# Patient Record
Sex: FEMALE | Race: ASIAN | Employment: FULL TIME | URBAN - METROPOLITAN AREA
[De-identification: names, ages, dates, MRNs, and addresses within clinical notes are randomized per-mention and may not be internally consistent; named-entity substitution may affect disease eponyms.]

---

## 2017-02-13 PROBLEM — D06.9 CIN III WITH SEVERE DYSPLASIA: Status: ACTIVE | Noted: 2017-02-13

## 2019-07-03 ENCOUNTER — HOSPITAL ENCOUNTER (OUTPATIENT)
Dept: SURGERY | Age: 64
Discharge: HOME | End: 2019-07-03
Payer: COMMERCIAL

## 2019-07-03 VITALS
DIASTOLIC BLOOD PRESSURE: 80 MMHG | WEIGHT: 170 LBS | BODY MASS INDEX: 30.12 KG/M2 | HEART RATE: 80 BPM | SYSTOLIC BLOOD PRESSURE: 140 MMHG | OXYGEN SATURATION: 95 % | TEMPERATURE: 98.1 F | HEIGHT: 63 IN | RESPIRATION RATE: 16 BRPM

## 2019-07-03 LAB
BACTERIA SPEC CULT: NORMAL
CREAT SERPL-MCNC: 0.6 MG/DL (ref 0.6–1)
HGB BLD-MCNC: 11.8 G/DL (ref 11.7–15.4)
POTASSIUM SERPL-SCNC: 3.6 MMOL/L (ref 3.5–5.1)
SERVICE CMNT-IMP: NORMAL

## 2019-07-03 PROCEDURE — 84132 ASSAY OF SERUM POTASSIUM: CPT

## 2019-07-03 PROCEDURE — 82565 ASSAY OF CREATININE: CPT

## 2019-07-03 PROCEDURE — 87641 MR-STAPH DNA AMP PROBE: CPT

## 2019-07-03 PROCEDURE — 77030027138 HC INCENT SPIROMETER -A

## 2019-07-03 PROCEDURE — 85018 HEMOGLOBIN: CPT

## 2019-07-03 RX ORDER — CHOLECALCIFEROL (VITAMIN D3) 125 MCG
CAPSULE ORAL DAILY
COMMUNITY

## 2019-07-03 RX ORDER — ALBUTEROL SULFATE 0.63 MG/3ML
0.63 SOLUTION RESPIRATORY (INHALATION)
COMMUNITY
End: 2019-07-03

## 2019-07-03 RX ORDER — CETIRIZINE HCL 10 MG
10 TABLET ORAL DAILY
COMMUNITY
End: 2020-04-01

## 2019-07-03 RX ORDER — HYDROCHLOROTHIAZIDE 25 MG/1
25 TABLET ORAL DAILY
COMMUNITY
End: 2020-04-16 | Stop reason: SDUPTHER

## 2019-07-03 RX ORDER — ALBUTEROL SULFATE 90 UG/1
2 AEROSOL, METERED RESPIRATORY (INHALATION)
COMMUNITY

## 2019-07-03 RX ORDER — PRAVASTATIN SODIUM 10 MG/1
10 TABLET ORAL
COMMUNITY
End: 2020-10-29 | Stop reason: ALTCHOICE

## 2019-07-03 RX ORDER — TRAZODONE HYDROCHLORIDE 50 MG/1
50 TABLET ORAL
COMMUNITY
End: 2020-04-01

## 2019-07-03 RX ORDER — LANOLIN ALCOHOL/MO/W.PET/CERES
CREAM (GRAM) TOPICAL
COMMUNITY
End: 2020-07-09 | Stop reason: SDUPTHER

## 2019-07-03 NOTE — PERIOP NOTES
Patient verified name, , and surgery as listed in [facility]. Patient provided medical/health information and PTA medications to the best of their ability. TYPE  CASE: 11  Orders per surgeon: were not received   Labs per surgeon: none at present time. Labs per anesthesia protocol: hgb, potassium, creat and  mrsa/mssa collected and results are in connect care  EKG:  EKG is not required per protocol. Patient any history of CAD, chest pain or shortness of breath on exertion     Nasal Swab collected per MD order . Patient provided with and instructed on education handouts including Guide to Surgery, blood transfusions, pain management, and hand hygiene for the family and community, and [facility] brochure. Road to Recovery Spine surgery patient guide given. Instructed on incentive spirometry with return demonstration. Long handled prehab sponge given with instructions for use. Patient viewed spine prehab video. Hibiclens and instructions given per hospital policy. Instructed patient to continue previous medications as prescribed prior to surgery unless otherwise directed and to take the following medications the day of surgery according to anesthesia guidelines : bring albuterol inhaler, amlodipine, atenolol, zyrtec and flonase nasal spray     . Instructed patient to hold  the following medications on the day of surgery: all vitamins and supplements 7 days prior including vitamin D and all nsaids 5 days prior    Original medication prescription bottles were visualized during patient appointment. Patient teach back successful and patient demonstrates knowledge of instruction.

## 2019-07-08 ENCOUNTER — ANESTHESIA EVENT (OUTPATIENT)
Dept: SURGERY | Age: 64
End: 2019-07-08
Payer: COMMERCIAL

## 2019-07-08 NOTE — H&P
Chief complaint: Back and leg pain. History of present illness: The patient returns today with persistent symptoms of lumbar deficit as previously described. This is a patient who has a lateral recess stenosis at L4-5. She also has a far lateral disc extrusion on the right at L5. There is compression of both L5 nerve roots. She has been going undergoing bilateral L5 selective nerve root blocks with significant relief however these injections just dont last.  The symptoms are worse on the right. The symptoms have been present for almost a year. The symptoms are described as bilateral L5 radiculopathy. The symptoms are worse with activities. There has been no lasting benefit from conservative efforts including injections, NSAIDs, tramadol, aquatic therapy, physical therapy. . At this point she is ready to proceed with surgical intervention. PMHx/PSHx/Social History/Medications/Allergies/ROS are documented separately and have been reviewed. ROS: Denies fever, chills, chest pain. ????? Medications:  ????? AmLODIPine & Diet Manage Prod;Aspirin; Atenolol;Cetirizine HCl;PARoxetine HCl;Pravastatin Sodium    Allergies:  ?????  ?????    Physical Exam: This is a well developed well nourished adult female in no acute distress. Mood and affect are appropriate. Oriented to person, place, and time. Head is normocephalic and atraumatic. Extraocular movements intact. Sclera anicteric. Respirations are unlabored and there is no evidence of cyanosis. Chest is clear to auscultation. Heart is regular rate and rhythm. Abdomen is soft. Straight leg testing is positive. There is subjective light touch sensory loss over the Bilateral L5.     Reflexes   Right Left   Quadriceps (L4) 2 2   Achilles (S1) 2 2     Ankle jerk is negative for clonus    Strength testing in the lower extremity reveals the following based on the 5 point grading scale:     HF (L2) H Ab (L5) KE (L3/4) ADF (L4) EHL (L5) A Ev (S1) APF (S1)   Right 5 5 5 5 5 5 5   Left 5 5 5 5 5 5 5     The feet are warm with good capillary refill and palpable pedal pulses. Radiographic Studies:    X-rays and MRI were again independently reviewed and correlate with the patients symptoms. Patient has significant spinal stenosis from L4-S1. Assessment/Plan: This patients clinical history and physical exam is consistent with Neurogenic claudication related to spinal stenosis from L4-S1. Raymond Barnett The imaging studies are concordant with the patients symptoms. Conservative efforts have been reasonably exhausted and the patient feels like she cannot go on with the symptoms as they are. We have previously discussed surgical options and now she would like to proceed with surgical scheduling.     ????? Patient met with Dr. Grzegorz Shay the following was discussed: We discussed the details of the surgery including a midline incision in over the low back followed by dissection to the area of stenosis. The nerves would be freed up by trimming any impinging structures including ligaments and bone. Once the nerves are freed the wound would be closed with suture and covered with sterile dressings. The patient would expect to stay in the hospital 1-2 days or until she can get about safely with minimal assistance. A short stay in a rehabilitation facility could also be considered depending on how quickly she recovers. Follow-up would be scheduled for 2-3 weeks and she would have restrictions including no driving, and no lifting greater than 15 lbs until follow up with me.   We also discussed the potential risks of the surgery including, but not limited to infection, spinal fluid leak and potential headaches requiring her to remain supine or have a lumbar drain inserted post-operatively; injury to the cauda equina or peripheral nerve root resulting in paralysis, bowel or bladder dysfunction, or loss of use of an extremity; persistent back or leg symptoms, recurrence of stenosis or the development of instability possibly needing additional surgery;  blood loss requiring transfusion; and the risks of anesthesia including, but not limited heart attack, stroke, and blood clot. The patient voiced an understanding of these issues and would like to discuss them over with her family and will get back with me with her desired treatment course. The surgery that I believe would be most beneficial here is a bilateral L4-S1 laminectomy. Brett table with sling. Patient will also be referred for her last injection for this year. Shell be referred for bilateral L5 selective nerve root blocks as a form of pain control until she is able to undergo surgery. ????? ?????         Electronically Signed By Sharon FLORES and Aleks Hsu MD on 5/30/2019

## 2019-07-09 ENCOUNTER — APPOINTMENT (OUTPATIENT)
Dept: GENERAL RADIOLOGY | Age: 64
End: 2019-07-09
Attending: ORTHOPAEDIC SURGERY
Payer: COMMERCIAL

## 2019-07-09 ENCOUNTER — HOSPITAL ENCOUNTER (OUTPATIENT)
Age: 64
Setting detail: OUTPATIENT SURGERY
Discharge: HOME | End: 2019-07-09
Attending: ORTHOPAEDIC SURGERY | Admitting: ORTHOPAEDIC SURGERY
Payer: COMMERCIAL

## 2019-07-09 ENCOUNTER — ANESTHESIA (OUTPATIENT)
Dept: SURGERY | Age: 64
End: 2019-07-09
Payer: COMMERCIAL

## 2019-07-09 VITALS
DIASTOLIC BLOOD PRESSURE: 70 MMHG | TEMPERATURE: 97.6 F | HEART RATE: 92 BPM | BODY MASS INDEX: 30.21 KG/M2 | OXYGEN SATURATION: 94 % | SYSTOLIC BLOOD PRESSURE: 126 MMHG | RESPIRATION RATE: 11 BRPM | WEIGHT: 170.5 LBS | HEIGHT: 63 IN

## 2019-07-09 DIAGNOSIS — M48.062 LUMBAR STENOSIS WITH NEUROGENIC CLAUDICATION: Primary | ICD-10-CM

## 2019-07-09 PROCEDURE — 77030019908 HC STETH ESOPH SIMS -A: Performed by: NURSE ANESTHETIST, CERTIFIED REGISTERED

## 2019-07-09 PROCEDURE — 77030018836 HC SOL IRR NACL ICUM -A: Performed by: ORTHOPAEDIC SURGERY

## 2019-07-09 PROCEDURE — 74011250637 HC RX REV CODE- 250/637: Performed by: NURSE PRACTITIONER

## 2019-07-09 PROCEDURE — 77030032490 HC SLV COMPR SCD KNE COVD -B: Performed by: ORTHOPAEDIC SURGERY

## 2019-07-09 PROCEDURE — 77030012894: Performed by: ORTHOPAEDIC SURGERY

## 2019-07-09 PROCEDURE — 74011000250 HC RX REV CODE- 250

## 2019-07-09 PROCEDURE — 77030031139 HC SUT VCRL2 J&J -A: Performed by: ORTHOPAEDIC SURGERY

## 2019-07-09 PROCEDURE — 77030039425 HC BLD LARYNG TRULITE DISP TELE -A: Performed by: NURSE ANESTHETIST, CERTIFIED REGISTERED

## 2019-07-09 PROCEDURE — 77030002946 HC SUT NRLN J&J -B: Performed by: ORTHOPAEDIC SURGERY

## 2019-07-09 PROCEDURE — 74011250637 HC RX REV CODE- 250/637: Performed by: ANESTHESIOLOGY

## 2019-07-09 PROCEDURE — 77030037088 HC TUBE ENDOTRACH ORAL NSL COVD-A: Performed by: NURSE ANESTHETIST, CERTIFIED REGISTERED

## 2019-07-09 PROCEDURE — 77030019940 HC BLNKT HYPOTHRM STRY -B: Performed by: NURSE ANESTHETIST, CERTIFIED REGISTERED

## 2019-07-09 PROCEDURE — 76010000161 HC OR TIME 1 TO 1.5 HR INTENSV-TIER 1: Performed by: ORTHOPAEDIC SURGERY

## 2019-07-09 PROCEDURE — 74011250636 HC RX REV CODE- 250/636

## 2019-07-09 PROCEDURE — 74011000250 HC RX REV CODE- 250: Performed by: ORTHOPAEDIC SURGERY

## 2019-07-09 PROCEDURE — 76210000000 HC OR PH I REC 2 TO 2.5 HR: Performed by: ORTHOPAEDIC SURGERY

## 2019-07-09 PROCEDURE — 72020 X-RAY EXAM OF SPINE 1 VIEW: CPT

## 2019-07-09 PROCEDURE — 74011250636 HC RX REV CODE- 250/636: Performed by: ANESTHESIOLOGY

## 2019-07-09 PROCEDURE — 77030030163 HC BN WAX J&J -A: Performed by: ORTHOPAEDIC SURGERY

## 2019-07-09 PROCEDURE — 74011250636 HC RX REV CODE- 250/636: Performed by: ORTHOPAEDIC SURGERY

## 2019-07-09 PROCEDURE — 76060000033 HC ANESTHESIA 1 TO 1.5 HR: Performed by: ORTHOPAEDIC SURGERY

## 2019-07-09 PROCEDURE — 76210000026 HC REC RM PH II 1 TO 1.5 HR: Performed by: ORTHOPAEDIC SURGERY

## 2019-07-09 PROCEDURE — 74011250636 HC RX REV CODE- 250/636: Performed by: NURSE PRACTITIONER

## 2019-07-09 PROCEDURE — 77030014647 HC SEAL FBRN TISSL BAXT -D: Performed by: ORTHOPAEDIC SURGERY

## 2019-07-09 PROCEDURE — 77030025623 HC BUR RND PRECIS STRY -D: Performed by: ORTHOPAEDIC SURGERY

## 2019-07-09 RX ORDER — OXYCODONE HYDROCHLORIDE 5 MG/1
5 TABLET ORAL
Status: COMPLETED | OUTPATIENT
Start: 2019-07-09 | End: 2019-07-09

## 2019-07-09 RX ORDER — MIDAZOLAM HYDROCHLORIDE 1 MG/ML
2 INJECTION, SOLUTION INTRAMUSCULAR; INTRAVENOUS ONCE
Status: COMPLETED | OUTPATIENT
Start: 2019-07-09 | End: 2019-07-09

## 2019-07-09 RX ORDER — ONDANSETRON 2 MG/ML
INJECTION INTRAMUSCULAR; INTRAVENOUS AS NEEDED
Status: DISCONTINUED | OUTPATIENT
Start: 2019-07-09 | End: 2019-07-09 | Stop reason: HOSPADM

## 2019-07-09 RX ORDER — SODIUM CHLORIDE, SODIUM LACTATE, POTASSIUM CHLORIDE, CALCIUM CHLORIDE 600; 310; 30; 20 MG/100ML; MG/100ML; MG/100ML; MG/100ML
INJECTION, SOLUTION INTRAVENOUS
Status: DISCONTINUED | OUTPATIENT
Start: 2019-07-09 | End: 2019-07-09 | Stop reason: HOSPADM

## 2019-07-09 RX ORDER — CEFAZOLIN SODIUM/WATER 2 G/20 ML
2 SYRINGE (ML) INTRAVENOUS
Status: COMPLETED | OUTPATIENT
Start: 2019-07-09 | End: 2019-07-09

## 2019-07-09 RX ORDER — FENTANYL CITRATE 50 UG/ML
100 INJECTION, SOLUTION INTRAMUSCULAR; INTRAVENOUS ONCE
Status: DISCONTINUED | OUTPATIENT
Start: 2019-07-09 | End: 2019-07-09 | Stop reason: HOSPADM

## 2019-07-09 RX ORDER — HYDROMORPHONE HYDROCHLORIDE 2 MG/ML
0.5 INJECTION, SOLUTION INTRAMUSCULAR; INTRAVENOUS; SUBCUTANEOUS
Status: DISCONTINUED | OUTPATIENT
Start: 2019-07-09 | End: 2019-07-09 | Stop reason: HOSPADM

## 2019-07-09 RX ORDER — VANCOMYCIN HYDROCHLORIDE 1 G/20ML
INJECTION, POWDER, LYOPHILIZED, FOR SOLUTION INTRAVENOUS AS NEEDED
Status: DISCONTINUED | OUTPATIENT
Start: 2019-07-09 | End: 2019-07-09 | Stop reason: HOSPADM

## 2019-07-09 RX ORDER — SODIUM CHLORIDE, SODIUM LACTATE, POTASSIUM CHLORIDE, CALCIUM CHLORIDE 600; 310; 30; 20 MG/100ML; MG/100ML; MG/100ML; MG/100ML
100 INJECTION, SOLUTION INTRAVENOUS CONTINUOUS
Status: DISCONTINUED | OUTPATIENT
Start: 2019-07-09 | End: 2019-07-09 | Stop reason: HOSPADM

## 2019-07-09 RX ORDER — MIDAZOLAM HYDROCHLORIDE 1 MG/ML
2 INJECTION, SOLUTION INTRAMUSCULAR; INTRAVENOUS
Status: DISCONTINUED | OUTPATIENT
Start: 2019-07-09 | End: 2019-07-09 | Stop reason: HOSPADM

## 2019-07-09 RX ORDER — BUPIVACAINE HYDROCHLORIDE AND EPINEPHRINE 5; 5 MG/ML; UG/ML
INJECTION, SOLUTION EPIDURAL; INTRACAUDAL; PERINEURAL AS NEEDED
Status: DISCONTINUED | OUTPATIENT
Start: 2019-07-09 | End: 2019-07-09 | Stop reason: HOSPADM

## 2019-07-09 RX ORDER — PROPOFOL 10 MG/ML
INJECTION, EMULSION INTRAVENOUS AS NEEDED
Status: DISCONTINUED | OUTPATIENT
Start: 2019-07-09 | End: 2019-07-09 | Stop reason: HOSPADM

## 2019-07-09 RX ORDER — NALOXONE HYDROCHLORIDE 0.4 MG/ML
0.04 INJECTION, SOLUTION INTRAMUSCULAR; INTRAVENOUS; SUBCUTANEOUS
Status: DISCONTINUED | OUTPATIENT
Start: 2019-07-09 | End: 2019-07-09 | Stop reason: HOSPADM

## 2019-07-09 RX ORDER — HYDROCODONE BITARTRATE AND ACETAMINOPHEN 7.5; 325 MG/1; MG/1
1 TABLET ORAL
Qty: 40 TAB | Refills: 0 | Status: SHIPPED | OUTPATIENT
Start: 2019-07-09 | End: 2019-07-12

## 2019-07-09 RX ORDER — ONDANSETRON 2 MG/ML
INJECTION INTRAMUSCULAR; INTRAVENOUS
Status: COMPLETED
Start: 2019-07-09 | End: 2019-07-09

## 2019-07-09 RX ORDER — GLYCOPYRROLATE 0.2 MG/ML
INJECTION INTRAMUSCULAR; INTRAVENOUS AS NEEDED
Status: DISCONTINUED | OUTPATIENT
Start: 2019-07-09 | End: 2019-07-09 | Stop reason: HOSPADM

## 2019-07-09 RX ORDER — LIDOCAINE HYDROCHLORIDE 20 MG/ML
INJECTION, SOLUTION EPIDURAL; INFILTRATION; INTRACAUDAL; PERINEURAL AS NEEDED
Status: DISCONTINUED | OUTPATIENT
Start: 2019-07-09 | End: 2019-07-09 | Stop reason: HOSPADM

## 2019-07-09 RX ORDER — FENTANYL CITRATE 50 UG/ML
INJECTION, SOLUTION INTRAMUSCULAR; INTRAVENOUS AS NEEDED
Status: DISCONTINUED | OUTPATIENT
Start: 2019-07-09 | End: 2019-07-09 | Stop reason: HOSPADM

## 2019-07-09 RX ORDER — NEOSTIGMINE METHYLSULFATE 1 MG/ML
INJECTION INTRAVENOUS AS NEEDED
Status: DISCONTINUED | OUTPATIENT
Start: 2019-07-09 | End: 2019-07-09 | Stop reason: HOSPADM

## 2019-07-09 RX ORDER — ONDANSETRON 2 MG/ML
4 INJECTION INTRAMUSCULAR; INTRAVENOUS ONCE
Status: DISCONTINUED | OUTPATIENT
Start: 2019-07-09 | End: 2019-07-09 | Stop reason: HOSPADM

## 2019-07-09 RX ORDER — ROCURONIUM BROMIDE 10 MG/ML
INJECTION, SOLUTION INTRAVENOUS AS NEEDED
Status: DISCONTINUED | OUTPATIENT
Start: 2019-07-09 | End: 2019-07-09 | Stop reason: HOSPADM

## 2019-07-09 RX ORDER — DEXAMETHASONE SODIUM PHOSPHATE 4 MG/ML
INJECTION, SOLUTION INTRA-ARTICULAR; INTRALESIONAL; INTRAMUSCULAR; INTRAVENOUS; SOFT TISSUE AS NEEDED
Status: DISCONTINUED | OUTPATIENT
Start: 2019-07-09 | End: 2019-07-09 | Stop reason: HOSPADM

## 2019-07-09 RX ORDER — LIDOCAINE HYDROCHLORIDE 10 MG/ML
0.1 INJECTION INFILTRATION; PERINEURAL AS NEEDED
Status: DISCONTINUED | OUTPATIENT
Start: 2019-07-09 | End: 2019-07-09 | Stop reason: HOSPADM

## 2019-07-09 RX ADMIN — SODIUM CHLORIDE, SODIUM LACTATE, POTASSIUM CHLORIDE, AND CALCIUM CHLORIDE 100 ML/HR: 600; 310; 30; 20 INJECTION, SOLUTION INTRAVENOUS at 07:55

## 2019-07-09 RX ADMIN — Medication 0.5 G: at 08:42

## 2019-07-09 RX ADMIN — MIDAZOLAM HYDROCHLORIDE 2 MG: 2 INJECTION, SOLUTION INTRAMUSCULAR; INTRAVENOUS at 08:05

## 2019-07-09 RX ADMIN — GLYCOPYRROLATE 0.2 MG: 0.2 INJECTION INTRAMUSCULAR; INTRAVENOUS at 09:27

## 2019-07-09 RX ADMIN — Medication 0.5 G: at 08:40

## 2019-07-09 RX ADMIN — NEOSTIGMINE METHYLSULFATE 1.5 MG: 1 INJECTION INTRAVENOUS at 09:27

## 2019-07-09 RX ADMIN — FENTANYL CITRATE 25 MCG: 50 INJECTION, SOLUTION INTRAMUSCULAR; INTRAVENOUS at 09:30

## 2019-07-09 RX ADMIN — GLYCOPYRROLATE 0.2 MG: 0.2 INJECTION INTRAMUSCULAR; INTRAVENOUS at 09:30

## 2019-07-09 RX ADMIN — NEOSTIGMINE METHYLSULFATE 1 MG: 1 INJECTION INTRAVENOUS at 09:30

## 2019-07-09 RX ADMIN — ONDANSETRON 4 MG: 2 INJECTION INTRAMUSCULAR; INTRAVENOUS at 12:52

## 2019-07-09 RX ADMIN — Medication 3 AMPULE: at 07:55

## 2019-07-09 RX ADMIN — GLYCOPYRROLATE 0.2 MG: 0.2 INJECTION INTRAMUSCULAR; INTRAVENOUS at 09:26

## 2019-07-09 RX ADMIN — SODIUM CHLORIDE, SODIUM LACTATE, POTASSIUM CHLORIDE, CALCIUM CHLORIDE: 600; 310; 30; 20 INJECTION, SOLUTION INTRAVENOUS at 08:28

## 2019-07-09 RX ADMIN — NEOSTIGMINE METHYLSULFATE 1 MG: 1 INJECTION INTRAVENOUS at 09:28

## 2019-07-09 RX ADMIN — Medication 0.5 G: at 08:43

## 2019-07-09 RX ADMIN — NEOSTIGMINE METHYLSULFATE 1.5 MG: 1 INJECTION INTRAVENOUS at 09:26

## 2019-07-09 RX ADMIN — LIDOCAINE HYDROCHLORIDE 80 MG: 20 INJECTION, SOLUTION EPIDURAL; INFILTRATION; INTRACAUDAL; PERINEURAL at 08:32

## 2019-07-09 RX ADMIN — ONDANSETRON 4 MG: 2 INJECTION INTRAMUSCULAR; INTRAVENOUS at 09:10

## 2019-07-09 RX ADMIN — PROPOFOL 20 MG: 10 INJECTION, EMULSION INTRAVENOUS at 09:26

## 2019-07-09 RX ADMIN — PROPOFOL 200 MG: 10 INJECTION, EMULSION INTRAVENOUS at 08:32

## 2019-07-09 RX ADMIN — OXYCODONE HYDROCHLORIDE 5 MG: 5 TABLET ORAL at 11:04

## 2019-07-09 RX ADMIN — PROPOFOL 10 MG: 10 INJECTION, EMULSION INTRAVENOUS at 09:28

## 2019-07-09 RX ADMIN — GLYCOPYRROLATE 0.2 MG: 0.2 INJECTION INTRAMUSCULAR; INTRAVENOUS at 09:28

## 2019-07-09 RX ADMIN — HYDROMORPHONE HYDROCHLORIDE 0.5 MG: 2 INJECTION INTRAMUSCULAR; INTRAVENOUS; SUBCUTANEOUS at 10:09

## 2019-07-09 RX ADMIN — FENTANYL CITRATE 25 MCG: 50 INJECTION, SOLUTION INTRAMUSCULAR; INTRAVENOUS at 09:40

## 2019-07-09 RX ADMIN — FENTANYL CITRATE 50 MCG: 50 INJECTION, SOLUTION INTRAMUSCULAR; INTRAVENOUS at 08:32

## 2019-07-09 RX ADMIN — ROCURONIUM BROMIDE 50 MG: 10 INJECTION, SOLUTION INTRAVENOUS at 08:32

## 2019-07-09 RX ADMIN — DEXAMETHASONE SODIUM PHOSPHATE 4 MG: 4 INJECTION, SOLUTION INTRA-ARTICULAR; INTRALESIONAL; INTRAMUSCULAR; INTRAVENOUS; SOFT TISSUE at 08:47

## 2019-07-09 RX ADMIN — Medication 0.5 G: at 08:41

## 2019-07-09 RX ADMIN — FENTANYL CITRATE 50 MCG: 50 INJECTION, SOLUTION INTRAMUSCULAR; INTRAVENOUS at 08:28

## 2019-07-09 RX ADMIN — FENTANYL CITRATE 50 MCG: 50 INJECTION, SOLUTION INTRAMUSCULAR; INTRAVENOUS at 08:50

## 2019-07-09 NOTE — DISCHARGE INSTRUCTIONS
Discharge Instructions    Wound Care and Showering  Your wound will typically be covered with a clear plastic dressing when you go home from the hospital. Since it is transparent, you will see the underlying gauze turn red with blood which is normal. You do not need to change the dressing unless it is leaking from the edges. Otherwise leave this dressing in place. The clear plastic dressing is waterproof so you can take a shower while it is on. You may remove the clear plastic dressing and the underlying gauze 3 days after surgery. There will be small tape strips under the gauze which should be left in place. If there is no leaking from the wound, you may take a shower and allow the tape strips to get wet. Some of them may fall off. The remaining strips will be removed once you return to the office. If there is persistent leaking when you first remove the clear dressing, apply new gauze and new clear plastic dressing (typically purchased at a pharmacy) over the wound. Hair washing is permissible in the shower. No tub baths, hot tubs or whirlpools until seen in the office. If any of the following should occur, please call the office:    -Persistent drainage from the incision site.  -Opening of incisions  -Fevers greater than 101 degrees  -Flu-like symptoms  -Increased redness    Exercise  You have unlimited walking and stair climbing privileges. Walking outside or walking on a treadmill without an incline is also allowed. Do NOT lift anything weighing greater than 10-15 lbs. Especially try to avoid lifting or reaching above your head. Sleeping  You may sleep in any comfortable position. Many patients find comfort sleeping in a recliner chair. It is normal to have difficulty sleeping for the first several weeks following your surgery. We recommend trying Benadryl, Melatonin, or Tylenol PM for help sleeping. All are over-the-counter and can be found in drugstores.      Eating  Because of the tubes in your throat while asleep during surgery, it is normal to have a sore throat and some difficulty swallowing solid foods after your surgery. This may persist for several weeks. Eating soft foods like yogurt, macaroni, and mashed potatoes seem to help. Today, you may have bland foods, nothing spicy or greasy. Pain  If you feel you need pain medicine, you may take regular or extra-strength Tylenol. Do NOT take an anti-inflammatory medication such as Advil, Aleve, or Motrin for the first 8 weeks following your surgery. Anti-inflammatory medications like these hinder bone growth and healing, which is critical in the weeks following surgery. Do NOT resume taking Foasamax for 8 weeks after your fusion surgery. To help alleviate persistent soreness around the shoulder lades, apply ice or warm moist compresses. Driving  You may NOT drive a car until told otherwise by your physician. You may be a passenger for short distances (about 20-30 minutes). If you must take a longer trip, be sure to make several pit stops so that you can walk and stretch your legs. Reclining in the passenger seat seems to be the most comfortable position for most patients. In some states, it is illegal to drive a car while wearing a neck brace. Follow up appointments  When you are discharged from the hospital, a follow up appointment will be made for 2-3 weeks from your surgery date. Call [phone] to confirm your appointment. After general anesthesia or intravenous sedation, for 24 hours or while taking prescription Narcotics:  · Limit your activities  · Do not drive and operate hazardous machinery  · Do not make important personal or business decisions  · Do  not drink alcoholic beverages  · If you have not urinated within 8 hours after discharge, please contact your surgeon on call. *  Please give a list of your current medications to your Primary Care Provider.   *  Please update this list whenever your medications are discontinued, doses are      changed, or new medications (including over-the-counter products) are added. *  Please carry medication information at all times in case of emergency situations. These are general instructions for a healthy lifestyle:  No smoking/ No tobacco products/ Avoid exposure to second hand smoke  Surgeon General's Warning:  Quitting smoking now greatly reduces serious risk to your health. Obesity, smoking, and sedentary lifestyle greatly increases your risk for illness  A healthy diet, regular physical exercise & weight monitoring are important for maintaining a healthy lifestyle    You may be retaining fluid if you have a history of heart failure or if you experience any of the following symptoms:  Weight gain of 3 pounds or more overnight or 5 pounds in a week, increased swelling in our hands or feet or shortness of breath while lying flat in bed. Please call your doctor as soon as you notice any of these symptoms; do not wait until your next office visit.

## 2019-07-09 NOTE — ANESTHESIA PREPROCEDURE EVALUATION
Relevant Problems   No relevant active problems       Anesthetic History     PONV          Review of Systems / Medical History  Pertinent labs reviewed    Pulmonary  Within defined limits                 Neuro/Psych   Within defined limits           Cardiovascular    Hypertension              Exercise tolerance: >4 METS  Comments: Normal stress test 2018   GI/Hepatic/Renal  Within defined limits              Endo/Other  Within defined limits           Other Findings            Physical Exam    Airway  Mallampati: II  TM Distance: 4 - 6 cm  Neck ROM: normal range of motion   Mouth opening: Diminished (comment)     Cardiovascular  Regular rate and rhythm,  S1 and S2 normal,  no murmur, click, rub, or gallop             Dental  No notable dental hx       Pulmonary  Breath sounds clear to auscultation               Abdominal  GI exam deferred       Other Findings            Anesthetic Plan    ASA: 2  Anesthesia type: general          Induction: Intravenous  Anesthetic plan and risks discussed with: Patient

## 2019-07-09 NOTE — ANESTHESIA POSTPROCEDURE EVALUATION
Procedure(s):  L4-S1 LAMINECTOMY. general    Anesthesia Post Evaluation        Patient location during evaluation: PACU  Patient participation: complete - patient participated  Level of consciousness: awake  Pain management: satisfactory to patient  Airway patency: patent  Anesthetic complications: no  Cardiovascular status: hemodynamically stable  Respiratory status: spontaneous ventilation  Hydration status: euvolemic  Post anesthesia nausea and vomiting:  none      Vitals Value Taken Time   /70 7/9/2019 11:50 AM   Temp 36.4 °C (97.6 °F) 7/9/2019 11:06 AM   Pulse 91 7/9/2019 11:53 AM   Resp 19 7/9/2019 11:53 AM   SpO2 97 % 7/9/2019 11:53 AM   Vitals shown include unvalidated device data.

## 2019-07-09 NOTE — OP NOTES
[address] [address]   [phone]    OPERATIVE REPORT    Patient ID:Jaja Dickey  [id number]  1955  66 y.o. DATE OF SURGERY: 7/9/2019    SURGEON: Dr. Elias Salazar. PREOPERATIVE DIAGNOSIS: Lumbar stenosis    POSTOPERATIVE DIAGNOSIS: Lumbar stenosis    PROCEDURE:    1. Lumbar laminectomy L4 with bilateral foraminotomies and L4 root decompression. (CPT K815566)  2. Lumbar laminectomy L5  with bilateral foraminotomies and L5 root decompression  (CPT 62461)  3. Lumbar laminectomy S1  with bilateral foraminotomies and S1 root decompression  (CPT 07438)    ANESTHESIA: General.    ESTIMATED BLOOD LOSS: 100 ml    POSTOPERATIVE CONDITION: Stable. INTRAOPERATIVE COMPLICATIONS: None. INDICATIONS FOR PROCEDURE: Back and leg pain consistent with claudication/lumbar radiculitis that is no longer responsive to conservative measures. Walking and standing tolerances have diminished. Imaging studies are concordant, showing lumbar stenosis. In the outpatient setting, the risks, benefits, and potential complications of the above listed procedure were discussed and an informed consent was obtained. DESCRIPTION OF PROCEDURE: After adequate induction of general anesthesia, the patient was positioned prone on the Nixon Pew spinal table. Care was taken to pad all bony prominences. The shoulders and elbows were placed in the 90/90 position. The abdomen was allowed to hang free to decrease intraabdominal and venous pressure. The lumbar area was prepped and draped in the usual sterile fashion. Preoperative antibiotic was administered. A time out was called to confirm the appropriate patient, proposed procedure and proposed incision sites. With this conformation, an incision was created midline, over the lumbar spinous processes. Dissection was carried down to the lumbodorsal fascia.      A Kocher clamp was attached to a spinous process and a cross-table lateral fluoroscopic image was obtained to confirm appropriate spinal level. With this confirmation, the spinous process was marked with a Leksell rongeur and the lumbodorsal fascia and paraspinous musculature were elevated in a subperiosteal fashion, laterally off of the spinous processes and lamina. The pars interarticularis was exposed at each level. Care was taken to preserve the facet capsule at each level. The deep retractors were placed to facilitate visualization. A Leksell rongeur was used to resect the spinous processes of  L4 - S1. The 4 mm darell was used to thin the lamina to an eggshell like thickness. A triple-0 angled curet was used to elevated the ligamentum flavum off of its origin on the caudal surface of the L5 lamina as well as off the cephalad surface of the adjacent lamina. The ligamentum flavum was elevated from the thecal sac and a plane was created in the epidural space with the [facility]. A 4 mm Kerrison was used to perform a central laminectomy of  S1, L5 and L4 . A small dural rent at a denticulate insertion was approximated with a figure of eight 4-0 neurilon. The central laminectomy was widened to the medial border of the pedicle at each level. With the central laminectomy completed, a 4 mm Kerrison was used to under cut the lateral recess along the entire length of the laminectomy site. Then using the Hezzie Foyer elevator to retract the thecal sac and identify each of the nerve roots, partial foraminotomies were performed and each nerve was visualized and decompressed bilaterally including L4, L5, and S1. There was felt to be no significant facet instability and a fusion was not deemed to be necessary. With the decompression completed, the wound was liberally irrigated with saline solution. Floseal and Vancomycin were applied. The lumbodorsal fascia was approximated with a #1 Vicryl suture in an interrupted fashion.  The subcutaneous tissue and skin were approximated in a layered fashion. Benzoin and Steri-Strips were applied. Sterile dressings were applied. The patient tolerated the procedure well and was returned to the postanesthesia care unit in stable condition. At the end of the case, all sponge, needle, and instrument counts were correct.        Erwin Valdovinos MD

## 2019-08-28 ENCOUNTER — HOSPITAL ENCOUNTER (OUTPATIENT)
Dept: PHYSICAL THERAPY | Age: 64
Discharge: HOME | End: 2019-08-28
Payer: COMMERCIAL

## 2019-08-28 PROCEDURE — 97110 THERAPEUTIC EXERCISES: CPT

## 2019-08-28 PROCEDURE — 97161 PT EVAL LOW COMPLEX 20 MIN: CPT

## 2019-08-28 NOTE — PROGRESS NOTES
Cayetano Smith  : 1955  Primary: Denise Razo [facility]  Secondary:  [address] at [facility]  [address], [address], [place]  [phone]   [phone]        OUTPATIENT PHYSICAL THERAPY: Daily Treatment Note 2019  ICD-10: Low back pain (M54.5), Difficulty in walking, not elsewhere classified (R26.2), Muscle weakness (generalized) (M62.81)    Pre-treatment Symptoms/Complaints:  Moderate levels of back pain  Pain: Initial:   5/10 Post Session:  5/10   Medications Last Reviewed:  2019    Updated Objective Findings:  See evaluation note from today   TREATMENT:   THERAPEUTIC EXERCISE: ( 15 minutes):     Date:   Date:   Date:     Activity/Exercise Parameters Parameters Parameters         Quad set 5 sec hold x  10     SAQ X 10                                 MANUAL THERAPY: ( minutes):     Manual Therapy technique and location Date:   Date:   Date:      Parameters Parameters Parameters           MODALITIES ( minutes):    Include Fitness  Treatment/Session Summary:    · Response to Treatment:pt demonstrated understanding of exercises. Reported fatigue. · Communication/Consultation:  None today  · Equipment provided today:  None today  · Recommendations/Intent for next treatment session: Next visit will focus on LE, core, back strengthening and motion.   Treatment Plan of Care Effective Dates:   2019 TO 2019  Total Treatment Billable Duration:  20 minutes eval, 15 minutes therex  PT Patient Time In/Time Out  Time In: 1515  Time Out: 1309 Harshad Fontenot, PT, DPT

## 2019-08-28 NOTE — THERAPY EVALUATION
Fitz Sorensen  : 1955  Payor: Elliott Alas / Plan: Frank Hammond / Product Type: Commerical /  [address]  at [facility]  [address], [address], [place]  [phone]   [phone]             OUTPATIENT PHYSICAL THERAPY:Initial Assessment 2019   ICD-10: Treatment Diagnosis: Low back pain (M54.5), Difficulty in walking, not elsewhere classified (R26.2), Muscle weakness (generalized) (M62.81)  Precautions/Allergies:   Latex   MD Orders: evaluate and treat, modalities, core strengthening and stretching, gait training and right lower extremity strengthening MEDICAL/REFERRING DIAGNOSIS:  Encounter for follow-up examination after completed treatment for conditions other than malignant neoplasm [Z09]  Radiculopathy, lumbar region [M54.16]   DATE OF ONSET:   REFERRING PHYSICIAN: Shaniqua Mchugh 372: --     INITIAL ASSESSMENT:  Ms. Yenifer Davis presents with strength deficits in the R LE and functional gait deficits. Pt will benefit from skilled physical therapy to address dysfunctions. PROBLEM LIST (Impacting functional limitations):  1. Decreased Strength  2. Decreased ADL/Functional Activities  3. Decreased Ambulation Ability/Technique  4. Decreased Activity Tolerance  5. Decreased Towns with Home Exercise Program INTERVENTIONS PLANNED: (Treatment may consist of any combination of the following)  1. Home Exercise Program (HEP)  2. Manual Therapy including soft tissue mobilization  3. Neuromuscular Re-education/Strengthening  4. Range of Motion (ROM)  5. Therapeutic Activites  6. Therapeutic Exercise/Strengthening  7. Modalities including heat/cold application, electrical stimulation, vasopneumatic compression, ultrasound   TREATMENT PLAN:  Effective Dates: 2019 TO 2019 (90 days).   Frequency/Duration: 2 times a week for 90 Day(s)  GOALS: (Goals have been discussed and agreed upon with patient.)  Short-Term Functional Goals: Time Frame: 5 weeks     1. Pt will demonstrate independence with > or = 2 exercises in HEP. 2. Pt will report < or = 19 on Oswestry. Discharge Goals: Time Frame: 10 weeks  1. Pt will demonstrate independence with > or = 4 exercises in HEP. 2. Pt will report < or = 14 on Oswestry. 3. Pt will demonstrate functional gait. 4. Pt will demonstrate functional R LE strength. OUTCOME MEASURE:   Tool Used: Modified Oswestry Low Back Pain Questionnaire  Score:  Initial: 24/50  Most Recent: X/50 (Date: -- )   Interpretation of Score: Each section is scored on a 0-5 scale, 5 representing the greatest disability. The scores of each section are added together for a total score of 50. MEDICAL NECESSITY:   · Skilled intervention continues to be required due to decreased function. REASON FOR SERVICES/OTHER COMMENTS:  · Patient continues to require skilled intervention due to decreased function. Total Duration:  PT Patient Time In/Time Out  Time In: 1515  Time Out: 1600    Rehabilitation Potential For Stated Goals: Good  Regarding Eh Ace's therapy, I certify that the treatment plan above will be carried out by a therapist or under their direction. Thank you for this referral,  Melissa Chan, PT, DPT          PAIN/SUBJECTIVE:   Initial:   5/10 Post Session:  5/10   HISTORY:   History of Injury/Illness (Reason for Referral):  Pt had back surgery on July 9th due to back pain that radiated down R LE, symptoms had been ongoing for years before sx. Following sx reports of back pain and R LE weakness that results in leg buckling occasionally, so using a cane. Past Medical History/Comorbidities:   Ms. Cooper Osman  has a past medical history of DVT (deep venous thrombosis) ([address] (~2009), Hypertension, and Nausea & vomiting. Ms. Cooper Osman  has a past surgical history that includes hx open cholecystectomy (1995) and hx hysterectomy (1991).   Social History/Living Environment:     house  Prior Level of Function/Work/Activity:  Works retail- computer work at a desk  Dominant Side:         RIGHT   Ambulatory/Rehab [facility] Risk Assessment   Risk Factors:       No Risk Factors Identified Ability to Rise from Chair:       (0)  Ability to rise in a single movement   Falls Prevention Plan:       No modifications necessary   Total: (5 or greater = High Risk): 0   ©2010 [facility] [employer] States Patent #[id number]. Federal Law prohibits the replication, distribution or use without written permission from [facility] [employer]   Current Medications:       Current Outpatient Medications:     cetirizine (ZYRTEC) 10 mg tablet, Take 10 mg by mouth daily. , Disp: , Rfl:     hydroCHLOROthiazide (HYDRODIURIL) 25 mg tablet, Take 25 mg by mouth daily. Indications: high blood pressure, Disp: , Rfl:     ferrous sulfate 325 mg (65 mg iron) tablet, Take  by mouth Daily (before breakfast). , Disp: , Rfl:     traZODone (DESYREL) 50 mg tablet, Take 50 mg by mouth nightly as needed for Sleep., Disp: , Rfl:     pravastatin (PRAVACHOL) 10 mg tablet, Take 10 mg by mouth nightly., Disp: , Rfl:     cholecalciferol, vitamin D3, (VITAMIN D3) 2,000 unit tab, Take  by mouth daily. , Disp: , Rfl:     albuterol (PROVENTIL HFA, VENTOLIN HFA, PROAIR HFA) 90 mcg/actuation inhaler, Take 2 Puffs by inhalation every four (4) hours as needed for Wheezing., Disp: , Rfl:     fluticasone (FLONASE) 50 mcg/actuation nasal spray, 2 Sprays by Nasal route daily. , Disp: , Rfl:     amLODIPine (NORVASC) 10 mg tablet, Take 1 Tab by mouth daily. , Disp: , Rfl:     aspirin delayed-release 81 mg tablet, Take 81 mg by mouth daily. Indications: prevention of transient ischemic attack, Disp: , Rfl:     atenolol (TENORMIN) 25 mg tablet, Take 25 mg by mouth daily. , Disp: , Rfl:    Date Last Reviewed:  8/28/2019     Number of Personal Factors/Comorbidities that affect the Plan of Care: 0: LOW COMPLEXITY   EXAMINATION: Observation:       Standing- weight shifted onto L LE in standing       Gait- ambulated in using cane in R hand, increased trunk sway to the R during R LE stance phase. Generally short strides with L and decreased stance time on R    Strength:  Muscle/Movement Strength at Eval Reassessment Date:    knee extension R 3-/5    knee flexion R 3/5    Hip flexion  R 2+/5    Hip extension R 3/5    Hip abduction R 2+/5             Range of Motion:   Movement/Joint ROM at eval Reassessment Date:    Knee  R wfl    hip Generally decreased IR rotation on R                          Palpation:  No palpation done today. Body Structures Involved:  1. Nerves  2. Bones  3. Joints  4. Muscles Body Functions Affected:  1. Sensory/Pain  2. Neuromusculoskeletal  3. Movement Related Activities and Participation Affected:  1. General Tasks and Demands  2. Mobility  3. Domestic Life  4.  Community, Social and [facility] [place]   Number of elements (examined above) that affect the Plan of Care: 1-2: LOW COMPLEXITY   CLINICAL PRESENTATION:   Presentation: Stable and uncomplicated: LOW COMPLEXITY   CLINICAL DECISION MAKING:   Use of outcome tool(s) and clinical judgement create a POC that gives a: Clear prediction of patient's progress: LOW COMPLEXITY       Total Evaluation Duration:  PT Patient Time In/Time Out  Time In: 1515  Time Out: 1600    Future Appointments   Date Time Provider Socorro Chandler   8/30/2019  8:30 AM Damon Julien PT, DPT [facility]   9/6/2019  3:15 PM Hernandez Fontenot PT, DPT [facility]   9/9/2019  2:30 PM Hernandez Fontenot PT, DPT [facility]   9/11/2019  9:15 AM Kannan Huggins PT [facility]   9/16/2019  2:30 PM Hernandez Galeas PT, DPT [facility]   9/18/2019 10:30 AM Hernandez Galeas PT, DPT [facility]   9/23/2019  2:30 PM Hernandez Galeas PT, DPT [facility]   9/25/2019 10:30 AM Damon Julien PT, DPT [facility]   9/30/2019  2:30 PM Trey Lopes [facility]   10/2/2019 10:30 AM Trey Lopes PT, LUIS JAC[facility]   10/7/2019  2:30 PM [facility]   10/9/2019 10:30 AM Duke Hampton [facility]       Naeem Kenney PT, DPT

## 2019-08-30 ENCOUNTER — HOSPITAL ENCOUNTER (OUTPATIENT)
Dept: PHYSICAL THERAPY | Age: 64
Discharge: HOME | End: 2019-08-30
Payer: COMMERCIAL

## 2019-08-30 PROCEDURE — 97110 THERAPEUTIC EXERCISES: CPT

## 2019-08-30 NOTE — PROGRESS NOTES
Anette Enzo  : 1955  Primary: Luis Angel [facility]  Secondary:  [address] at [facility]  [address], [address], [place] [address]  [phone]   [phone]        OUTPATIENT PHYSICAL THERAPY: Daily Treatment Note 2019  ICD-10: Low back pain (M54.5), Difficulty in walking, not elsewhere classified (R26.2), Muscle weakness (generalized) (M62.81)    Pre-treatment Symptoms/Complaints:  Moderate levels of back pain  Pain: Initial:   5/10 Post Session:  5/10   Medications Last Reviewed:  2019    Updated Objective Findings: pt demonstrates step to gait pattern when walking without cane; improved gait sequencing using cane in L hand    TREATMENT:   THERAPEUTIC EXERCISE: ( 45 minutes):     Date:   Date:     Date:     Activity/Exercise Parameters Parameters Parameters   Recumbent stepper  8 min level 1    Quad set 5 sec hold x  10 X 20    SAQ X 10 X 20    SLR  X 20    bridge  X 20    SL clamshells  X 20 R    SL hip abduction  X 10 R    ambulation  No cane, CGA, 50 ft x 2                    MANUAL THERAPY: ( minutes):     Manual Therapy technique and location Date:   Date:   Date:      Parameters Parameters Parameters           MODALITIES ( minutes):    [employer] Portal  Treatment/Session Summary:    · Response to Treatment:pt reported R LE fatigue. Very hesitant to weight bear on R LE.    · Communication/Consultation:  None today  · Equipment provided today:  None today  · Recommendations/Intent for next treatment session: Next visit will focus on LE, core, back strengthening and motion.   Treatment Plan of Care Effective Dates:   2019 TO 2019  Total Treatment Billable Duration: 45 minutes therex  PT Patient Time In/Time Out  Time In: 830  Time Out: 915    Marietta Marcos, PT, DPT

## 2019-09-06 ENCOUNTER — HOSPITAL ENCOUNTER (OUTPATIENT)
Dept: PHYSICAL THERAPY | Age: 64
Discharge: HOME | End: 2019-09-06
Payer: COMMERCIAL

## 2019-09-06 PROCEDURE — 97110 THERAPEUTIC EXERCISES: CPT

## 2019-09-06 NOTE — PROGRESS NOTES
Cayetano Smith  : 1955  Primary: Denise Razo [facility]  Secondary:  [address] at [facility]  [address], [address], [place] [address]  [phone]   [phone]        OUTPATIENT PHYSICAL THERAPY: Daily Treatment Note 2019  ICD-10: Low back pain (M54.5), Difficulty in walking, not elsewhere classified (R26.2), Muscle weakness (generalized) (M62.81)    Pre-treatment Symptoms/Complaints:  Reports high levels of fatigue after last treatment. For the past couple of weeks the upper back has been painful and getting more painful, having difficulty rolling over. Pain: Initial:   5/10 Post Session:  5/10   Medications Last Reviewed:  2019    Updated Objective Findings: none today   TREATMENT:   THERAPEUTIC EXERCISE: ( 45 minutes):     Date:   Date:     Date:  9.   Activity/Exercise Parameters Parameters Parameters   Recumbent stepper  8 min level 1 12 min level 1   Quad set 5 sec hold x  10 X 20    SAQ X 10 X 20    SLR  X 20 2 x 10   bridge  X 20 2 x 10   SL clamshells  X 20 R    SL hip abduction  X 10 R    ambulation  No cane, CGA, 50 ft x 2    Abdominal isometrics   10 sec hold x 3 B LE   STC   30 sec hold x 3 R   Piriformis stretch   30 sec hold x 3 R   Towel roll under mid-thoracic   X 2 min   Seated thoracic flexion, extension   X 10   Ball squat   X 20       MANUAL THERAPY: ( minutes):     Manual Therapy technique and location Date:   Date:   Date:      Parameters Parameters Parameters           MODALITIES ( minutes):    [employer] Portal  Treatment/Session Summary:    · Response to Treatment: spent some time with mid thoracic back pain today, felt a stretch in the area with seated thoracic flexion/extension. To do this at home. · Communication/Consultation:  None today  · Equipment provided today:  None today  · Recommendations/Intent for next treatment session: Next visit will focus on LE, core, back strengthening and motion.   Treatment Plan of Care Effective Dates:   8/28/2019 TO 11/26/2019  Total Treatment Billable Duration: 45 minutes therex  PT Patient Time In/Time Out  Time In: 1783  Time Out: 7617 [facility] IMANI Fontenot, DPT

## 2019-09-09 ENCOUNTER — HOSPITAL ENCOUNTER (OUTPATIENT)
Dept: PHYSICAL THERAPY | Age: 64
Discharge: HOME | End: 2019-09-09
Payer: COMMERCIAL

## 2019-09-09 PROCEDURE — 97110 THERAPEUTIC EXERCISES: CPT

## 2019-09-09 NOTE — PROGRESS NOTES
Nelli Muller  : 1955  Primary: Jose Carlos Rodriguez [facility]  Secondary:  [address] at [facility]  [address], [address], [place] [address]  [phone]   [phone]        OUTPATIENT PHYSICAL THERAPY: Daily Treatment Note 2019  ICD-10: Low back pain (M54.5), Difficulty in walking, not elsewhere classified (R26.2), Muscle weakness (generalized) (M62.81)    Pre-treatment Symptoms/Complaints:  Mid back pain is much better since she has started doing the stretches, was so tired after last treatment   Pain: Initial:   5/10 Post Session:  5/10   Medications Last Reviewed:  2019    Updated Objective Findings: none today   TREATMENT:   THERAPEUTIC EXERCISE: ( 45 minutes):     Date:   Date:     Date:   9.9   Activity/Exercise Parameters Parameters Parameters    Recumbent stepper  8 min level 1 12 min level 1 10 min level 1   Quad set 5 sec hold x  10 X 20     SAQ X 10 X 20     SLR  X 20 2 x 10 2 x 10   bridge  X 20 2 x 10 2 x 10   SL clamshells  X 20 R  2 x 10 R   SL hip abduction  X 10 R  2 x 10 R   ambulation  No cane, CGA, 50 ft x 2     Abdominal isometrics   10 sec hold x 3 B LE    STC   30 sec hold x 3 R 30 sec hold x 3 R   Piriformis stretch   30 sec hold x 3 R 30 sec hold x 3 R   Towel roll under mid-thoracic   X 2 min    Seated thoracic flexion, extension   X 10    Ball squat   X 20    Sit to stand    Elevated surface, x 10   balance    In II bars, neutral stance without hand hold 30 sec x 4              MANUAL THERAPY: ( minutes):     Manual Therapy technique and location Date:   Date:   Date:      Parameters Parameters Parameters           MODALITIES ( minutes):    [employer] Portal  Treatment/Session Summary:    · Response to Treatment: reported increased fatigue with exercises.    · Communication/Consultation:  None today  · Equipment provided today:  None today  · Recommendations/Intent for next treatment session: Next visit will focus on LE, core, back strengthening and motion.   Treatment Plan of Care Effective Dates:   8/28/2019 TO 11/26/2019  Total Treatment Billable Duration: 45 minutes therex  PT Patient Time In/Time Out  Time In: 1430  Time Out: 6997    Devonte Garcias, PT, DPT     Future Appointments   Date Time Provider Socorro Chandler   9/11/2019  4:15 PM Maria Del Rosario Braun, PT, DPT [facility]   9/16/2019  2:30 PM Zhao Fontenot, PT, DPT [facility]   9/18/2019  1:30 PM Octavio Jade, PT [facility]   9/23/2019  2:30 PM Zhao Fontenot, PT, DPT [facility]   9/25/2019  2:30 PM Zhao Fontenot, PT, DPT [facility]   9/30/2019  2:30 PM Lebanon LynnZhao galvez Spenser, PT, DPT [facility]   10/2/2019  2:30 PM Lebanon LynnZhao galvez Spenser, PT, DPT [facility]   10/7/2019  2:30 PM Zhao Dueñas, PT [facility]   10/9/2019  2:30 PM Lori Cuevas, PT [facility]   '

## 2019-09-11 ENCOUNTER — HOSPITAL ENCOUNTER (OUTPATIENT)
Dept: PHYSICAL THERAPY | Age: 64
Discharge: HOME | End: 2019-09-11
Payer: COMMERCIAL

## 2019-09-11 PROCEDURE — 97110 THERAPEUTIC EXERCISES: CPT

## 2019-09-11 NOTE — PROGRESS NOTES
Henry Yin  : 1955  Primary: Pancho Brandon [facility]  Secondary:  [address] at [facility]  [address], [address], [place] [address]  [phone]   [phone]        OUTPATIENT PHYSICAL THERAPY: Daily Treatment Note 2019  ICD-10: Low back pain (M54.5), Difficulty in walking, not elsewhere classified (R26.2), Muscle weakness (generalized) (M62.81)    Pre-treatment Symptoms/Complaints:  Pt almost fell when walking to the bathroom at work because R LE gave way. She was very tired after last treatment. Pain: Initial:   5/10 Post Session:  5/10   Medications Last Reviewed:  2019    Updated Objective Findings: none today   TREATMENT:   THERAPEUTIC EXERCISE: ( 45 minutes):     Date:   Date:     Date:  9 9.9 9.11   Activity/Exercise Parameters Parameters Parameters     Recumbent stepper  8 min level 1 12 min level 1 10 min level 1 10 min level 1   Quad set 5 sec hold x  10 X 20      SAQ X 10 X 20      SLR  X 20 2 x 10 2 x 10 2 x 15   bridge  X 20 2 x 10 2 x 10 2 x 15   SL clamshells  X 20 R  2 x 10 R 2 x 15   SL hip abduction  X 10 R  2 x 10 R 2 x 10   ambulation  No cane, CGA, 50 ft x 2      Abdominal isometrics   10 sec hold x 3 B LE  5 sec hold x 3; therapist assist   STC   30 sec hold x 3 R 30 sec hold x 3 R    Piriformis stretch   30 sec hold x 3 R 30 sec hold x 3 R    Towel roll under mid-thoracic   X 2 min     Seated thoracic flexion, extension   X 10     Ball squat   X 20     Sit to stand    Elevated surface, x 10 Elevated surface, x 10   balance    In II bars, neutral stance without hand hold 30 sec x 4 In II bars, neutral stance without hand hold 30 sec x 4               MANUAL THERAPY: ( minutes):     Manual Therapy technique and location Date:   Date:   Date:      Parameters Parameters Parameters           MODALITIES ( minutes):    [employer] Portal  Treatment/Session Summary:    · Response to Treatment: reported increased fatigue with exercises. · Communication/Consultation:  None today  · Equipment provided today:  None today  · Recommendations/Intent for next treatment session: Next visit will focus on LE, core, back strengthening and motion.   Treatment Plan of Care Effective Dates:   8/28/2019 TO 11/26/2019  Total Treatment Billable Duration: 45 minutes therex  PT Patient Time In/Time Out  Time In: 3024  Time Out: 1309 [facility] Richardey, PT, DPT     Future Appointments   Date Time Provider Socorro Chandler   9/16/2019  2:30 PM [facility]ifan[facility]les [facility]   9/18/2019  1:30 PM Marquis Jara PT [facility]   9/23/2019  2:30 PM Leeroy Fontenot PT, DPT [facility]   9/25/2019  2:30 PM Leeroy Fontenot, PT, DPT [facility]   9/30/2019  2:30 PM Leeroy Fontenot, PT, DPT [facility]   10/2/2019  2:30 PM Leeroy Shelton, PT, DPT [facility]   10/7/2019  2:30 PM Leeroy Cedillo PT [facility]   10/9/2019  2:30 PM Jose Angel DUTTON PT [facility]   '

## 2019-09-16 ENCOUNTER — HOSPITAL ENCOUNTER (OUTPATIENT)
Dept: PHYSICAL THERAPY | Age: 64
Discharge: HOME | End: 2019-09-16
Payer: COMMERCIAL

## 2019-09-16 PROCEDURE — 97110 THERAPEUTIC EXERCISES: CPT

## 2019-09-16 NOTE — PROGRESS NOTES
Fitz Sorensen  : 1955  Primary: Joann Terry [facility]  Secondary:  [address] at [facility]  [address], [address], [place] [address]  [phone]   [phone]        OUTPATIENT PHYSICAL THERAPY: Daily Treatment Note 2019  ICD-10: Low back pain (M54.5), Difficulty in walking, not elsewhere classified (R26.2), Muscle weakness (generalized) (M62.81)    Pre-treatment Symptoms/Complaints:  Pt reports continuing R LE weakness.     Pain: Initial:   10 Post Session:  5/10   Medications Last Reviewed:  2019    Updated Objective Findings: none today   TREATMENT:   THERAPEUTIC EXERCISE: ( 45 minutes):     Date:   Date:     Date:   9.9 9.11 9.16   Activity/Exercise Parameters Parameters Parameters      Recumbent stepper  8 min level 1 12 min level 1 10 min level 1 10 min level 1 10 min level 1   Quad set 5 sec hold x  10 X 20       SAQ X 10 X 20       SLR  X 20 2 x 10 2 x 10 2 x 15 2 x 15   bridge  X 20 2 x 10 2 x 10 2 x 15 2 x 15   SL clamshells  X 20 R  2 x 10 R 2 x 15 2 x 15   SL hip abduction  X 10 R  2 x 10 R 2 x 10 2 x 15   ambulation  No cane, CGA, 50 ft x 2       Abdominal isometrics   10 sec hold x 3 B LE  5 sec hold x 3; therapist assist    STC   30 sec hold x 3 R 30 sec hold x 3 R     Piriformis stretch   30 sec hold x 3 R 30 sec hold x 3 R     Towel roll under mid-thoracic   X 2 min      Seated thoracic flexion, extension   X 10      Ball squat   X 20      Sit to stand    Elevated surface, x 10 Elevated surface, x 10 Elevated surface, x 10   balance    In II bars, neutral stance without hand hold 30 sec x 4 In II bars, neutral stance without hand hold 30 sec x 4 In II bars, neutral stance without hand hold 30 sec x 4   marching      In II bars  X 10   Standing hip abd      X 10                                  MANUAL THERAPY: ( minutes):     Manual Therapy technique and location Date:   Date:   Date:      Parameters Parameters Parameters MODALITIES ( minutes):    [employer] Portal  Treatment/Session Summary:    · Response to Treatment: pt tolerated increased activities with increased fatigue. · Communication/Consultation:  None today  · Equipment provided today:  None today  · Recommendations/Intent for next treatment session: Next visit will focus on LE, core, back strengthening and motion.   Treatment Plan of Care Effective Dates:   8/28/2019 TO 11/26/2019  Total Treatment Billable Duration: 45 minutes therex  PT Patient Time In/Time Out  Time In: 1430  Time Out: 79 Walder [place] Fontenot, PT, DPT     Future Appointments   Date Time Provider Socorro Chandler   9/18/2019  1:30 PM Maurilio Ahumada, PT [facility]   9/23/2019  2:30 PM Bandar Fontenot PT, DPT [facility]   9/25/2019  2:30 PM Bandar Fontenot, PT, DPT [facility]   9/30/2019  2:30 PM Bandar Fontenot PT, DPT [facility]   10/2/2019  2:30 PM Bandar Fontenot PT, DPT [facility]   10/7/2019  2:30 PM Bandar Gonzalez PT [facility]   10/9/2019  2:30 PM Reena DUTTON PT [facility]   '

## 2019-09-18 ENCOUNTER — HOSPITAL ENCOUNTER (OUTPATIENT)
Dept: PHYSICAL THERAPY | Age: 64
Discharge: HOME | End: 2019-09-18
Payer: COMMERCIAL

## 2019-09-18 PROCEDURE — 97110 THERAPEUTIC EXERCISES: CPT

## 2019-09-18 NOTE — PROGRESS NOTES
Anny Chepear  : 1955  Primary: [facility]  Secondary:  [address] at [facility]  [address], [address], [place]  [phone]   [phone]        OUTPATIENT PHYSICAL THERAPY: Daily Treatment Note 2019  ICD-10: Low back pain (M54.5), Difficulty in walking, not elsewhere classified (R26.2), Muscle weakness (generalized) (M62.81)    Pre-treatment Symptoms/Complaints:  Pt complains more about \"burning in right leg\" than true pain today. She does report 1 episode of right leg \"giving out\".   Pain: Initial:   1/10 Post Session:  1/10   Medications Last Reviewed:  2019    Updated Objective Findings: none today   TREATMENT:   THERAPEUTIC EXERCISE: ( 45 minutes):     Date:   Date:     Date:   9.9 9.11 9.16 Date:  19   Activity/Exercise Parameters Parameters Parameters       Recumbent stepper  8 min level 1 12 min level 1 10 min level 1 10 min level 1 10 min level 1 10 min level 1   Quad set 5 sec hold x  10 X 20     X 10   SAQ X 10 X 20     1# x 15   SLR  X 20 2 x 10 2 x 10 2 x 15 2 x 15 1# x 15             bridge  X 20 2 x 10 2 x 10 2 x 15 2 x 15    SL clamshells  X 20 R  2 x 10 R 2 x 15 2 x 15 X 15   SL hip abduction  X 10 R  2 x 10 R 2 x 10 2 x 15 1# x 15   ambulation  No cane, CGA, 50 ft x 2        Abdominal isometrics   10 sec hold x 3 B LE  5 sec hold x 3; therapist assist     STC   30 sec hold x 3 R 30 sec hold x 3 R      Piriformis stretch   30 sec hold x 3 R 30 sec hold x 3 R      Towel roll under mid-thoracic   X 2 min       Seated thoracic flexion, extension   X 10       Ball squat   X 20       Sit to stand    Elevated surface, x 10 Elevated surface, x 10 Elevated surface, x 10 Elevated surface,2  x 10   balance    In II bars, neutral stance without hand hold 30 sec x 4 In II bars, neutral stance without hand hold 30 sec x 4 In II bars, neutral stance without hand hold 30 sec x 4    marching      In II bars  X 10 In II bars  X 10   Standing hip abd      X 10 In II bars  X 10   Ant step ups       6\"  X 10 BLE   Lat step ups       6\"  X 10 BLE                 MANUAL THERAPY: ( minutes):     Manual Therapy technique and location Date:   Date:   Date:      Parameters Parameters Parameters           MODALITIES ( minutes):    [employer] Portal  Treatment/Session Summary:    · Response to Treatment: pt reports moderate fatigue. · Communication/Consultation:  None today  · Equipment provided today:  None today  · Recommendations/Intent for next treatment session: Next visit will focus on LE, core, back strengthening and motion.   Treatment Plan of Care Effective Dates:   8/28/2019 TO 11/26/2019  Total Treatment Billable Duration: 45 minutes therex  PT Patient Time In/Time Out  Time In: 1330  Time Out: Caesar Townsend 90, PT     Future Appointments   Date Time Provider Socorro Chandler   9/23/2019  2:30 PM Braxton Arvizu PT, DPT [facility]   9/25/2019  2:30 PM Cristina Fontenot, PT, DPT [facility]   9/30/2019  2:30 PM Cristina Fontenot PT, DPT [facility]   10/2/2019  2:30 PM Cristina Fontenot, PT, DPT [facility]   10/7/2019  2:30 PM Cristina Mims PT [facility]   10/9/2019  2:30 PM Diaz DUTTON PT [facility]   '

## 2019-09-23 ENCOUNTER — HOSPITAL ENCOUNTER (OUTPATIENT)
Dept: PHYSICAL THERAPY | Age: 64
Discharge: HOME | End: 2019-09-23
Payer: COMMERCIAL

## 2019-09-23 PROCEDURE — 97110 THERAPEUTIC EXERCISES: CPT

## 2019-09-23 NOTE — PROGRESS NOTES
Power Ariadna  : 1955  Primary: Sonia [facility]  Secondary:  [address]  at [facility] [address] beds at the [facility]  [address], [address], [place] [address]  [phone]   [phone]        OUTPATIENT PHYSICAL THERAPY: Daily Treatment Note 2019  ICD-10: Low back pain (M54.5), Difficulty in walking, not elsewhere classified (R26.2), Muscle weakness (generalized) (M62.81)    Pre-treatment Symptoms/Complaints:  Pt reports she has been able to get her socks on by herself by crossing her legs which she hadn't been able to do. Today experiencing a lot of pain into R foot.    Pain: Initial:    Post Session:     Medications Last Reviewed:  2019    Updated Objective Findings: none today   TREATMENT:   THERAPEUTIC EXERCISE: ( 45 minutes):     Date:     Date:   9.9 9.11 9.16 Date:  19   Activity/Exercise Parameters Parameters        Recumbent stepper 8 min level 1 12 min level 1 10 min level 1 10 min level 1 10 min level 1 10 min level 1 10 min level 1   Quad set X 20     X 10    SAQ X 20     1# x 15 1# 2 x 15   SLR X 20 2 x 10 2 x 10 2 x 15 2 x 15 1# x 15 2 x 15             bridge X 20 2 x 10 2 x 10 2 x 15 2 x 15  2 x 15   SL clamshells X 20 R  2 x 10 R 2 x 15 2 x 15 X 15 2 x 15   SL hip abduction X 10 R  2 x 10 R 2 x 10 2 x 15 1# x 15 2 x 15   ambulation No cane, CGA, 50 ft x 2         Abdominal isometrics  10 sec hold x 3 B LE  5 sec hold x 3; therapist assist      STC  30 sec hold x 3 R 30 sec hold x 3 R       Piriformis stretch  30 sec hold x 3 R 30 sec hold x 3 R    15 sec hold x 5 with contract-relax   Towel roll under mid-thoracic  X 2 min        Seated thoracic flexion, extension  X 10        Ball squat  X 20        Sit to stand   Elevated surface, x 10 Elevated surface, x 10 Elevated surface, x 10 Elevated surface,2  x 10 Elevated surface  2 x 10   balance   In II bars, neutral stance without hand hold 30 sec x 4 In II bars, neutral stance without hand hold 30 sec x 4 In II bars, neutral stance without hand hold 30 sec x 4     marching     In II bars  X 10 In II bars  X 10 In II bars   2 x 10   Standing hip abd     X 10 In II bars  X 10 In II bars  2 x 10   Ant step ups      6\"  X 10 BLE    Lat step ups      6\"  X 10 BLE    Sciatic nerve flossing       X 10       MANUAL THERAPY: ( minutes):     Manual Therapy technique and location Date:   Date:   Date:      Parameters Parameters Parameters           MODALITIES ( minutes):    [employer] Portal  Treatment/Session Summary:    · Response to Treatment: pt reports moderate fatigue. Tolerating more repetitions. · Communication/Consultation:  None today  · Equipment provided today:  None today  · Recommendations/Intent for next treatment session: Next visit will focus on LE, core, back strengthening and motion.   Treatment Plan of Care Effective Dates:   8/28/2019 TO 11/26/2019  Total Treatment Billable Duration: 45 minutes therex  PT Patient Time In/Time Out  Time In: 1430  Time Out: 79 Freddy Fontenot, PT, DPT     Future Appointments   Date Time Provider Socorro Chandler   9/25/2019  2:30 PM Ra Dunn, PT, DPT [facility]   9/30/2019  2:30 PM Richardey, Oletta Riccardohire, PT, DPT [facility]   10/2/2019  2:30 PM Cuffey, Charlestta Fortinoons[place], PT, DPT [facility]   10/7/2019  2:30 PM Olemonstera Riccardohire, PT [facility]   10/9/2019  2:30 PM Olemonstera Riccardohire, PT [facility]   10/16/2019  2:15 PM Cuffey, Oletta [place], PT, DPT [facility]   10/18/2019  1:45 PM Cuffey, Oletta [place], PT, DPT [facility]   10/21/2019  1:45 PM Ashish Mcknightonshire, PT, DPT [facility]   10/25/2019  1:00 PM Ashish Mcknightonshire, PT, DPT [facility]   10/28/2019  1:45 PM Barak Mcknighta [place], PT, DPT [facility]   10/30/2019  2:15 PM Ashish Fontenot PT, DPT [facility]   '

## 2019-09-25 ENCOUNTER — HOSPITAL ENCOUNTER (OUTPATIENT)
Dept: PHYSICAL THERAPY | Age: 64
Discharge: HOME | End: 2019-09-25
Payer: COMMERCIAL

## 2019-09-25 PROCEDURE — 97110 THERAPEUTIC EXERCISES: CPT

## 2019-09-25 NOTE — PROGRESS NOTES
Yoav Alanna  : 1955  Primary: Hetal [facility]  Secondary:  [address] at [facility]  [address], [address], [place] [address]  [phone]   [phone]        OUTPATIENT PHYSICAL THERAPY: Daily Treatment Note 2019  ICD-10: Low back pain (M54.5), Difficulty in walking, not elsewhere classified (R26.2), Muscle weakness (generalized) (M62.81)    Pre-treatment Symptoms/Complaints:  Pt reports issues with motivation but improvements.  Still high levels of pain in R foot  Pain: Initial:   9/10 Post Session:     Medications Last Reviewed:  2019    Updated Objective Findings: none today   TREATMENT:   THERAPEUTIC EXERCISE: ( 45 minutes):     9.9 9.11 9.16 Date:  19 9.25   Activity/Exercise         Recumbent stepper 10 min level 1 10 min level 1 10 min level 1 10 min level 1 10 min level 1 10 min level 1   Quad set    X 10     SAQ    1# x 15 1# 2 x 15 1# 2 x 15   SLR 2 x 10 2 x 15 2 x 15 1# x 15 2 x 15 2 x 15            bridge 2 x 10 2 x 15 2 x 15  2 x 15 2 x 15   SL clamshells 2 x 10 R 2 x 15 2 x 15 X 15 2 x 15 2 x 15   SL hip abduction 2 x 10 R 2 x 10 2 x 15 1# x 15 2 x 15 2 x 15   ambulation         Abdominal isometrics  5 sec hold x 3; therapist assist       STC 30 sec hold x 3 R        Piriformis stretch 30 sec hold x 3 R    15 sec hold x 5 with contract-relax 15 sec hold x 5 with contract-relax   Towel roll under mid-thoracic         Seated thoracic flexion, extension         Ball squat         Sit to stand Elevated surface, x 10 Elevated surface, x 10 Elevated surface, x 10 Elevated surface,2  x 10 Elevated surface  2 x 10 Elevated surface  2 x 10   balance In II bars, neutral stance without hand hold 30 sec x 4 In II bars, neutral stance without hand hold 30 sec x 4 In II bars, neutral stance without hand hold 30 sec x 4      marching   In II bars  X 10 In II bars  X 10 In II bars   2 x 10 In II bars   2 x 10   Standing hip abd   X 10 In II bars  X 10 In II bars  2 x 10    Ant step ups    6\"  X 10 BLE     Lat step ups    6\"  X 10 BLE     Sciatic nerve flossing     X 10 X 10   Step overs      X 10   balance      Neutral stand with 50% weight bilaterally       MANUAL THERAPY: ( minutes):     Manual Therapy technique and location Date:   Date:   Date:      Parameters Parameters Parameters           MODALITIES ( minutes):    [employer] Portal  Treatment/Session Summary:    · Response to Treatment: pt reports increased fatigue. · Communication/Consultation:  None today  · Equipment provided today:  None today  · Recommendations/Intent for next treatment session: Next visit will focus on LE, core, back strengthening and motion.   Treatment Plan of Care Effective Dates:   8/28/2019 TO 11/26/2019  Total Treatment Billable Duration: 45 minutes therex  PT Patient Time In/Time Out  Time In: 1430  Time Out: 79 Freddy Ramosey, PT, DPT     Future Appointments   Date Time Provider Socorro Chandler   9/30/2019  2:30 PM Jose Manuel Horta, PT, DPT [facility]   10/2/2019  2:30 PM Richardey, Hartford Ligas, PT, DPT [facility]   10/7/2019  2:30 PM Hartford Ligas, PT [facility]   10/9/2019  2:30 PM Hartford Ligas, PT [facility]   10/16/2019  2:15 PM Richardey, Kiko Ligas, PT, DPT [facility]   10/18/2019  1:45 PM Richardey, Hartford Ligas, PT, DPT [facility]   10/21/2019  1:45 PM Prince Mendez, Hartford Ligas, PT, DPT [facility]   10/25/2019  1:00 PM Prince Mendez, Hartford Ligas, PT, DPT [facility]   10/28/2019  1:45 PM Prince Mendez, Hartford Ligas, PT, DPT [facility]   10/30/2019  2:15 PM Po, Hartford Ligas, PT, DPT [facility]   '

## 2019-09-30 ENCOUNTER — HOSPITAL ENCOUNTER (OUTPATIENT)
Dept: PHYSICAL THERAPY | Age: 64
Discharge: HOME | End: 2019-09-30
Payer: COMMERCIAL

## 2019-09-30 PROCEDURE — 97110 THERAPEUTIC EXERCISES: CPT

## 2019-09-30 NOTE — PROGRESS NOTES
Rich Leventhal  : 1955  Primary: Anni Roberts [facility]  Secondary:  [address]  at [facility]  [address], [address], [place] [address]  [phone]   [phone]        OUTPATIENT PHYSICAL THERAPY: Daily Treatment Note 2019  ICD-10: Low back pain (M54.5), Difficulty in walking, not elsewhere classified (R26.2), Muscle weakness (generalized) (M62.81)    Pre-treatment Symptoms/Complaints:  Pt reports high levels of pain in foot still, but she is able to cross her legs to put on socks now. Still very hesitant to put weight on leg.    Pain: Initial:   8/10 Post Session:     Medications Last Reviewed:  2019    Updated Objective Findings: none today   TREATMENT:   THERAPEUTIC EXERCISE: ( 45 minutes):     9.9 9.11 9.16 Date:  19 9.25 9.30   Activity/Exercise          Recumbent stepper 10 min level 1 10 min level 1 10 min level 1 10 min level 1 10 min level 1 10 min level 1 10 min level 1   Quad set    X 10      SAQ    1# x 15 1# 2 x 15 1# 2 x 15    SLR 2 x 10 2 x 15 2 x 15 1# x 15 2 x 15 2 x 15 2 x 15             bridge 2 x 10 2 x 15 2 x 15  2 x 15 2 x 15 2 x 15   SL clamshells 2 x 10 R 2 x 15 2 x 15 X 15 2 x 15 2 x 15 2 x 15   SL hip abduction 2 x 10 R 2 x 10 2 x 15 1# x 15 2 x 15 2 x 15 2 x 15   ambulation          Abdominal isometrics  5 sec hold x 3; therapist assist        STC 30 sec hold x 3 R      30 sec hold x 3 with contract relax   Piriformis stretch 30 sec hold x 3 R    15 sec hold x 5 with contract-relax 15 sec hold x 5 with contract-relax 30 sec hold x 3 with contract relax   Towel roll under mid-thoracic          Seated thoracic flexion, extension          Ball squat          Sit to stand Elevated surface, x 10 Elevated surface, x 10 Elevated surface, x 10 Elevated surface,2  x 10 Elevated surface  2 x 10 Elevated surface  2 x 10 Elevated surface  2 x 10   balance In II bars, neutral stance without hand hold 30 sec x 4 In II bars, neutral stance without hand hold 30 sec x 4 In II bars, neutral stance without hand hold 30 sec x 4       marching   In II bars  X 10 In II bars  X 10 In II bars   2 x 10 In II bars   2 x 10 In II bars   2 x 10   Standing hip abd   X 10 In II bars  X 10 In II bars  2 x 10     Ant step ups    6\"  X 10 BLE      Lat step ups    6\"  X 10 BLE      Sciatic nerve flossing     X 10 X 10 X 10, supine   Step overs      X 10 X 10   balance      Neutral stand with 50% weight bilaterally Neutral stand with 50% weight bilaterally, 30 sec hold x 4   Seated trunk flexion       X 10                           MANUAL THERAPY: ( minutes):     Manual Therapy technique and location Date:   Date:   Date:      Parameters Parameters Parameters           MODALITIES ( minutes):    [employer] Portal  Access Code: [email or web address]   URL: [email or web address]   Date: 09/30/2019   Prepared by: Cornelia Coronado     Exercises  Straight Leg Raise - 15 reps - 2 sets - 2x daily - 7x weekly  Supine Bridge - 15 reps - 2 sets - 2x daily - 7x weekly  Beginner Clam - 15 reps - 2 sets - 2x daily - 7x weekly  Sidelying Hip Abduction - 15 reps - 2 sets - 2x daily - 7x weekly  Supine Piriformis Stretch with Foot on Ground - 5 reps - 10 second hold - 2x daily - 7x weekly  Hooklying Single Knee to Chest - 5 reps - 10 second hold - 2x daily - 7x weekly    Treatment/Session Summary:    · Response to Treatment: pt reports increased fatigue. Gave her supine exercises for home. · Communication/Consultation:  None today  · Equipment provided today:  None today  · Recommendations/Intent for next treatment session: Next visit will focus on LE, core, back strengthening and motion.   Treatment Plan of Care Effective Dates:   8/28/2019 TO 11/26/2019  Total Treatment Billable Duration: 45 minutes therex  PT Patient Time In/Time Out  Time In: 1430  Time Out: 79 Freddy Fontenot PT, DPT     Future Appointments   Date Time Provider Socorro Chandler   10/2/2019  2:30 PM Po Zoila Pollen, PT, DPT [facility]   10/7/2019  2:30 PM Zoila Pollen, PT [facility]   10/9/2019  2:30 PM Zoila Pollen, PT [facility]   10/16/2019  2:15 PM Luci Drew, Zoila Pollen, PT, DPT [facility]   10/18/2019  1:45 PM Luci Drew, Zoila Pollen, PT, DPT [facility]   10/21/2019  1:45 PM Tomas Banuelos, PT, DPT [facility]   10/25/2019  1:00 PM Tomas Banuelos, PT, DPT [facility]   10/28/2019  1:45 PM Luci Drew, Zoila Pollen, PT, DPT [facility]   10/30/2019  2:15 PM Luci Drew, Zoila Pollen, PT, DPT [facility]   '

## 2019-09-30 NOTE — ROUTINE PROCESS
Jeanette Grimm  : 1955  Payor: Kavin Timmons / Plan: Indigo Villalta / Product Type: Commerical /  [address]  at [facility]  [address], [address], [place] [address]  [phone]   [phone]             OUTPATIENT PHYSICAL THERAPY:Progress Report 2019   ICD-10: Treatment Diagnosis: Low back pain (M54.5), Difficulty in walking, not elsewhere classified (R26.2), Muscle weakness (generalized) (M62.81)  Precautions/Allergies:   Latex   MD Orders: evaluate and treat, modalities, core strengthening and stretching, gait training and right lower extremity strengthening MEDICAL/REFERRING DIAGNOSIS:  Encounter for follow-up examination after completed treatment for conditions other than malignant neoplasm [Z09]  Radiculopathy, lumbar region [M54.16]   DATE OF ONSET:   REFERRING PHYSICIAN: Shaniqua Foster 372: --     INITIAL ASSESSMENT:  Ms. Radha Nascimento has attended 10 physical therapy treatments for low back pain, R LE weakness, and difficulty walking. Pt has made gains in LE strength and improvements in activities of daily life, for example she is able to put her sock on independently. However she still ambulates using a cane and has significant weakness and functional difficulties. PROBLEM LIST (Impacting functional limitations):  1. Decreased Strength  2. Decreased ADL/Functional Activities  3. Decreased Ambulation Ability/Technique  4. Decreased Activity Tolerance  5. Decreased [place] with Home Exercise Program INTERVENTIONS PLANNED: (Treatment may consist of any combination of the following)  1. Home Exercise Program (HEP)  2. Manual Therapy including soft tissue mobilization  3. Neuromuscular Re-education/Strengthening  4. Range of Motion (ROM)  5. Therapeutic Activites  6. Therapeutic Exercise/Strengthening  7.  Modalities including heat/cold application, electrical stimulation, vasopneumatic compression, ultrasound TREATMENT PLAN:  Effective Dates: 8/28/2019 TO 11/26/2019 (90 days). Frequency/Duration: 2 times a week for 90 Day(s)  GOALS: (Goals have been discussed and agreed upon with patient.)  Short-Term Functional Goals: Time Frame: 5 weeks     1. Pt will demonstrate independence with > or = 2 exercises in HEP. met  2. Pt will report < or = 19 on Oswestry. ongoing  Discharge Goals: Time Frame: 10 weeks  1. Pt will demonstrate independence with > or = 4 exercises in HEP. ongoing  2. Pt will report < or = 14 on Oswestry. ongoing  3. Pt will demonstrate functional gait. ongoing  4. Pt will demonstrate functional R LE strength. ongoing     OUTCOME MEASURE:   Tool Used: Modified Oswestry Low Back Pain Questionnaire  Score:  Initial: 24/50  Most Recent: X/50 (Date: -- )   Interpretation of Score: Each section is scored on a 0-5 scale, 5 representing the greatest disability. The scores of each section are added together for a total score of 50. MEDICAL NECESSITY:   · Skilled intervention continues to be required due to decreased function. REASON FOR SERVICES/OTHER COMMENTS:  · Patient continues to require skilled intervention due to decreased function. Total Duration:  PT Patient Time In/Time Out  Time In: 1430  Time Out: 1515    Rehabilitation Potential For Stated Goals: Good  Regarding Wendi Ace's therapy, I certify that the treatment plan above will be carried out by a therapist or under their direction. Thank you for this referral,  Qing Cardona, PT, DPT          PAIN/SUBJECTIVE:   Initial:   5/10 Post Session:  5/10   HISTORY:   History of Injury/Illness (Reason for Referral):  Pt had back surgery on July 9th due to back pain that radiated down R LE, symptoms had been ongoing for years before sx. Following sx reports of back pain and R LE weakness that results in leg buckling occasionally, so using a cane.   Past Medical History/Comorbidities:   Ms. Kade Childs  has a past medical history of DVT (deep venous thrombosis) (HCC) (~2009), Hypertension, and Nausea & vomiting. Ms. Ginna Finley  has a past surgical history that includes hx open cholecystectomy (1995) and hx hysterectomy (1991). Social History/Living Environment:     house  Prior Level of Function/Work/Activity:  Works retail- computer work at a desk  Dominant Side:         RIGHT   Ambulatory/Rehab [facility] Risk Assessment   Risk Factors:       No Risk Factors Identified Ability to Rise from Chair:       (0)  Ability to rise in a single movement   Falls Prevention Plan:       No modifications necessary   Total: (5 or greater = High Risk): 0   ©2010 [facility] [place] States Patent #[id number]. Federal Law prohibits the replication, distribution or use without written permission from [facility] [place]   Current Medications:       Current Outpatient Medications:     cetirizine (ZYRTEC) 10 mg tablet, Take 10 mg by mouth daily. , Disp: , Rfl:     hydroCHLOROthiazide (HYDRODIURIL) 25 mg tablet, Take 25 mg by mouth daily. Indications: high blood pressure, Disp: , Rfl:     ferrous sulfate 325 mg (65 mg iron) tablet, Take  by mouth Daily (before breakfast). , Disp: , Rfl:     traZODone (DESYREL) 50 mg tablet, Take 50 mg by mouth nightly as needed for Sleep., Disp: , Rfl:     pravastatin (PRAVACHOL) 10 mg tablet, Take 10 mg by mouth nightly., Disp: , Rfl:     cholecalciferol, vitamin D3, (VITAMIN D3) 2,000 unit tab, Take  by mouth daily. , Disp: , Rfl:     albuterol (PROVENTIL HFA, VENTOLIN HFA, PROAIR HFA) 90 mcg/actuation inhaler, Take 2 Puffs by inhalation every four (4) hours as needed for Wheezing., Disp: , Rfl:     fluticasone (FLONASE) 50 mcg/actuation nasal spray, 2 Sprays by Nasal route daily. , Disp: , Rfl:     amLODIPine (NORVASC) 10 mg tablet, Take 1 Tab by mouth daily. , Disp: , Rfl:     aspirin delayed-release 81 mg tablet, Take 81 mg by mouth daily.  Indications: prevention of transient ischemic attack, Disp: , Rfl:     atenolol (TENORMIN) 25 mg tablet, Take 25 mg by mouth daily. , Disp: , Rfl:    Date Last Reviewed:  9/30/2019     EXAMINATION:from IE   Observation:       Standing- weight shifted onto L LE in standing       Gait- ambulated in using cane in R hand, increased trunk sway to the R during R LE stance phase. Generally short strides with L and decreased stance time on R    Strength:  Muscle/Movement Strength at Eval Reassessment Date:    knee extension R 3-/5    knee flexion R 3/5    Hip flexion  R 2+/5    Hip extension R 3/5    Hip abduction R 2+/5             Range of Motion:   Movement/Joint ROM at eval Reassessment Date:    Knee  R wfl    hip Generally decreased IR rotation on R                          Palpation:  No palpation done today. Body Structures Involved:  1. Nerves  2. Bones  3. Joints  4. Muscles Body Functions Affected:  1. Sensory/Pain  2. Neuromusculoskeletal  3. Movement Related Activities and Participation Affected:  1. General Tasks and Demands  2. Mobility  3. Domestic Life  4.  Community, Social and Civic Life   CLINICAL PRESENTATION:   CLINICAL DECISION MAKING:       Total Evaluation Duration:  PT Patient Time In/Time Out  Time In: 1430  Time Out: 1515    Future Appointments   Date Time Provider Socorro Chandler   10/2/2019  2:30 PM Huma Petersen PT, DPT [facility]   10/7/2019  2:30 PM Lizandro Rao PT [facility]   10/9/2019  2:30 PM Lizandro Rao PT [facility]   10/16/2019  2:15 PM Lizandro Fontenot PT, DPT [facility]   10/18/2019  1:45 PM Lizandro Fontenot PT, DPT [facility]   10/21/2019  1:45 PM Lizandro Blanchard PT, DPT [facility]   10/25/2019  1:00 PM Lizandro Fontenot PT, DPT [facility]   10/28/2019  1:45 PM Lizandro Fontenot PT, DPT [facility]   10/30/2019  2:15 PM Lizandro Fontenot PT, DPT [facility]       Patsy Nguyen PT, DPT

## 2019-10-02 ENCOUNTER — HOSPITAL ENCOUNTER (OUTPATIENT)
Dept: PHYSICAL THERAPY | Age: 64
Discharge: HOME | End: 2019-10-02
Payer: COMMERCIAL

## 2019-10-02 PROCEDURE — 97110 THERAPEUTIC EXERCISES: CPT

## 2019-10-02 NOTE — PROGRESS NOTES
Francisco Garcia  : 1955  Primary: Hans Mckeon [facility]  Secondary:  [address] at [facility]  [address], [address], [place]  [phone]   [phone]        OUTPATIENT PHYSICAL THERAPY: Daily Treatment Note 10/2/2019  ICD-10: Low back pain (M54.5), Difficulty in walking, not elsewhere classified (R26.2), Muscle weakness (generalized) (M62.81)    Pre-treatment Symptoms/Complaints:  Pt reports high levels of pain into R foot, having a hard time bending over to pick anything up. Has been doing the exercises at home.    Pain: Initial:   8/10 Post Session:     Medications Last Reviewed:  10/2/2019    Updated Objective Findings: none today   TREATMENT:   THERAPEUTIC EXERCISE: ( 45 minutes):     9.16 Date:  19 9.25 9.30 10.2   Activity/Exercise         Recumbent stepper 10 min level 1 10 min level 1 10 min level 1 10 min level 1 10 min level 1 10 min level 2   Quad set  X 10       SAQ  1# x 15 1# 2 x 15 1# 2 x 15     SLR 2 x 15 1# x 15 2 x 15 2 x 15 2 x 15             bridge 2 x 15  2 x 15 2 x 15 2 x 15    SL clamshells 2 x 15 X 15 2 x 15 2 x 15 2 x 15    SL hip abduction 2 x 15 1# x 15 2 x 15 2 x 15 2 x 15    ambulation         Abdominal isometrics         STC     30 sec hold x 3 with contract relax    Piriformis stretch   15 sec hold x 5 with contract-relax 15 sec hold x 5 with contract-relax 30 sec hold x 3 with contract relax    Towel roll under mid-thoracic         Seated thoracic flexion, extension         Ball squat         Sit to stand Elevated surface, x 10 Elevated surface,2  x 10 Elevated surface  2 x 10 Elevated surface  2 x 10 Elevated surface  2 x 10 Blue foam in chair  2 x 10   balance In II bars, neutral stance without hand hold 30 sec x 4        marching In II bars  X 10 In II bars  X 10 In II bars   2 x 10 In II bars   2 x 10 In II bars   2 x 10 In II bars   2 x 10   Standing hip abd X 10 In II bars  X 10 In II bars  2 x 10      Ant step ups 6\"  X 10 BLE       Lat step ups  6\"  X 10 BLE       Sciatic nerve flossing   X 10 X 10 X 10, supine    Step overs    X 10 X 10 X 10 (in II bars, stance on R foot and move L foot forwards and back for pregait)   minisquats      X 10   balance    Neutral stand with 50% weight bilaterally Neutral stand with 50% weight bilaterally, 30 sec hold x 4 Neutral stand with 50% weight bilaterally, 30 sec hold x 4   Seated trunk flexion     X 10 X 10,  And x 10 with rotation bias   ambulation      Without cane 75 ft x 2 with seated rest break in between                MANUAL THERAPY: ( minutes):     Manual Therapy technique and location Date:   Date:   Date:      Parameters Parameters Parameters           MODALITIES ( minutes):    [employer] Portal  Access Code: [email or web address]   URL: [email or web address]   Date: 09/30/2019   Prepared by: Wing General     Exercises  Straight Leg Raise - 15 reps - 2 sets - 2x daily - 7x weekly  Supine Bridge - 15 reps - 2 sets - 2x daily - 7x weekly  Beginner Clam - 15 reps - 2 sets - 2x daily - 7x weekly  Sidelying Hip Abduction - 15 reps - 2 sets - 2x daily - 7x weekly  Supine Piriformis Stretch with Foot on Ground - 5 reps - 10 second hold - 2x daily - 7x weekly  Hooklying Single Knee to Chest - 5 reps - 10 second hold - 2x daily - 7x weekly    Treatment/Session Summary:    · Response to Treatment: pt was surprised she was able to walk 75 ft without cane, encouraged her to do short distance walking at home without cane. · Communication/Consultation:  None today  · Equipment provided today:  None today  · Recommendations/Intent for next treatment session: Next visit will focus on LE, core, back strengthening and motion.   Treatment Plan of Care Effective Dates:   8/28/2019 TO 11/26/2019  Total Treatment Billable Duration: 45 minutes therex  PT Patient Time In/Time Out  Time In: 1430  Time Out: 79 Freddy Fontenot PT, DPT     Future Appointments   Date Time Provider Department [place]   10/7/2019  2:30 PM [facility]   10/9/2019  2:30 PM [facility]   10/16/2019  2:15 PM [facility]   10/18/2019  1:45 PM [facility]   10/21/2019  1:45 PM [facility]   10/25/2019  1:00 PM [facility]   10/28/2019  1:45 PM [facility]   10/30/2019  2:15 PM [facility]   '

## 2019-10-07 ENCOUNTER — HOSPITAL ENCOUNTER (OUTPATIENT)
Dept: PHYSICAL THERAPY | Age: 64
Discharge: HOME | End: 2019-10-07
Payer: COMMERCIAL

## 2019-10-07 PROCEDURE — 97110 THERAPEUTIC EXERCISES: CPT | Performed by: PHYSICAL THERAPIST

## 2019-10-07 NOTE — PROGRESS NOTES
Barry Huertas  : 1955  Primary: Teresa Hatch [facility]  Secondary:  [address] at [facility]  [address], [address], [place] [address]  [phone]   [phone]        OUTPATIENT PHYSICAL THERAPY: Daily Treatment Note 10/7/2019  ICD-10: Low back pain (M54.5), Difficulty in walking, not elsewhere classified (R26.2), Muscle weakness (generalized) (M62.81)    Pre-treatment Symptoms/Complaints:  Pt reports increased R side back and leg pain today. She reports increased difficulty sleeping due to pain.    Pain: Initial:   -8/10 Post Session:  -6/10; increased fatigue/decreased stiffness   Medications Last Reviewed:  10/7/2019    Updated Objective Findings: none today   TREATMENT:   THERAPEUTIC EXERCISE: ( 45 minutes):     9.16 Date:  19 9.25 9.30 10.2 10.7   Activity/Exercise          Recumbent stepper 10 min level 1 10 min level 1 10 min level 1 10 min level 1 10 min level 1 10 min level 2 12min; L2   Quad set  X 10        SAQ  1# x 15 1# 2 x 15 1# 2 x 15      SLR 2 x 15 1# x 15 2 x 15 2 x 15 2 x 15               bridge 2 x 15  2 x 15 2 x 15 2 x 15     SL clamshells 2 x 15 X 15 2 x 15 2 x 15 2 x 15     SL hip abduction 2 x 15 1# x 15 2 x 15 2 x 15 2 x 15     ambulation          Abdominal isometrics          STC     30 sec hold x 3 with contract relax     Piriformis stretch   15 sec hold x 5 with contract-relax 15 sec hold x 5 with contract-relax 30 sec hold x 3 with contract relax     Towel roll under mid-thoracic          Seated thoracic flexion, extension          Ball squat          Stdg toe raises       attempted   Stdg heel raises       20x B   Sit to stand Elevated surface, x 10 Elevated surface,2  x 10 Elevated surface  2 x 10 Elevated surface  2 x 10 Elevated surface  2 x 10 Blue foam in chair  2 x 10 Blue foam in chair  2 x 10   balance In II bars, neutral stance without hand hold 30 sec x 4         marching In II bars  X 10 In II bars  X 10 In II bars   2 x 10 In II bars   2 x 10 In II bars   2 x 10 In II bars   2 x 10 In II bars   2 x 10   Standing hip abd X 10 In II bars  X 10 In II bars  2 x 10       Ant step ups  6\"  X 10 BLE        Lat step ups  6\"  X 10 BLE        Sciatic nerve flossing   X 10 X 10 X 10, supine     Step overs    X 10 X 10 X 10 (in II bars, stance on R foot and move L foot forwards and back for pregait)    minisquats      X 10    balance    Neutral stand with 50% weight bilaterally Neutral stand with 50% weight bilaterally, 30 sec hold x 4 Neutral stand with 50% weight bilaterally, 30 sec hold x 4 Neutral stand with 50% weight bilaterally, 30 sec hold x 4   Seated trunk flexion     X 10 X 10,  And x 10 with rotation bias X 10,  And x 10 with rotation bias   ambulation      Without cane 75 ft x 2 with seated rest break in between Without cane 75 ft x 2 with seated rest break in between                 MANUAL THERAPY: ( minutes):     Manual Therapy technique and location Date:   Date:   Date:      Parameters Parameters Parameters           MODALITIES ( minutes):    [employer] Portal  Access Code: [email or web address]   URL: [email or web address]   Date: 09/30/2019   Prepared by: Esteban Sabillon     Exercises  Straight Leg Raise - 15 reps - 2 sets - 2x daily - 7x weekly  Supine Bridge - 15 reps - 2 sets - 2x daily - 7x weekly  Beginner Clam - 15 reps - 2 sets - 2x daily - 7x weekly  Sidelying Hip Abduction - 15 reps - 2 sets - 2x daily - 7x weekly  Supine Piriformis Stretch with Foot on Ground - 5 reps - 10 second hold - 2x daily - 7x weekly  Hooklying Single Knee to Chest - 5 reps - 10 second hold - 2x daily - 7x weekly    Treatment/Session Summary:    · Response to Treatment: Pt did well with ambulation without AD today. She fatigued quickly with B heel raises and stdg hip abd on L due to increased stance time on the R. She c/o mild increase in R leg pain after standing exercises. Balance and body awareness is improving . · Communication/Consultation:  None today  · Equipment provided today:  None today  · Recommendations/Intent for next treatment session: Next visit will focus on LE, core, back strengthening and motion.   Treatment Plan of Care Effective Dates:   8/28/2019 TO 11/26/2019  Total Treatment Billable Duration: 45 minutes therex  PT Patient Time In/Time Out  Time In: 1430  Time Out: [address], PT     Future Appointments   Date Time Provider [place]   10/9/2019  2:30 PM Armen Sal PT [facility]   10/16/2019  2:15 PM Armen Fontenot, PT, DPT [facility]   10/18/2019  1:45 PM Armen Fontenot PT, DPT [facility]   10/21/2019  1:45 PM Armen Darling PT, DPT [facility]   10/25/2019  1:00 PM Armen Fontenot PT, DPT [facility]   10/28/2019  1:45 PM Armen Fontenot PT, DPT [facility]   10/30/2019  2:15 PM Armen Fontenot PT, DPT [facility]   '

## 2019-10-09 ENCOUNTER — HOSPITAL ENCOUNTER (OUTPATIENT)
Dept: PHYSICAL THERAPY | Age: 64
Discharge: HOME | End: 2019-10-09
Payer: COMMERCIAL

## 2019-10-09 PROCEDURE — 97140 MANUAL THERAPY 1/> REGIONS: CPT | Performed by: PHYSICAL THERAPIST

## 2019-10-09 PROCEDURE — 97110 THERAPEUTIC EXERCISES: CPT | Performed by: PHYSICAL THERAPIST

## 2019-10-09 NOTE — PROGRESS NOTES
Murali Nam  : 1955  Primary: Karely Minor [facility]  Secondary:  [address] at [facility]  [address], [address], [place] [address]  [phone]   [phone]        OUTPATIENT PHYSICAL THERAPY: Daily Treatment Note 10/9/2019  ICD-10: Low back pain (M54.5), Difficulty in walking, not elsewhere classified (R26.2), Muscle weakness (generalized) (M62.81)    Pre-treatment Symptoms/Complaints:  Pt reports more back pain today due to the cool, rainy weather.    Pain: Initial:   -7/10 Post Session:  -6/10; increased fatigue/decreased stiffness   Medications Last Reviewed:  10/9/2019    Updated Objective Findings: none today   TREATMENT:   THERAPEUTIC EXERCISE: ( 35 minutes):     9.16 Date:  19 9.25 9.30 10.2 10.7 10.9   Activity/Exercise           Recumbent stepper 10 min level 1 10 min level 1 10 min level 1 10 min level 1 10 min level 1 10 min level 2 12min; L2 13min; L2   Quad set  X 10         SAQ  1# x 15 1# 2 x 15 1# 2 x 15       SLR 2 x 15 1# x 15 2 x 15 2 x 15 2 x 15                 bridge 2 x 15  2 x 15 2 x 15 2 x 15   2 x 10   SL clamshells 2 x 15 X 15 2 x 15 2 x 15 2 x 15      SL hip abduction 2 x 15 1# x 15 2 x 15 2 x 15 2 x 15      ambulation           Abdominal isometrics           STC     30 sec hold x 3 with contract relax      Piriformis stretch   15 sec hold x 5 with contract-relax 15 sec hold x 5 with contract-relax 30 sec hold x 3 with contract relax      Towel roll under mid-thoracic           Seated thoracic flexion, extension           Ball squat           Stdg toe raises       attempted    Stdg heel raises       20x B    Sit to stand Elevated surface, x 10 Elevated surface,2  x 10 Elevated surface  2 x 10 Elevated surface  2 x 10 Elevated surface  2 x 10 Blue foam in chair  2 x 10 Blue foam in chair  2 x 10 Blue foam in chair  2 x 10   balance In II bars, neutral stance without hand hold 30 sec x 4          marching In II bars  X 10 In II bars  X 10 In II bars   2 x 10 In II bars   2 x 10 In II bars   2 x 10 In II bars   2 x 10 In II bars   2 x 10 In II bars   2 x 10   Standing hip abd X 10 In II bars  X 10 In II bars  2 x 10        Ant step ups  6\"  X 10 BLE         Lat step ups  6\"  X 10 BLE         Sciatic nerve flossing   X 10 X 10 X 10, supine      Step overs    X 10 X 10 X 10 (in II bars, stance on R foot and move L foot forwards and back for pregait)     minisquats      X 10     balance    Neutral stand with 50% weight bilaterally Neutral stand with 50% weight bilaterally, 30 sec hold x 4 Neutral stand with 50% weight bilaterally, 30 sec hold x 4 Neutral stand with 50% weight bilaterally, 30 sec hold x 4 Neutral stand with 50% weight bilaterally, 30 sec hold x 4   Seated trunk flexion     X 10 X 10,  And x 10 with rotation bias X 10,  And x 10 with rotation bias X 10,  And x 10 with rotation bias   ambulation      Without cane 75 ft x 2 with seated rest break in between Without cane 75 ft x 2 with seated rest break in between Without cane 75 ft x 2 with seated rest break in between                  MANUAL THERAPY: (10 minutes):     Manual Therapy technique and location Date:  10/9/19 Date:   Date:      Parameters Parameters Parameters    -STM/MFR to B thoracolumbar parapsinals, as well as, R posterior/lateral R hip, including piriformis trigger point release       MODALITIES ( minutes):    [employer] Portal  Access Code: [email or web address]   URL: [email or web address]   Date: 09/30/2019   Prepared by: Lino Vidal     Exercises  Straight Leg Raise - 15 reps - 2 sets - 2x daily - 7x weekly  Supine Bridge - 15 reps - 2 sets - 2x daily - 7x weekly  Beginner Clam - 15 reps - 2 sets - 2x daily - 7x weekly  Sidelying Hip Abduction - 15 reps - 2 sets - 2x daily - 7x weekly  Supine Piriformis Stretch with Foot on Ground - 5 reps - 10 second hold - 2x daily - 7x weekly  Hooklying Single Knee to Chest - 5 reps - 10 second hold - 2x daily - 7x weekly    Treatment/Session Summary:    · Response to Treatment: Pt was more challenged by gait activity today. Improved quality with sit-stand, as well as, bridging after several repetitions, indicating improving muscle memory. Pt with decreased pain/tightness after manual therapy. · Communication/Consultation:  None today  · Equipment provided today:  None today  · Recommendations/Intent for next treatment session: Next visit will focus on LE, core, back strengthening and motion.   Treatment Plan of Care Effective Dates:   8/28/2019 TO 11/26/2019  Total Treatment Billable Duration: 45 minutes ( 35 min therex; 10 min manual)  PT Patient Time In/Time Out  Time In: 1430  Time Out: [address], PT     Future Appointments   Date Time Provider [place] [facility]   10/16/2019  2:15 PM Red Caruso PT, DPT [facility]   10/18/2019  1:45 PM Peter oFntenot, PT, DPT [facility]   10/21/2019  1:45 PM Peter Bowers PT, DPT [facility]   10/25/2019  1:00 PM Peter Bowers PT, DPT [facility]   10/28/2019  1:45 PM Peter Fontenot PT, DPT [facility]   10/30/2019  2:15 PM Peter Fontenot PT, DPT [facility]   '

## 2019-10-16 ENCOUNTER — HOSPITAL ENCOUNTER (OUTPATIENT)
Dept: PHYSICAL THERAPY | Age: 64
Discharge: HOME | End: 2019-10-16
Payer: COMMERCIAL

## 2019-10-16 PROCEDURE — 97140 MANUAL THERAPY 1/> REGIONS: CPT

## 2019-10-16 PROCEDURE — 97110 THERAPEUTIC EXERCISES: CPT

## 2019-10-16 NOTE — PROGRESS NOTES
Barry Huertas  : 1955  Primary: Teresa Hatch [facility]  Secondary:  [address]  at [facility]  [address], [address], [place] [address]  [phone]   [phone]        OUTPATIENT PHYSICAL THERAPY: Daily Treatment Note 10/16/2019  ICD-10: Low back pain (M54.5), Difficulty in walking, not elsewhere classified (R26.2), Muscle weakness (generalized) (M62.81)    Pre-treatment Symptoms/Complaints:  Pt ambulates in without cane, reports she has been walking without her cane more. Generally she is improving function, but progress is slow.     Pain: Initial:   -7/10 Post Session:  -6/10; increased fatigue/decreased stiffness   Medications Last Reviewed:  10/16/2019    Updated Objective Findings: none today   TREATMENT:   THERAPEUTIC EXERCISE: ( 30 minutes):     9.25 9.30 10.2 10.7 10.9 10/16   Activity/Exercise         Recumbent stepper 10 min level 1 10 min level 1 10 min level 2 12min; L2 13min; L2 10 min level 2   Quad set         SAQ 1# 2 x 15        SLR 2 x 15 2 x 15                bridge 2 x 15 2 x 15   2 x 10    SL clamshells 2 x 15 2 x 15       SL hip abduction 2 x 15 2 x 15       ambulation         Abdominal isometrics         STC  30 sec hold x 3 with contract relax       Piriformis stretch 15 sec hold x 5 with contract-relax 30 sec hold x 3 with contract relax       Towel roll under mid-thoracic         Seated thoracic flexion, extension         Ball squat         Stdg toe raises    attempted     Stdg heel raises    20x B     Sit to stand Elevated surface  2 x 10 Elevated surface  2 x 10 Blue foam in chair  2 x 10 Blue foam in chair  2 x 10 Blue foam in chair  2 x 10    balance         marching In II bars   2 x 10 In II bars   2 x 10 In II bars   2 x 10 In II bars   2 x 10 In II bars   2 x 10    Standing hip abd         Ant step ups         Lat step ups         Sciatic nerve flossing X 10 X 10, supine       Step overs X 10 X 10 X 10 (in II bars, stance on R foot and move L foot forwards and back for pregait)   X 10 in II bars bilaterally   Step to the side      X 10 bilaterally   minisquats   X 10      balance Neutral stand with 50% weight bilaterally Neutral stand with 50% weight bilaterally, 30 sec hold x 4 Neutral stand with 50% weight bilaterally, 30 sec hold x 4 Neutral stand with 50% weight bilaterally, 30 sec hold x 4 Neutral stand with 50% weight bilaterally, 30 sec hold x 4    Seated trunk flexion  X 10 X 10,  And x 10 with rotation bias X 10,  And x 10 with rotation bias X 10,  And x 10 with rotation bias X 10,  And x 10 with rotation bias   ambulation   Without cane 75 ft x 2 with seated rest break in between Without cane 75 ft x 2 with seated rest break in between Without cane 75 ft x 2 with seated rest break in between                 MANUAL THERAPY: (15 minutes):     Manual Therapy technique and location Date:  10/9/19 Date:  10/16 Date:      Parameters Parameters Parameters    -STM/MFR to B thoracolumbar parapsinals, as well as, R posterior/lateral R hip, including piriformis trigger point release - mfr to B lumbar paraspinals manually and tool assisted      MODALITIES ( minutes):    [employer] Portal  Access Code: [email or web address]   URL: [email or web address]   Date: 09/30/2019   Prepared by: Mary Patiño     Exercises  Straight Leg Raise - 15 reps - 2 sets - 2x daily - 7x weekly  Supine Bridge - 15 reps - 2 sets - 2x daily - 7x weekly  Beginner Clam - 15 reps - 2 sets - 2x daily - 7x weekly  Sidelying Hip Abduction - 15 reps - 2 sets - 2x daily - 7x weekly  Supine Piriformis Stretch with Foot on Ground - 5 reps - 10 second hold - 2x daily - 7x weekly  Hooklying Single Knee to Chest - 5 reps - 10 second hold - 2x daily - 7x weekly    Treatment/Session Summary:    · Response to Treatment: Pt had decreased tightness after manual therapy  · Communication/Consultation:  None today  · Equipment provided today:  None today  · Recommendations/Intent for next treatment session: Next visit will focus on LE, core, back strengthening and motion.   Treatment Plan of Care Effective Dates:   8/28/2019 TO 11/26/2019  Total Treatment Billable Duration: 45 minutes ( 30 min therex; 15 min manual)  PT Patient Time In/Time Out  Time In: 5410  Time Out: Soocrro Fontenot, PT, DPT     Future Appointments   Date Time Provider [place]   10/18/2019  1:45 PM Liza Srivastava, PT, DPT [facility]   10/21/2019  1:45 PM Shelly Fontenot, PT, DPT [facility]   10/25/2019  1:00 PM Shelly Aiken, PT, DPT [facility]   10/28/2019  1:45 PM Shelly Fontenot, PT, DPT [facility]   10/30/2019  2:15 PM Shelly Fontenot, PT, DPT [facility]   '

## 2019-10-18 ENCOUNTER — HOSPITAL ENCOUNTER (OUTPATIENT)
Dept: PHYSICAL THERAPY | Age: 64
Discharge: HOME | End: 2019-10-18
Payer: COMMERCIAL

## 2019-10-18 PROCEDURE — 97110 THERAPEUTIC EXERCISES: CPT

## 2019-10-18 PROCEDURE — 97140 MANUAL THERAPY 1/> REGIONS: CPT

## 2019-10-18 NOTE — PROGRESS NOTES
Wanda Baugh  : 1955  Primary: [facility]  Secondary:  [address] at [facility]  [address], [address], [place]  [phone]   [phone]        OUTPATIENT PHYSICAL THERAPY: Daily Treatment Note 10/18/2019  ICD-10: Low back pain (M54.5), Difficulty in walking, not elsewhere classified (R26.2), Muscle weakness (generalized) (M62.81)    Pre-treatment Symptoms/Complaints:  Pt reports continuing tightness in back that limits function. Interested in trying more aggressive ways to loosen it up.    Pain: Initial:   -7/10 Post Session:  -6/10; increased fatigue/decreased stiffness   Medications Last Reviewed:  10/18/2019    Updated Objective Findings: none today   TREATMENT:   THERAPEUTIC EXERCISE: ( 30 minutes):     9.25 9.30 10.2 10.7 10.9 10/16 10/18   Activity/Exercise          Recumbent stepper 10 min level 1 10 min level 1 10 min level 2 12min; L2 13min; L2 10 min level 2 10 min level 2   Quad set          SAQ 1# 2 x 15         SLR 2 x 15 2 x 15                  bridge 2 x 15 2 x 15   2 x 10     SL clamshells 2 x 15 2 x 15        SL hip abduction 2 x 15 2 x 15        ambulation          Abdominal isometrics          STC  30 sec hold x 3 with contract relax        Piriformis stretch 15 sec hold x 5 with contract-relax 30 sec hold x 3 with contract relax        Towel roll under mid-thoracic          Seated thoracic flexion, extension          Ball squat          Stdg toe raises    attempted      Stdg heel raises    20x B      Sit to stand Elevated surface  2 x 10 Elevated surface  2 x 10 Blue foam in chair  2 x 10 Blue foam in chair  2 x 10 Blue foam in chair  2 x 10     balance          marching In II bars   2 x 10 In II bars   2 x 10 In II bars   2 x 10 In II bars   2 x 10 In II bars   2 x 10     Standing hip abd          Ant step ups          Lat step ups          Sciatic nerve flossing X 10 X 10, supine        Step overs X 10 X 10 X 10 (in II bars, stance on R foot and move L foot forwards and back for pregait)   X 10 in II bars bilaterally    Step to the side      X 10 bilaterally    minisquats   X 10       balance Neutral stand with 50% weight bilaterally Neutral stand with 50% weight bilaterally, 30 sec hold x 4 Neutral stand with 50% weight bilaterally, 30 sec hold x 4 Neutral stand with 50% weight bilaterally, 30 sec hold x 4 Neutral stand with 50% weight bilaterally, 30 sec hold x 4     Seated trunk flexion  X 10 X 10,  And x 10 with rotation bias X 10,  And x 10 with rotation bias X 10,  And x 10 with rotation bias X 10,  And x 10 with rotation bias Standing x 10   ambulation   Without cane 75 ft x 2 with seated rest break in between Without cane 75 ft x 2 with seated rest break in between Without cane 75 ft x 2 with seated rest break in between  75 ft without cane   Seated pelvic tilts       X 10       MANUAL THERAPY: (15 minutes):     Manual Therapy technique and location Date:  10/9/19 Date:  10/16 Date:  10/18    Parameters Parameters Parameters    -STM/MFR to B thoracolumbar parapsinals, as well as, R posterior/lateral R hip, including piriformis trigger point release - mfr to B lumbar paraspinals manually and tool assisted - instrument assisted soft tissue technique to trigger points in bilateral lumbar paraspinals following written and verbal consent     Dry Needles Used: 6   Dry Needles Removed: 6       MODALITIES ( minutes):    [employer] Portal  Access Code: [email or web address]   URL: [email or web address]   Date: 09/30/2019   Prepared by: Mary Patiño     Exercises  Straight Leg Raise - 15 reps - 2 sets - 2x daily - 7x weekly  Supine Bridge - 15 reps - 2 sets - 2x daily - 7x weekly  Beginner Clam - 15 reps - 2 sets - 2x daily - 7x weekly  Sidelying Hip Abduction - 15 reps - 2 sets - 2x daily - 7x weekly  Supine Piriformis Stretch with Foot on Ground - 5 reps - 10 second hold - 2x daily - 7x weekly  Hooklying Single Knee to Chest - 5 reps - 10 second hold - 2x daily - 7x weekly    Treatment/Session Summary:    · Response to Treatment: Pt was agreeable to manual therapy, no complications reported  · Communication/Consultation:  None today  · Equipment provided today:  None today  · Recommendations/Intent for next treatment session: Next visit will focus on LE, core, back strengthening and motion.   Treatment Plan of Care Effective Dates:   8/28/2019 TO 11/26/2019  Total Treatment Billable Duration: 45 minutes ( 30 min therex; 15 min manual)  PT Patient Time In/Time Out  Time In: 5910  Time Out: [address], PT, DPT     Future Appointments   Date Time Provider Socorro Chandler   10/21/2019  1:45 PM Dustin Lei, PT, DPT [facility]   10/25/2019  1:00 PM Cuffey, Haynes Dakins, PT, DPT [facility]   10/28/2019  1:45 PM Cuffey, Haynes Dakins, PT, DPT [facility]   10/30/2019  2:15 PM Joey Sleeper, Haynes Dakins, PT, DPT [facility]   11/4/2019  1:00 PM Dustin Lei, PT, DPT [facility]   11/8/2019  1:00 PM Dustin Lei, PT, DPT [facility]   11/15/2019  1:00 PM Dustin Lei, PT, DPT [facility]   11/18/2019  1:00 PM Dustin Lei, PT, DPT [facility]   11/22/2019  1:00 PM Dustin Lei, PT, DPT [facility]   11/25/2019  1:00 PM Cuffey, Haynes Dakins, PT, DPT [facility]   '

## 2019-10-21 ENCOUNTER — HOSPITAL ENCOUNTER (OUTPATIENT)
Dept: PHYSICAL THERAPY | Age: 64
Discharge: HOME | End: 2019-10-21
Payer: COMMERCIAL

## 2019-10-21 PROCEDURE — 97110 THERAPEUTIC EXERCISES: CPT

## 2019-10-21 NOTE — PROGRESS NOTES
Alen Rodriguez  : 1955  Primary: [facility]  Secondary:  [address]  at [facility]  [address], [address], [place]  [phone]   [phone]        OUTPATIENT PHYSICAL THERAPY: Daily Treatment Note 10/21/2019  ICD-10: Low back pain (M54.5), Difficulty in walking, not elsewhere classified (R26.2), Muscle weakness (generalized) (M62.81)    Pre-treatment Symptoms/Complaints:  Pt reports the day after last treatment her back felt \"normal\" like she could do anything. Got a flu shot. The next day she reported all pains flared back up and was not able to do much. Today, pain has calmed back down and back has more movement.    Pain: Initial:    Post Session:  Increased movement tolerated   Medications Last Reviewed:  10/21/2019    Updated Objective Findings: none today   TREATMENT:   THERAPEUTIC EXERCISE: ( 45 minutes):     10.2 10.7 10.9 10/16 10/18 10/21   Activity/Exercise         Recumbent stepper 10 min level 2 12min; L2 13min; L2 10 min level 2 10 min level 2 10 min level 2   Quad set         SAQ         SLR                  bridge   2 x 10      SL clamshells         SL hip abduction         ambulation         Abdominal isometrics         STC         Piriformis stretch         Towel roll under mid-thoracic         Seated thoracic flexion, extension         Ball squat         Stdg toe raises  attempted       Stdg heel raises  20x B       Sit to stand Blue foam in chair  2 x 10 Blue foam in chair  2 x 10 Blue foam in chair  2 x 10      balance         marching In II bars   2 x 10 In II bars   2 x 10 In II bars   2 x 10   In II bars   2 x 10   Standing hip abd         Ant step ups         Lat step ups         Sciatic nerve flossing         Step overs X 10 (in II bars, stance on R foot and move L foot forwards and back for pregait)   X 10 in II bars bilaterally  X 10 in II bars   Step to the side    X 10 bilaterally  X 10 bilaterally   sidestepping      In II bars 6 laps   minisquats X 10        balance Neutral stand with 50% weight bilaterally, 30 sec hold x 4 Neutral stand with 50% weight bilaterally, 30 sec hold x 4 Neutral stand with 50% weight bilaterally, 30 sec hold x 4   - Neutral stand with 50% weight bilaterally, 30 sec hold x 4  -  stride 30 sec hold x 4   Seated trunk flexion X 10,  And x 10 with rotation bias X 10,  And x 10 with rotation bias X 10,  And x 10 with rotation bias X 10,  And x 10 with rotation bias Standing x 10 X 10   ambulation Without cane 75 ft x 2 with seated rest break in between Without cane 75 ft x 2 with seated rest break in between Without cane 75 ft x 2 with seated rest break in between  75 ft without cane    Seated pelvic tilts     X 10 X 10   Step up      4 in step, x 10 B LE, hand hold                                  MANUAL THERAPY: (0 minutes):     Manual Therapy technique and location Date:  10/9/19 Date:  10/16 Date:  10/18    Parameters Parameters Parameters    -STM/MFR to B thoracolumbar parapsinals, as well as, R posterior/lateral R hip, including piriformis trigger point release - mfr to B lumbar paraspinals manually and tool assisted - instrument assisted soft tissue technique to trigger points in bilateral lumbar paraspinals following written and verbal consent     Dry Needles Used: 6   Dry Needles Removed: 6       MODALITIES ( minutes):    [employer] Portal  Access Code: [email or web address]   URL: [email or web address]   Date: 09/30/2019   Prepared by: Chadwick Little     Exercises  Straight Leg Raise - 15 reps - 2 sets - 2x daily - 7x weekly  Supine Bridge - 15 reps - 2 sets - 2x daily - 7x weekly  Beginner Clam - 15 reps - 2 sets - 2x daily - 7x weekly  Sidelying Hip Abduction - 15 reps - 2 sets - 2x daily - 7x weekly  Supine Piriformis Stretch with Foot on Ground - 5 reps - 10 second hold - 2x daily - 7x weekly  Hooklying Single Knee to Chest - 5 reps - 10 second hold - 2x daily - 7x weekly    Treatment/Session Summary:    · Response to Treatment: Pt demonstrated improved trunk flexion today compared to previous visits. Tolerated increased standing activities. · Communication/Consultation:  None today  · Equipment provided today:  None today  · Recommendations/Intent for next treatment session: Next visit will focus on LE, core, back strengthening and motion.   Treatment Plan of Care Effective Dates:   8/28/2019 TO 11/26/2019  Total Treatment Billable Duration: 45 minutes therex  PT Patient Time In/Time Out  Time In: 4327  Time Out: [address] [facility] Po, PT, DPT     Future Appointments   Date Time Provider Socorro Chandler   10/25/2019  1:00 PM Zenon Avalos, PT, DPT [facility]   10/28/2019  1:45 PM Linette Fontenot PT, DPT [facility]   10/30/2019  2:15 PM Linette Fontenot, PT, DPT [facility]   11/4/2019  1:00 PM Linette Fontenot, PT, DPT [facility]   11/8/2019  1:00 PM Linette Cedeno, PT, DPT [facility]   11/15/2019  1:00 PM Zenon Avalos, PT, DPT [facility]   11/18/2019  1:00 PM Zenon Avalos PT, DPT [facility]   11/22/2019  1:00 PM Zenon Avalos PT, DPT [facility]   11/25/2019  1:00 PM Linette Fontenot PT, DPT [facility]   '

## 2019-10-28 ENCOUNTER — HOSPITAL ENCOUNTER (OUTPATIENT)
Dept: PHYSICAL THERAPY | Age: 64
Discharge: HOME | End: 2019-10-28
Payer: COMMERCIAL

## 2019-10-28 PROCEDURE — 97110 THERAPEUTIC EXERCISES: CPT

## 2019-10-28 NOTE — PROGRESS NOTES
Jay Rhodes  : 1955  Primary: [facility]  Secondary:  [address]  at [facility]  [address], [address], [place] [address]  [phone]   [phone]        OUTPATIENT PHYSICAL THERAPY: Daily Treatment Note 10/28/2019  ICD-10: Low back pain (M54.5), Difficulty in walking, not elsewhere classified (R26.2), Muscle weakness (generalized) (M62.81)    Pre-treatment Symptoms/Complaints:  Pt was walking yesterday outside without cane because the past few days she had been feeling good, yesterday she stepped in a hole and fell. Today her legs and back feel tender, and has returned to using cane.    Pain: Initial:    Post Session:  Increased movement tolerated   Medications Last Reviewed:  10/28/2019    Updated Objective Findings: none today   TREATMENT:   THERAPEUTIC EXERCISE: ( 45 minutes):     10.2 10.7 10.9 10/16 10/18 10/21 10/28   Activity/Exercise          Recumbent stepper 10 min level 2 12min; L2 13min; L2 10 min level 2 10 min level 2 10 min level 2 10 min level 2   Quad set          SAQ          SLR                    bridge   2 x 10       SL clamshells          SL hip abduction          ambulation          Abdominal isometrics          STC          Piriformis stretch          Towel roll under mid-thoracic          Seated thoracic flexion, extension          Ball squat          Stdg toe raises  attempted        Stdg heel raises  20x B        Sit to stand Blue foam in chair  2 x 10 Blue foam in chair  2 x 10 Blue foam in chair  2 x 10       balance          marching In II bars   2 x 10 In II bars   2 x 10 In II bars   2 x 10   In II bars   2 x 10 In II bars  X 20   Standing hip abd          Ant step ups          Lat step ups          Sciatic nerve flossing          Step overs X 10 (in II bars, stance on R foot and move L foot forwards and back for pregait)   X 10 in II bars bilaterally  X 10 in II bars X 10 in II bars   Step to the side    X 10 bilaterally  X 10 bilaterally X 10 bilaterally   sidestepping      In II bars 6 laps In II bars 6 laps   minisquats X 10      X 10   balance Neutral stand with 50% weight bilaterally, 30 sec hold x 4 Neutral stand with 50% weight bilaterally, 30 sec hold x 4 Neutral stand with 50% weight bilaterally, 30 sec hold x 4   - Neutral stand with 50% weight bilaterally, 30 sec hold x 4  -  stride 30 sec hold x 4 - neutral stand, focus on weight in middle of feet and forwards from heels 1 min x 3   Seated trunk flexion X 10,  And x 10 with rotation bias X 10,  And x 10 with rotation bias X 10,  And x 10 with rotation bias X 10,  And x 10 with rotation bias Standing x 10 X 10 X 10   ambulation Without cane 75 ft x 2 with seated rest break in between Without cane 75 ft x 2 with seated rest break in between Without cane 75 ft x 2 with seated rest break in between  75 ft without cane  Without cane 1 lap around clinic   Seated pelvic tilts     X 10 X 10 X 10   Step up      4 in step, x 10 B LE, hand hold 4 in step, x 10 B LE, hand hold 50%   Hip extension       In II bars x 10 B LE                           MANUAL THERAPY: (0 minutes):     Manual Therapy technique and location Date:  10/9/19 Date:  10/16 Date:  10/18    Parameters Parameters Parameters    -STM/MFR to B thoracolumbar parapsinals, as well as, R posterior/lateral R hip, including piriformis trigger point release - mfr to B lumbar paraspinals manually and tool assisted - instrument assisted soft tissue technique to trigger points in bilateral lumbar paraspinals following written and verbal consent                 MODALITIES ( minutes):    [employer] Portal  Access Code: [email or web address]   URL: [email or web address]   Date: 09/30/2019   Prepared by: Jacob Rivers     Exercises  Straight Leg Raise - 15 reps - 2 sets - 2x daily - 7x weekly  Supine Bridge - 15 reps - 2 sets - 2x daily - 7x weekly  Beginner Clam - 15 reps - 2 sets - 2x daily - 7x weekly  Sidelying Hip Abduction - 15 reps - 2 sets - 2x daily - 7x weekly  Supine Piriformis Stretch with Foot on Ground - 5 reps - 10 second hold - 2x daily - 7x weekly  Hooklying Single Knee to Chest - 5 reps - 10 second hold - 2x daily - 7x weekly    Treatment/Session Summary:    · Response to Treatment: Pt is improving in back movement. Increased standing and walking activities today. · Communication/Consultation:  None today  · Equipment provided today:  None today  · Recommendations/Intent for next treatment session: Next visit will focus on LE, core, back strengthening and motion.   Treatment Plan of Care Effective Dates:   8/28/2019 TO 11/26/2019  Total Treatment Billable Duration: 45 minutes therex  PT Patient Time In/Time Out  Time In: 8353  Time Out: [address], PT, DPT     Future Appointments   Date Time Provider Socorro Chandler   10/30/2019  2:15 PM Juan José Sheldon, PT, DPT [facility]   11/13/2019  3:30 PM [address], PT, DPT [facility]   11/15/2019  3:15 PM Helen [address], PT, DPT [facility]   11/18/2019  2:30 PM Helen [address], PT, DPT [facility]   11/22/2019  2:30 PM Helen [address], PT, DPT [facility]   12/2/2019  2:30 PM Helen [address], PT, DPT [facility]   12/11/2019  2:30 PM Helen [address], PT, DPT [facility]   12/13/2019  2:30 PM [address], PT, DPT [facility]   12/16/2019  2:30 PM Helen [address], PT, DPT [facility]   12/18/2019  2:15 PM Juan José Sheldon, PT, DPT [facility]   12/23/2019  2:30 PM Helen [address], PT, DPT [facility]   '

## 2019-11-04 ENCOUNTER — APPOINTMENT (OUTPATIENT)
Dept: PHYSICAL THERAPY | Age: 64
End: 2019-11-04
Payer: COMMERCIAL

## 2019-11-13 ENCOUNTER — HOSPITAL ENCOUNTER (OUTPATIENT)
Dept: PHYSICAL THERAPY | Age: 64
Discharge: HOME | End: 2019-11-13
Payer: COMMERCIAL

## 2019-11-13 PROCEDURE — 97140 MANUAL THERAPY 1/> REGIONS: CPT

## 2019-11-13 PROCEDURE — 97110 THERAPEUTIC EXERCISES: CPT

## 2019-11-13 NOTE — PROGRESS NOTES
Vinita Geremias  : 1955  Primary: [facility]  Secondary:  [address] at [facility]  [address], [address], [place] [address]  [phone]   [phone]        OUTPATIENT PHYSICAL THERAPY: Daily Treatment Note 2019  ICD-10: Low back pain (M54.5), Difficulty in walking, not elsewhere classified (R26.2), Muscle weakness (generalized) (M62.81)    Pre-treatment Symptoms/Complaints:  Pt reports her leg is stronger and walking is better, however she has been having very high levels of low back pain recently.    Pain: Initial:    Post Session:  Increased soreness   Medications Last Reviewed:  2019    Updated Objective Findings: none today   TREATMENT:   THERAPEUTIC EXERCISE: ( 30 minutes):     10.7 10.9 10/16 10/18 10/21 10/28 11/13   Activity/Exercise          Recumbent stepper 12min; L2 13min; L2 10 min level 2 10 min level 2 10 min level 2 10 min level 2 10 min level 2   Quad set          SAQ          SLR                    bridge  2 x 10        SL clamshells          SL hip abduction          ambulation          Abdominal isometrics       10 sec hold x 3   STC       10 sec hold x 5   Piriformis stretch       10 sec hold x 5   LTR       X 10   Towel roll under mid-thoracic          Seated thoracic flexion, extension          Ball squat          Stdg toe raises attempted         Stdg heel raises 20x B         Sit to stand Blue foam in chair  2 x 10 Blue foam in chair  2 x 10        balance          marching In II bars   2 x 10 In II bars   2 x 10   In II bars   2 x 10 In II bars  X 20    Standing hip abd          Ant step ups          Lat step ups          Sciatic nerve flossing          Step overs   X 10 in II bars bilaterally  X 10 in II bars X 10 in II bars    Step to the side   X 10 bilaterally  X 10 bilaterally X 10 bilaterally    sidestepping     In II bars 6 laps In II bars 6 laps    minisquats      X 10    balance Neutral stand with 50% weight bilaterally, 30 sec hold x 4 Neutral stand with 50% weight bilaterally, 30 sec hold x 4   - Neutral stand with 50% weight bilaterally, 30 sec hold x 4  -  stride 30 sec hold x 4 - neutral stand, focus on weight in middle of feet and forwards from heels 1 min x 3    Seated trunk flexion X 10,  And x 10 with rotation bias X 10,  And x 10 with rotation bias X 10,  And x 10 with rotation bias Standing x 10 X 10 X 10    ambulation Without cane 75 ft x 2 with seated rest break in between Without cane 75 ft x 2 with seated rest break in between  75 ft without cane  Without cane 1 lap around clinic    Seated pelvic tilts    X 10 X 10 X 10    Step up     4 in step, x 10 B LE, hand hold 4 in step, x 10 B LE, hand hold 50%    Hip extension      In II bars x 10 B LE                            MANUAL THERAPY: (25 minutes):     Manual Therapy technique and location Date:  10/9/19 Date:  10/16 Date:  10/18 11/13    Parameters Parameters Parameters     -STM/MFR to B thoracolumbar parapsinals, as well as, R posterior/lateral R hip, including piriformis trigger point release - mfr to B lumbar paraspinals manually and tool assisted - instrument assisted soft tissue technique to trigger points in bilateral lumbar paraspinals following written and verbal consent - instrument assisted soft tissue technique to trigger points in bilateral lumbar paraspinals following written and verbal consent; 10 needles used, 10 needles accounted for                 MODALITIES (5 minutes):  - IFC electrical stimulation in conjunction with manual therapy to lumbar paraspinals, intensity adjusted to pt tolerance    [employer] Portal  Access Code: [email or web address]   URL: [email or web address]   Date: 09/30/2019   Prepared by: Maame Devine     Exercises  Straight Leg Raise - 15 reps - 2 sets - 2x daily - 7x weekly  Supine Bridge - 15 reps - 2 sets - 2x daily - 7x weekly  Beginner Clam - 15 reps - 2 sets - 2x daily - 7x weekly  Sidelying Hip Abduction - 15 reps - 2 sets - 2x daily - 7x weekly  Supine Piriformis Stretch with Foot on Ground - 5 reps - 10 second hold - 2x daily - 7x weekly  Hooklying Single Knee to Chest - 5 reps - 10 second hold - 2x daily - 7x weekly    Treatment/Session Summary:    · Response to Treatment: Pt is improving in back movement. Increased standing and walking activities today. · Communication/Consultation:  None today  · Equipment provided today:  None today  · Recommendations/Intent for next treatment session: Next visit will focus on LE, core, back strengthening and motion.   Treatment Plan of Care Effective Dates:   8/28/2019 TO 11/26/2019  Total Treatment Billable Duration: 30 minutes therex, 25 minutes manual therapy  PT Patient Time In/Time Out  Time In: 1520  Time Out: [address] Po, PT, DPT     Future Appointments   Date Time Provider Socorro Chandler   11/15/2019  3:15 PM Graciela Vuong, PT, DPT [facility]   11/18/2019  2:30 PM Betsy Fontenot, PT, DPT [facility]   11/22/2019  2:30 PM Betsy Fontenot, PT, DPT [facility]   12/2/2019  2:30 PM Betsy Fontenot, PT, DPT [facility]   12/11/2019  2:30 PM Betsy Fontenot, PT, DPT [facility]   12/13/2019  2:30 PM Betsy Fontenot, PT, DPT [facility]   12/16/2019  2:30 PM Betsy Penny, PT, DPT [facility]   12/18/2019  2:15 PM Graciela Vuong, PT, DPT [facility]   12/23/2019  2:30 PM Betsy Fontenot, PT, DPT [facility]   '

## 2019-11-15 ENCOUNTER — HOSPITAL ENCOUNTER (OUTPATIENT)
Dept: PHYSICAL THERAPY | Age: 64
Discharge: HOME | End: 2019-11-15
Payer: COMMERCIAL

## 2019-11-15 PROCEDURE — 97140 MANUAL THERAPY 1/> REGIONS: CPT

## 2019-11-15 PROCEDURE — 97110 THERAPEUTIC EXERCISES: CPT

## 2019-11-15 NOTE — PROGRESS NOTES
Ana Bach  : 1955  Primary: [facility]  Secondary:  [address]  at [facility]  [address], [address], [place]  [phone]   [phone]        OUTPATIENT PHYSICAL THERAPY: Daily Treatment Note 11/15/2019  ICD-10: Low back pain (M54.5), Difficulty in walking, not elsewhere classified (R26.2), Muscle weakness (generalized) (M62.81)    Pre-treatment Symptoms/Complaints:  Pt reports her back felt much better for one day following last treatment, but then pain returned. Pain interferes with ability to walk and function.    Pain: Initial:    Post Session:  Decreased pain   Medications Last Reviewed:  11/15/2019    Updated Objective Findings: none today   TREATMENT:   THERAPEUTIC EXERCISE: ( 15 minutes):     10/16 10/18 10/21 10/28 11/13 11/15   Activity/Exercise         Recumbent stepper 10 min level 2 10 min level 2 10 min level 2 10 min level 2 10 min level 2 10 min level 2   Quad set         SAQ         SLR                  bridge         SL clamshells         SL hip abduction         ambulation         Prone press up      X 5   Abdominal isometrics     10 sec hold x 3    STC     10 sec hold x 5 10 sec hold x 3   Piriformis stretch     10 sec hold x 5 10 sec hold x 3   LTR     X 10    Towel roll under mid-thoracic         Seated thoracic flexion, extension         Ball squat         Stdg toe raises         Stdg heel raises         Sit to stand         balance         marching   In II bars   2 x 10 In II bars  X 20     Standing hip abd         Ant step ups         Lat step ups         Sciatic nerve flossing         Step overs X 10 in II bars bilaterally  X 10 in II bars X 10 in II bars     Step to the side X 10 bilaterally  X 10 bilaterally X 10 bilaterally     sidestepping   In II bars 6 laps In II bars 6 laps     minisquats    X 10     balance   - Neutral stand with 50% weight bilaterally, 30 sec hold x 4  -  stride 30 sec hold x 4 - neutral stand, focus on weight in middle of feet and forwards from heels 1 min x 3     Seated trunk flexion X 10,  And x 10 with rotation bias Standing x 10 X 10 X 10     ambulation  75 ft without cane  Without cane 1 lap around clinic     Seated pelvic tilts  X 10 X 10 X 10     Step up   4 in step, x 10 B LE, hand hold 4 in step, x 10 B LE, hand hold 50%     Hip extension    In II bars x 10 B LE     kinesiotape application      I strip for mm inhibition to B low back                MANUAL THERAPY: (30 minutes):     Manual Therapy technique and location Date:  10/9/19 Date:  10/16 Date:  10/18 11/13 11/15    Parameters Parameters Parameters      -STM/MFR to B thoracolumbar parapsinals, as well as, R posterior/lateral R hip, including piriformis trigger point release - mfr to B lumbar paraspinals manually and tool assisted - instrument assisted soft tissue technique to trigger points in bilateral lumbar paraspinals following written and verbal consent - instrument assisted soft tissue technique to trigger points in bilateral lumbar paraspinals following written and verbal consent; 10 needles used, 10 needles accounted for - soft tissue mobilization manually and tool assisted to lumbar paraspinals, QL specifically R side  - long axis traction                 MODALITIES (0 minutes):  - IFC electrical stimulation in conjunction with manual therapy to lumbar paraspinals, intensity adjusted to pt tolerance - not today    [employer] Portal  Access Code: [email or web address]   URL: [email or web address]   Date: 09/30/2019   Prepared by: Dimitry Liang     Exercises  Straight Leg Raise - 15 reps - 2 sets - 2x daily - 7x weekly  Supine Bridge - 15 reps - 2 sets - 2x daily - 7x weekly  Beginner Clam - 15 reps - 2 sets - 2x daily - 7x weekly  Sidelying Hip Abduction - 15 reps - 2 sets - 2x daily - 7x weekly  Supine Piriformis Stretch with Foot on Ground - 5 reps - 10 second hold - 2x daily - 7x weekly  Hooklying Single Knee to Chest - 5 reps - 10 second hold - 2x daily - 7x weekly    Treatment/Session Summary:    · Response to Treatment: Pt demonstrated improved ability to stand upright during ambulation, reported felt much better after treatment. .   · Communication/Consultation:  None today  · Equipment provided today:  None today  · Recommendations/Intent for next treatment session: Next visit will focus on LE, core, back strengthening and motion.   Treatment Plan of Care Effective Dates:   8/28/2019 TO 11/26/2019  Total Treatment Billable Duration: 15 minutes therex, 30 minutes manual therapy  PT Patient Time In/Time Out  Time In: 2240  Time Out: [address] [facility] Richardey, PT, DPT     Future Appointments   Date Time Provider Socorro Chandler   11/18/2019  2:30 PM Rochelle Vicente, PT, DPT [facility]   11/22/2019  2:30 PM Alessandra Fontenot, PT, DPT [facility]   12/2/2019  2:30 PM Alessandra Goncalves, PT, DPT [facility]   12/11/2019  2:30 PM Alessandra Goncalves, PT, DPT [facility]   12/13/2019  2:30 PM Alessandra Goncalves, PT, DPT [facility]   12/16/2019  2:30 PM Alessandra Fontenot, PT, DPT [facility]   12/18/2019  2:15 PM Rochelle Vicente, PT, DPT [facility]   12/23/2019  2:30 PM Alessandra Fontenot, PT, DPT [facility]   '

## 2019-11-22 ENCOUNTER — HOSPITAL ENCOUNTER (OUTPATIENT)
Dept: PHYSICAL THERAPY | Age: 64
Discharge: HOME | End: 2019-11-22
Payer: COMMERCIAL

## 2019-11-22 PROCEDURE — 97110 THERAPEUTIC EXERCISES: CPT

## 2019-11-22 PROCEDURE — 97140 MANUAL THERAPY 1/> REGIONS: CPT

## 2019-11-22 NOTE — PROGRESS NOTES
Mariaa Ferguson  : 1955  Primary: [facility]  Secondary:  [address]  at [facility]  [address], [address], [address] [facility]  [phone]   [phone]        OUTPATIENT PHYSICAL THERAPY: Daily Treatment Note 2019  ICD-10: Low back pain (M54.5), Difficulty in walking, not elsewhere classified (R26.2), Muscle weakness (generalized) (M62.81)    Pre-treatment Symptoms/Complaints:  Pt cancelled last appointment because she couldn't stop sneezing and it was causing her a lot of back pain. Pain has calmed down, not having much pain right now.    Pain: Initial:    Post Session:  Decreased pain   Medications Last Reviewed:  2019    Updated Objective Findings: none today   TREATMENT:   THERAPEUTIC EXERCISE: ( 30 minutes):     10/16 10/18 10/21 10/28 11/13 11/15 11.22   Activity/Exercise          Recumbent stepper 10 min level 2 10 min level 2 10 min level 2 10 min level 2 10 min level 2 10 min level 2 10 min level 2   Quad set          SAQ          SLR                    bridge          SL clamshells          SL hip abduction          ambulation          Prone press up      X 5    Abdominal isometrics     10 sec hold x 3     STC     10 sec hold x 5 10 sec hold x 3    Piriformis stretch     10 sec hold x 5 10 sec hold x 3    LTR     X 10     Towel roll under mid-thoracic          Seated thoracic flexion, extension          Ball squat          Stdg toe raises          Stdg heel raises          Sit to stand          balance          marching   In II bars   2 x 10 In II bars  X 20      Standing hip abd          Ant step ups          Lat step ups          Sciatic nerve flossing          Step overs X 10 in II bars bilaterally  X 10 in II bars X 10 in II bars      Step to the side X 10 bilaterally  X 10 bilaterally X 10 bilaterally      sidestepping   In II bars 6 laps In II bars 6 laps      minisquats    X 10      balance   - Neutral stand with 50% weight bilaterally, 30 sec hold x 4  -  stride 30 sec hold x 4 - neutral stand, focus on weight in middle of feet and forwards from heels 1 min x 3      Seated trunk flexion X 10,  And x 10 with rotation bias Standing x 10 X 10 X 10   X 10   ambulation  75 ft without cane  Without cane 1 lap around clinic   Without cane, working on arm swing 75 ft x 6   Seated pelvic tilts  X 10 X 10 X 10   X 10   Step up   4 in step, x 10 B LE, hand hold 4 in step, x 10 B LE, hand hold 50%      Hip extension    In II bars x 10 B LE      kinesiotape application      I strip for mm inhibition to B low back I strip for mm inhibition to B low back    cone from floor       X 5       MANUAL THERAPY: (25 minutes):     Manual Therapy technique and location Date:  10/9/19 Date:  10/16 Date:  10/18 11/13 11/15 11/22    Parameters Parameters Parameters       -STM/MFR to B thoracolumbar parapsinals, as well as, R posterior/lateral R hip, including piriformis trigger point release - mfr to B lumbar paraspinals manually and tool assisted - instrument assisted soft tissue technique to trigger points in bilateral lumbar paraspinals following written and verbal consent - instrument assisted soft tissue technique to trigger points in bilateral lumbar paraspinals following written and verbal consent; 10 needles used, 10 needles accounted for - soft tissue mobilization manually and tool assisted to lumbar paraspinals, QL specifically R side  - long axis traction - instrument assisted soft tissue technique to trigger points in bilateral lumbar paraspinals                 MODALITIES ( 5 minutes):  - IFC electrical stimulation in conjunction with manual therapy to lumbar paraspinals, intensity adjusted to pt tolerance     [employer] Portal  Access Code: [email or web address]   URL: [email or web address]   Date: 09/30/2019   Prepared by: Esteban Sabillon     Exercises  Straight Leg Raise - 15 reps - 2 sets - 2x daily - 7x weekly  Supine Bridge - 15 reps - 2 sets - 2x daily - 7x weekly  Beginner Clam - 15 reps - 2 sets - 2x daily - 7x weekly  Sidelying Hip Abduction - 15 reps - 2 sets - 2x daily - 7x weekly  Supine Piriformis Stretch with Foot on Ground - 5 reps - 10 second hold - 2x daily - 7x weekly  Hooklying Single Knee to Chest - 5 reps - 10 second hold - 2x daily - 7x weekly    Treatment/Session Summary:    · Response to Treatment: Pt demonstrated improved ability to stand upright during ambulation, reported felt much better after treatment. .   · Communication/Consultation:  None today  · Equipment provided today:  None today  · Recommendations/Intent for next treatment session: Next visit will focus on LE, core, back strengthening and motion.   Treatment Plan of Care Effective Dates:   8/28/2019 TO 11/26/2019  Total Treatment Billable Duration: 30 minutes therex, 30 minutes manual therapy  PT Patient Time In/Time Out  Time In: 1430  Time Out: Di 2 Po, PT, DPT     Future Appointments   Date Time Provider Socorro Chandler   12/2/2019  2:30 PM Matt Louis, PT, DPT [facility]   12/11/2019  2:30 PM Kelsy Fontenot, PT, DPT [facility]   12/13/2019  2:30 PM Kelsy Olson PT, DPT [facility]   12/16/2019  2:30 PM Kelsy Olson, PT, DPT [facility]   12/18/2019  2:15 PM Kelsy Olson PT, DPT [facility]   12/23/2019  2:30 PM Kelsy Fontenot PT, DPT [facility]   '

## 2019-11-22 NOTE — ROUTINE PROCESS
Maite Buckley : 1955 Payor: Benjy Ortega / Plan: Shakeel Rao / Product Type: Commerical /  [address]  at [address], [place] [address] [phone]   [phone] OUTPATIENT PHYSICAL THERAPY:Recertification  ICD-10: Treatment Diagnosis: Low back pain (M54.5), Difficulty in walking, not elsewhere classified (R26.2), Muscle weakness (generalized) (M62.81) Precautions/Allergies:  
Latex MD Orders: evaluate and treat, modalities, core strengthening and stretching, gait training and right lower extremity strengthening MEDICAL/REFERRING DIAGNOSIS: 
Encounter for follow-up examination after completed treatment for conditions other than malignant neoplasm [Z09] Radiculopathy, lumbar region [M54.16] DATE OF ONSET:  REFERRING PHYSICIAN: Camden Boot D* RETURN PHYSICIAN APPOINTMENT: --  
 
INITIAL ASSESSMENT:  Ms. Wendie Cochran has attended physical therapy for weakness and back pain. She has improved to ambulating without cane for household distances and some community distances. She has also improved in R LE strength and stability, reporting a reduction in instances of R LE buckling. She continues to report levels of back pain that interfere with daily activities, and she demonstrates significant limitations in lumbar ROM and mobility. Pt has responded well to manual therapy techniques including dry needling, and a home exercise routine. PROBLEM LIST (Impacting functional limitations): 1. Decreased Strength 2. Decreased ADL/Functional Activities 3. Decreased Ambulation Ability/Technique 4. Decreased Activity Tolerance 5. Decreased [place] with Home Exercise Program INTERVENTIONS PLANNED: (Treatment may consist of any combination of the following) 1. Home Exercise Program (HEP) 2. Manual Therapy including soft tissue mobilization 3. Neuromuscular Re-education/Strengthening 4. Range of Motion (ROM) 5. Therapeutic Activites 6. Therapeutic Exercise/Strengthening 7. Modalities including heat/cold application, electrical stimulation, vasopneumatic compression, ultrasound TREATMENT PLAN: 
Effective Dates: 11/22/19 to 2/22/20  Frequency/Duration: 1-2 times a week for 90 Day(s) GOALS: (Goals have been discussed and agreed upon with patient.) Short-Term Functional Goals: Time Frame: 5 weeks 1. Pt will demonstrate independence with > or = 2 exercises in HEP. met 2. Pt will report < or = 19 on Oswestry. ongoing Discharge Goals: Time Frame: 10 weeks 1. Pt will demonstrate independence with > or = 4 exercises in HEP. ongoing 2. Pt will report < or = 14 on Oswestry. ongoing 3. Pt will demonstrate functional gait. ongoing 4. Pt will demonstrate functional R LE strength. Partially met OUTCOME MEASURE:  
Tool Used: Modified Oswestry Low Back Pain Questionnaire Score:  Initial: 24/50  Most Recent: X/50 (Date: -- ) Interpretation of Score: Each section is scored on a 0-5 scale, 5 representing the greatest disability. The scores of each section are added together for a total score of 50. MEDICAL NECESSITY:  
· Skilled intervention continues to be required due to decreased function. REASON FOR SERVICES/OTHER COMMENTS: 
· Patient continues to require skilled intervention due to decreased function. Total Duration: PT Patient Time In/Time Out Time In: 1430 Time Out: 1530 Rehabilitation Potential For Stated Goals: Good Regarding Noy Ace's therapy, I certify that the treatment plan above will be carried out by a therapist or under their direction. Thank you for this referral, Manju Medeiros, PT, DPT    
  
 
PAIN/SUBJECTIVE:  
Initial:   5/10 Post Session:  5/10 HISTORY:  
History of Injury/Illness (Reason for Referral): 
Pt had back surgery on July 9th due to back pain that radiated down R LE, symptoms had been ongoing for years before sx. Following sx reports of back pain and R LE weakness that results in leg buckling occasionally, so using a cane. Past Medical History/Comorbidities: Ms. Judith Guerrero  has a past medical history of DVT (deep venous thrombosis) ([facility] [address] (~2009), Hypertension, and Nausea & vomiting. Ms. Judith Guerrero  has a past surgical history that includes hx open cholecystectomy (1995) and hx hysterectomy (1991). Social History/Living Environment:  
  house Prior Level of Function/Work/Activity: 
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  
      [facility] H2 Model Falls Risk Assessment Risk Factors: 
     No Risk Factors Identified Ability to Rise from Chair: 
     (0)  Ability to rise in a single movement Falls Prevention Plan: No modifications necessary Total: (5 or greater = High Risk): 0  
©2010 [facility]. [facility] States Patent #[id number]. Federal Law prohibits the replication, distribution or use without written permission from [facility] [employer] Current Medications:   
  
Current Outpatient Medications:  
  cetirizine (ZYRTEC) 10 mg tablet, Take 10 mg by mouth daily. , Disp: , Rfl:  
  hydroCHLOROthiazide (HYDRODIURIL) 25 mg tablet, Take 25 mg by mouth daily. Indications: high blood pressure, Disp: , Rfl:  
  ferrous sulfate 325 mg (65 mg iron) tablet, Take  by mouth Daily (before breakfast). , Disp: , Rfl:  
  traZODone (DESYREL) 50 mg tablet, Take 50 mg by mouth nightly as needed for Sleep., Disp: , Rfl:  
  pravastatin (PRAVACHOL) 10 mg tablet, Take 10 mg by mouth nightly., Disp: , Rfl:  
  cholecalciferol, vitamin D3, (VITAMIN D3) 2,000 unit tab, Take  by mouth daily. , Disp: , Rfl:  
  albuterol (PROVENTIL HFA, VENTOLIN HFA, PROAIR HFA) 90 mcg/actuation inhaler, Take 2 Puffs by inhalation every four (4) hours as needed for Wheezing., Disp: , Rfl:  
   fluticasone (FLONASE) 50 mcg/actuation nasal spray, 2 Sprays by Nasal route daily. , Disp: , Rfl:  
  amLODIPine (NORVASC) 10 mg tablet, Take 1 Tab by mouth daily. , Disp: , Rfl:  
  aspirin delayed-release 81 mg tablet, Take 81 mg by mouth daily. Indications: prevention of transient ischemic attack, Disp: , Rfl:  
  atenolol (TENORMIN) 25 mg tablet, Take 25 mg by mouth daily. , Disp: , Rfl:   
Date Last Reviewed:  11/22/2019 EXAMINATION:from IE  
Observation: 
     Standing- weight shifted onto L LE in standing Gait- ambulated in using cane in R hand, increased trunk sway to the R during R LE stance phase. Generally short strides with L and decreased stance time on R Strength: Muscle/Movement Strength at Eval Reassessment Date:   
knee extension R 3-/5   
knee flexion R 3/5 Hip flexion  R 2+/5 Hip extension R 3/5 Hip abduction R 2+/5 Range of Motion: Movement/Joint ROM at eval Reassessment Date:   
Knee  R wfl   
hip Generally decreased IR rotation on R Palpation: No palpation done today. Body Structures Involved: 1. Nerves 2. Bones 3. Joints 4. Muscles Body Functions Affected: 1. Sensory/Pain 2. Neuromusculoskeletal 
3. Movement Related Activities and Participation Affected: 1. General Tasks and Demands 2. Mobility 3. Domestic Life 4. Community, Social and [place] CLINICAL PRESENTATION:  
CLINICAL DECISION MAKING:  
 
 
Total Evaluation Duration: PT Patient Time In/Time Out Time In: 1430 Time Out: 1530 Future Appointments Date Time Provider Socorro Krausi 12/2/2019  2:30 PM Armen Fontenot PT, DPT [facility]  
12/11/2019  2:30 PM Juan Winters PT, DPT [facility]  
12/13/2019  2:30 PM Juan Winters PT, DPT [facility]  
12/16/2019  2:30 PM Juan Winters PT, DPT [facility]  
12/18/2019  2:15 PM Armen Fontenot PT, DPT [facility] 12/23/2019  2:30 PM [facility], PT, D[facility]

## 2019-12-11 ENCOUNTER — HOSPITAL ENCOUNTER (OUTPATIENT)
Dept: PHYSICAL THERAPY | Age: 64
Discharge: HOME | End: 2019-12-11
Payer: COMMERCIAL

## 2019-12-11 PROCEDURE — 97140 MANUAL THERAPY 1/> REGIONS: CPT

## 2019-12-11 PROCEDURE — 97110 THERAPEUTIC EXERCISES: CPT

## 2019-12-11 NOTE — PROGRESS NOTES
Jay Rhodes  : 1955  Primary: [facility]  Secondary:  [address] at [facility]  [address], [address], [place] [address]  [phone]   [phone]        OUTPATIENT PHYSICAL THERAPY: Daily Treatment Note 2019  ICD-10: Low back pain (M54.5), Difficulty in walking, not elsewhere classified (R26.2), Muscle weakness (generalized) (M62.81)    Pre-treatment Symptoms/Complaints:  Pt was sick last week so was unable to attend. She has been walking without cane and back pain is down although is having R side back pain still.    Pain: Initial:    Post Session:  Decreased pain   Medications Last Reviewed:  2019    Updated Objective Findings: none today   TREATMENT:   THERAPEUTIC EXERCISE: ( 40 minutes):     10/16 10/18 10/21 10/28 11/13 11/15 11.22 12.11   Activity/Exercise           Recumbent stepper 10 min level 2 10 min level 2 10 min level 2 10 min level 2 10 min level 2 10 min level 2 10 min level 2 10 min level 2   Quad set           SAQ           SLR                      bridge           SL clamshells           SL hip abduction           ambulation           Prone press up      X 5     Abdominal isometrics     10 sec hold x 3      STC     10 sec hold x 5 10 sec hold x 3     Piriformis stretch     10 sec hold x 5 10 sec hold x 3     LTR     X 10      Towel roll under mid-thoracic           Seated thoracic flexion, extension           Ball squat           Stdg toe raises           Stdg heel raises           Sit to stand           balance           marching   In II bars   2 x 10 In II bars  X 20    In II bars  X 20   Standing hip abd           Ant step ups           Lat step ups           Sciatic nerve flossing           Step overs X 10 in II bars bilaterally  X 10 in II bars X 10 in II bars    X 10 in II bars   Step to the side X 10 bilaterally  X 10 bilaterally X 10 bilaterally    X 10 in II bars   sidestepping   In II bars 6 laps In II bars 6 laps minisquats    X 10    X 10   balance   - Neutral stand with 50% weight bilaterally, 30 sec hold x 4  -  stride 30 sec hold x 4 - neutral stand, focus on weight in middle of feet and forwards from heels 1 min x 3    - neutral stand on blue foam pad 1 min hold x 3 with hand hold when needed   Seated trunk flexion X 10,  And x 10 with rotation bias Standing x 10 X 10 X 10   X 10    ambulation  75 ft without cane  Without cane 1 lap around clinic   Without cane, working on arm swing 75 ft x 6    Seated pelvic tilts  X 10 X 10 X 10   X 10    Step up   4 in step, x 10 B LE, hand hold 4 in step, x 10 B LE, hand hold 50%    6 in step x 10 B LE   Hip extension    In II bars x 10 B LE       kinesiotape application      I strip for mm inhibition to B low back I strip for mm inhibition to B low back     cone from floor       X 5 X 5   Seated trunk flexion stretch        X 10, and with side bias                             MANUAL THERAPY: (15 minutes):     Manual Therapy technique and location Date:  10/9/19 Date:  10/16 Date:  10/18 11/13 11/15 11/22 12/11    Parameters Parameters Parameters        -STM/MFR to B thoracolumbar parapsinals, as well as, R posterior/lateral R hip, including piriformis trigger point release - mfr to B lumbar paraspinals manually and tool assisted - instrument assisted soft tissue technique to trigger points in bilateral lumbar paraspinals following written and verbal consent - instrument assisted soft tissue technique to trigger points in bilateral lumbar paraspinals following written and verbal consent; 10 needles used, 10 needles accounted for - soft tissue mobilization manually and tool assisted to lumbar paraspinals, QL specifically R side  - long axis traction - instrument assisted soft tissue technique to trigger points in bilateral lumbar paraspinals - R QL release                 MODALITIES (  minutes):  - IFC electrical stimulation in conjunction with manual therapy to lumbar paraspinals, intensity adjusted to pt tolerance     [employer] Portal  Access Code: [email or web address]   URL: [email or web address]   Date: 09/30/2019   Prepared by: Naeem Sellers     Exercises  Straight Leg Raise - 15 reps - 2 sets - 2x daily - 7x weekly  Supine Bridge - 15 reps - 2 sets - 2x daily - 7x weekly  Beginner Clam - 15 reps - 2 sets - 2x daily - 7x weekly  Sidelying Hip Abduction - 15 reps - 2 sets - 2x daily - 7x weekly  Supine Piriformis Stretch with Foot on Ground - 5 reps - 10 second hold - 2x daily - 7x weekly  Hooklying Single Knee to Chest - 5 reps - 10 second hold - 2x daily - 7x weekly    Treatment/Session Summary:    · Response to Treatment: Pt reported fatigue with exercises. Increased trunk ROM after manual therapy. · Communication/Consultation:  None today  · Equipment provided today:  None today  · Recommendations/Intent for next treatment session: Next visit will focus on LE, core, back strengthening and motion.   Treatment Plan of Care Effective Dates:  11/22/19 to 2/22/20    Total Treatment Billable Duration: 40 minutes therex, 15 minutes manual therapy  PT Patient Time In/Time Out  Time In: 1430  Time Out: [address] [facility], PT, DPT     Future Appointments   Date Time Provider Socorro Chandler   12/13/2019  2:30 PM Zia High, PT, DPT [facility]   12/16/2019  2:30 PM Michelle Fontenot, PT, [facility]   12/18/2019  2:15 PM Michelle Costello, PT, DPT [facility]   12/23/2019  2:30 PM Michelle Fontenot, PT, DPT [facility]   '

## 2019-12-13 ENCOUNTER — HOSPITAL ENCOUNTER (OUTPATIENT)
Dept: PHYSICAL THERAPY | Age: 64
Discharge: HOME | End: 2019-12-13
Payer: COMMERCIAL

## 2019-12-13 PROCEDURE — 97110 THERAPEUTIC EXERCISES: CPT

## 2019-12-13 PROCEDURE — 97140 MANUAL THERAPY 1/> REGIONS: CPT

## 2019-12-13 NOTE — PROGRESS NOTES
Francisco Garcia  : 1955  Primary: [facility]  Secondary:  [address] [facility]  [address], [address], [place]  [phone]   [phone]        OUTPATIENT PHYSICAL THERAPY: Daily Treatment Note 2019  ICD-10: Low back pain (M54.5), Difficulty in walking, not elsewhere classified (R26.2), Muscle weakness (generalized) (M62.81)    Pre-treatment Symptoms/Complaints:  Pt reports significant reduction in pain in R side low back following last treatment.    Pain: Initial:    Post Session:  Decreased pain   Medications Last Reviewed:  2019    Updated Objective Findings: none today   TREATMENT:   THERAPEUTIC EXERCISE: ( 40 minutes):     10/28 11/13 11/15 11.22 12.11 12.14   Activity/Exercise         Recumbent stepper 10 min level 2 10 min level 2 10 min level 2 10 min level 2 10 min level 2 10 min level 2   Quad set         SAQ         SLR      X 10 B            bridge      X 10 B   SL clamshells      X 10 B   SL hip abduction         ambulation         Prone press up   X 5   X 10 B   Abdominal isometrics  10 sec hold x 3       STC  10 sec hold x 5 10 sec hold x 3      Piriformis stretch  10 sec hold x 5 10 sec hold x 3      LTR  X 10       Towel roll under mid-thoracic         Seated thoracic flexion, extension         Ball squat         Stdg toe raises         Stdg heel raises         Sit to stand         balance         marching In II bars  X 20    In II bars  X 20 In II bars  X 20   Standing hip abd         Ant step ups         Lat step ups         Sciatic nerve flossing         Step overs X 10 in II bars    X 10 in II bars    Step to the side X 10 bilaterally    X 10 in II bars    sidestepping In II bars 6 laps        minisquats X 10    X 10 X 10   balance - neutral stand, focus on weight in middle of feet and forwards from heels 1 min x 3    - neutral stand on blue foam pad 1 min hold x 3 with hand hold when needed - neutral stand on blue foam pad 1 min hold x 3 with hand hold when needed   Seated trunk flexion X 10   X 10     ambulation Without cane 1 lap around clinic   Without cane, working on arm swing 75 ft x 6     Seated pelvic tilts X 10   X 10     Toe touch on step      4 in step x 10 B LE   Step up 4 in step, x 10 B LE, hand hold 50%    6 in step x 10 B LE 4 in step x 10 B LE   Hip extension In II bars x 10 B LE     Prone x 10 B   kinesiotape application   I strip for mm inhibition to B low back I strip for mm inhibition to B low back      cone from floor    X 5 X 5 X 6   Seated trunk flexion stretch     X 10, and with side bias X 10 and with side bias                         MANUAL THERAPY: (15 minutes):     Manual Therapy technique and location 11/13 11/15 11/22 12/11 12.13            - instrument assisted soft tissue technique to trigger points in bilateral lumbar paraspinals following written and verbal consent; 10 needles used, 10 needles accounted for - soft tissue mobilization manually and tool assisted to lumbar paraspinals, QL specifically R side  - long axis traction - instrument assisted soft tissue technique to trigger points in bilateral lumbar paraspinals - R QL release - R QL release  - B lumbar paraspinal mfr  - rotational lumbar stretch                 MODALITIES (  minutes):  - IFC electrical stimulation in conjunction with manual therapy to lumbar paraspinals, intensity adjusted to pt tolerance     [employer] Portal  Access Code: [email or web address]   URL: [email or web address]   Date: 09/30/2019   Prepared by: Kaylee Jorge     Exercises  Straight Leg Raise - 15 reps - 2 sets - 2x daily - 7x weekly  Supine Bridge - 15 reps - 2 sets - 2x daily - 7x weekly  Beginner Clam - 15 reps - 2 sets - 2x daily - 7x weekly  Sidelying Hip Abduction - 15 reps - 2 sets - 2x daily - 7x weekly  Supine Piriformis Stretch with Foot on Ground - 5 reps - 10 second hold - 2x daily - 7x weekly  Hooklying Single Knee to Chest - 5 reps - 10 second hold - 2x daily - 7x weekly    Treatment/Session Summary:    · Response to Treatment: Pt reported fatigue with exercises. Increased trunk ROM after manual therapy. · Communication/Consultation:  None today  · Equipment provided today:  None today  · Recommendations/Intent for next treatment session: Next visit will focus on LE, core, back strengthening and motion.   Treatment Plan of Care Effective Dates:  11/22/19 to 2/22/20    Total Treatment Billable Duration: 40 minutes therex, 15 minutes manual therapy  PT Patient Time In/Time Out  Time In: 1420  Time Out: 79 Wolfer Fatoumata Fontenot, PT, DPT     Future Appointments   Date Time Provider [place] Chandler   12/16/2019  2:30 PM Dustin Lei, PT, DPT [facility]   12/18/2019  2:15 PM Joey Sleeper, Haynes Dakins, PT, DPT [facility]   12/23/2019  2:30 PM Cuffey, Haynes Dakins, PT, DPT [facility]   '

## 2019-12-16 ENCOUNTER — HOSPITAL ENCOUNTER (OUTPATIENT)
Dept: PHYSICAL THERAPY | Age: 64
Discharge: HOME | End: 2019-12-16
Payer: COMMERCIAL

## 2019-12-16 PROCEDURE — 97110 THERAPEUTIC EXERCISES: CPT

## 2019-12-16 PROCEDURE — 97140 MANUAL THERAPY 1/> REGIONS: CPT

## 2019-12-16 NOTE — PROGRESS NOTES
Jacqui Wharton  : 1955  Primary: [facility]  Secondary:  [address]  at [facility]  [address], [address], [place] [address]  [phone]   [phone]        OUTPATIENT PHYSICAL THERAPY: Daily Treatment Note 2019  ICD-10: Low back pain (M54.5), Difficulty in walking, not elsewhere classified (R26.2), Muscle weakness (generalized) (M62.81)    Pre-treatment Symptoms/Complaints:  Pt reports back pain is feeling better with manual therapy from previous treatments, and strength continues to improve.     Pain: Initial:    Post Session:  Decreased pain   Medications Last Reviewed:  2019    Updated Objective Findings: none today   TREATMENT:   THERAPEUTIC EXERCISE: ( 40 minutes):     11/15 11.22 12.11 12.14 12.16   Activity/Exercise        Recumbent stepper 10 min level 2 10 min level 2 10 min level 2 10 min level 2 10 min level 2   Quad set        SAQ        SLR    X 10 B X 10 B           bridge    X 10 B X 10 B   SL clamshells    X 10 B X 10 R   SL hip abduction        ambulation        Prone press up X 5   X 10 B X 10   Abdominal isometrics        STC 10 sec hold x 3       Piriformis stretch 10 sec hold x 3       LTR        Towel roll under mid-thoracic        Seated thoracic flexion, extension        Ball squat        Stdg toe raises        Stdg heel raises        Sit to stand        balance        marching   In II bars  X 20 In II bars  X 20 In II bars  X 20   Standing hip abd        Ant step ups        Lat step ups        Sciatic nerve flossing        Step overs   X 10 in II bars     Step to the side   X 10 in II bars     sidestepping        minisquats   X 10 X 10    balance   - neutral stand on blue foam pad 1 min hold x 3 with hand hold when needed - neutral stand on blue foam pad 1 min hold x 3 with hand hold when needed - neutral stand on blue foam pad 1 min hold x 3 with hand hold when needed, looking up   Seated trunk flexion  X 10      ambulation Without cane, working on arm swing 75 ft x 6      Seated pelvic tilts  X 10      Toe touch on step    4 in step x 10 B LE 4 in step x 10 B LE   Step up   6 in step x 10 B LE 4 in step x 10 B LE 4 in step x 10 B LE   Hip extension    Prone x 10 B Prone x 10 B   kinesiotape application I strip for mm inhibition to B low back I strip for mm inhibition to B low back       cone from floor  X 5 X 5 X 6 X 6   Seated trunk flexion stretch   X 10, and with side bias X 10 and with side bias X 10   Stepping forward, side, back     X 10               MANUAL THERAPY: (15 minutes):     Manual Therapy technique and location 11/13 11/15 11/22 12/11 12.13 12.16             - instrument assisted soft tissue technique to trigger points in bilateral lumbar paraspinals following written and verbal consent; 10 needles used, 10 needles accounted for - soft tissue mobilization manually and tool assisted to lumbar paraspinals, QL specifically R side  - long axis traction - instrument assisted soft tissue technique to trigger points in bilateral lumbar paraspinals - R QL release - R QL release  - B lumbar paraspinal mfr  - rotational lumbar stretch - R QL release  - B lumbar paraspinal mfr  - rotational lumbar stretch                 MODALITIES (  minutes):  - IFC electrical stimulation in conjunction with manual therapy to lumbar paraspinals, intensity adjusted to pt tolerance     [employer] Portal  Access Code: [email or web address]   URL: [email or web address]   Date: 09/30/2019   Prepared by: Chadwick Little     Exercises  Straight Leg Raise - 15 reps - 2 sets - 2x daily - 7x weekly  Supine Bridge - 15 reps - 2 sets - 2x daily - 7x weekly  Beginner Clam - 15 reps - 2 sets - 2x daily - 7x weekly  Sidelying Hip Abduction - 15 reps - 2 sets - 2x daily - 7x weekly  Supine Piriformis Stretch with Foot on Ground - 5 reps - 10 second hold - 2x daily - 7x weekly  Hooklying Single Knee to Chest - 5 reps - 10 second hold - 2x daily - 7x weekly    Treatment/Session Summary:    · Response to Treatment: Pt reported fatigue with exercises. Continues to tolerate more activities. · Communication/Consultation:  None today  · Equipment provided today:  None today  · Recommendations/Intent for next treatment session: Next visit will focus on LE, core, back strengthening and motion.   Treatment Plan of Care Effective Dates:  11/22/19 to 2/22/20    Total Treatment Billable Duration: 40 minutes therex, 15 minutes manual therapy  PT Patient Time In/Time Out  Time In: 1425  Time Out: 1200 B. Neva Hernandez, PT, DPT     Future Appointments   Date Time Provider Socorro Chandler   12/18/2019  2:15 PM Divine Ferreira, PT, DPT [facility]   12/23/2019  2:30 PM Ervin Fontenot, PT[facility]   '

## 2019-12-18 ENCOUNTER — HOSPITAL ENCOUNTER (OUTPATIENT)
Dept: PHYSICAL THERAPY | Age: 64
Discharge: HOME | End: 2019-12-18
Payer: COMMERCIAL

## 2019-12-18 PROCEDURE — 97140 MANUAL THERAPY 1/> REGIONS: CPT

## 2019-12-18 PROCEDURE — 97110 THERAPEUTIC EXERCISES: CPT

## 2019-12-18 NOTE — PROGRESS NOTES
Sha Ascencio  : 1955  Primary: Josefviki Aguilar [facility]  Secondary:  [address]  at [facility]  [address], [address], [place] [address]  [phone]   [phone]        OUTPATIENT PHYSICAL THERAPY: Daily Treatment Note 2019  ICD-10: Low back pain (M54.5), Difficulty in walking, not elsewhere classified (R26.2), Muscle weakness (generalized) (M62.81)    Pre-treatment Symptoms/Complaints:  Pt reports continuing improvement in back pain. Got a vaccine shot today and left arm is very sore.      Pain: Initial:    Post Session:  Decreased pain   Medications Last Reviewed:  2019    Updated Objective Findings: none today   TREATMENT:   THERAPEUTIC EXERCISE: ( 40 minutes):     11/15 11.22 12.11 12.14 12.16 12.18   Activity/Exercise         Recumbent stepper 10 min level 2 10 min level 2 10 min level 2 10 min level 2 10 min level 2 10 min level 3   Quad set         SAQ         SLR    X 10 B X 10 B 2 x 10 B            bridge    X 10 B X 10 B 2 x 10 B   SL clamshells    X 10 B X 10 R 2 x 10 R   SL hip abduction         ambulation         Prone press up X 5   X 10 B X 10 2 x 10   Abdominal isometrics         STC 10 sec hold x 3        Piriformis stretch 10 sec hold x 3        LTR         Towel roll under mid-thoracic         Seated thoracic flexion, extension         Ball squat         Stdg toe raises         Stdg heel raises         Sit to stand         balance         marching   In II bars  X 20 In II bars  X 20 In II bars  X 20 In II bars   X 20   Standing hip abd         Ant step ups         Lat step ups         Sciatic nerve flossing         Step overs   X 10 in II bars      Step to the side   X 10 in II bars      sidestepping         minisquats   X 10 X 10     balance   - neutral stand on blue foam pad 1 min hold x 3 with hand hold when needed - neutral stand on blue foam pad 1 min hold x 3 with hand hold when needed - neutral stand on blue foam pad 1 min hold x 3 with hand hold when needed, looking up    Seated trunk flexion  X 10       ambulation  Without cane, working on arm swing 75 ft x 6       Seated pelvic tilts  X 10       Toe touch on step    4 in step x 10 B LE 4 in step x 10 B LE 4 in step x 10 B LE   Step up   6 in step x 10 B LE 4 in step x 10 B LE 4 in step x 10 B LE 4 in step x 10 B LE   Hip extension    Prone x 10 B Prone x 10 B Prone 2 x 10 B   kinesiotape application I strip for mm inhibition to B low back I strip for mm inhibition to B low back        cone from floor  X 5 X 5 X 6 X 6 X 5   Seated trunk flexion stretch   X 10, and with side bias X 10 and with side bias X 10 X 10 and with side bias   Stepping forward, side, back     X 10 X 5                MANUAL THERAPY: (15 minutes):     Manual Therapy technique and location 11/15 11/22 12/11 12.13 12.16 12/18             - soft tissue mobilization manually and tool assisted to lumbar paraspinals, QL specifically R side  - long axis traction - instrument assisted soft tissue technique to trigger points in bilateral lumbar paraspinals - R QL release - R QL release  - B lumbar paraspinal mfr  - rotational lumbar stretch - R QL release  - B lumbar paraspinal mfr  - rotational lumbar stretch - R QL release  - B lumbar paraspinal mfr  - rotational lumbar stretch                 MODALITIES (  minutes):  - IFC electrical stimulation in conjunction with manual therapy to lumbar paraspinals, intensity adjusted to pt tolerance     [employer] Portal  Access Code: [email or web address]   URL: [email or web address]   Date: 09/30/2019   Prepared by: Harsh Mendiola     Exercises  Straight Leg Raise - 15 reps - 2 sets - 2x daily - 7x weekly  Supine Bridge - 15 reps - 2 sets - 2x daily - 7x weekly  Beginner Clam - 15 reps - 2 sets - 2x daily - 7x weekly  Sidelying Hip Abduction - 15 reps - 2 sets - 2x daily - 7x weekly  Supine Piriformis Stretch with Foot on Ground - 5 reps - 10 second hold - 2x daily - 7x weekly  Hooklying Single Knee to Chest - 5 reps - 10 second hold - 2x daily - 7x weekly    Treatment/Session Summary:    · Response to Treatment: Pt reported fatigue with exercises, but tolerated increased repetitions. Pt is progressing well but continues to demonstrate limitations in lumbar flexion and R LE stability    · Communication/Consultation:  None today  · Equipment provided today:  None today  · Recommendations/Intent for next treatment session: Next visit will focus on LE, core, back strengthening and motion.   Treatment Plan of Care Effective Dates:  11/22/19 to 2/22/20    Total Treatment Billable Duration: 40 minutes therex, 15 minutes manual therapy  PT Patient Time In/Time Out  Time In: 1425  Time Out: [address], PT, DPT     Future Appointments   Date Time Provider Socorro Chandler   12/23/2019  2:30 PM Gerald Fontenot, PT, DPT [facility]   '

## 2019-12-23 ENCOUNTER — HOSPITAL ENCOUNTER (OUTPATIENT)
Dept: PHYSICAL THERAPY | Age: 64
Discharge: HOME | End: 2019-12-23
Payer: COMMERCIAL

## 2019-12-23 PROCEDURE — 97110 THERAPEUTIC EXERCISES: CPT

## 2019-12-23 PROCEDURE — 97140 MANUAL THERAPY 1/> REGIONS: CPT

## 2019-12-23 NOTE — PROGRESS NOTES
Little Compton Felicia  : 1955  Primary: [facility]  Secondary:  [address] at [facility]  [address], [address], [place] [address]  [phone]   [phone]        OUTPATIENT PHYSICAL THERAPY: Daily Treatment Note 2019  ICD-10: Low back pain (M54.5), Difficulty in walking, not elsewhere classified (R26.2), Muscle weakness (generalized) (M62.81)    Pre-treatment Symptoms/Complaints:  Pt reports continuing improvement in back pain, still foot pain.     Pain: Initial:    Post Session:  Decreased pain   Medications Last Reviewed:  2019    Updated Objective Findings: none today   TREATMENT:   THERAPEUTIC EXERCISE: ( 40 minutes):     11.22 12.11 12.14 12.16 12.18    Activity/Exercise         Recumbent stepper 10 min level 2 10 min level 2 10 min level 2 10 min level 2 10 min level 3 10 min level 3   Quad set         SAQ         SLR   X 10 B X 10 B 2 x 10 B 2 x 10 B            bridge   X 10 B X 10 B 2 x 10 B 2 x 10 B   SL clamshells   X 10 B X 10 R 2 x 10 R 2 x 10 R   SL hip abduction         ambulation         Prone press up   X 10 B X 10 2 x 10 X 10   Abdominal isometrics         STC         Piriformis stretch         LTR         Towel roll under mid-thoracic         Seated thoracic flexion, extension         Ball squat         Stdg toe raises         Stdg heel raises         Sit to stand         balance         marching  In II bars  X 20 In II bars  X 20 In II bars  X 20 In II bars   X 20 In II bars   X 20   Standing hip abd         Ant step ups         Lat step ups         Sciatic nerve flossing         Step overs  X 10 in II bars       Step to the side  X 10 in II bars       sidestepping         minisquats  X 10 X 10      balance  - neutral stand on blue foam pad 1 min hold x 3 with hand hold when needed - neutral stand on blue foam pad 1 min hold x 3 with hand hold when needed - neutral stand on blue foam pad 1 min hold x 3 with hand hold when needed, looking up  - L LE on step, R LE on ground, cues to keep hip activated, 1 min hold x 2   Seated trunk flexion X 10        ambulation Without cane, working on arm swing 75 ft x 6        Seated pelvic tilts X 10        Toe touch on step   4 in step x 10 B LE 4 in step x 10 B LE 4 in step x 10 B LE    Step up  6 in step x 10 B LE 4 in step x 10 B LE 4 in step x 10 B LE 4 in step x 10 B LE 4 in step x 10 B LE   Hip extension   Prone x 10 B Prone x 10 B Prone 2 x 10 B    kinesiotape application I strip for mm inhibition to B low back         cone from floor X 5 X 5 X 6 X 6 X 5 X 5   Seated trunk flexion stretch  X 10, and with side bias X 10 and with side bias X 10 X 10 and with side bias X 10   Stepping forward, side, back    X 10 X 5 X 10                MANUAL THERAPY: (15 minutes):     Manual Therapy technique and location 12/11 12.13 12.16 12/18 12/23            - R QL release - R QL release  - B lumbar paraspinal mfr  - rotational lumbar stretch - R QL release  - B lumbar paraspinal mfr  - rotational lumbar stretch - R QL release  - B lumbar paraspinal mfr  - rotational lumbar stretch - R QL release  - B lumbar paraspinal mfr  - rotational lumbar stretch                 MODALITIES (  minutes):  - IFC electrical stimulation in conjunction with manual therapy to lumbar paraspinals, intensity adjusted to pt tolerance     [employer] Portal  Access Code: [email or web address]   URL: [email or web address]   Date: 09/30/2019   Prepared by: Lino Vidal     Exercises  Straight Leg Raise - 15 reps - 2 sets - 2x daily - 7x weekly  Supine Bridge - 15 reps - 2 sets - 2x daily - 7x weekly  Beginner Clam - 15 reps - 2 sets - 2x daily - 7x weekly  Sidelying Hip Abduction - 15 reps - 2 sets - 2x daily - 7x weekly  Supine Piriformis Stretch with Foot on Ground - 5 reps - 10 second hold - 2x daily - 7x weekly  Hooklying Single Knee to Chest - 5 reps - 10 second hold - 2x daily - 7x weekly    Treatment/Session Summary: · Response to Treatment: Pt reported fatigue with exercises, but tolerated increased repetitions. · Communication/Consultation:  None today  · Equipment provided today:  None today  · Recommendations/Intent for next treatment session: Next visit will focus on LE, core, back strengthening and motion.   Treatment Plan of Care Effective Dates:  11/22/19 to 2/22/20    Total Treatment Billable Duration: 40 minutes therex, 15 minutes manual therapy  PT Patient Time In/Time Out  Time In: 1430  Time Out: [address] [facility], PT, DPT     Future Appointments   Date Time Provider Socorro Chandler   1/8/2020  2:15 PM Dave Briggs, PT, DPT [facility]   1/10/2020  1:45 PM Cuffey, Nayana [place], PT, DPT [facility]   1/13/2020  2:30 PM Cuffey, Nayana [place], PT, DPT [facility]   1/17/2020  1:45 PM Cuffey, Nayana [place], PT, DPT [facility]   1/22/2020  2:15 PM Cuffey, Nayana [place], PT, DPT [facility]   1/24/2020  1:45 PM Cuffey, Nayana [place], PT, DPT [facility]   1/27/2020  1:45 PM Pardeep Alcazar, Nayana [place], PT, DPT [facility]   1/31/2020  1:45 PM Cuffey, Nayana [place], PT, DPT [facility]   '

## 2020-01-08 ENCOUNTER — HOSPITAL ENCOUNTER (OUTPATIENT)
Dept: PHYSICAL THERAPY | Age: 65
Discharge: HOME | End: 2020-01-08
Payer: COMMERCIAL

## 2020-01-08 PROCEDURE — 97140 MANUAL THERAPY 1/> REGIONS: CPT

## 2020-01-08 PROCEDURE — 97110 THERAPEUTIC EXERCISES: CPT

## 2020-01-08 NOTE — PROGRESS NOTES
Farrah Rodrigues  : 1955  Primary: [facility]  Secondary:  [address]  at [facility]  [address], [address], [place]  [phone]   [phone]        OUTPATIENT PHYSICAL THERAPY: Daily Treatment Note 2020  ICD-10: Low back pain (M54.5), Difficulty in walking, not elsewhere classified (R26.2), Muscle weakness (generalized) (M62.81)    Pre-treatment Symptoms/Complaints:  Pt reports continuing improvement in back function. Able to pick things up off of the floor now.      Pain: Initial:    Post Session:  Decreased pain   Medications Last Reviewed:  2020    Updated Objective Findings: none today   TREATMENT:   THERAPEUTIC EXERCISE: ( 30 minutes):     12.11 12.14 12.16 12.18    Activity/Exercise         Recumbent stepper 10 min level 2 10 min level 2 10 min level 2 10 min level 3 10 min level 3 10 min level 3   Quad set         SAQ         SLR  X 10 B X 10 B 2 x 10 B 2 x 10 B             bridge  X 10 B X 10 B 2 x 10 B 2 x 10 B    SL clamshells  X 10 B X 10 R 2 x 10 R 2 x 10 R    SL hip abduction         ambulation         Prone press up  X 10 B X 10 2 x 10 X 10    Abdominal isometrics         STC         Piriformis stretch         LTR         Towel roll under mid-thoracic         Seated thoracic flexion, extension         Ball squat         Stdg toe raises         Stdg heel raises         Sit to stand         balance         marching In II bars  X 20 In II bars  X 20 In II bars  X 20 In II bars   X 20 In II bars   X 20 40 ft x 4   Standing hip abd         Ant step ups         Lat step ups         Sciatic nerve flossing         Step overs X 10 in II bars        Step to the side X 10 in II bars        sidestepping      40 ft x 4   minisquats X 10 X 10       balance - neutral stand on blue foam pad 1 min hold x 3 with hand hold when needed - neutral stand on blue foam pad 1 min hold x 3 with hand hold when needed - neutral stand on blue foam pad 1 min hold x 3 with hand hold when needed, looking up  - L LE on step, R LE on ground, cues to keep hip activated, 1 min hold x 2 - L LE on step, R LE on ground, cues to keep hip activated, 1 min hold x 2   Seated trunk flexion         ambulation         Seated pelvic tilts      X 10 with added lumbar flexion for stretch   Toe touch on step  4 in step x 10 B LE 4 in step x 10 B LE 4 in step x 10 B LE     Step up 6 in step x 10 B LE 4 in step x 10 B LE 4 in step x 10 B LE 4 in step x 10 B LE 4 in step x 10 B LE 6\" step x 20 R LE   Hip extension  Prone x 10 B Prone x 10 B Prone 2 x 10 B     kinesiotape application          cone from floor X 5 X 6 X 6 X 5 X 5    Seated trunk flexion stretch X 10, and with side bias X 10 and with side bias X 10 X 10 and with side bias X 10 X 10   Stepping forward, side, back   X 10 X 5 X 10                 MANUAL THERAPY: (15 minutes):     Manual Therapy technique and location 12/11 12.13 12.16 12/18 12/23 1/8             - R QL release - R QL release  - B lumbar paraspinal mfr  - rotational lumbar stretch - R QL release  - B lumbar paraspinal mfr  - rotational lumbar stretch - R QL release  - B lumbar paraspinal mfr  - rotational lumbar stretch - R QL release  - B lumbar paraspinal mfr  - rotational lumbar stretch - R QL release  - B lumbar paraspinal mfr                 MODALITIES (  minutes):  - IFC electrical stimulation in conjunction with manual therapy to lumbar paraspinals, intensity adjusted to pt tolerance     [email or web address] Portal  Access Code: [email or web address]   URL: [email or web address]   Date: 09/30/2019   Prepared by: Aviva Be     Exercises  Straight Leg Raise - 15 reps - 2 sets - 2x daily - 7x weekly  Supine Bridge - 15 reps - 2 sets - 2x daily - 7x weekly  Beginner Clam - 15 reps - 2 sets - 2x daily - 7x weekly  Sidelying Hip Abduction - 15 reps - 2 sets - 2x daily - 7x weekly  Supine Piriformis Stretch with Foot on Ground - 5 reps - 10 second hold - 2x daily - 7x weekly  Hooklying Single Knee to Chest - 5 reps - 10 second hold - 2x daily - 7x weekly    Treatment/Session Summary:    · Response to Treatment: Pt reported fatigue with exercises, but tolerated increased repetitions. Pt is able to touch the floor from a sitting position and pick things up off of the floor from a standing position now. To do this she demonstrates more movement at hip flexion than lumbar movement, focused on stretches to move lumbar more. · Communication/Consultation:  None today  · Equipment provided today:  None today  · Recommendations/Intent for next treatment session: Next visit will focus on LE, core, back strengthening and motion.   Treatment Plan of Care Effective Dates:  11/22/19 to 2/22/20    Total Treatment Billable Duration: 30 minutes therex, 15 minutes manual therapy  PT Patient Time In/Time Out  Time In: 1415  Time Out: 1500    Marianela Bee, PT, DPT     Future Appointments   Date Time Provider Socorro Chandler   1/10/2020  1:45 PM Ama Lira, PT, DPT [facility]   1/13/2020  2:30 PM Ant Fontenot, PT, DPT [facility]   1/17/2020  1:45 PM Ant Fontenot, PT, DPT [facility]   1/22/2020  2:15 PM Ant Fontenot, PT, DPT [facility]   1/24/2020  1:45 PM Ant Fontenot, PT, DPT [facility]   1/27/2020  1:45 PM Ant Fontenot, PT, DPT [facility]   1/30/2020 10:15 AM Kandy Hamm MD [facility]   1/31/2020  1:45 PM Ant Fontenot, PT, DPT [facility]   '

## 2020-01-10 ENCOUNTER — HOSPITAL ENCOUNTER (OUTPATIENT)
Dept: PHYSICAL THERAPY | Age: 65
Discharge: HOME | End: 2020-01-10
Payer: COMMERCIAL

## 2020-01-10 PROCEDURE — 97110 THERAPEUTIC EXERCISES: CPT

## 2020-01-10 PROCEDURE — 97140 MANUAL THERAPY 1/> REGIONS: CPT

## 2020-01-10 NOTE — PROGRESS NOTES
Kortney Maya  : 1955  Primary: [facility]  Secondary:  [address]  at [facility]  [address], [address], [place] [address]  [phone]   [phone]        OUTPATIENT PHYSICAL THERAPY: Daily Treatment Note 1/10/2020  ICD-10: Low back pain (M54.5), Difficulty in walking, not elsewhere classified (R26.2), Muscle weakness (generalized) (M62.81)    Pre-treatment Symptoms/Complaints:  Pt reports difficulty with the exercise from last time.  Able to walk around her block 2x, rather than just one in the past.     Pain: Initial:    Post Session:  Decreased pain   Medications Last Reviewed:  1/10/2020    Updated Objective Findings: none today   TREATMENT:   THERAPEUTIC EXERCISE: ( 30 minutes):     12.11 12.14 12.16 12.18 12/23 1/8 1/10   Activity/Exercise          Recumbent stepper 10 min level 2 10 min level 2 10 min level 2 10 min level 3 10 min level 3 10 min level 3 10 min level 3   Quad set          SAQ          SLR  X 10 B X 10 B 2 x 10 B 2 x 10 B               bridge  X 10 B X 10 B 2 x 10 B 2 x 10 B     SL clamshells  X 10 B X 10 R 2 x 10 R 2 x 10 R     SL hip abduction          ambulation          Prone press up  X 10 B X 10 2 x 10 X 10     Abdominal isometrics          STC          Piriformis stretch          LTR          Towel roll under mid-thoracic          Seated thoracic flexion, extension          Ball squat          Stdg toe raises          Stdg heel raises          Sit to stand          balance          marching In II bars  X 20 In II bars  X 20 In II bars  X 20 In II bars   X 20 In II bars   X 20 40 ft x 4 40 ft x 4   Standing hip abd          Ant step ups          Lat step ups          Sciatic nerve flossing          Step overs X 10 in II bars         Step to the side X 10 in II bars         sidestepping      40 ft x 4 40 ft x 4   minisquats X 10 X 10        balance - neutral stand on blue foam pad 1 min hold x 3 with hand hold when needed - neutral stand on blue foam pad 1 min hold x 3 with hand hold when needed - neutral stand on blue foam pad 1 min hold x 3 with hand hold when needed, looking up  - L LE on step, R LE on ground, cues to keep hip activated, 1 min hold x 2 - L LE on step, R LE on ground, cues to keep hip activated, 1 min hold x 2    Seated trunk flexion          ambulation          Seated pelvic tilts      X 10 with added lumbar flexion for stretch X 10 with added lumbar flexion for stretch   Toe touch on step  4 in step x 10 B LE 4 in step x 10 B LE 4 in step x 10 B LE      Step up 6 in step x 10 B LE 4 in step x 10 B LE 4 in step x 10 B LE 4 in step x 10 B LE 4 in step x 10 B LE 6\" step x 20 R LE 6\" step x 20 R LE   Hip extension  Prone x 10 B Prone x 10 B Prone 2 x 10 B      kinesiotape application           cone from floor X 5 X 6 X 6 X 5 X 5     Seated trunk flexion stretch X 10, and with side bias X 10 and with side bias X 10 X 10 and with side bias X 10 X 10 X 10   Stepping forward, side, back   X 10 X 5 X 10  X 10                 MANUAL THERAPY: (15 minutes):     Manual Therapy technique and location 12.13 12.16 12/18 12/23 1/8 1/10             - R QL release  - B lumbar paraspinal mfr  - rotational lumbar stretch - R QL release  - B lumbar paraspinal mfr  - rotational lumbar stretch - R QL release  - B lumbar paraspinal mfr  - rotational lumbar stretch - R QL release  - B lumbar paraspinal mfr  - rotational lumbar stretch - R QL release  - B lumbar paraspinal mfr - R QL release  - B lumbar paraspinal mfr                 MODALITIES (  minutes):  - IFC electrical stimulation in conjunction with manual therapy to lumbar paraspinals, intensity adjusted to pt tolerance     [employer] Portal  Access Code: [email or web address]   URL: [email or web address]   Date: 09/30/2019   Prepared by: Geraldine Mathew     Exercises  Straight Leg Raise - 15 reps - 2 sets - 2x daily - 7x weekly  Supine Bridge - 15 reps - 2 sets - 2x daily - 7x weekly  Beginner Clam - 15 reps - 2 sets - 2x daily - 7x weekly  Sidelying Hip Abduction - 15 reps - 2 sets - 2x daily - 7x weekly  Supine Piriformis Stretch with Foot on Ground - 5 reps - 10 second hold - 2x daily - 7x weekly  Hooklying Single Knee to Chest - 5 reps - 10 second hold - 2x daily - 7x weekly    Treatment/Session Summary:    · Response to Treatment: Pt reported fatigue with exercises, and nerve pain into R LE. Continues to demonstrate improvements. · Communication/Consultation:  None today  · Equipment provided today:  None today  · Recommendations/Intent for next treatment session: Next visit will focus on LE, core, back strengthening and motion.   Treatment Plan of Care Effective Dates:  11/22/19 to 2/22/20    Total Treatment Billable Duration: 30 minutes therex, 15 minutes manual therapy  PT Patient Time In/Time Out  Time In: 1345  Time Out: [address], PT, DPT     Future Appointments   Date Time Provider Socorro Chandler   1/13/2020  2:30 PM Dominic Madsen PT, DPT [facility]   1/17/2020  1:45 PM Cuffey, Roel Spatz, PT, DPT [facility]   1/22/2020  2:15 PM Cuffey, Roel Spatz, PT, DPT [facility]   1/24/2020  1:45 PM Cuffey, Roel Spatz, PT, DPT [facility]   1/27/2020  1:45 PM Myna Leitz, Roel Spatz, PT, DPT [facility]   1/30/2020 10:15 AM Juvenal Jeronimo MD [facility]   1/31/2020  1:45 PM Cuffey, Roel Spatz, PT, DPT [facility]   '

## 2020-01-17 ENCOUNTER — HOSPITAL ENCOUNTER (OUTPATIENT)
Dept: PHYSICAL THERAPY | Age: 65
Discharge: HOME | End: 2020-01-17
Payer: COMMERCIAL

## 2020-01-17 PROCEDURE — 97140 MANUAL THERAPY 1/> REGIONS: CPT

## 2020-01-17 PROCEDURE — 97110 THERAPEUTIC EXERCISES: CPT

## 2020-01-17 NOTE — PROGRESS NOTES
Zully Sides  : 1955  Primary: [facility]  Secondary:  [address] at [facility]  [address], [address], [place] [address]  [phone]   [phone]        OUTPATIENT PHYSICAL THERAPY: Daily Treatment Note 2020  ICD-10: Low back pain (M54.5), Difficulty in walking, not elsewhere classified (R26.2), Muscle weakness (generalized) (M62.81)    Pre-treatment Symptoms/Complaints:  Pt reports continuing improvement in back movement, decreasing 'nerve pain' but very slowly.      Pain: Initial:    Post Session:  Decreased pain   Medications Last Reviewed:  2020    Updated Objective Findings: none today   TREATMENT:   THERAPEUTIC EXERCISE: ( 30 minutes):     12.14 12.16 12.18 12/23 1/8 1/10 1/17   Activity/Exercise          Recumbent stepper 10 min level 2 10 min level 2 10 min level 3 10 min level 3 10 min level 3 10 min level 3 10 min level 3   Quad set          SAQ          SLR X 10 B X 10 B 2 x 10 B 2 x 10 B                bridge X 10 B X 10 B 2 x 10 B 2 x 10 B      SL clamshells X 10 B X 10 R 2 x 10 R 2 x 10 R      SL hip abduction          ambulation          Prone press up X 10 B X 10 2 x 10 X 10      Abdominal isometrics          STC          Piriformis stretch          LTR          Towel roll under mid-thoracic          Seated thoracic flexion, extension          Ball squat          Stdg toe raises          Stdg heel raises          Sit to stand          balance          marching In II bars  X 20 In II bars  X 20 In II bars   X 20 In II bars   X 20 40 ft x 4 40 ft x 4 40 ft x 4   Standing hip abd          Ant step ups          Lat step ups          Sciatic nerve flossing          Step overs          Step to the side          sidestepping     40 ft x 4 40 ft x 4 40 ft x 4f   minisquats X 10         balance - neutral stand on blue foam pad 1 min hold x 3 with hand hold when needed - neutral stand on blue foam pad 1 min hold x 3 with hand hold when needed, looking up  - L LE on step, R LE on ground, cues to keep hip activated, 1 min hold x 2 - L LE on step, R LE on ground, cues to keep hip activated, 1 min hold x 2     Seated trunk flexion          ambulation          Seated pelvic tilts     X 10 with added lumbar flexion for stretch X 10 with added lumbar flexion for stretch X 10 with added lumbar flexion for stretch   Toe touch on step 4 in step x 10 B LE 4 in step x 10 B LE 4 in step x 10 B LE       Step up 4 in step x 10 B LE 4 in step x 10 B LE 4 in step x 10 B LE 4 in step x 10 B LE 6\" step x 20 R LE 6\" step x 20 R LE 6\" step   2 x 15 B LE   Hip extension Prone x 10 B Prone x 10 B Prone 2 x 10 B       kinesiotape application           cone from floor X 6 X 6 X 5 X 5      Seated trunk flexion stretch X 10 and with side bias X 10 X 10 and with side bias X 10 X 10 X 10    Stepping forward, side, back  X 10 X 5 X 10  X 10 X 10 B LE   Cat/camel       X 10   Quadruped LE extension       X 10       MANUAL THERAPY: (15 minutes):     Manual Therapy technique and location 12/18 12/23 1/8 1/10 1/17            - R QL release  - B lumbar paraspinal mfr  - rotational lumbar stretch - R QL release  - B lumbar paraspinal mfr  - rotational lumbar stretch - R QL release  - B lumbar paraspinal mfr - R QL release  - B lumbar paraspinal mfr - R QL release  - B lumbar paraspinal mfr  - long axis traction                 MODALITIES (  minutes):  - IFC electrical stimulation in conjunction with manual therapy to lumbar paraspinals, intensity adjusted to pt tolerance     [employer] Portal  Access Code: [email or web address]   URL: [email or web address]   Date: 09/30/2019   Prepared by: Kaylah Persaud  Straight Leg Raise - 15 reps - 2 sets - 2x daily - 7x weekly  Supine Bridge - 15 reps - 2 sets - 2x daily - 7x weekly  Beginner Clam - 15 reps - 2 sets - 2x daily - 7x weekly  Sidelying Hip Abduction - 15 reps - 2 sets - 2x daily - 7x weekly  Supine Piriformis Stretch with Foot on Ground - 5 reps - 10 second hold - 2x daily - 7x weekly  Hooklying Single Knee to Chest - 5 reps - 10 second hold - 2x daily - 7x weekly    Treatment/Session Summary:    · Response to Treatment: Pt reported fatigue with exercises, but tolerating more activity than before. · Communication/Consultation:  None today  · Equipment provided today:  None today  · Recommendations/Intent for next treatment session: Next visit will focus on LE, core, back strengthening and motion.   Treatment Plan of Care Effective Dates:  11/22/19 to 2/22/20    Total Treatment Billable Duration: 30 minutes therex, 15 minutes manual therapy  PT Patient Time In/Time Out  Time In: 1350  Time Out: [address] [facility] PT, DPT     Future Appointments   Date Time Provider Socorro Chandler   1/22/2020  2:15 PM Zuleyma Nobles PT, DPT [facility]   1/24/2020  1:45 PM Toby Fontenot, PT, DPT [facility]   1/27/2020  1:45 PM Toby Serna PT, DPT [facility]   1/30/2020 10:15 AM Fiona Edouard MD [facility]   1/31/2020  1:45 PM Toby Fontenot, PT, DPT [facility]   '

## 2020-01-22 ENCOUNTER — HOSPITAL ENCOUNTER (OUTPATIENT)
Dept: PHYSICAL THERAPY | Age: 65
Discharge: HOME | End: 2020-01-22
Payer: COMMERCIAL

## 2020-01-22 PROCEDURE — 97110 THERAPEUTIC EXERCISES: CPT

## 2020-01-22 PROCEDURE — 97140 MANUAL THERAPY 1/> REGIONS: CPT

## 2020-01-22 NOTE — PROGRESS NOTES
Silvana Connelly  : 1955  Primary: [facility]  Secondary:  [address]  at [facility]  [address], [address], [place] [address]  [phone]   [phone]        OUTPATIENT PHYSICAL THERAPY: Daily Treatment Note 2020  ICD-10: Low back pain (M54.5), Difficulty in walking, not elsewhere classified (R26.2), Muscle weakness (generalized) (M62.81)    Pre-treatment Symptoms/Complaints:  Pt reports continuing improvement in back movement, walking better. Still nerve pain into foot.    Pain: Initial:    Post Session:  Decreased pain   Medications Last Reviewed:  2020    Updated Objective Findings: none today   TREATMENT:   THERAPEUTIC EXERCISE: ( 30 minutes):     12.18 12/23 1/8 1/10 1/17 1/22   Activity/Exercise         Recumbent stepper 10 min level 3 10 min level 3 10 min level 3 10 min level 3 10 min level 3 10 min level 3   Quad set         SAQ         SLR 2 x 10 B 2 x 10 B                bridge 2 x 10 B 2 x 10 B       SL clamshells 2 x 10 R 2 x 10 R       SL hip abduction         ambulation         Prone press up 2 x 10 X 10       Abdominal isometrics         STC         Piriformis stretch         LTR         Towel roll under mid-thoracic         Seated thoracic flexion, extension         Ball squat         Stdg toe raises         Stdg heel raises         Sit to stand         balance         marching In II bars   X 20 In II bars   X 20 40 ft x 4 40 ft x 4 40 ft x 4 40 ft x 4   Standing hip abd         Ant step ups         Lat step ups         Sciatic nerve flossing         Step overs         Step to the side         sidestepping   40 ft x 4 40 ft x 4 40 ft x 4f 40 ft x 4   minisquats         balance  - L LE on step, R LE on ground, cues to keep hip activated, 1 min hold x 2 - L LE on step, R LE on ground, cues to keep hip activated, 1 min hold x 2      Seated trunk flexion         ambulation         Seated pelvic tilts   X 10 with added lumbar flexion for stretch X 10 with added lumbar flexion for stretch X 10 with added lumbar flexion for stretch X 10 with added lumbar flexion for stretch   Toe touch on step 4 in step x 10 B LE        Step up 4 in step x 10 B LE 4 in step x 10 B LE 6\" step x 20 R LE 6\" step x 20 R LE 6\" step   2 x 15 B LE 6\" step   2 x 15 B LE   Hip extension Prone 2 x 10 B        kinesiotape application          cone from floor X 5 X 5       Seated trunk flexion stretch X 10 and with side bias X 10 X 10 X 10     Stepping forward, side, back X 5 X 10  X 10 X 10 B LE    Cat/camel     X 10 X 10   Quadruped LE extension     X 10 X 10       MANUAL THERAPY: (15 minutes):     Manual Therapy technique and location 12/18 12/23 1/8 1/10 1/17 1/22             - R QL release  - B lumbar paraspinal mfr  - rotational lumbar stretch - R QL release  - B lumbar paraspinal mfr  - rotational lumbar stretch - R QL release  - B lumbar paraspinal mfr - R QL release  - B lumbar paraspinal mfr - R QL release  - B lumbar paraspinal mfr  - long axis traction - R QL release  - B lumbar paraspinal mfr  - long axis traction                 MODALITIES (  minutes):  - IFC electrical stimulation in conjunction with manual therapy to lumbar paraspinals, intensity adjusted to pt tolerance     "[employer] Portal  Access Code: [email or web address]   URL: [email or web address]   Date: 09/30/2019   Prepared by: Sanjay Palm     Exercises  Straight Leg Raise - 15 reps - 2 sets - 2x daily - 7x weekly  Supine Bridge - 15 reps - 2 sets - 2x daily - 7x weekly  Beginner Clam - 15 reps - 2 sets - 2x daily - 7x weekly  Sidelying Hip Abduction - 15 reps - 2 sets - 2x daily - 7x weekly  Supine Piriformis Stretch with Foot on Ground - 5 reps - 10 second hold - 2x daily - 7x weekly  Hooklying Single Knee to Chest - 5 reps - 10 second hold - 2x daily - 7x weekly    Treatment/Session Summary:    · Response to Treatment: Pt reported fatigue with exercises, but tolerating more activity than before. · Communication/Consultation:  None today  · Equipment provided today:  None today  · Recommendations/Intent for next treatment session: Next visit will focus on LE, core, back strengthening and motion.   Treatment Plan of Care Effective Dates:  11/22/19 to 2/22/20    Total Treatment Billable Duration: 30 minutes therex, 15 minutes manual therapy  PT Patient Time In/Time Out  Time In: 1415  Time Out: 1500    Wally Myers, PT, DPT     Future Appointments   Date Time Provider Socorro Krausi   1/24/2020  1:45 PM Tess Sandoval, PT, DPT [facility]   1/27/2020  1:45 PM Abby Fontenot, PT, DPT [facility]   1/30/2020 10:15 AM Adrianne Theodore MD [facility]   1/31/2020  1:45 PM [facility]   '

## 2020-01-24 ENCOUNTER — HOSPITAL ENCOUNTER (OUTPATIENT)
Dept: PHYSICAL THERAPY | Age: 65
Discharge: HOME | End: 2020-01-24
Payer: COMMERCIAL

## 2020-01-24 PROCEDURE — 97110 THERAPEUTIC EXERCISES: CPT

## 2020-01-24 PROCEDURE — 97140 MANUAL THERAPY 1/> REGIONS: CPT

## 2020-01-24 NOTE — PROGRESS NOTES
Alonzo  : 1955  Primary: Daniel Serrano [facility]  Secondary:  [address]  at [facility]  [address], [address], [place]  [phone]   [phone]        OUTPATIENT PHYSICAL THERAPY: Daily Treatment Note 2020  ICD-10: Low back pain (M54.5), Difficulty in walking, not elsewhere classified (R26.2), Muscle weakness (generalized) (M62.81)    Pre-treatment Symptoms/Complaints:  Pt reports continuing improvement in back movement, walking better. Still nerve pain into foot.    Pain: Initial:    Post Session:  Decreased pain   Medications Last Reviewed:  2020    Updated Objective Findings: none today   TREATMENT:   THERAPEUTIC EXERCISE: ( 30 minutes):     12/23 1/8 1/10 1/17 1/22 1/24   Activity/Exercise         Recumbent stepper 10 min level 3 10 min level 3 10 min level 3 10 min level 3 10 min level 3 10 min level 3   Quad set         SAQ         SLR 2 x 10 B                 bridge 2 x 10 B        SL clamshells 2 x 10 R        SL hip abduction         ambulation         Prone press up X 10        Abdominal isometrics         STC         Piriformis stretch         LTR         Towel roll under mid-thoracic         Seated thoracic flexion, extension         Ball squat         Stdg toe raises         Stdg heel raises         Sit to stand         balance         marching In II bars   X 20 40 ft x 4 40 ft x 4 40 ft x 4 40 ft x 4 40 ft x 4   Standing hip abd         Ant step ups         Lat step ups         Sciatic nerve flossing         Step overs         Step to the side         sidestepping  40 ft x 4 40 ft x 4 40 ft x 4f 40 ft x 4 40 ft x 4   minisquats         balance - L LE on step, R LE on ground, cues to keep hip activated, 1 min hold x 2 - L LE on step, R LE on ground, cues to keep hip activated, 1 min hold x 2       Seated trunk flexion         ambulation         Seated pelvic tilts  X 10 with added lumbar flexion for stretch X 10 with added lumbar flexion for stretch X 10 with added lumbar flexion for stretch X 10 with added lumbar flexion for stretch X 10 with added lumbar flexion for stretch   Toe touch on step         Step up 4 in step x 10 B LE 6\" step x 20 R LE 6\" step x 20 R LE 6\" step   2 x 15 B LE 6\" step   2 x 15 B LE 6\" step   2 x 15 B LE   Hip extension         kinesiotape application          cone from floor X 5        Seated trunk flexion stretch X 10 X 10 X 10      Stepping forward, side, back X 10  X 10 X 10 B LE     Cat/camel    X 10 X 10 X 10   Quadruped LE extension    X 10 X 10 X 10       MANUAL THERAPY: (15 minutes):     Manual Therapy technique and location 1/8 1/10 1/17 1/22 1/24            - R QL release  - B lumbar paraspinal mfr - R QL release  - B lumbar paraspinal mfr - R QL release  - B lumbar paraspinal mfr  - long axis traction - R QL release  - B lumbar paraspinal mfr  - long axis traction - R QL release  - B lumbar paraspinal mfr  - long axis traction                 MODALITIES (  minutes):  - IFC electrical stimulation in conjunction with manual therapy to lumbar paraspinals, intensity adjusted to pt tolerance     [employer] Portal  Access Code: [email or web address]   URL: [email or web address]   Date: 09/30/2019   Prepared by: Tessa Salinas     Exercises  Straight Leg Raise - 15 reps - 2 sets - 2x daily - 7x weekly  Supine Bridge - 15 reps - 2 sets - 2x daily - 7x weekly  Beginner Clam - 15 reps - 2 sets - 2x daily - 7x weekly  Sidelying Hip Abduction - 15 reps - 2 sets - 2x daily - 7x weekly  Supine Piriformis Stretch with Foot on Ground - 5 reps - 10 second hold - 2x daily - 7x weekly  Hooklying Single Knee to Chest - 5 reps - 10 second hold - 2x daily - 7x weekly    Treatment/Session Summary:    · Response to Treatment: Pt reported fatigue with exercises, but tolerating more activity than before.     · Communication/Consultation:  None today  · Equipment provided today:  None today  · Recommendations/Intent for next treatment session: Next visit will focus on LE, core, back strengthening and motion.   Treatment Plan of Care Effective Dates:  11/22/19 to 2/22/20    Total Treatment Billable Duration: 30 minutes therex, 15 minutes manual therapy  PT Patient Time In/Time Out  Time In: 1345  Time Out: [address], PT, DPT     Future Appointments   Date Time Provider Socorro Chandler   1/27/2020  1:45 PM La Sanchez, PT, DPT [facility]   1/30/2020 10:15 AM Tatiana King MD [facility]   1/31/2020  1:45 PM Lisa Fontenot, PT, DPT [facility]   '

## 2020-01-27 ENCOUNTER — HOSPITAL ENCOUNTER (OUTPATIENT)
Dept: PHYSICAL THERAPY | Age: 65
Discharge: HOME | End: 2020-01-27
Payer: COMMERCIAL

## 2020-01-27 PROCEDURE — 97110 THERAPEUTIC EXERCISES: CPT

## 2020-01-27 PROCEDURE — 97140 MANUAL THERAPY 1/> REGIONS: CPT

## 2020-01-27 NOTE — PROGRESS NOTES
Elham Rios  : 1955  Primary: Kana Campover[facility]  Secondary:  [address]  at [facility]  [address], [address], [place] [address]  [phone]   [phone]        OUTPATIENT PHYSICAL THERAPY: Daily Treatment Note 2020  ICD-10: Low back pain (M54.5), Difficulty in walking, not elsewhere classified (R26.2), Muscle weakness (generalized) (M62.81)    Pre-treatment Symptoms/Complaints:  Pt reports continuing improvement in back movement, walking better. Still nerve pain into foot.    Pain: Initial:    Post Session:  Decreased pain   Medications Last Reviewed:  2020    Updated Objective Findings: none today   TREATMENT:   THERAPEUTIC EXERCISE: ( 30 minutes):     12/23 1/8 1/10 1/17 1/22 1/24 1/27   Activity/Exercise          Recumbent stepper 10 min level 3 10 min level 3 10 min level 3 10 min level 3 10 min level 3 10 min level 3 10 min level 3   Quad set          SAQ          SLR 2 x 10 B                   bridge 2 x 10 B         SL clamshells 2 x 10 R         SL hip abduction          ambulation          Prone press up X 10         Abdominal isometrics          STC          Piriformis stretch          LTR          Towel roll under mid-thoracic          Seated thoracic flexion, extension          Ball squat          Stdg toe raises          Stdg heel raises          Sit to stand          balance          marching In II bars   X 20 40 ft x 4 40 ft x 4 40 ft x 4 40 ft x 4 40 ft x 4 40 ft x 4   Standing hip abd          Ant step ups          Lat step ups          Sciatic nerve flossing          Step overs          Step to the side          sidestepping  40 ft x 4 40 ft x 4 40 ft x 4f 40 ft x 4 40 ft x 4 40 ft x 4   minisquats          balance - L LE on step, R LE on ground, cues to keep hip activated, 1 min hold x 2 - L LE on step, R LE on ground, cues to keep hip activated, 1 min hold x 2        Seated trunk flexion          ambulation          Seated pelvic tilts X 10 with added lumbar flexion for stretch X 10 with added lumbar flexion for stretch X 10 with added lumbar flexion for stretch X 10 with added lumbar flexion for stretch X 10 with added lumbar flexion for stretch X 10 with added lumbar flexion for stretch   Toe touch on step          Step up 4 in step x 10 B LE 6\" step x 20 R LE 6\" step x 20 R LE 6\" step   2 x 15 B LE 6\" step   2 x 15 B LE 6\" step   2 x 15 B LE 6\" step   2 x 15 B LE   Hip extension          kinesiotape application           cone from floor X 5         Seated trunk flexion stretch X 10 X 10 X 10       Stepping forward, side, back X 10  X 10 X 10 B LE      Cat/camel    X 10 X 10 X 10 X 10   Quadruped LE extension    X 10 X 10 X 10 X 10       MANUAL THERAPY: (15 minutes):     Manual Therapy technique and location 1/8 1/10 1/17 1/22 1/24 1/27             - R QL release  - B lumbar paraspinal mfr - R QL release  - B lumbar paraspinal mfr - R QL release  - B lumbar paraspinal mfr  - long axis traction - R QL release  - B lumbar paraspinal mfr  - long axis traction - R QL release  - B lumbar paraspinal mfr  - long axis traction - R QL release  - B lumbar paraspinal mfr  - long axis traction                 MODALITIES (  minutes):  - IFC electrical stimulation in conjunction with manual therapy to lumbar paraspinals, intensity adjusted to pt tolerance     [employer] Portal  Access Code: [email or web address]   URL: [email or web address]   Date: 09/30/2019   Prepared by: Andrew Henderson     Exercises  Straight Leg Raise - 15 reps - 2 sets - 2x daily - 7x weekly  Supine Bridge - 15 reps - 2 sets - 2x daily - 7x weekly  Beginner Clam - 15 reps - 2 sets - 2x daily - 7x weekly  Sidelying Hip Abduction - 15 reps - 2 sets - 2x daily - 7x weekly  Supine Piriformis Stretch with Foot on Ground - 5 reps - 10 second hold - 2x daily - 7x weekly  Hooklying Single Knee to Chest - 5 reps - 10 second hold - 2x daily - 7x weekly      Access Code: Lani Haines   URL: [email or web address]   Date: 01/27/2020   Prepared by: Lc Rock     Exercises  Seated Posterior Pelvic Tilt - 10 reps - 2x daily - 7x weekly  Quadruped Alternating Leg Extensions - 10 reps - 2x daily - 7x weekly  Quadruped Cat Camel - 10 reps - 2x daily - 7x weekly  Step Up - 10 reps - 2x daily - 7x weekly  Squat with Chair Support - 10 reps - 2x daily - 7x weekly  Standing March - 10 reps - 2x daily - 7x weekly  Sidestepping - 10 reps - 1x daily - 7x weekly        Treatment/Session Summary:    · Response to Treatment: Pt reported fatigue with exercises, but tolerating more activity than before. · Communication/Consultation:  None today  · Equipment provided today:  None today  · Recommendations/Intent for next treatment session: Next visit will focus on LE, core, back strengthening and motion.   Treatment Plan of Care Effective Dates:  11/22/19 to 2/22/20    Total Treatment Billable Duration: 30 minutes therex, 15 minutes manual therapy       Emmanuelle Alexander, PT, DPT     Future Appointments   Date Time Provider Socorro Chandler   1/27/2020  1:45 PM Farrukh Batres, PT, DPT [facility]   1/30/2020 10:15 AM Dhaval Sherwood MD [facility]   1/31/2020  1:45 PM Ciro Fontenot, PT, DPT [facility]

## 2020-01-30 PROBLEM — Z98.890 HISTORY OF BACK SURGERY: Status: ACTIVE | Noted: 2020-01-30

## 2020-01-30 PROBLEM — J30.1 SEASONAL ALLERGIC RHINITIS DUE TO POLLEN: Status: ACTIVE | Noted: 2020-01-30

## 2020-01-30 PROBLEM — I10 BENIGN ESSENTIAL HTN: Status: ACTIVE | Noted: 2020-01-30

## 2020-01-30 PROBLEM — R94.6 ABNORMAL THYROID FUNCTION TEST: Status: ACTIVE | Noted: 2020-01-30

## 2020-01-30 PROBLEM — F41.9 ANXIETY: Status: ACTIVE | Noted: 2020-01-30

## 2020-01-30 PROBLEM — E78.00 PURE HYPERCHOLESTEROLEMIA: Status: ACTIVE | Noted: 2020-01-30

## 2020-01-31 ENCOUNTER — HOSPITAL ENCOUNTER (OUTPATIENT)
Dept: PHYSICAL THERAPY | Age: 65
Discharge: HOME | End: 2020-01-31
Payer: COMMERCIAL

## 2020-01-31 PROCEDURE — 97140 MANUAL THERAPY 1/> REGIONS: CPT

## 2020-01-31 PROCEDURE — 97110 THERAPEUTIC EXERCISES: CPT

## 2020-01-31 NOTE — PROGRESS NOTES
Trevon Marrufo  : 1955  Primary: Merrill [facility]  Secondary:  [address]  at [facility]  [address], [address], [place] [address]  [phone]   [phone]        OUTPATIENT PHYSICAL THERAPY: Daily Treatment Note 2020  ICD-10: Low back pain (M54.5), Difficulty in walking, not elsewhere classified (R26.2), Muscle weakness (generalized) (M62.81)    Pre-treatment Symptoms/Complaints:  Pt reports she is ready to be done with physical therapy at this time. Going to gym regularly and doing the exercises.     Pain: Initial:    Post Session:  Decreased pain   Medications Last Reviewed:  2020    Updated Objective Findings: none today   TREATMENT:   THERAPEUTIC EXERCISE: ( 30 minutes):     1/8 1/10 1/17 1/22 1/24 1/27 1/31   Activity/Exercise          Recumbent stepper 10 min level 3 10 min level 3 10 min level 3 10 min level 3 10 min level 3 10 min level 3 10 min level 3   Quad set          SAQ          SLR                    bridge          SL clamshells          SL hip abduction          ambulation          Prone press up          Abdominal isometrics          STC          Piriformis stretch          LTR          Towel roll under mid-thoracic          Seated thoracic flexion, extension          Ball squat          Stdg toe raises          Stdg heel raises          Sit to stand          balance          marching 40 ft x 4 40 ft x 4 40 ft x 4 40 ft x 4 40 ft x 4 40 ft x 4 40 ft x 4   Standing hip abd          Ant step ups          Lat step ups          Sciatic nerve flossing          Step overs          Step to the side          sidestepping 40 ft x 4 40 ft x 4 40 ft x 4f 40 ft x 4 40 ft x 4 40 ft x 4 40 ft x 4   minisquats          balance - L LE on step, R LE on ground, cues to keep hip activated, 1 min hold x 2         Seated trunk flexion          ambulation          Seated pelvic tilts X 10 with added lumbar flexion for stretch X 10 with added lumbar flexion for stretch X 10 with added lumbar flexion for stretch X 10 with added lumbar flexion for stretch X 10 with added lumbar flexion for stretch X 10 with added lumbar flexion for stretch X 10   Toe touch on step          Step up 6\" step x 20 R LE 6\" step x 20 R LE 6\" step   2 x 15 B LE 6\" step   2 x 15 B LE 6\" step   2 x 15 B LE 6\" step   2 x 15 B LE    Hip extension          kinesiotape application           cone from floor          Seated trunk flexion stretch X 10 X 10        Stepping forward, side, back  X 10 X 10 B LE       Cat/camel   X 10 X 10 X 10 X 10 X 10   Quadruped LE extension   X 10 X 10 X 10 X 10 X 10       MANUAL THERAPY: (15 minutes):     Manual Therapy technique and location 1/17 1/22 1/24 1/27 1/31            - R QL release  - B lumbar paraspinal mfr  - long axis traction - R QL release  - B lumbar paraspinal mfr  - long axis traction - R QL release  - B lumbar paraspinal mfr  - long axis traction - R QL release  - B lumbar paraspinal mfr  - long axis traction - R QL release  - B lumbar paraspinal mfr  - long axis traction                 MODALITIES (  minutes):  - IFC electrical stimulation in conjunction with manual therapy to lumbar paraspinals, intensity adjusted to pt tolerance     [employer] Portal  Access Code: [email or web address]   URL: [email or web address]   Date: 09/30/2019   Prepared by: Nickolas Stupeflix     Exercises  Straight Leg Raise - 15 reps - 2 sets - 2x daily - 7x weekly  Supine Bridge - 15 reps - 2 sets - 2x daily - 7x weekly  Beginner Clam - 15 reps - 2 sets - 2x daily - 7x weekly  Sidelying Hip Abduction - 15 reps - 2 sets - 2x daily - 7x weekly  Supine Piriformis Stretch with Foot on Ground - 5 reps - 10 second hold - 2x daily - 7x weekly  Hooklying Single Knee to Chest - 5 reps - 10 second hold - 2x daily - 7x weekly      Access Code: [email or web address]   URL: [email or web address]   Date: 01/27/2020   Prepared by: Nickolas Stupeflix     Exercises  Seated Posterior Pelvic Tilt - 10 reps - 2x daily - 7x weekly  Quadruped Alternating Leg Extensions - 10 reps - 2x daily - 7x weekly  Quadruped Cat Camel - 10 reps - 2x daily - 7x weekly  Step Up - 10 reps - 2x daily - 7x weekly  Squat with Chair Support - 10 reps - 2x daily - 7x weekly  Standing March - 10 reps - 2x daily - 7x weekly  Sidestepping - 10 reps - 1x daily - 7x weekly        Treatment/Session Summary:    · Response to Treatment: Pt reported fatigue with exercises, but tolerating more activity than before. Pt doing well, no further PT visits scheduled. · Communication/Consultation:  None today  · Equipment provided today:  None today  · Recommendations/Intent for next treatment session: Next visit will focus on LE, core, back strengthening and motion.   Treatment Plan of Care Effective Dates:  11/22/19 to 2/22/20    Total Treatment Billable Duration: 30 minutes therex, 15 minutes manual therapy  PT Patient Time In/Time Out  Time In: 1345  Time Out: [address], PT, DPT     Future Appointments   Date Time Provider Socorro Chandler   2/10/2020  3:15 PM [facility] CHAS BI ROOM 1 [facility]ULT [facility]   2/10/2020  3:30 PM [facility] US LOGIC 9 [facility]   2/27/2020 10:45 AM Leopoldo Drew MD [facility]   '

## 2020-02-04 NOTE — THERAPY DISCHARGE
Mervat Ma  : 1955  Payor: Alejandrina Guzman / Plan: Raegan Arredondo / Product Type: Commerical /  [address]  at [facility]  [address], [address], [place] [address]  [phone]   [phone]             OUTPATIENT PHYSICAL THERAPY:Discharge Summary 2020   ICD-10: Treatment Diagnosis: Low back pain (M54.5), Difficulty in walking, not elsewhere classified (R26.2), Muscle weakness (generalized) (M62.81)  Precautions/Allergies:   Latex   MD Orders: evaluate and treat, modalities, core strengthening and stretching, gait training and right lower extremity strengthening MEDICAL/REFERRING DIAGNOSIS:  Encounter for follow-up examination after completed treatment for conditions other than malignant neoplasm [Z09]  Radiculopathy, lumbar region [M54.16]   DATE OF ONSET:   REFERRING PHYSICIAN: Shaniqua Forman [address]: --     INITIAL ASSESSMENT:  Ms. Kelley Archuleta has attended physical therapy for weakness and back pain following back surgery. She has reported significant improvement in overall function; demonstrates a return to functional ambulation without assistive device and a return to normal daily activities including picking items up off of the floor. Pt is independent with a HEP and has been attending a gym regularly with . All goals are met and pt has no further need for skilled physical therapy. TREATMENT PLAN:  Discharge. GOALS: (Goals have been discussed and agreed upon with patient.)  Short-Term Functional Goals: Time Frame: 5 weeks     1. Pt will demonstrate independence with > or = 2 exercises in HEP. met  2. Pt will report < or = 19 on Oswestry. met  Discharge Goals: Time Frame: 10 weeks  1. Pt will demonstrate independence with > or = 4 exercises in HEP. met  2. Pt will report < or = 14 on Oswestry. met  3. Pt will demonstrate functional gait. met  4. Pt will demonstrate functional R LE strength.   met    OUTCOME MEASURE: Tool Used: Modified Oswestry Low Back Pain Questionnaire  Score:  Initial: 24/50  Most Recent: 6/50 (Date: 1/31/20 )   Interpretation of Score: Each section is scored on a 0-5 scale, 5 representing the greatest disability. The scores of each section are added together for a total score of 50.         Thank you for this referral,  Jennifer Kumari, PT, DPT

## 2020-02-11 PROBLEM — R87.619 ABNORMAL CERVICAL PAPANICOLAOU SMEAR: Status: ACTIVE | Noted: 2018-03-14

## 2020-03-02 ENCOUNTER — HOSPITAL ENCOUNTER (OUTPATIENT)
Dept: ULTRASOUND IMAGING | Age: 65
Discharge: HOME | End: 2020-03-02
Attending: INTERNAL MEDICINE
Payer: COMMERCIAL

## 2020-03-02 DIAGNOSIS — R94.6 ABNORMAL THYROID FUNCTION TEST: ICD-10-CM

## 2020-03-02 PROCEDURE — 76536 US EXAM OF HEAD AND NECK: CPT

## 2020-04-16 PROBLEM — M65.30 ACQUIRED TRIGGER FINGER: Status: ACTIVE | Noted: 2020-04-16

## 2020-04-16 PROBLEM — E55.9 VITAMIN D DEFICIENCY: Status: ACTIVE | Noted: 2020-04-16

## 2020-04-16 PROBLEM — D64.9 ANEMIA: Status: ACTIVE | Noted: 2020-04-16

## 2020-04-16 PROBLEM — R53.83 FATIGUE: Status: ACTIVE | Noted: 2020-04-16

## 2020-04-16 PROBLEM — R07.9 CHEST PAIN: Status: ACTIVE | Noted: 2020-04-16

## 2020-06-15 ENCOUNTER — HOSPITAL ENCOUNTER (OUTPATIENT)
Dept: MAMMOGRAPHY | Age: 65
Discharge: HOME | End: 2020-06-15
Attending: INTERNAL MEDICINE

## 2020-06-15 DIAGNOSIS — Z12.31 VISIT FOR SCREENING MAMMOGRAM: ICD-10-CM

## 2020-07-20 ENCOUNTER — HOSPITAL ENCOUNTER (EMERGENCY)
Age: 65
Discharge: HOME | End: 2020-07-20
Attending: EMERGENCY MEDICINE
Payer: COMMERCIAL

## 2020-07-20 ENCOUNTER — APPOINTMENT (OUTPATIENT)
Dept: CT IMAGING | Age: 65
End: 2020-07-20
Attending: EMERGENCY MEDICINE
Payer: COMMERCIAL

## 2020-07-20 ENCOUNTER — APPOINTMENT (OUTPATIENT)
Dept: GENERAL RADIOLOGY | Age: 65
End: 2020-07-20
Attending: EMERGENCY MEDICINE
Payer: COMMERCIAL

## 2020-07-20 VITALS
WEIGHT: 159 LBS | RESPIRATION RATE: 16 BRPM | BODY MASS INDEX: 29.26 KG/M2 | HEART RATE: 76 BPM | HEIGHT: 62 IN | SYSTOLIC BLOOD PRESSURE: 131 MMHG | OXYGEN SATURATION: 99 % | DIASTOLIC BLOOD PRESSURE: 80 MMHG | TEMPERATURE: 98.1 F

## 2020-07-20 DIAGNOSIS — R55 SYNCOPE, UNSPECIFIED SYNCOPE TYPE: Primary | ICD-10-CM

## 2020-07-20 DIAGNOSIS — R22.0 LEFT FACIAL SWELLING: ICD-10-CM

## 2020-07-20 LAB
ALBUMIN SERPL-MCNC: 3.9 G/DL (ref 3.2–4.6)
ALBUMIN/GLOB SERPL: 1 {RATIO} (ref 1.2–3.5)
ALP SERPL-CCNC: 68 U/L (ref 50–130)
ALT SERPL-CCNC: 24 U/L (ref 12–65)
ANION GAP SERPL CALC-SCNC: 5 MMOL/L (ref 7–16)
AST SERPL-CCNC: 13 U/L (ref 15–37)
BASOPHILS # BLD: 0.1 K/UL (ref 0–0.2)
BASOPHILS NFR BLD: 1 % (ref 0–2)
BILIRUB SERPL-MCNC: 0.8 MG/DL (ref 0.2–1.1)
BUN SERPL-MCNC: 16 MG/DL (ref 8–23)
CALCIUM SERPL-MCNC: 9.3 MG/DL (ref 8.3–10.4)
CHLORIDE SERPL-SCNC: 102 MMOL/L (ref 98–107)
CO2 SERPL-SCNC: 31 MMOL/L (ref 21–32)
CREAT SERPL-MCNC: 0.69 MG/DL (ref 0.6–1)
D DIMER PPP FEU-MCNC: 0.61 UG/ML(FEU)
DIFFERENTIAL METHOD BLD: ABNORMAL
EOSINOPHIL # BLD: 0.2 K/UL (ref 0–0.8)
EOSINOPHIL NFR BLD: 2 % (ref 0.5–7.8)
ERYTHROCYTE [DISTWIDTH] IN BLOOD BY AUTOMATED COUNT: 15.7 % (ref 11.9–14.6)
GLOBULIN SER CALC-MCNC: 4 G/DL (ref 2.3–3.5)
GLUCOSE SERPL-MCNC: 99 MG/DL (ref 65–100)
HCT VFR BLD AUTO: 33.6 % (ref 35.8–46.3)
HGB BLD-MCNC: 10.4 G/DL (ref 11.7–15.4)
IMM GRANULOCYTES # BLD AUTO: 0 K/UL (ref 0–0.5)
IMM GRANULOCYTES NFR BLD AUTO: 0 % (ref 0–5)
LYMPHOCYTES # BLD: 3.1 K/UL (ref 0.5–4.6)
LYMPHOCYTES NFR BLD: 36 % (ref 13–44)
MAGNESIUM SERPL-MCNC: 2.2 MG/DL (ref 1.8–2.4)
MCH RBC QN AUTO: 19.2 PG (ref 26.1–32.9)
MCHC RBC AUTO-ENTMCNC: 31 G/DL (ref 31.4–35)
MCV RBC AUTO: 61.9 FL (ref 79.6–97.8)
MONOCYTES # BLD: 0.6 K/UL (ref 0.1–1.3)
MONOCYTES NFR BLD: 7 % (ref 4–12)
NEUTS SEG # BLD: 4.7 K/UL (ref 1.7–8.2)
NEUTS SEG NFR BLD: 54 % (ref 43–78)
NRBC # BLD: 0 K/UL (ref 0–0.2)
PLATELET # BLD AUTO: 296 K/UL (ref 150–450)
PMV BLD AUTO: 9.2 FL (ref 9.4–12.3)
POTASSIUM SERPL-SCNC: 3.4 MMOL/L (ref 3.5–5.1)
PROT SERPL-MCNC: 7.9 G/DL (ref 6.3–8.2)
RBC # BLD AUTO: 5.43 M/UL (ref 4.05–5.2)
SODIUM SERPL-SCNC: 138 MMOL/L (ref 136–145)
TROPONIN-HIGH SENSITIVITY: <3 PG/ML (ref 0–14)
TROPONIN-HIGH SENSITIVITY: <3 PG/ML (ref 0–14)
WBC # BLD AUTO: 8.7 K/UL (ref 4.3–11.1)

## 2020-07-20 PROCEDURE — 85025 COMPLETE CBC W/AUTO DIFF WBC: CPT

## 2020-07-20 PROCEDURE — 84484 ASSAY OF TROPONIN QUANT: CPT

## 2020-07-20 PROCEDURE — 83735 ASSAY OF MAGNESIUM: CPT

## 2020-07-20 PROCEDURE — 70486 CT MAXILLOFACIAL W/O DYE: CPT

## 2020-07-20 PROCEDURE — 74011000258 HC RX REV CODE- 258: Performed by: EMERGENCY MEDICINE

## 2020-07-20 PROCEDURE — 80053 COMPREHEN METABOLIC PANEL: CPT

## 2020-07-20 PROCEDURE — 72100 X-RAY EXAM L-S SPINE 2/3 VWS: CPT

## 2020-07-20 PROCEDURE — 74011636320 HC RX REV CODE- 636/320: Performed by: EMERGENCY MEDICINE

## 2020-07-20 PROCEDURE — 85379 FIBRIN DEGRADATION QUANT: CPT

## 2020-07-20 PROCEDURE — 99284 EMERGENCY DEPT VISIT MOD MDM: CPT

## 2020-07-20 PROCEDURE — 71260 CT THORAX DX C+: CPT

## 2020-07-20 PROCEDURE — 96365 THER/PROPH/DIAG IV INF INIT: CPT

## 2020-07-20 PROCEDURE — 70450 CT HEAD/BRAIN W/O DYE: CPT

## 2020-07-20 PROCEDURE — 74011250636 HC RX REV CODE- 250/636: Performed by: EMERGENCY MEDICINE

## 2020-07-20 RX ORDER — CLINDAMYCIN HYDROCHLORIDE 300 MG/1
300 CAPSULE ORAL 4 TIMES DAILY
Qty: 28 CAP | Refills: 0 | Status: SHIPPED | OUTPATIENT
Start: 2020-07-20 | End: 2020-07-27

## 2020-07-20 RX ORDER — SODIUM CHLORIDE 0.9 % (FLUSH) 0.9 %
10 SYRINGE (ML) INJECTION
Status: COMPLETED | OUTPATIENT
Start: 2020-07-20 | End: 2020-07-20

## 2020-07-20 RX ORDER — CLINDAMYCIN PHOSPHATE 900 MG/50ML
900 INJECTION INTRAVENOUS
Status: COMPLETED | OUTPATIENT
Start: 2020-07-20 | End: 2020-07-20

## 2020-07-20 RX ADMIN — Medication 10 ML: at 17:32

## 2020-07-20 RX ADMIN — IOPAMIDOL 100 ML: 755 INJECTION, SOLUTION INTRAVENOUS at 17:32

## 2020-07-20 RX ADMIN — SODIUM CHLORIDE 100 ML: 900 INJECTION, SOLUTION INTRAVENOUS at 17:32

## 2020-07-20 RX ADMIN — CLINDAMYCIN PHOSPHATE 900 MG: 900 INJECTION, SOLUTION INTRAVENOUS at 16:29

## 2020-07-20 RX ADMIN — SODIUM CHLORIDE 1000 ML: 900 INJECTION, SOLUTION INTRAVENOUS at 13:28

## 2020-07-20 NOTE — ED NOTES
I have reviewed discharge instructions with the patient. The patient verbalized understanding. Patient left ED via Discharge Method: ambulatory to Home with spouse. Opportunity for questions and clarification provided. Patient given 1 scripts. To continue your aftercare when you leave the hospital, you may receive an automated call from our care team to check in on how you are doing. This is a free service and part of our promise to provide the best care and service to meet your aftercare needs.  If you have questions, or wish to unsubscribe from this service please call [phone]. Thank you for Choosing our [facility] Emergency Department.

## 2020-07-20 NOTE — DISCHARGE INSTRUCTIONS
Referral to cardiology for possible Holter monitoring  Make certain you are taking an extra fluids  Follow for fever, worse left facial swelling, worse dental discomfort or swelling, any difficulty breathing or swallowing Antibiotic: Clindamycin  Any new or significant changes please be rechecked

## 2020-07-20 NOTE — ED TRIAGE NOTES
Pt reports lower back and right flank pain after \"losing consciousness\" 2 days ago. Also reports headache, neck pain and facial swelling.

## 2020-07-20 NOTE — ED PROVIDER NOTES
Patient comes to our department with syncopal-like episode. This occurred on this past Saturday when she had a spell where she states she passed out. She has had some nausea with vomiting and today after 1 of these had a near syncopal episode as well. Denies any focal changes. No cough or sputum. No typical COVID symptoms. Denies any pleuritic pain. No extremity swelling. At present other than some headache and pain to the left side of her face she has no present complaint. States that she has pain to a lower left-sided tooth. Swelling to left face is temporally related to dental pain. She possibly also gives history of injury to this area though she does not recall striking it and has no bruising or redness. The history is provided by the patient and the spouse. Headache    The current episode started more than 2 days ago. The problem has not changed since onset. The pain is located in the frontal region. Associated symptoms include near-syncope. Pertinent negatives include no fever, no chest pressure, no orthopnea, no palpitations, no shortness of breath and no tingling. She has tried nothing for the symptoms.         Past Medical History:   Diagnosis Date    DVT (deep venous thrombosis) (HCC) ~2009    RLE    Hypertension     managed with meds    Nausea & vomiting        Past Surgical History:   Procedure Laterality Date    HX HYSTERECTOMY  [facility] in [facility] due to presumably high grade cervical dysplasia     HX OPEN CHOLECYSTECTOMY  1995    In [address] [place] [address]  07/2019    back surgery         Family History:   Problem Relation Age of Onset    Colon Cancer Mother     Cancer Father         lung    Breast Cancer Sister     Cancer Sister         breast    Diabetes Maternal Aunt     Colon Cancer Maternal Grandfather     Lung Disease Brother         asthma    Cancer Sister         breast    Breast Cancer Sister     Hypertension Brother        Social History     Socioeconomic History    Marital status:      Spouse name: Not on file    Number of children: Not on file    Years of education: Not on file    Highest education level: Not on file   Occupational History    Not on file   Social Needs    Financial resource strain: Not on file    Food insecurity     Worry: Not on file     Inability: Not on file    Transportation needs     Medical: Not on file     Non-medical: Not on file   Tobacco Use    Smoking status: Never Smoker    Smokeless tobacco: Never Used   Substance and Sexual Activity    Alcohol use: Yes     Comment: socially    Drug use: No    Sexual activity: Yes     Partners: Male     Birth control/protection: Surgical   Lifestyle    Physical activity     Days per week: Not on file     Minutes per session: Not on file    Stress: Not on file   Relationships    Social connections     Talks on phone: Not on file     Gets together: Not on file     Attends Congregational service: Not on file     Active member of club or organization: Not on file     Attends meetings of clubs or organizations: Not on file     Relationship status: Not on file    Intimate partner violence     Fear of current or ex partner: Not on file     Emotionally abused: Not on file     Physically abused: Not on file     Forced sexual activity: Not on file   Other Topics Concern    Not on file   Social History Narrative    Not on file         ALLERGIES: Latex    Review of Systems   Constitutional: Negative for fever. HENT: Positive for dental problem and facial swelling. Negative for congestion, drooling, ear discharge, ear pain, rhinorrhea, sinus pain, sore throat and trouble swallowing. Respiratory: Negative for shortness of breath. Cardiovascular: Positive for near-syncope. Negative for chest pain, palpitations, orthopnea and leg swelling. Gastrointestinal: Negative. Neurological: Positive for headaches. Negative for tingling.    Psychiatric/Behavioral: Negative for confusion and decreased concentration. All other systems reviewed and are negative. Vitals:    07/20/20 1242   BP: 127/66   Pulse: 80   Resp: 18   Temp: 98.1 °F (36.7 °C)   SpO2: 97%   Weight: 72.1 kg (159 lb)   Height: 5' 2\" (1.575 m)            Physical Exam  Constitutional:       Appearance: Normal appearance. She is not ill-appearing or diaphoretic. HENT:      Head: Atraumatic. Right Ear: Tympanic membrane, ear canal and external ear normal.      Left Ear: Tympanic membrane, ear canal and external ear normal.      Nose: Nose normal.      Mouth/Throat:      Dentition: Dental tenderness present. Tongue: Tongue does not deviate from midline. Palate: No mass. Pharynx: No posterior oropharyngeal erythema or uvula swelling. Comments: Left mainly lower molar pain. Adjacent cheek soreness  No bruise/redness or cari  Eyes:      General: No scleral icterus. Extraocular Movements: Extraocular movements intact. Pupils: Pupils are equal, round, and reactive to light. Neck:      Comments: Fluid motion with no meningismus  Cardiovascular:      Rate and Rhythm: Normal rate and regular rhythm. Pulses: Normal pulses. Heart sounds: Normal heart sounds. No murmur. No friction rub. Pulmonary:      Effort: Pulmonary effort is normal.      Breath sounds: Normal breath sounds. No stridor. No rhonchi. Abdominal:      General: Abdomen is flat. Palpations: Abdomen is soft. Tenderness: There is no right CVA tenderness, left CVA tenderness, guarding or rebound. Musculoskeletal:         General: No tenderness. Right lower leg: No edema. Left lower leg: No edema. Skin:     Coloration: Skin is not pale. Findings: No erythema. Neurological:      General: No focal deficit present. Mental Status: She is alert. Mental status is at baseline. GCS: GCS eye subscore is 4. GCS verbal subscore is 5. GCS motor subscore is 6.       Cranial Nerves: No cranial nerve deficit, dysarthria or facial asymmetry. Sensory: Sensation is intact. Motor: Motor function is intact. No weakness, tremor, abnormal muscle tone or seizure activity. Coordination: Coordination is intact. MDM  Number of Diagnoses or Management Options  Left facial swelling:   Syncope, unspecified syncope type:   Diagnosis management comments: Will cover possible left dental infection. Overall intact exam and stable with excellent support from . Will place her on antibiotics.  She is to use a low threshold for return/recheck       Amount and/or Complexity of Data Reviewed  Clinical lab tests: ordered and reviewed  Tests in the radiology section of CPT®: reviewed and ordered  Obtain history from someone other than the patient: yes  Review and summarize past medical records: yes  Independent visualization of images, tracings, or specimens: yes    Risk of Complications, Morbidity, and/or Mortality  Presenting problems: moderate  Diagnostic procedures: low  Management options: moderate    Patient Progress  Patient progress: stable         Procedures

## 2020-07-20 NOTE — ED NOTES
I have reviewed discharge instructions with the patient. The patient verbalized understanding. Patient left ED via Discharge Method: ambulatory to Home with spouse. Opportunity for questions and clarification provided. Patient given 1 scripts. To continue your aftercare when you leave the hospital, you may receive an automated call from our care team to check in on how you are doing. This is a free service and part of our promise to provide the best care and service to meet your aftercare needs.  If you have questions, or wish to unsubscribe from this service please call [phone]. Thank you for Choosing our [facility] Department.

## 2020-09-24 PROBLEM — D50.9 MICROCYTIC ANEMIA: Status: ACTIVE | Noted: 2020-04-16

## 2020-09-24 PROBLEM — R93.1 AGATSTON CORONARY ARTERY CALCIUM SCORE BETWEEN 100 AND 199: Status: ACTIVE | Noted: 2020-09-24

## 2020-09-24 PROBLEM — R55 SYNCOPE AND COLLAPSE: Status: ACTIVE | Noted: 2020-09-24

## 2020-09-24 PROBLEM — R94.31 ABNORMAL EKG: Status: ACTIVE | Noted: 2020-09-24

## 2020-11-23 ENCOUNTER — HOSPITAL ENCOUNTER (OUTPATIENT)
Dept: PHYSICAL THERAPY | Age: 65
Discharge: HOME | End: 2020-11-23
Payer: COMMERCIAL

## 2020-11-23 PROCEDURE — 97140 MANUAL THERAPY 1/> REGIONS: CPT

## 2020-11-23 PROCEDURE — 97161 PT EVAL LOW COMPLEX 20 MIN: CPT

## 2020-11-23 NOTE — PROGRESS NOTES
Rebecca Pastrana  : 1955  Primary: Esvin Rose [facility]  Secondary:  [address] at [facility]  [address] , [address], [place] [address]  [phone]   [phone]        OUTPATIENT PHYSICAL THERAPY: Daily Treatment Note 2020  ICD-10: Treatment Diagnosis: Pain in right leg (M79.604), Low back pain (M54.5)  Precautions/Allergies:   Latex   TREATMENT PLAN:  Effective Dates: 2020 TO 2021 (90 days). Frequency/Duration: 2 times a week for 90 Day(s) MEDICAL/REFERRING DIAGNOSIS:  Spondylosis without myelopathy or radiculopathy, lumbar region [M47.816]  Other intervertebral disc degeneration, lumbar region [M51.36]  Radiculopathy, lumbar region [M54.16]   DATE OF ONSET: chronic  REFERRING PHYSICIAN: Kenney LOYD  MD Orders: evaluate and treat  Return MD Appointment: --       Pre-treatment Symptoms/Complaints:  High levels of pain  Pain: Initial:     /10 Post Session:  /10   Medications Last Reviewed:  2020    Updated Objective Findings:  See evaluation note from today   TREATMENT:   THERAPEUTIC EXERCISE: (  minutes):     Date:   Date:   Date:     Activity/Exercise Parameters Parameters Parameters         Supine knee to chest X 10     LTR X 10           UT stretch X 10                     MANUAL THERAPY: (15 minutes):     Manual Therapy technique and location Date:   Date:   Date:      Parameters Parameters Parameters    - UT and levator passive stretch and stm  - passive cervical traction  - long axis traction  - gentle stm to R thoracolumbar paraspinals       MODALITIES ( minutes):    [employer] Portal  Treatment/Session Summary:    · Response to Treatment:  Pt demonstrated understanding of exercises. · Communication/Consultation:  None today  · Equipment provided today:  None today  · Recommendations/Intent for next treatment session: Next visit will focus on back and LE stretches.   Total Treatment Billable Duration:  20 min evaluation, 15 minutes manual therapy  PT Patient Time In/Time Out  Time In: 1605  Time Out: [address], PT, DPT

## 2020-11-25 NOTE — THERAPY EVALUATION
Leon Batista  : 1955  Payor: Julieta Warren / Plan: Lila Fajardo / Product Type: Commerical /  [address]  at [facility]  [address], [address], [place] [address]  [phone]   [phone]             OUTPATIENT PHYSICAL THERAPY:Initial Assessment 2020   ICD-10: Treatment Diagnosis: Pain in right leg (M79.604), Low back pain (M54.5)  Precautions/Allergies:   Latex   TREATMENT PLAN:  Effective Dates: 2020 TO 2021 (90 days). Frequency/Duration: 2 times a week for 90 Day(s) MEDICAL/REFERRING DIAGNOSIS:  Spondylosis without myelopathy or radiculopathy, lumbar region [M47.816]  Other intervertebral disc degeneration, lumbar region [M51.36]  Radiculopathy, lumbar region [M54.16]   DATE OF ONSET: chronic  REFERRING PHYSICIAN: Sivan Esposito*  MD Orders: evaluate and treat  Return MD Appointment: --     INITIAL ASSESSMENT:  Ms. Douglas Maharaj presents with neck, back, leg pain from a fall 3 months ago with associated movement and strength defecits. Pt will benefit from skilled physical therapy to address pain and dysfunctions. PROBLEM LIST (Impacting functional limitations):  1. Decreased ADL/Functional Activities  2. Increased Pain  3. Decreased Flexibility/Joint Mobility  4. Decreased [place] with Home Exercise Program INTERVENTIONS PLANNED: (Treatment may consist of any combination of the following)  1. Home Exercise Program (HEP)  2. Manual Therapy including soft tissue and spinal manipulation and mobilization; and dry needling  3. Neuromuscular Re-education/Strengthening  4. Range of Motion (ROM)  5. Therapeutic Activites  6. Therapeutic Exercise/Strengthening  7. Modalities including heat/cold application, electrical stimulation, vasopneumatic compression, ultrasound     GOALS: (Goals have been discussed and agreed upon with patient.)  Short-Term Functional Goals: Time Frame: 5 weeks     1.  Pt will demonstrate independence with > or = 2 exercises in HEP. 2. Pt will demonstrate 50% improvement in neck and trunk movements. Discharge Goals: Time Frame: 10 weeks  1. Pt will demonstrate independence with > or = 4 exercises in HEP. 2. Pt will demonstrate functional neck and trunk movements. OUTCOME MEASURE:   Tool Used: Modified Oswestry Low Back Pain Questionnaire  Score:  Initial: 19/50  Most Recent: X/50 (Date: -- )   Interpretation of Score: Each section is scored on a 0-5 scale, 5 representing the greatest disability. The scores of each section are added together for a total score of 50. MEDICAL NECESSITY:   · Skilled intervention continues to be required due to decreased function. REASON FOR SERVICES/OTHER COMMENTS:  · Patient continues to require skilled intervention due to decreased function. Total Duration:  PT Patient Time In/Time Out  Time In: 1605  Time Out: 1700    Rehabilitation Potential For Stated Goals: Good  Regarding Gearl Kayser Kopp's therapy, I certify that the treatment plan above will be carried out by a therapist or under their direction. Thank you for this referral,  Serge Simon, PT, DPT          HISTORY:   History of Injury/Illness (Reason for Referral):  Back surgery last year. 3 months ago she fell onto her L side at [facility]. Went to ER, had scans no fractures. Now has neck, back, R LE pain into R foot. Was given oral steroids that helps with pain. Past Medical History/Comorbidities:   Ms. Pura Pretty  has a past medical history of DVT (deep venous thrombosis) ([facility] [address] (~2009), Hypertension, and Nausea & vomiting. Ms. Pura Pretty  has a past surgical history that includes hx open cholecystectomy (1995); hx hysterectomy (1991); and neurological procedure unlisted (07/2019).   Social History/Living Environment:     house  Prior Level of Function/Work/Activity:  Works part time  Dominant Side:         RIGHT   Ambulatory/Rehab Services H2 Model Falls Risk Assessment   Risk Factors:       No Risk Factors Identified Ability to Rise from Chair:       (0)  Ability to rise in a single movement   Falls Prevention Plan:       No modifications necessary   Total: (5 or greater = High Risk): 0   ©2010 [facility] of [facility] Patent #[id number]. Federal Law prohibits the replication, distribution or use without written permission from [facility] of [employer]   Current Medications:       Current Outpatient Medications:     atorvastatin (LIPITOR) 20 mg tablet, Take 1 Tab by mouth nightly., Disp: 30 Tab, Rfl: 5    fexofenadine (ALLEGRA) 180 mg tablet, Take 360 mg by mouth daily. , Disp: , Rfl:     CALCIUM CITRATE-VITAMIN D3 PO, Take  by mouth., Disp: , Rfl:     cyanocobalamin (Vitamin B-12) 100 mcg tablet, Take 100 mcg by mouth daily. , Disp: , Rfl:     omeprazole (PRILOSEC) 20 mg capsule, Take 1 Cap by mouth daily. , Disp: 90 Cap, Rfl: 4    PARoxetine (PAXIL) 20 mg tablet, TAKE 1 TABLET BY MOUTH EVERY DAY, Disp: , Rfl:     amLODIPine (NORVASC) 10 mg tablet, Take 1 Tab by mouth daily. , Disp: 90 Tab, Rfl: 3    ferrous sulfate 325 mg (65 mg iron) tablet, Take 1 Tab by mouth Daily (before breakfast). , Disp: 90 Tab, Rfl: 3    triamcinolone acetonide (KENALOG) 0.025 % topical cream, Apply  to affected area two (2) times a day. use thin layer (Patient taking differently: Apply  to affected area two (2) times a day. use thin layer  Prn), Disp: 80 g, Rfl: 0    OTHER,NON-FORMULARY,, Immuno marlene, Disp: , Rfl:     olopatadine (Pazeo) 0.7 % drop, Apply 1 Drop to eye daily. , Disp: , Rfl:     allergy injection, , Disp: , Rfl:     EPINEPHrine (EPIPEN) 0.3 mg/0.3 mL injection, USE AS DIRECTED, Disp: , Rfl:     olmesartan (Benicar) 40 mg tablet, Take 1 Tab by mouth daily. , Disp: 90 Tab, Rfl: 02    hydroCHLOROthiazide (HYDRODIURIL) 25 mg tablet, Take 1 Tab by mouth daily.  Indications: high blood pressure, Disp: 90 Tab, Rfl: 1    multivit-min/FA/lycopen/lutein (SPECTRAVITE ADULT 50 PLUS PO), Take  by mouth., Disp: , Rfl:   levocetirizine (XYZAL) 5 mg tablet, Take  by mouth., Disp: , Rfl:     cholecalciferol, vitamin D3, (VITAMIN D3) 2,000 unit tab, Take  by mouth daily. , Disp: , Rfl:     albuterol (PROVENTIL HFA, VENTOLIN HFA, PROAIR HFA) 90 mcg/actuation inhaler, Take 2 Puffs by inhalation every four (4) hours as needed for Wheezing., Disp: , Rfl:     fluticasone (FLONASE) 50 mcg/actuation nasal spray, 2 Sprays by Nasal route daily. , Disp: , Rfl:     aspirin delayed-release 81 mg tablet, Take 81 mg by mouth daily. Indications: prevention of transient ischemic attack, Disp: , Rfl:    Date Last Reviewed:  11/23/2020     Number of Personal Factors/Comorbidities that affect the Plan of Care: 0: LOW COMPLEXITY   EXAMINATION: from Initial Evaluation unless otherwise noted   Observation:       Standing- elevated R shoulder complex        Gait- decreased stance time on R    Strength:  Muscle/Movement Strength at Eval Reassessment Date:    Hip flexion L 5/5  R 3-/5    Hip abduction L 5/5  R 3-/5    Shoulder elevation L 5/5  R 3-/5                       Range of Motion:   Movement/Joint ROM at eval Reassessment Date:    Cervical flexion 50% of normal    Cervical rotation 50% of normal B    Cervical extension 25% of normal    Trunk flexion 25% of normal    Trunk extension 15% of normal    Trunk rotation R 25% of normal  L 15% of normal      Palpation:  Increased tone and tenderness in R UT, R cervical paraspinals, R lumbar paraspinals. Body Structures Involved:  1. Nerves  2. Bones  3. Joints  4. Muscles Body Functions Affected:  1. Sensory/Pain  2. Neuromusculoskeletal  3. Movement Related Activities and Participation Affected:  1. General Tasks and Demands  2. Mobility  3.  Community, Social and [place]   Number of elements (examined above) that affect the Plan of Care: 3: MODERATE COMPLEXITY   CLINICAL PRESENTATION:   Presentation: Stable and uncomplicated: LOW COMPLEXITY   CLINICAL DECISION MAKING:   Use of outcome tool(s) and clinical judgement create a POC that gives a: Clear prediction of patient's progress: LOW COMPLEXITY       Total Evaluation Duration:  PT Patient Time In/Time Out  Time In: 1605  Time Out: 1700    Future Appointments   Date Time Provider Socorro Geraldine   12/2/2020  4:15 PM Ether Stable, PT, DPT [facility]   12/4/2020  3:15 PM Cuffey, Sai Jessee, PT, DPT [facility]   12/7/2020  3:15 PM Zi Young, Sai Jessee, PT, DPT [facility]   12/9/2020  3:15 PM Zi Young, Sai Jessee, PT, DPT [facility]   12/14/2020  3:15 PM Zi Young, Sai Jessee, PT, DPT [facility]   12/16/2020  3:15 PM Zi Young, Sai Jessee, PT, DPT [facility]   12/21/2020  3:15 PM Zi Young, Sai Jessee, PT, DPT [facility]   12/23/2020  3:15 PM Zi Young, Sai Jessee, PT, DPT [facility]   12/28/2020  3:15 PM Zi Young, Sai Jessee, PT, DPT [facility]   12/30/2020  3:15 PM Ether Stable, PT, DPT [facility]   2/1/2021 11:00 AM Diana Thomson MD [facility]       Nate Jauregui, PT, DPT

## 2020-12-02 ENCOUNTER — HOSPITAL ENCOUNTER (OUTPATIENT)
Dept: PHYSICAL THERAPY | Age: 65
Discharge: HOME | End: 2020-12-02
Payer: COMMERCIAL

## 2020-12-02 PROCEDURE — 97110 THERAPEUTIC EXERCISES: CPT

## 2020-12-02 PROCEDURE — 97140 MANUAL THERAPY 1/> REGIONS: CPT

## 2020-12-02 NOTE — PROGRESS NOTES
Jasmin Ace  : 1955  Primary: Yanique Alyce [facility]  Secondary:  [address]  at [facility]  [address], [address], [place] [address]  [phone]   [phone]        OUTPATIENT PHYSICAL THERAPY: Daily Treatment Note 2020  ICD-10: Treatment Diagnosis: Pain in right leg (M79.604), Low back pain (M54.5)  Precautions/Allergies:   Latex   TREATMENT PLAN:  Effective Dates: 2020 TO 2021 (90 days).   Frequency/Duration: 2 times a week for 90 Day(s) MEDICAL/REFERRING DIAGNOSIS:  Spondylosis without myelopathy or radiculopathy, lumbar region [M47.816]  Other intervertebral disc degeneration, lumbar region [M51.36]  Radiculopathy, lumbar region [M54.16]   DATE OF ONSET: chronic  REFERRING PHYSICIAN: Kyler LOYD MD Orders: evaluate and treat  Return MD Appointment: --       Pre-treatment Symptoms/Complaints:  Neck is starting to move better  Pain: Initial:     /10 Post Session:  /10   Medications Last Reviewed:  2020    Updated Objective Findings:  None Today   TREATMENT:   THERAPEUTIC EXERCISE: ( 30 minutes):     Date:   Date:   Date:     Activity/Exercise Parameters Parameters Parameters   Recumbent stepper  10 min level 1    Supine knee to chest X 10 X 10    LTR X 10 X 10    Seated trunk flexion  X 10    Seated piriformis stretch  X 10                                  UT stretch X 10                     MANUAL THERAPY: (15 minutes):     Manual Therapy technique and location Date:   Date:   Date:      Parameters Parameters Parameters    - UT and levator passive stretch and stm  - passive cervical traction  - long axis traction  - gentle stm to R thoracolumbar paraspinals - UT and levator passive stretch and stm  - passive cervical traction  - long axis traction  - gentle stm to R thoracolumbar paraspinals      MODALITIES ( minutes):    [employer] Portal  Treatment/Session Summary:    · Response to Treatment:  Pt reported feeling better after treatment. · Communication/Consultation:  None today  · Equipment provided today:  None today  · Recommendations/Intent for next treatment session: Next visit will focus on back and LE stretches.   Total Treatment Billable Duration:  30 minutes therex, 15 minutes manual therapy  PT Patient Time In/Time Out  Time In: 1615  Time Out: 1705    Raquel Horta, PT, DPT

## 2020-12-04 ENCOUNTER — HOSPITAL ENCOUNTER (OUTPATIENT)
Dept: PHYSICAL THERAPY | Age: 65
Discharge: HOME | End: 2020-12-04
Payer: COMMERCIAL

## 2020-12-04 PROCEDURE — 97140 MANUAL THERAPY 1/> REGIONS: CPT

## 2020-12-04 PROCEDURE — 97110 THERAPEUTIC EXERCISES: CPT

## 2020-12-04 NOTE — PROGRESS NOTES
Deonna Factor  : 1955  Primary: [facility]  Secondary:  [address] at [facility]  [address], [place] [address]  [phone]   [phone]        OUTPATIENT PHYSICAL THERAPY: Daily Treatment Note 2020  ICD-10: Treatment Diagnosis: Pain in right leg (M79.604), Low back pain (M54.5)  Precautions/Allergies:   Latex   TREATMENT PLAN:  Effective Dates: 2020 TO 2021 (90 days). Frequency/Duration: 2 times a week for 90 Day(s) MEDICAL/REFERRING DIAGNOSIS:  Spondylosis without myelopathy or radiculopathy, lumbar region [M47.816]  Other intervertebral disc degeneration, lumbar region [M51.36]  Radiculopathy, lumbar region [M54.16]   DATE OF ONSET: chronic  REFERRING PHYSICIAN: Marce Rea*  MD Orders: evaluate and treat  Return MD Appointment: --       Pre-treatment Symptoms/Complaints:  Neck is much better, still tight. Mid back has felt a sharp pain there, unable to wear a bra.    Pain: Initial:     /10 Post Session:  /10   Medications Last Reviewed:  2020    Updated Objective Findings:  None Today   TREATMENT:   THERAPEUTIC EXERCISE: ( 30 minutes):     Date:   Date:   Date:     Activity/Exercise Parameters Parameters Parameters   Recumbent stepper  10 min level 1 10 min level 1   Supine knee to chest X 10 X 10 X 10   LTR X 10 X 10 X 10   Seated trunk flexion  X 10 X 10   Seated piriformis stretch  X 10 X 10   Sit to stand   X 10 from chair   Seated forward reach stretch for mid back                        UT stretch X 10  X 10         KT tape application   Star formation over R mid thoracic region       MANUAL THERAPY: (15 minutes):     Manual Therapy technique and location Date:   Date:   Date:      Parameters Parameters Parameters    - UT and levator passive stretch and stm  - passive cervical traction  - long axis traction  - gentle stm to R thoracolumbar paraspinals - UT and levator passive stretch and stm  - passive cervical traction  - long axis traction  - gentle stm to R thoracolumbar paraspinals - UT and levator passive stretch and stm  - passive cervical traction  - long axis traction  - gentle stm to R thoracolumbar paraspinals manually and using Edge tool     MODALITIES ( minutes):    [employer] Portal  Treatment/Session Summary:    · Response to Treatment:  Pt reported decreased tightness following treatment. · Communication/Consultation:  None today  · Equipment provided today:  None today  · Recommendations/Intent for next treatment session: Next visit will focus on back and LE stretches.   Total Treatment Billable Duration:  30 minutes therex, 15 minutes manual therapy  PT Patient Time In/Time Out  Time In: 1885  Time Out: 1600    Muriel Sun, PT, DPT

## 2020-12-07 ENCOUNTER — HOSPITAL ENCOUNTER (OUTPATIENT)
Dept: PHYSICAL THERAPY | Age: 65
Discharge: HOME | End: 2020-12-07
Payer: COMMERCIAL

## 2020-12-07 PROCEDURE — 97110 THERAPEUTIC EXERCISES: CPT

## 2020-12-07 PROCEDURE — 97140 MANUAL THERAPY 1/> REGIONS: CPT

## 2020-12-07 NOTE — PROGRESS NOTES
Shawnjeff Pastrana  : 1955  Primary: Esvin [facility]  Secondary:  [address] at [facility]  [address], [address], [place]  [phone]   [phone]        OUTPATIENT PHYSICAL THERAPY: Daily Treatment Note 2020  ICD-10: Treatment Diagnosis: Pain in right leg (M79.604), Low back pain (M54.5)  Precautions/Allergies:   Latex   TREATMENT PLAN:  Effective Dates: 2020 TO 2021 (90 days). Frequency/Duration: 2 times a week for 90 Day(s) MEDICAL/REFERRING DIAGNOSIS:  Spondylosis without myelopathy or radiculopathy, lumbar region [M47.816]  Other intervertebral disc degeneration, lumbar region [M51.36]  Radiculopathy, lumbar region [M54.16]   DATE OF ONSET: chronic  REFERRING PHYSICIAN: Betzy Ramirez*  MD Orders: evaluate and treat  Return MD Appointment: --       Pre-treatment Symptoms/Complaints:  Improved pain for a day or so after last treatment, but back pain returned. Neck has been feeling better.    Pain: Initial:     /10 Post Session:  /10   Medications Last Reviewed:  2020    Updated Objective Findings:  None Today   TREATMENT:   THERAPEUTIC EXERCISE: ( 30 minutes):     Date:   Date:   Date:     Activity/Exercise Parameters Parameters Parameters    Recumbent stepper  10 min level 1 10 min level 1 10 min level 1   Supine knee to chest X 10 X 10 X 10 X 10   LTR X 10 X 10 X 10 X 10   Seated trunk flexion  X 10 X 10 X 10   Seated piriformis stretch  X 10 X 10 X 10   Sit to stand   X 10 from chair    Seated forward reach stretch for mid back    X 10                        UT stretch X 10  X 10           KT tape application   Star formation over R mid thoracic region Star formation over R mid thoracic region       MANUAL THERAPY: (25 minutes):     Manual Therapy technique and location Date:   Date:   Date:      Parameters Parameters Parameters     - UT and levator passive stretch and stm  - passive cervical traction  - long axis traction  - gentle stm to R thoracolumbar paraspinals - UT and levator passive stretch and stm  - passive cervical traction  - long axis traction  - gentle stm to R thoracolumbar paraspinals - UT and levator passive stretch and stm  - passive cervical traction  - long axis traction  - gentle stm to R thoracolumbar paraspinals manually and using Edge tool - UT and levator passive stretch and stm  - passive cervical traction  - long axis traction  - gentle stm to R thoracolumbar paraspinals manually and using Edge tool  - point stimulation using handheld electric unit to R thoracic paraspinals  - PA mobilizations to mid thoracic to upper lumbar segments. MODALITIES ( minutes):    [employer] Portal  Treatment/Session Summary:    · Response to Treatment:  Pt reported decreased tightness following treatment. · Communication/Consultation:  None today  · Equipment provided today:  None today  · Recommendations/Intent for next treatment session: Next visit will focus on back and LE stretches.   Total Treatment Billable Duration:  30 minutes therex, 25 minutes manual therapy  PT Patient Time In/Time Out  Time In: 9700  Time Out: [address] [facility] Po, PT, DPT

## 2020-12-09 ENCOUNTER — HOSPITAL ENCOUNTER (OUTPATIENT)
Dept: PHYSICAL THERAPY | Age: 65
Discharge: HOME | End: 2020-12-09
Payer: COMMERCIAL

## 2020-12-09 PROCEDURE — 97140 MANUAL THERAPY 1/> REGIONS: CPT

## 2020-12-09 PROCEDURE — 97110 THERAPEUTIC EXERCISES: CPT

## 2020-12-09 NOTE — PROGRESS NOTES
Maria Elena Meter  : 1955  Primary: María Elena Felicianocarlosmkie [facility]  Secondary:  [address] at [facility]  [address], [address], [place] [address]  [phone]   [phone]        OUTPATIENT PHYSICAL THERAPY: Daily Treatment Note 2020  ICD-10: Treatment Diagnosis: Pain in right leg (M79.604), Low back pain (M54.5)  Precautions/Allergies:   Latex   TREATMENT PLAN:  Effective Dates: 2020 TO 2021 (90 days). Frequency/Duration: 2 times a week for 90 Day(s) MEDICAL/REFERRING DIAGNOSIS:  Spondylosis without myelopathy or radiculopathy, lumbar region [M47.816]  Other intervertebral disc degeneration, lumbar region [M51.36]  Radiculopathy, lumbar region [M54.16]   DATE OF ONSET: chronic  REFERRING PHYSICIAN: Katey Siddiqui*  MD Orders: evaluate and treat  Return MD Appointment: --       Pre-treatment Symptoms/Complaints:  Pt was able to sleep a lot better since last treatment. No incidences recently of neck pain.    Pain: Initial:     /10 Post Session:  /10   Medications Last Reviewed:  2020    Updated Objective Findings:  None Today   TREATMENT:   THERAPEUTIC EXERCISE: ( 30 minutes):     Date:   Date:   Date:     Activity/Exercise Parameters Parameters Parameters     Recumbent stepper  10 min level 1 10 min level 1 10 min level 1 10 min level 1   Supine knee to chest X 10 X 10 X 10 X 10 X 10   LTR X 10 X 10 X 10 X 10 X 10   Seated trunk flexion  X 10 X 10 X 10 X 10   Seated piriformis stretch  X 10 X 10 X 10 X 10   Sit to stand   X 10 from chair  X 10 from chair   Seated forward reach stretch for mid back    X 10 X 10                           UT stretch X 10  X 10             KT tape application   Star formation over R mid thoracic region Star formation over R mid thoracic region Star formation over R mid thoracic region       MANUAL THERAPY: (25 minutes):     Manual Therapy technique and location Date:   Date:   Date:   12/9    Parameters Parameters Parameters      - UT and levator passive stretch and stm  - passive cervical traction  - long axis traction  - gentle stm to R thoracolumbar paraspinals - UT and levator passive stretch and stm  - passive cervical traction  - long axis traction  - gentle stm to R thoracolumbar paraspinals - UT and levator passive stretch and stm  - passive cervical traction  - long axis traction  - gentle stm to R thoracolumbar paraspinals manually and using Edge tool - UT and levator passive stretch and stm  - passive cervical traction  - long axis traction  - gentle stm to R thoracolumbar paraspinals manually and using Edge tool  - point stimulation using handheld electric unit to R thoracic paraspinals  - PA mobilizations to mid thoracic to upper lumbar segments. - gentle stm to R thoracolumbar paraspinals manually and using Edge tool and cupping  - point stimulation using handheld electric unit to R thoracic paraspinals  - PA mobilizations to mid thoracic to upper lumbar segments. MODALITIES ( minutes):    [employer] Portal  Treatment/Session Summary:    · Response to Treatment:  Decreased tone in R thoracolumbar paraspinals and decreased pain following manual therapy. · Communication/Consultation:  None today  · Equipment provided today:  None today  · Recommendations/Intent for next treatment session: Next visit will focus on back and LE stretches.   Total Treatment Billable Duration:  30 minutes therex, 25 minutes manual therapy  PT Patient Time In/Time Out  Time In: 0172  Time Out: [address] Fang [address]ot, PT, DPT

## 2020-12-14 ENCOUNTER — HOSPITAL ENCOUNTER (OUTPATIENT)
Dept: PHYSICAL THERAPY | Age: 65
Discharge: HOME | End: 2020-12-14
Payer: COMMERCIAL

## 2020-12-14 PROCEDURE — 97110 THERAPEUTIC EXERCISES: CPT

## 2020-12-14 PROCEDURE — 97140 MANUAL THERAPY 1/> REGIONS: CPT

## 2020-12-14 NOTE — PROGRESS NOTES
Jovanny Burgos  : 1955  Primary: Helder Fernandez [facility]  Secondary:  [address]  at [facility]  [address], [address], [place] [address]  [phone]   [phone]        OUTPATIENT PHYSICAL THERAPY: Daily Treatment Note 2020  ICD-10: Treatment Diagnosis: Pain in right leg (M79.604), Low back pain (M54.5)  Precautions/Allergies:   Latex   TREATMENT PLAN:  Effective Dates: 2020 TO 2021 (90 days). Frequency/Duration: 2 times a week for 90 Day(s) MEDICAL/REFERRING DIAGNOSIS:  Spondylosis without myelopathy or radiculopathy, lumbar region [M47.816]  Other intervertebral disc degeneration, lumbar region [M51.36]  Radiculopathy, lumbar region [M54.16]   DATE OF ONSET: chronic  REFERRING PHYSICIAN: Davin Cabezas  MD Orders: evaluate and treat  Return MD Appointment: --       Pre-treatment Symptoms/Complaints:  Pt reports she is able to sleep better, pain not waking her up at night anymore. The tape aggravated her skin, made it itchy so took it off.    Pain: Initial:     /10 Post Session:  /10   Medications Last Reviewed:  2020    Updated Objective Findings:  None Today   TREATMENT:   THERAPEUTIC EXERCISE: ( 30 minutes):     Date:   Date:   Date:     Activity/Exercise Parameters Parameters Parameters      Recumbent stepper  10 min level 1 10 min level 1 10 min level 1 10 min level 1 10 min level 2   Supine knee to chest X 10 X 10 X 10 X 10 X 10    LTR X 10 X 10 X 10 X 10 X 10    Seated trunk flexion  X 10 X 10 X 10 X 10 X 10  And x 10 to the side   Seated piriformis stretch  X 10 X 10 X 10 X 10 X 10   Sit to stand   X 10 from chair  X 10 from chair X 10 from chair   Seated forward reach stretch for mid back    X 10 X 10 X 10   Back extension over fulcrum      X 10   Step up      4 in step  X 10 R            UT stretch X 10  X 10               KT tape application   Star formation over R mid thoracic region Star formation over R mid thoracic region Star formation over R mid thoracic region        MANUAL THERAPY: (15 minutes):     Manual Therapy technique and location Date:  12/2 Date:  12/4 12/7 12/9 12/14    Parameters Parameters       - UT and levator passive stretch and stm  - passive cervical traction  - long axis traction  - gentle stm to R thoracolumbar paraspinals - UT and levator passive stretch and stm  - passive cervical traction  - long axis traction  - gentle stm to R thoracolumbar paraspinals manually and using Edge tool - UT and levator passive stretch and stm  - passive cervical traction  - long axis traction  - gentle stm to R thoracolumbar paraspinals manually and using Edge tool  - point stimulation using handheld electric unit to R thoracic paraspinals  - PA mobilizations to mid thoracic to upper lumbar segments. - gentle stm to R thoracolumbar paraspinals manually and using Edge tool and cupping  - point stimulation using handheld electric unit to R thoracic paraspinals  - PA mobilizations to mid thoracic to upper lumbar segments. - gentle stm to R thoracolumbar paraspinals manually and using Edge tool and cupping  - point stimulation using handheld electric unit to R thoracic paraspinals  - PA mobilizations to mid thoracic to upper lumbar segments. MODALITIES ( minutes):    [employer] Portal  Treatment/Session Summary:    · Response to Treatment:  Decreased tone in R thoracolumbar paraspinals and decreased pain following manual therapy. · Communication/Consultation:  None today  · Equipment provided today:  None today  · Recommendations/Intent for next treatment session: Next visit will focus on back and LE stretches.   Total Treatment Billable Duration:  30 minutes therex, 15 minutes manual therapy  PT Patient Time In/Time Out  Time In: 4779  Time Out: [address] Po, PT, DPT

## 2020-12-16 ENCOUNTER — HOSPITAL ENCOUNTER (OUTPATIENT)
Dept: PHYSICAL THERAPY | Age: 65
Discharge: HOME | End: 2020-12-16
Payer: COMMERCIAL

## 2020-12-16 PROCEDURE — 97110 THERAPEUTIC EXERCISES: CPT

## 2020-12-16 PROCEDURE — 97140 MANUAL THERAPY 1/> REGIONS: CPT

## 2020-12-16 NOTE — PROGRESS NOTES
Zully Sides  : 1955  Primary: Leonides Moody [facility]  Secondary:  [address] at [facility]  [address], [address], [place] [address]  [phone]   [phone]        OUTPATIENT PHYSICAL THERAPY: Daily Treatment Note 2020  ICD-10: Treatment Diagnosis: Pain in right leg (M79.604), Low back pain (M54.5)  Precautions/Allergies:   Latex   TREATMENT PLAN:  Effective Dates: 2020 TO 2021 (90 days). Frequency/Duration: 2 times a week for 90 Day(s) MEDICAL/REFERRING DIAGNOSIS:  Spondylosis without myelopathy or radiculopathy, lumbar region [M47.816]  Other intervertebral disc degeneration, lumbar region [M51.36]  Radiculopathy, lumbar region [M54.16]   DATE OF ONSET: chronic  REFERRING PHYSICIAN: Wolfgang Infante*  MD Orders: evaluate and treat  Return MD Appointment: --       Pre-treatment Symptoms/Complaints:  Pt reports pain is improving. Still lots of pain on the bottom of R foot.    Pain: Initial:     /10 Post Session:  /10   Medications Last Reviewed:  2020    Updated Objective Findings:  None Today   TREATMENT:   THERAPEUTIC EXERCISE: ( 30 minutes):     Date:   Date:     Activity/Exercise Parameters Parameters       Recumbent stepper 10 min level 1 10 min level 1 10 min level 1 10 min level 1 10 min level 2 10 min level 2   Supine knee to chest X 10 X 10 X 10 X 10     LTR X 10 X 10 X 10 X 10     Seated trunk flexion X 10 X 10 X 10 X 10 X 10  And x 10 to the side X 10   And x 10 to the side   Seated piriformis stretch X 10 X 10 X 10 X 10 X 10 X 10   Sit to stand  X 10 from chair  X 10 from chair X 10 from chair X 10 from chair   Seated forward reach stretch for mid back   X 10 X 10 X 10 X 10   Back extension over fulcrum     X 10    Step up     4 in step  X 10 R 4 in step  X 10 R   Stand L LE on step, R on ground, for balance      10 sec hold x 3   UT stretch  X 10                KT tape application  Star formation over R mid thoracic region Star formation over R mid thoracic region Star formation over R mid thoracic region         MANUAL THERAPY: (25 minutes):     Manual Therapy technique and location Date:  12/4 12/7 12/9 12/14 12/16    Parameters        - UT and levator passive stretch and stm  - passive cervical traction  - long axis traction  - gentle stm to R thoracolumbar paraspinals manually and using Edge tool - UT and levator passive stretch and stm  - passive cervical traction  - long axis traction  - gentle stm to R thoracolumbar paraspinals manually and using Edge tool  - point stimulation using handheld electric unit to R thoracic paraspinals  - PA mobilizations to mid thoracic to upper lumbar segments. - gentle stm to R thoracolumbar paraspinals manually and using Edge tool and cupping  - point stimulation using handheld electric unit to R thoracic paraspinals  - PA mobilizations to mid thoracic to upper lumbar segments. - gentle stm to R thoracolumbar paraspinals manually and using Edge tool and cupping  - point stimulation using handheld electric unit to R thoracic paraspinals  - PA mobilizations to mid thoracic to upper lumbar segments. - gentle stm to R thoracolumbar paraspinals manually and using Edge tool and cupping  - point stimulation using handheld electric unit to R thoracic paraspinals  - PA mobilizations to mid thoracic to upper lumbar segments. - gentle R foot mobilizations, mfr using edge tool to bottom of foot      MODALITIES ( minutes):    [employer] Portal  Treatment/Session Summary:    · Response to Treatment:  Decreased tone in R thoracolumbar paraspinals and decreased pain following manual therapy. Manual therapy to foot \"felt good\"  · Communication/Consultation:  None today  · Equipment provided today:  None today  · Recommendations/Intent for next treatment session: Next visit will focus on back and LE stretches.   Total Treatment Billable Duration:  30 minutes therex, 25 minutes manual therapy  PT Patient Time In/Time Out  Time In: 4649  Time Out: [address], PT, DPT

## 2020-12-21 ENCOUNTER — APPOINTMENT (OUTPATIENT)
Dept: PHYSICAL THERAPY | Age: 65
End: 2020-12-21
Payer: COMMERCIAL

## 2020-12-23 ENCOUNTER — APPOINTMENT (OUTPATIENT)
Dept: PHYSICAL THERAPY | Age: 65
End: 2020-12-23
Payer: COMMERCIAL

## 2020-12-28 ENCOUNTER — APPOINTMENT (OUTPATIENT)
Dept: PHYSICAL THERAPY | Age: 65
End: 2020-12-28
Payer: COMMERCIAL

## 2020-12-30 ENCOUNTER — APPOINTMENT (OUTPATIENT)
Dept: PHYSICAL THERAPY | Age: 65
End: 2020-12-30
Payer: COMMERCIAL

## 2021-01-04 ENCOUNTER — HOSPITAL ENCOUNTER (OUTPATIENT)
Dept: PHYSICAL THERAPY | Age: 66
Discharge: HOME | End: 2021-01-04
Payer: COMMERCIAL

## 2021-01-04 NOTE — PROGRESS NOTES
[facility]  : 1955  Primary: Joey Heart [facility]  Secondary:  [address]  at [facility]  [address], [address], [place] [address]  [phone]   [phone]      [facility] cancelled PT appointment today due to sickness.     Juan Barrett, PT, DPT

## 2021-01-06 ENCOUNTER — APPOINTMENT (OUTPATIENT)
Dept: PHYSICAL THERAPY | Age: 66
End: 2021-01-06
Payer: COMMERCIAL

## 2021-01-11 ENCOUNTER — HOSPITAL ENCOUNTER (OUTPATIENT)
Dept: PHYSICAL THERAPY | Age: 66
Discharge: HOME | End: 2021-01-11
Payer: COMMERCIAL

## 2021-01-11 PROCEDURE — 97110 THERAPEUTIC EXERCISES: CPT

## 2021-01-11 PROCEDURE — 97140 MANUAL THERAPY 1/> REGIONS: CPT

## 2021-01-11 NOTE — PROGRESS NOTES
Chema Peter  : 1955  Primary: Joanne Grande [facility]  Secondary:  [address] at [facility]  [address], [address], [place] [address]  [phone]   [phone]        OUTPATIENT PHYSICAL THERAPY: Daily Treatment Note 2021  ICD-10: Treatment Diagnosis: Pain in right leg (M79.604), Low back pain (M54.5)  Precautions/Allergies:   Latex   TREATMENT PLAN:  Effective Dates: 2020 TO 2021 (90 days). Frequency/Duration: 2 times a week for 90 Day(s) MEDICAL/REFERRING DIAGNOSIS:  Spondylosis without myelopathy or radiculopathy, lumbar region [M47.816]  Other intervertebral disc degeneration, lumbar region [M51.36]  Radiculopathy, lumbar region [M54.16]   DATE OF ONSET: chronic  REFERRING PHYSICIAN: Lazara Styles*  MD Orders: evaluate and treat  Return MD Appointment: --       Pre-treatment Symptoms/Complaints:  Pt reports issues with R LE pain and weakness, back is starting to feel better.    Pain: Initial:     /10 Post Session:  /10   Medications Last Reviewed:  2021    Updated Objective Findings:  None Today   TREATMENT:   THERAPEUTIC EXERCISE: ( 30 minutes):      12   Activity/Exercise        Recumbent stepper 10 min level 1 10 min level 1 10 min level 2 10 min level 2 10 min level 2   Supine knee to chest X 10 X 10      LTR X 10 X 10      Seated trunk flexion X 10 X 10 X 10  And x 10 to the side X 10   And x 10 to the side X 10   Seated piriformis stretch X 10 X 10 X 10 X 10 X 1-   Sit to stand  X 10 from chair X 10 from chair X 10 from chair X 10 from chair   Seated forward reach stretch for mid back X 10 X 10 X 10 X 10    Back extension over fulcrum   X 10     Step up   4 in step  X 10 R 4 in step  X 10 R 6 in step   X 10 R   Stand L LE on step, R on ground, for balance    10 sec hold x 3 10 sec hold x 3   UT stretch                KT tape application Star formation over R mid thoracic region Star formation over R mid thoracic region          MANUAL THERAPY: (15 minutes):     Manual Therapy technique and location 12/9 12/14 12/16 1/11           - gentle stm to R thoracolumbar paraspinals manually and using Edge tool and cupping  - point stimulation using handheld electric unit to R thoracic paraspinals  - PA mobilizations to mid thoracic to upper lumbar segments. - gentle stm to R thoracolumbar paraspinals manually and using Edge tool and cupping  - point stimulation using handheld electric unit to R thoracic paraspinals  - PA mobilizations to mid thoracic to upper lumbar segments. - gentle stm to R thoracolumbar paraspinals manually and using Edge tool and cupping  - point stimulation using handheld electric unit to R thoracic paraspinals  - PA mobilizations to mid thoracic to upper lumbar segments. - gentle R foot mobilizations, mfr using edge tool to bottom of foot  - gentle stm to R thoracolumbar paraspinals manually   - PA mobilizations to mid thoracic to upper lumbar segments.g Edge tool  - gentle R foot mobilizations, mfr using edge tool to bottom of foot   - R piriformis release     MODALITIES ( minutes):    [employer] Portal  Treatment/Session Summary:    · Response to Treatment:  Back movement getting better. Start to focus more on hip strength, piriformis. · Communication/Consultation:  None today  · Equipment provided today:  None today  · Recommendations/Intent for next treatment session: Next visit will focus on back and LE stretches.   Total Treatment Billable Duration:  30 minutes therex, 15 minutes manual therapy  PT Patient Time In/Time Out  Time In: 0945  Time Out: 1600    Briseida Hadley, PT, DPT

## 2021-01-13 ENCOUNTER — HOSPITAL ENCOUNTER (OUTPATIENT)
Dept: PHYSICAL THERAPY | Age: 66
Discharge: HOME | End: 2021-01-13
Payer: COMMERCIAL

## 2021-01-13 PROCEDURE — 97110 THERAPEUTIC EXERCISES: CPT

## 2021-01-13 NOTE — PROGRESS NOTES
Ceci Callahan  : 1955  Primary: Stefanie Haddad [facility]  Secondary:  [address] at [facility]  [address], [place] [address]  [phone]   [phone]        OUTPATIENT PHYSICAL THERAPY: Daily Treatment Note 2021  ICD-10: Treatment Diagnosis: Pain in right leg (M79.604), Low back pain (M54.5)  Precautions/Allergies:   Latex   TREATMENT PLAN:  Effective Dates: 2020 TO 2021 (90 days). Frequency/Duration: 2 times a week for 90 Day(s) MEDICAL/REFERRING DIAGNOSIS:  Spondylosis without myelopathy or radiculopathy, lumbar region [M47.816]  Other intervertebral disc degeneration, lumbar region [M51.36]  Radiculopathy, lumbar region [M54.16]   DATE OF ONSET: chronic  REFERRING PHYSICIAN: James Rizvi*  MD Orders: evaluate and treat  Return MD Appointment: --       Pre-treatment Symptoms/Complaints:  Pt reports issues with R LE pain and weakness, back is starting to feel better.    Pain: Initial:     /10 Post Session:  /10   Medications Last Reviewed:  2021    Updated Objective Findings:  None Today   TREATMENT:   THERAPEUTIC EXERCISE: ( 45 minutes):        Activity/Exercise         Recumbent stepper 10 min level 1 10 min level 1 10 min level 2 10 min level 2 10 min level 2 10 min level 2   Supine knee to chest X 10 X 10    X 10   LTR X 10 X 10    X 10   slr      X 10   clam      X 10   SL hip abd      X 10   Seated trunk flexion X 10 X 10 X 10  And x 10 to the side X 10   And x 10 to the side X 10 X 10   Seated piriformis stretch X 10 X 10 X 10 X 10 X 1- X 10 supine   Sit to stand  X 10 from chair X 10 from chair X 10 from chair X 10 from chair    Seated forward reach stretch for mid back X 10 X 10 X 10 X 10     Back extension over fulcrum   X 10      Step up   4 in step  X 10 R 4 in step  X 10 R 6 in step   X 10 R 6 in step   X 10 R   forward and lateral   Stand L LE on step, R on ground, for balance    10 sec hold x 3 10 sec hold x 3 10 sec hold x 3   UT stretch         marching      2 laps x 30 ft   walking      2 laps in healthyself   KT tape application Star formation over R mid thoracic region Star formation over R mid thoracic region           MANUAL THERAPY: ( minutes):     Manual Therapy technique and location 12/9 12/14 12/16 1/11           - gentle stm to R thoracolumbar paraspinals manually and using Edge tool and cupping  - point stimulation using handheld electric unit to R thoracic paraspinals  - PA mobilizations to mid thoracic to upper lumbar segments. - gentle stm to R thoracolumbar paraspinals manually and using Edge tool and cupping  - point stimulation using handheld electric unit to R thoracic paraspinals  - PA mobilizations to mid thoracic to upper lumbar segments. - gentle stm to R thoracolumbar paraspinals manually and using Edge tool and cupping  - point stimulation using handheld electric unit to R thoracic paraspinals  - PA mobilizations to mid thoracic to upper lumbar segments. - gentle R foot mobilizations, mfr using edge tool to bottom of foot  - gentle stm to R thoracolumbar paraspinals manually   - PA mobilizations to mid thoracic to upper lumbar segments.g Edge tool  - gentle R foot mobilizations, mfr using edge tool to bottom of foot   - R piriformis release     MODALITIES ( minutes):    [employer] Portal  Treatment/Session Summary:    · Response to Treatment:  Back movement getting better. Start to focus more on hip strength, piriformis. · Communication/Consultation:  None today  · Equipment provided today:  None today  · Recommendations/Intent for next treatment session: Next visit will focus on back and LE stretches.   Total Treatment Billable Duration:  30 minutes therex, 15 minutes manual therapy  PT Patient Time In/Time Out  Time In: 1515  Time Out: 1600    Ike Huang, PT, DPT

## 2021-01-18 ENCOUNTER — APPOINTMENT (OUTPATIENT)
Dept: PHYSICAL THERAPY | Age: 66
End: 2021-01-18
Payer: COMMERCIAL

## 2021-01-22 ENCOUNTER — HOSPITAL ENCOUNTER (OUTPATIENT)
Dept: PHYSICAL THERAPY | Age: 66
Discharge: HOME | End: 2021-01-22
Payer: COMMERCIAL

## 2021-01-22 PROCEDURE — 97110 THERAPEUTIC EXERCISES: CPT

## 2021-01-22 NOTE — PROGRESS NOTES
Darren Flores  : 1955  Primary: [facility]  Secondary:  [address]  at [facility]  [address], [address], [place] [address]  [phone]   [phone]        OUTPATIENT PHYSICAL THERAPY: Daily Treatment Note 2021  ICD-10: Treatment Diagnosis: Pain in right leg (M79.604), Low back pain (M54.5)  Precautions/Allergies:   Latex   TREATMENT PLAN:  Effective Dates: 2020 TO 2021 (90 days). Frequency/Duration: 2 times a week for 90 Day(s) MEDICAL/REFERRING DIAGNOSIS:  Spondylosis without myelopathy or radiculopathy, lumbar region [M47.816]  Other intervertebral disc degeneration, lumbar region [M51.36]  Radiculopathy, lumbar region [M54.16]   DATE OF ONSET: chronic  REFERRING PHYSICIAN: Anil Morin*  MD Orders: evaluate and treat  Return MD Appointment: --       Pre-treatment Symptoms/Complaints:  Pt reports continued tightness in back, leg.    Pain: Initial:     /10 Post Session:  /10   Medications Last Reviewed:  2021    Updated Objective Findings:  None Today   TREATMENT:   THERAPEUTIC EXERCISE: ( 40 minutes):        Activity/Exercise        Recumbent stepper 10 min level 2 10 min level 2 10 min level 2 10 min level 2 10 min level 2   Supine knee to chest    X 10 X 10   Supine piriformis stretch     X 10   LTR    X 10    slr    X 10    clam    X 10 X 10   bridge     X 10   Open book     X 10   SL hip abd    X 10    Seated trunk flexion X 10  And x 10 to the side X 10   And x 10 to the side X 10 X 10 X 10   Seated piriformis stretch X 10 X 10 X 1- X 10 supine    Sit to stand X 10 from chair X 10 from chair X 10 from chair     Seated forward reach stretch for mid back X 10 X 10      Back extension over fulcrum X 10    X 10   Step up 4 in step  X 10 R 4 in step  X 10 R 6 in step   X 10 R 6 in step   X 10 R   forward and lateral 4 in step x 10, no hand hold   Stand L LE on step, R on ground, for balance  10 sec hold x 3 10 sec hold x 3 10 sec hold x 3    UT stretch        marching    2 laps x 30 ft X 20 in II bars   walking    2 laps in healthyself    KT tape application        rhomberg stand     15 sec hold x 3    squat     In II bar x 10   Step overs     In II bar x 10   Side steps     In II bar x 10       MANUAL THERAPY: ( minutes):     Manual Therapy technique and location 12/9 12/14 12/16 1/11           - gentle stm to R thoracolumbar paraspinals manually and using Edge tool and cupping  - point stimulation using handheld electric unit to R thoracic paraspinals  - PA mobilizations to mid thoracic to upper lumbar segments. - gentle stm to R thoracolumbar paraspinals manually and using Edge tool and cupping  - point stimulation using handheld electric unit to R thoracic paraspinals  - PA mobilizations to mid thoracic to upper lumbar segments. - gentle stm to R thoracolumbar paraspinals manually and using Edge tool and cupping  - point stimulation using handheld electric unit to R thoracic paraspinals  - PA mobilizations to mid thoracic to upper lumbar segments. - gentle R foot mobilizations, mfr using edge tool to bottom of foot  - gentle stm to R thoracolumbar paraspinals manually   - PA mobilizations to mid thoracic to upper lumbar segments.g Edge tool  - gentle R foot mobilizations, mfr using edge tool to bottom of foot   - R piriformis release     MODALITIES ( minutes):    [employer] Portal  Treatment/Session Summary:    · Response to Treatment:  Increased focus on strengthening today. · Communication/Consultation:  None today  · Equipment provided today:  None today  · Recommendations/Intent for next treatment session: Next visit will focus on back and LE stretches.   Total Treatment Billable Duration:  40 minutes therex   PT Patient Time In/Time Out  Time In: 5970  Time Out: 3126    Alexy Marion, PT, DPT

## 2021-01-25 ENCOUNTER — HOSPITAL ENCOUNTER (OUTPATIENT)
Dept: PHYSICAL THERAPY | Age: 66
Discharge: HOME | End: 2021-01-25
Payer: COMMERCIAL

## 2021-01-25 PROCEDURE — 97110 THERAPEUTIC EXERCISES: CPT

## 2021-01-25 NOTE — PROGRESS NOTES
Erick Reyes  : 1955  Primary: [facility]  Secondary:  [address]  at [facility]  [address], [place] [address]  [phone]   [phone]        OUTPATIENT PHYSICAL THERAPY: Daily Treatment Note 2021  ICD-10: Treatment Diagnosis: Pain in right leg (M79.604), Low back pain (M54.5)  Precautions/Allergies:   Latex   TREATMENT PLAN:  Effective Dates: 2020 TO 2021 (90 days). Frequency/Duration: 2 times a week for 90 Day(s) MEDICAL/REFERRING DIAGNOSIS:  Spondylosis without myelopathy or radiculopathy, lumbar region [M47.816]  Other intervertebral disc degeneration, lumbar region [M51.36]  Radiculopathy, lumbar region [M54.16]   DATE OF ONSET: chronic  REFERRING PHYSICIAN: Kirsten Sinclair Sendmara*  MD Orders: evaluate and treat  Return MD Appointment: --       Pre-treatment Symptoms/Complaints:  Pt reports continued tightness in back, leg.    Pain: Initial:     /10 Post Session:  /10   Medications Last Reviewed:  2021    Updated Objective Findings:  None Today   TREATMENT:   THERAPEUTIC EXERCISE: ( 40 minutes):        Activity/Exercise         Recumbent stepper 10 min level 2 10 min level 2 10 min level 2 10 min level 2 10 min level 2 10 min level 2   Supine knee to chest    X 10 X 10 X 10   Supine piriformis stretch     X 10 X 10   LTR    X 10     slr    X 10  X 10   clam    X 10 X 10 X 10   bridge     X 10 X 10   Open book     X 10 X 10   SL hip abd    X 10     Seated trunk flexion X 10  And x 10 to the side X 10   And x 10 to the side X 10 X 10 X 10 X 10   Seated piriformis stretch X 10 X 10 X 1- X 10 supine     Sit to stand X 10 from chair X 10 from chair X 10 from chair      Seated forward reach stretch for mid back X 10 X 10       Back extension over fulcrum X 10    X 10 X 10   Step up 4 in step  X 10 R 4 in step  X 10 R 6 in step   X 10 R 6 in step   X 10 R   forward and lateral 4 in step x 10, no hand hold 4 in step x 10 forward and lateral, no hand hold   Stand L LE on step, R on ground, for balance  10 sec hold x 3 10 sec hold x 3 10 sec hold x 3     UT stretch         marching    2 laps x 30 ft X 20 in II bars X 20 in II bars   walking    2 laps in healthyself     KT tape application         rhomberg stand     15 sec hold x 3  15 sec hold x 3   squat     In II bar x 10    Step overs     In II bar x 10 In II bar x 10   Side steps     In II bar x 10 In II bar x 10       MANUAL THERAPY: ( minutes):     Manual Therapy technique and location 12/9 12/14 12/16 1/11           - gentle stm to R thoracolumbar paraspinals manually and using Edge tool and cupping  - point stimulation using handheld electric unit to R thoracic paraspinals  - PA mobilizations to mid thoracic to upper lumbar segments. - gentle stm to R thoracolumbar paraspinals manually and using Edge tool and cupping  - point stimulation using handheld electric unit to R thoracic paraspinals  - PA mobilizations to mid thoracic to upper lumbar segments. - gentle stm to R thoracolumbar paraspinals manually and using Edge tool and cupping  - point stimulation using handheld electric unit to R thoracic paraspinals  - PA mobilizations to mid thoracic to upper lumbar segments. - gentle R foot mobilizations, mfr using edge tool to bottom of foot  - gentle stm to R thoracolumbar paraspinals manually   - PA mobilizations to mid thoracic to upper lumbar segments.g Edge tool  - gentle R foot mobilizations, mfr using edge tool to bottom of foot   - R piriformis release     MODALITIES ( minutes):    [employer] Portal  Treatment/Session Summary:    · Response to Treatment:  Increased focus on strengthening today. Increased fatigue. · Communication/Consultation:  None today  · Equipment provided today:  None today  · Recommendations/Intent for next treatment session: Next visit will focus on back and LE stretches.   Total Treatment Billable Duration:  40 minutes therex   PT Patient Time In/Time Out  Time In: 1515  Time Out: 1600    Tila Elena, PT, DPT

## 2021-01-25 NOTE — THERAPY RECERTIFICATION
Maryan Beauchamp : 1955 Payor: Jenna Santana / Plan: Lety Cobb / Product Type: Commerical /  [address]  [facility] [address], [place] [address] [phone]   [phone] OUTPATIENT PHYSICAL THERAPY:Recertification  ICD-10: Treatment Diagnosis: Pain in right leg (M79.604), Low back pain (M54.5) Precautions/Allergies:  
Latex TREATMENT PLAN: 
Effective Dates: 21 to 3/25/21. Frequency/Duration: 1-2 times a week for 90 Day(s) MEDICAL/REFERRING DIAGNOSIS: 
Spondylosis without myelopathy or radiculopathy, lumbar region [M47.816] Other intervertebral disc degeneration, lumbar region [M51.36] Radiculopathy, lumbar region [M54.16] DATE OF ONSET: chronic REFERRING PHYSICIAN: Lilliana LOYD MD Orders: evaluate and treat Return MD Appointment: --  
 
Progress Summary:  Ms. Ronda Morin has attended 9 physical therapy treatments for back, neck, leg pain from a fall. She has reported a resolution of pain in the neck, a reduction of pain in her back. She continues to report pain radiating down right LE and into R foot and demonstrates limitations in back and R hip functional movements and weakness in R hip and LE. Pt will continue to benefit from skilled physical therapy. PROBLEM LIST (Impacting functional limitations): 1. Decreased ADL/Functional Activities 2. Increased Pain 3. Decreased Flexibility/Joint Mobility 4. Decreased [place] with Home Exercise Program INTERVENTIONS PLANNED: (Treatment may consist of any combination of the following) 1. Home Exercise Program (HEP) 2. Manual Therapy including soft tissue and spinal manipulation and mobilization; and dry needling 3. Neuromuscular Re-education/Strengthening 4. Range of Motion (ROM) 5. Therapeutic Activites 6. Therapeutic Exercise/Strengthening 7. Modalities including heat/cold application, electrical stimulation, vasopneumatic compression, ultrasound GOALS: (Goals have been discussed and agreed upon with patient.) Short-Term Functional Goals: Time Frame: 5 weeks 1. Pt will demonstrate independence with > or = 2 exercises in HEP. met 2. Pt will demonstrate 50% improvement in neck and trunk movements. ongoing Discharge Goals: Time Frame: 10 weeks 1. Pt will demonstrate independence with > or = 4 exercises in HEP. met 2. Pt will demonstrate functional neck and trunk movements. ongoing OUTCOME MEASURE:  
Tool Used: Modified Oswestry Low Back Pain Questionnaire Score:  Initial: 19/50  Most Recent: X/50 (Date: -- ) Interpretation of Score: Each section is scored on a 0-5 scale, 5 representing the greatest disability. The scores of each section are added together for a total score of 50. MEDICAL NECESSITY:  
· Skilled intervention continues to be required due to decreased function. REASON FOR SERVICES/OTHER COMMENTS: 
· Patient continues to require skilled intervention due to decreased function. Total Duration: 
  
 
Rehabilitation Potential For Stated Goals: Good Regarding Andrei Ace's therapy, I certify that the treatment plan above will be carried out by a therapist or under their direction. Thank you for this referral, Tatiana Moffett, PT, DPT HISTORY:  
History of Injury/Illness (Reason for Referral): Back surgery last year. 3 months ago she fell onto her L side at [facility]. Went to ER, had scans no fractures. Now has neck, back, R LE pain into R foot. Was given oral steroids that helps with pain. Past Medical History/Comorbidities: Ms. Alyssa Dominguez  has a past medical history of DVT (deep venous thrombosis) ([facility] [address] (~2009), Hypertension, and Nausea & vomiting. Ms. Alyssa Dominguez  has a past surgical history that includes hx open cholecystectomy (1995); hx hysterectomy (1991); and neurological procedure unlisted (07/2019). Social History/Living Environment:  
  house Prior Level of Function/Work/Activity: 
Works part time Dominant Side:  
      [facility] H2 Model Falls Risk Assessment Risk Factors: 
     No Risk Factors Identified Ability to Rise from Chair: 
     (0)  Ability to rise in a single movement Falls Prevention Plan: No modifications necessary Total: (5 or greater = High Risk): 0  
©2010 [facility] [place] [address] [place] [address] States Patent #[id number]. Federal Law prohibits the replication, distribution or use without written permission from [facility] [employer] Current Medications:   
  
Current Outpatient Medications:  
  atorvastatin (LIPITOR) 20 mg tablet, Take 1 Tab by mouth nightly., Disp: 30 Tab, Rfl: 5 
  fexofenadine (ALLEGRA) 180 mg tablet, Take 360 mg by mouth daily. , Disp: , Rfl:  
  CALCIUM CITRATE-VITAMIN D3 PO, Take  by mouth., Disp: , Rfl:  
  cyanocobalamin (Vitamin B-12) 100 mcg tablet, Take 100 mcg by mouth daily. , Disp: , Rfl:  
  omeprazole (PRILOSEC) 20 mg capsule, Take 1 Cap by mouth daily. , Disp: 90 Cap, Rfl: 4 
  PARoxetine (PAXIL) 20 mg tablet, TAKE 1 TABLET BY MOUTH EVERY DAY, Disp: , Rfl:  
  amLODIPine (NORVASC) 10 mg tablet, Take 1 Tab by mouth daily. , Disp: 90 Tab, Rfl: 3 
  ferrous sulfate 325 mg (65 mg iron) tablet, Take 1 Tab by mouth Daily (before breakfast). , Disp: 90 Tab, Rfl: 3 
  triamcinolone acetonide (KENALOG) 0.025 % topical cream, Apply  to affected area two (2) times a day. use thin layer (Patient taking differently: Apply  to affected area two (2) times a day.  use thin layer  Prn), Disp: 80 g, Rfl: 0 
   OTHER,NON-FORMULARY,, Immuno marlene, Disp: , Rfl:  
  olopatadine (Pazeo) 0.7 % drop, Apply 1 Drop to eye daily. , Disp: , Rfl:  
  allergy injection, , Disp: , Rfl:  
  EPINEPHrine (EPIPEN) 0.3 mg/0.3 mL injection, USE AS DIRECTED, Disp: , Rfl:  
  olmesartan (Benicar) 40 mg tablet, Take 1 Tab by mouth daily. , Disp: 90 Tab, Rfl: 02 
  hydroCHLOROthiazide (HYDRODIURIL) 25 mg tablet, Take 1 Tab by mouth daily. Indications: high blood pressure, Disp: 90 Tab, Rfl: 1 
  multivit-min/FA/lycopen/lutein (SPECTRAVITE ADULT 50 PLUS PO), Take  by mouth., Disp: , Rfl:  
  levocetirizine (XYZAL) 5 mg tablet, Take  by mouth., Disp: , Rfl:  
  cholecalciferol, vitamin D3, (VITAMIN D3) 2,000 unit tab, Take  by mouth daily. , Disp: , Rfl:  
  albuterol (PROVENTIL HFA, VENTOLIN HFA, PROAIR HFA) 90 mcg/actuation inhaler, Take 2 Puffs by inhalation every four (4) hours as needed for Wheezing., Disp: , Rfl:  
  fluticasone (FLONASE) 50 mcg/actuation nasal spray, 2 Sprays by Nasal route daily. , Disp: , Rfl:  
  aspirin delayed-release 81 mg tablet, Take 81 mg by mouth daily. Indications: prevention of transient ischemic attack, Disp: , Rfl:   
Date Last Reviewed:  1/25/2021 Number of Personal Factors/Comorbidities that affect the Plan of Care: 0: LOW COMPLEXITY EXAMINATION: from Initial Evaluation unless otherwise noted Observation: 
     Standing- elevated R shoulder complex Gait- decreased stance time on R Strength: Muscle/Movement Strength at Eval Reassessment Date: 1/25 Hip flexion L 5/5 
R 3-/5 L 5/5 
R 3+/5 Hip abduction L 5/5 
R 3-/5 L 5/5 
R 3/5 Shoulder elevation L 5/5 
R 3-/5 L 5/5 
R 4/5 Range of Motion: Movement/Joint ROM at eval Reassessment Date: 1/25 Cervical flexion 50% of normal wfl Cervical rotation 50% of normal B wfk Cervical extension 25% of normal wfl Trunk flexion 25% of normal 25% of normal  
Trunk extension 15% of normal 25% of normal  
 Trunk rotation R 25% of normal 
L 15% of normal R 25% of normal 
L 25% of normal  
 
Palpation: 
Increased tone and tenderness in R UT, R cervical paraspinals, R lumbar paraspinals. Body Structures Involved: 1. Nerves 2. Bones 3. Joints 4. Muscles Body Functions Affected: 1. Sensory/Pain 2. Neuromusculoskeletal 
3. Movement Related Activities and Participation Affected: 1. General Tasks and Demands 2. Mobility 3. Community, Social and [address] [place] [address] Appointments Date Time Provider Socorro Geraldine 1/25/2021  3:15 PM Nomi Fontenot, PT, DPT [facility]  
1/29/2021  8:30 AM Milton Leon PT, DPT [facility]  
2/1/2021 11:00 AM Maurice De La O MD [facility]  
2/1/2021  3:15 PM Lizette Márquez PT, DPT [facility]  
2/5/2021  8:45 AM Milton Leon PT, DPT [facility]  
2/8/2021  3:15 PM Milton Leon PT, DPT [facility]  
2/12/2021  8:45 AM Milton Leon PT, DPT [facility]  
2/15/2021  3:15 PM Milton Leon PT, DPT [facility]  
2/19/2021  8:45 AM Milton Leon PT, DPT [facility]  
2/22/2021  3:15 PM Milton Leon PT, DPT [facility]  
2/26/2021  8:45 AM Nomi Perez, PT, DPT [facility] Faye Ramos, PT, DPT

## 2021-01-29 ENCOUNTER — HOSPITAL ENCOUNTER (OUTPATIENT)
Dept: PHYSICAL THERAPY | Age: 66
Discharge: HOME | End: 2021-01-29
Payer: COMMERCIAL

## 2021-01-29 PROCEDURE — 97110 THERAPEUTIC EXERCISES: CPT

## 2021-01-29 NOTE — PROGRESS NOTES
Lance Evangelista  : 1955  Primary: [facility]  Secondary:  [address]  at [facility]  [address], [place] [address]  [phone]   [phone]        OUTPATIENT PHYSICAL THERAPY: Daily Treatment Note 2021  ICD-10: Treatment Diagnosis: Pain in right leg (M79.604), Low back pain (M54.5)  Precautions/Allergies:   Latex   TREATMENT PLAN:  Effective Dates: 2020 TO 2021 (90 days). Frequency/Duration: 2 times a week for 90 Day(s) MEDICAL/REFERRING DIAGNOSIS:  Spondylosis without myelopathy or radiculopathy, lumbar region [M47.816]  Other intervertebral disc degeneration, lumbar region [M51.36]  Radiculopathy, lumbar region [M54.16]   DATE OF ONSET: chronic  REFERRING PHYSICIAN: Ishaan Higgins*  MD Orders: evaluate and treat  Return MD Appointment: --       Pre-treatment Symptoms/Complaints:  Pt reports continued tightness in back, leg.    Pain: Initial:     /10 Post Session:  /10   Medications Last Reviewed:  2021    Updated Objective Findings:  None Today   TREATMENT:   THERAPEUTIC EXERCISE: ( 45 minutes):        Activity/Exercise         Recumbent stepper 10 min level 2 10 min level 2 10 min level 2 10 min level 2 10 min level 2 10 min level 2   Supine knee to chest   X 10 X 10 X 10 X 10   Supine piriformis stretch    X 10 X 10 X 10   LTR   X 10   X 10   slr   X 10  X 10 X 10   clam   X 10 X 10 X 10 X 10   bridge    X 10 X 10 X 10   Open book    X 10 X 10    SL hip abd   X 10      Seated trunk flexion X 10   And x 10 to the side X 10 X 10 X 10 X 10    Seated piriformis stretch X 10 X 1- X 10 supine      Sit to stand X 10 from chair X 10 from chair       Seated forward reach stretch for mid back X 10        Back extension over fulcrum    X 10 X 10 X 10   Step up 4 in step  X 10 R 6 in step   X 10 R 6 in step   X 10 R   forward and lateral 4 in step x 10, no hand hold 4 in step x 10 forward and lateral, no hand hold 4 in step x 10 forward and lateral, no hand hold   Stand L LE on step, R on ground, for balance 10 sec hold x 3 10 sec hold x 3 10 sec hold x 3      UT stretch         marching   2 laps x 30 ft X 20 in II bars X 20 in II bars X 20 in II bars  2 laps x 40 ft   walking   2 laps in healthyself      KT tape application         rhomberg stand    15 sec hold x 3  15 sec hold x 3 15 sec hold x 3   squat    In II bar x 10     Step overs    In II bar x 10 In II bar x 10 In II bar x 10   Side steps    In II bar x 10 In II bar x 10 In II bar x 10       MANUAL THERAPY: ( minutes):     Manual Therapy technique and location 12/9 12/14 12/16 1/11           - gentle stm to R thoracolumbar paraspinals manually and using Edge tool and cupping  - point stimulation using handheld electric unit to R thoracic paraspinals  - PA mobilizations to mid thoracic to upper lumbar segments. - gentle stm to R thoracolumbar paraspinals manually and using Edge tool and cupping  - point stimulation using handheld electric unit to R thoracic paraspinals  - PA mobilizations to mid thoracic to upper lumbar segments. - gentle stm to R thoracolumbar paraspinals manually and using Edge tool and cupping  - point stimulation using handheld electric unit to R thoracic paraspinals  - PA mobilizations to mid thoracic to upper lumbar segments. - gentle R foot mobilizations, mfr using edge tool to bottom of foot  - gentle stm to R thoracolumbar paraspinals manually   - PA mobilizations to mid thoracic to upper lumbar segments.g Edge tool  - gentle R foot mobilizations, mfr using edge tool to bottom of foot   - R piriformis release     MODALITIES ( minutes):    [employer] Portal  Treatment/Session Summary:    · Response to Treatment:  Increased focus on strengthening today. Increased fatigue.    · Communication/Consultation:  None today  · Equipment provided today:  None today  · Recommendations/Intent for next treatment session: Next visit will focus on back and LE stretches.   Total Treatment Billable Duration:  45 minutes therex   PT Patient Time In/Time Out  Time In: 0835  Time Out: 0920    Beata Candelario, PT, DPT

## 2021-02-01 ENCOUNTER — HOSPITAL ENCOUNTER (OUTPATIENT)
Dept: PHYSICAL THERAPY | Age: 66
Discharge: HOME | End: 2021-02-01
Payer: COMMERCIAL

## 2021-02-01 PROCEDURE — 97110 THERAPEUTIC EXERCISES: CPT

## 2021-02-01 NOTE — PROGRESS NOTES
Summer Garcia  : 1955  Primary: [facility]  Secondary:  [address]  at [facility]  [address], [place] [address]  [phone]   [phone]        OUTPATIENT PHYSICAL THERAPY: Daily Treatment Note 2021  ICD-10: Treatment Diagnosis: Pain in right leg (M79.604), Low back pain (M54.5)  Precautions/Allergies:   Latex   TREATMENT PLAN:  Effective Dates: 21 to 3/25/21. 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.816]  Other intervertebral disc degeneration, lumbar region [M51.36]  Radiculopathy, lumbar region [M54.16]   DATE OF ONSET: chronic  REFERRING PHYSICIAN: Fide Boyle*  MD Orders: evaluate and treat  Return MD Appointment: --       Pre-treatment Symptoms/Complaints:  Pt reports continued tightness in back, leg.    Pain: Initial:     /10 Post Session:  /10   Medications Last Reviewed:  2021    Updated Objective Findings:  None Today   TREATMENT:   THERAPEUTIC EXERCISE: ( 45 minutes):        Activity/Exercise        Recumbent stepper 10 min level 2 10 min level 2 10 min level 2 10 min level 2 10 min level 2   Supine knee to chest X 10 X 10 X 10 X 10 X 10   Supine piriformis stretch  X 10 X 10 X 10 X 10   LTR X 10   X 10 X 10   slr X 10  X 10 X 10 2 X 10   clam X 10 X 10 X 10 X 10 X 10   bridge  X 10 X 10 X 10 X 10   Open book  X 10 X 10  X 10   SL hip abd X 10       Seated trunk flexion X 10 X 10 X 10     Seated piriformis stretch X 10 supine       Sit to stand        Seated forward reach stretch for mid back        Back extension over fulcrum  X 10 X 10 X 10 X 10   Step up 6 in step   X 10 R   forward and lateral 4 in step x 10, no hand hold 4 in step x 10 forward and lateral, no hand hold 4 in step x 10 forward and lateral, no hand hold 4 in step x 10 forward and lateral, no hand hold   Stand L LE on step, R on ground, for balance 10 sec hold x 3       UT stretch        marching 2 laps x 30 ft X 20 in II bars X 20 in II bars X 20 in II bars  2 laps x 40 ft 2 laps x 40 ft   walking 2 laps in healthyself       KT tape application        rhomberg stand  15 sec hold x 3  15 sec hold x 3 15 sec hold x 3 15 sec hold x 3   squat  In II bar x 10      Step overs  In II bar x 10 In II bar x 10 In II bar x 10 In II bar x 10   Side steps  In II bar x 10 In II bar x 10 In II bar x 10 In II bar x 10   Standing hip abd     X 10 in II bars   Heel raise     X 10       MANUAL THERAPY: ( minutes):     Manual Therapy technique and location 12/9 12/14 12/16 1/11           - gentle stm to R thoracolumbar paraspinals manually and using Edge tool and cupping  - point stimulation using handheld electric unit to R thoracic paraspinals  - PA mobilizations to mid thoracic to upper lumbar segments. - gentle stm to R thoracolumbar paraspinals manually and using Edge tool and cupping  - point stimulation using handheld electric unit to R thoracic paraspinals  - PA mobilizations to mid thoracic to upper lumbar segments. - gentle stm to R thoracolumbar paraspinals manually and using Edge tool and cupping  - point stimulation using handheld electric unit to R thoracic paraspinals  - PA mobilizations to mid thoracic to upper lumbar segments. - gentle R foot mobilizations, mfr using edge tool to bottom of foot  - gentle stm to R thoracolumbar paraspinals manually   - PA mobilizations to mid thoracic to upper lumbar segments.g Edge tool  - gentle R foot mobilizations, mfr using edge tool to bottom of foot   - R piriformis release     MODALITIES ( minutes):    [employer] Portal  Treatment/Session Summary:    · Response to Treatment: Increased fatigue with activities.    · Communication/Consultation:  None today  · Equipment provided today:  None today  · Recommendations/Intent for next treatment session: Next visit will focus on back and LE stretches.   Total Treatment Billable Duration:  45 minutes therex   PT Patient Time In/Time Out  Time In: 1515  Time Out: 1600    Allen Alegria PT, DPT

## 2021-02-05 ENCOUNTER — HOSPITAL ENCOUNTER (OUTPATIENT)
Dept: PHYSICAL THERAPY | Age: 66
Discharge: HOME | End: 2021-02-05
Payer: COMMERCIAL

## 2021-02-05 PROCEDURE — 97110 THERAPEUTIC EXERCISES: CPT

## 2021-02-05 NOTE — PROGRESS NOTES
Ceci Callahan  : 1955  Primary: [facility]  Secondary:  [address]  at [facility]  [address], [address], [place] [address]  [phone]   [phone]        OUTPATIENT PHYSICAL THERAPY: Daily Treatment Note 2021  ICD-10: Treatment Diagnosis: Pain in right leg (M79.604), Low back pain (M54.5)  Precautions/Allergies:   Latex   TREATMENT PLAN:  Effective Dates: 21 to 3/25/21. 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.816]  Other intervertebral disc degeneration, lumbar region [M51.36]  Radiculopathy, lumbar region [M54.16]   DATE OF ONSET: chronic  REFERRING PHYSICIAN: James Rizvi*  MD Orders: evaluate and treat  Return MD Appointment: --       Pre-treatment Symptoms/Complaints:  Pt reports improvements in activities tolerated.    Pain: Initial:     /10 Post Session:  /10   Medications Last Reviewed:  2021    Updated Objective Findings:  None Today   TREATMENT:   THERAPEUTIC EXERCISE: ( 45 minutes):        Activity/Exercise         Recumbent stepper 10 min level 2 10 min level 2 10 min level 2 10 min level 2 10 min level 2 10 min level 2   Supine knee to chest X 10 X 10 X 10 X 10 X 10 X 10   Supine piriformis stretch  X 10 X 10 X 10 X 10 X 10   LTR X 10   X 10 X 10 X 10   slr X 10  X 10 X 10 2 X 10 2 x 10   clam X 10 X 10 X 10 X 10 X 10 X 10   bridge  X 10 X 10 X 10 X 10 X 10   Open book  X 10 X 10  X 10 X 10   SL hip abd X 10        Seated trunk flexion X 10 X 10 X 10      Seated piriformis stretch X 10 supine        Sit to stand         Seated forward reach stretch for mid back         Back extension over fulcrum  X 10 X 10 X 10 X 10 X 10   Step up 6 in step   X 10 R   forward and lateral 4 in step x 10, no hand hold 4 in step x 10 forward and lateral, no hand hold 4 in step x 10 forward and lateral, no hand hold 4 in step x 10 forward and lateral, no hand hold 4 in step x 15 forward and lateral, no hand hold   Stand L LE on step, R on ground, for balance 10 sec hold x 3        UT stretch         marching 2 laps x 30 ft X 20 in II bars X 20 in II bars X 20 in II bars  2 laps x 40 ft 2 laps x 40 ft 4 laps x 40 ft   walking 2 laps in healthyself        KT tape application         rhomberg stand  15 sec hold x 3  15 sec hold x 3 15 sec hold x 3 15 sec hold x 3 15 sec hold x 3   squat  In II bar x 10       Step overs  In II bar x 10 In II bar x 10 In II bar x 10 In II bar x 10    Side steps  In II bar x 10 In II bar x 10 In II bar x 10 In II bar x 10 4 laps x 40 ft   Standing hip abd     X 10 in II bars X 10 in II bars   Heel raise     X 10 X 10       MANUAL THERAPY: ( minutes):     Manual Therapy technique and location 12/9 12/14 12/16 1/11           - gentle stm to R thoracolumbar paraspinals manually and using Edge tool and cupping  - point stimulation using handheld electric unit to R thoracic paraspinals  - PA mobilizations to mid thoracic to upper lumbar segments. - gentle stm to R thoracolumbar paraspinals manually and using Edge tool and cupping  - point stimulation using handheld electric unit to R thoracic paraspinals  - PA mobilizations to mid thoracic to upper lumbar segments. - gentle stm to R thoracolumbar paraspinals manually and using Edge tool and cupping  - point stimulation using handheld electric unit to R thoracic paraspinals  - PA mobilizations to mid thoracic to upper lumbar segments.    - gentle R foot mobilizations, mfr using edge tool to bottom of foot  - gentle stm to R thoracolumbar paraspinals manually   - PA mobilizations to mid thoracic to upper lumbar segments.g Edge tool  - gentle R foot mobilizations, mfr using edge tool to bottom of foot   - R piriformis release     MODALITIES ( minutes):    [employer] Portal  Treatment/Session Summary:    · Response to Treatment: Increased fatigue with activities, but tolerating better than in past.   · Communication/Consultation:  None today  · Equipment provided today:  None today  · Recommendations/Intent for next treatment session: Next visit will focus on back and LE stretches.   Total Treatment Billable Duration:  45 minutes therex   PT Patient Time In/Time Out  Time In: 0845  Time Out: 0930    Abdias Pena, PT, DPT

## 2021-02-08 ENCOUNTER — HOSPITAL ENCOUNTER (OUTPATIENT)
Dept: PHYSICAL THERAPY | Age: 66
Discharge: HOME | End: 2021-02-08
Payer: COMMERCIAL

## 2021-02-08 PROCEDURE — 97110 THERAPEUTIC EXERCISES: CPT

## 2021-02-08 NOTE — PROGRESS NOTES
Maryan Nito  : 1955  Primary: [facility]  Secondary:  [address]  at [facility]  [address], [place] [address]  [phone]   [phone]        OUTPATIENT PHYSICAL THERAPY: Daily Treatment Note 2021  ICD-10: Treatment Diagnosis: Pain in right leg (M79.604), Low back pain (M54.5)  Precautions/Allergies:   Latex   TREATMENT PLAN:  Effective Dates: 21 to 3/25/21. 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.816]  Other intervertebral disc degeneration, lumbar region [M51.36]  Radiculopathy, lumbar region [M54.16]   DATE OF ONSET: chronic  REFERRING PHYSICIAN: Stephanie Novoa*  MD Orders: evaluate and treat  Return MD Appointment: --       Pre-treatment Symptoms/Complaints:  Pt reports improvements in activities tolerated.    Pain: Initial:     /10 Post Session:  /10   Medications Last Reviewed:  2021    Updated Objective Findings:  None Today   TREATMENT:   THERAPEUTIC EXERCISE: ( 55 minutes):     1/25 1/29 2/1 2/5 2/10   Activity/Exercise        Recumbent stepper 10 min level 2 10 min level 2 10 min level 2 10 min level 2 10 min level 2   Supine knee to chest X 10 X 10 X 10 X 10 X 10   Supine piriformis stretch X 10 X 10 X 10 X 10 X 10   LTR  X 10 X 10 X 10    slr X 10 X 10 2 X 10 2 x 10 2 x 10   clam X 10 X 10 X 10 X 10 2 x 10, YTB   bridge X 10 X 10 X 10 X 10 2 x 10   Open book X 10  X 10 X 10 X 10   SL hip abd        Seated trunk flexion X 10       Seated piriformis stretch        Sit to stand     X 10   Seated forward reach stretch for mid back        Back extension over fulcrum X 10 X 10 X 10 X 10 X 10   Step up 4 in step x 10 forward and lateral, no hand hold 4 in step x 10 forward and lateral, no hand hold 4 in step x 10 forward and lateral, no hand hold 4 in step x 15 forward and lateral, no hand hold 4 in step x 15 forward and lateral, no hand hold   Stand L LE on step, R on ground, for balance        UT stretch        marching X 20 in II bars X 20 in II bars  2 laps x 40 ft 2 laps x 40 ft 4 laps x 40 ft 4 laps x 40 ft   walking        KT tape application        rhomberg stand 15 sec hold x 3 15 sec hold x 3 15 sec hold x 3 15 sec hold x 3 15 sec hold x 3   squat        Step overs In II bar x 10 In II bar x 10 In II bar x 10  In II bar x 10   Side steps In II bar x 10 In II bar x 10 In II bar x 10 4 laps x 40 ft 4 laps x 40 ft   Standing hip abd   X 10 in II bars X 10 in II bars X 10 in II bars   Heel raise   X 10 X 10    SLS on foam     5 sec hold x 10   lunges     In II bars x 10       MANUAL THERAPY: ( minutes):     Manual Therapy technique and location 12/9 12/14 12/16 1/11           - gentle stm to R thoracolumbar paraspinals manually and using Edge tool and cupping  - point stimulation using handheld electric unit to R thoracic paraspinals  - PA mobilizations to mid thoracic to upper lumbar segments. - gentle stm to R thoracolumbar paraspinals manually and using Edge tool and cupping  - point stimulation using handheld electric unit to R thoracic paraspinals  - PA mobilizations to mid thoracic to upper lumbar segments. - gentle stm to R thoracolumbar paraspinals manually and using Edge tool and cupping  - point stimulation using handheld electric unit to R thoracic paraspinals  - PA mobilizations to mid thoracic to upper lumbar segments.    - gentle R foot mobilizations, mfr using edge tool to bottom of foot  - gentle stm to R thoracolumbar paraspinals manually   - PA mobilizations to mid thoracic to upper lumbar segments.g Edge tool  - gentle R foot mobilizations, mfr using edge tool to bottom of foot   - R piriformis release     MODALITIES ( minutes):    [employer] Portal  Treatment/Session Summary:    · Response to Treatment: Increased fatigue with activities, but tolerating better than in past. · Communication/Consultation:  None today  · Equipment provided today:  None today  · Recommendations/Intent for next treatment session: Next visit will focus on back and LE stretches.   Total Treatment Billable Duration:  55 minutes therex   PT Patient Time In/Time Out  Time In: 3141  Time Out: 6468 [facility] Po, PT, DPT

## 2021-02-12 ENCOUNTER — HOSPITAL ENCOUNTER (OUTPATIENT)
Dept: PHYSICAL THERAPY | Age: 66
Discharge: HOME | End: 2021-02-12
Payer: COMMERCIAL

## 2021-02-12 PROCEDURE — 97110 THERAPEUTIC EXERCISES: CPT

## 2021-02-12 NOTE — PROGRESS NOTES
Alivia Ramírez  : 1955  Primary: [facility]  Secondary:  [address]  at [facility]  [address], [address], [place] [address]  [phone]   [phone]        OUTPATIENT PHYSICAL THERAPY: Daily Treatment Note 2021  ICD-10: Treatment Diagnosis: Pain in right leg (M79.604), Low back pain (M54.5)  Precautions/Allergies:   Latex   TREATMENT PLAN:  Effective Dates: 21 to 3/25/21. 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.816]  Other intervertebral disc degeneration, lumbar region [M51.36]  Radiculopathy, lumbar region [M54.16]   DATE OF ONSET: chronic  REFERRING PHYSICIAN: Karina Maria*  MD Orders: evaluate and treat  Return MD Appointment: --       Pre-treatment Symptoms/Complaints:  Pt reports improvements in activities tolerated. She reports her  has noticed that she is walking better than she had before. Today feeling sore.    Pain: Initial:     /10 Post Session:  /10   Medications Last Reviewed:  2021    Updated Objective Findings:  None Today   TREATMENT:   THERAPEUTIC EXERCISE: ( 45 minutes):      2/5 2/10 2/12   Activity/Exercise       Recumbent stepper 10 min level 2 10 min level 2 10 min level 2 10 min level 2   Supine knee to chest X 10 X 10 X 10 X 10   Supine piriformis stretch X 10 X 10 X 10 X 10   LTR X 10 X 10     slr 2 X 10 2 x 10 2 x 10 2 x 10   clam X 10 X 10 2 x 10, YTB 2 x 10   bridge X 10 X 10 2 x 10 2 x 10   Open book X 10 X 10 X 10 X 10   SL hip abd       Seated trunk flexion       Seated piriformis stretch       Sit to stand   X 10 X 10   Seated forward reach stretch for mid back       Back extension over fulcrum X 10 X 10 X 10    Step up 4 in step x 10 forward and lateral, no hand hold 4 in step x 15 forward and lateral, no hand hold 4 in step x 15 forward and lateral, no hand hold 4 in step x 15 forward and lateral, no hand hold   Stand L LE on step, R on ground, for balance       UT stretch       marching 2 laps x 40 ft 4 laps x 40 ft 4 laps x 40 ft 4 laps x 40 ft   walking       KT tape application       rhomberg stand 15 sec hold x 3 15 sec hold x 3 15 sec hold x 3    squat       Step overs In II bar x 10  In II bar x 10    Side steps In II bar x 10 4 laps x 40 ft 4 laps x 40 ft 4 laps x 40 ft   Standing hip abd X 10 in II bars X 10 in II bars X 10 in II bars X 10 in II bars   Heel raise X 10 X 10     SLS on foam   5 sec hold x 10    lunges   In II bars x 10    Walk on line    1 lap x 40 ft   Stand on R, touch L forwards, lateral, back    X 10              MANUAL THERAPY: ( minutes):     Manual Therapy technique and location 12/9 12/14 12/16 1/11           - gentle stm to R thoracolumbar paraspinals manually and using Edge tool and cupping  - point stimulation using handheld electric unit to R thoracic paraspinals  - PA mobilizations to mid thoracic to upper lumbar segments. - gentle stm to R thoracolumbar paraspinals manually and using Edge tool and cupping  - point stimulation using handheld electric unit to R thoracic paraspinals  - PA mobilizations to mid thoracic to upper lumbar segments. - gentle stm to R thoracolumbar paraspinals manually and using Edge tool and cupping  - point stimulation using handheld electric unit to R thoracic paraspinals  - PA mobilizations to mid thoracic to upper lumbar segments. - gentle R foot mobilizations, mfr using edge tool to bottom of foot  - gentle stm to R thoracolumbar paraspinals manually   - PA mobilizations to mid thoracic to upper lumbar segments.g Edge tool  - gentle R foot mobilizations, mfr using edge tool to bottom of foot   - R piriformis release     MODALITIES ( minutes):    [employer] Portal  Treatment/Session Summary:    · Response to Treatment: Increased fatigue with activities.    · Communication/Consultation:  None today  · Equipment provided today: None today  · Recommendations/Intent for next treatment session: Next visit will focus on back and LE stretches.   Total Treatment Billable Duration:  45 minutes therex   PT Patient Time In/Time Out  Time In: 0845  Time Out: 0930    Susan Rodriguez, PT, DPT

## 2021-02-15 ENCOUNTER — HOSPITAL ENCOUNTER (OUTPATIENT)
Dept: PHYSICAL THERAPY | Age: 66
Discharge: HOME | End: 2021-02-15
Payer: COMMERCIAL

## 2021-02-15 NOTE — PROGRESS NOTES
Laila Waggoner  : 1955  Primary: [facility]  Secondary:  [address]  at [facility]  [address], [address], [place] [address]  [phone]   [phone]      Laila Waggoner cancelled PT appointment due to illness.      John Aparicio, PT, DPT

## 2021-02-17 PROBLEM — R42 DIZZINESS: Status: ACTIVE | Noted: 2021-02-17

## 2021-02-19 ENCOUNTER — HOSPITAL ENCOUNTER (OUTPATIENT)
Dept: PHYSICAL THERAPY | Age: 66
Discharge: HOME | End: 2021-02-19
Payer: COMMERCIAL

## 2021-02-19 ENCOUNTER — APPOINTMENT (OUTPATIENT)
Dept: PHYSICAL THERAPY | Age: 66
End: 2021-02-19
Payer: COMMERCIAL

## 2021-02-19 PROCEDURE — 97110 THERAPEUTIC EXERCISES: CPT

## 2021-02-19 NOTE — PROGRESS NOTES
Yokasta Geoffrey  : 1955  Primary: [facility]  Secondary:  [address] at [address], [place] [address]  [phone]   [phone]        OUTPATIENT PHYSICAL THERAPY: Daily Treatment Note 2021  ICD-10: Treatment Diagnosis: Pain in right leg (M79.604), Low back pain (M54.5)  Precautions/Allergies:   Latex   TREATMENT PLAN:  Effective Dates: 21 to 3/25/21. 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.816]  Other intervertebral disc degeneration, lumbar region [M51.36]  Radiculopathy, lumbar region [M54.16]   DATE OF ONSET: chronic  REFERRING PHYSICIAN: Kj Martínez*  MD Orders: evaluate and treat  Return MD Appointment: --       Pre-treatment Symptoms/Complaints:  Pt reports her leg is getting stronger.    Pain: Initial:     /10 Post Session:  /10   Medications Last Reviewed:  2021    Updated Objective Findings:  None Today   TREATMENT:   THERAPEUTIC EXERCISE: ( 45 minutes):     2/1 2/5 2/10 2/12 2/19   Activity/Exercise        Recumbent stepper 10 min level 2 10 min level 2 10 min level 2 10 min level 2 10 min level 3   Supine knee to chest X 10 X 10 X 10 X 10 X 10   Supine piriformis stretch X 10 X 10 X 10 X 10 X 10   LTR X 10 X 10      slr 2 X 10 2 x 10 2 x 10 2 x 10 2 x 10   clam X 10 X 10 2 x 10, YTB 2 x 10 2 x 10, ytb   bridge X 10 X 10 2 x 10 2 x 10 2 x 10   Open book X 10 X 10 X 10 X 10    SL hip abd        Seated trunk flexion        Seated piriformis stretch        Sit to stand   X 10 X 10    Seated forward reach stretch for mid back        Back extension over fulcrum X 10 X 10 X 10     Step up 4 in step x 10 forward and lateral, no hand hold 4 in step x 15 forward and lateral, no hand hold 4 in step x 15 forward and lateral, no hand hold 4 in step x 15 forward and lateral, no hand hold 8 in step x 20 forwards and lateral   Stand L LE on step, R on ground, for balance        UT stretch        marching 2 laps x 40 ft 4 laps x 40 ft 4 laps x 40 ft 4 laps x 40 ft 4 laps x 40 ft   walking        KT tape application        rhomberg stand 15 sec hold x 3 15 sec hold x 3 15 sec hold x 3     squat        Step overs In II bar x 10  In II bar x 10     Side steps In II bar x 10 4 laps x 40 ft 4 laps x 40 ft 4 laps x 40 ft 4 laps x 40 ft   Standing hip abd X 10 in II bars X 10 in II bars X 10 in II bars X 10 in II bars    Heel raise X 10 X 10      SLS on foam   5 sec hold x 10     lunges   In II bars x 10     Walk on line    1 lap x 40 ft 4 laps x 40 ft   Stand on R, touch L forwards, lateral, back    X 10    shuttle     100# B LE  X 20       MANUAL THERAPY: ( minutes):     Manual Therapy technique and location 12/9 12/14 12/16 1/11           - gentle stm to R thoracolumbar paraspinals manually and using Edge tool and cupping  - point stimulation using handheld electric unit to R thoracic paraspinals  - PA mobilizations to mid thoracic to upper lumbar segments. - gentle stm to R thoracolumbar paraspinals manually and using Edge tool and cupping  - point stimulation using handheld electric unit to R thoracic paraspinals  - PA mobilizations to mid thoracic to upper lumbar segments. - gentle stm to R thoracolumbar paraspinals manually and using Edge tool and cupping  - point stimulation using handheld electric unit to R thoracic paraspinals  - PA mobilizations to mid thoracic to upper lumbar segments.    - gentle R foot mobilizations, mfr using edge tool to bottom of foot  - gentle stm to R thoracolumbar paraspinals manually   - PA mobilizations to mid thoracic to upper lumbar segments.g Edge tool  - gentle R foot mobilizations, mfr using edge tool to bottom of foot   - R piriformis release     MODALITIES ( minutes):    [employer] Portal  Treatment/Session Summary:    · Response to Treatment: Increased fatigue with activities, but improving strength and balance. · Communication/Consultation:  None today  · Equipment provided today:  None today  · Recommendations/Intent for next treatment session: Next visit will focus on back and LE stretches.   Total Treatment Billable Duration:  45 minutes therex   PT Patient Time In/Time Out  Time In: 0845  Time Out: 0930    [place] Su, PT, DPT

## 2021-02-22 ENCOUNTER — APPOINTMENT (OUTPATIENT)
Dept: PHYSICAL THERAPY | Age: 66
End: 2021-02-22
Payer: COMMERCIAL

## 2021-02-26 ENCOUNTER — APPOINTMENT (OUTPATIENT)
Dept: PHYSICAL THERAPY | Age: 66
End: 2021-02-26
Payer: COMMERCIAL

## 2021-02-26 ENCOUNTER — HOSPITAL ENCOUNTER (OUTPATIENT)
Dept: PHYSICAL THERAPY | Age: 66
Discharge: HOME | End: 2021-02-26
Payer: COMMERCIAL

## 2021-02-26 PROCEDURE — 97110 THERAPEUTIC EXERCISES: CPT

## 2021-03-12 ENCOUNTER — HOSPITAL ENCOUNTER (OUTPATIENT)
Dept: PHYSICAL THERAPY | Age: 66
Discharge: HOME | End: 2021-03-12
Payer: COMMERCIAL

## 2021-03-12 PROCEDURE — 97110 THERAPEUTIC EXERCISES: CPT

## 2021-03-12 NOTE — PROGRESS NOTES
Rea Graham  : 1955  Primary: [facility]  Secondary:  [address]  at [facility]  [address] , [address], [place] [address]  [phone]   [phone]        OUTPATIENT PHYSICAL THERAPY: Daily Treatment Note 3/12/2021  ICD-10: Treatment Diagnosis: Pain in right leg (M79.604), Low back pain (M54.5)  Precautions/Allergies:   Latex   TREATMENT PLAN:  Effective Dates: 21 to 3/25/21.   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.816]  Other intervertebral disc degeneration, lumbar region [M51.36]  Radiculopathy, lumbar region [M54.16]   DATE OF ONSET: chronic  REFERRING PHYSICIAN: Umair LOYD MD Orders: evaluate and treat  Return MD Appointment: --       Pre-treatment Symptoms/Complaints:  Pt reports her leg is getting stronger, walking better, but still having pain into foot  Pain: Initial:     /10 Post Session:  /10   Medications Last Reviewed:  3/12/2021    Updated Objective Findings:  None Today   TREATMENT:   THERAPEUTIC EXERCISE: ( 45 minutes):     2/10 2 3.12   Activity/Exercise        Recumbent stepper 10 min level 2 10 min level 2 10 min level 3 10 min level 3 10 min level 3   Supine knee to chest X 10 X 10 X 10 X 10 X 10   Supine piriformis stretch X 10 X 10 X 10 X 10 X 10   Supine hamstrings stretch    X 10    LTR     X 10   slr 2 x 10 2 x 10 2 x 10 2 x 10, 1# 2 x 10   clam 2 x 10, YTB 2 x 10 2 x 10, ytb 2 x 10, 2# 2 x 10   bridge 2 x 10 2 x 10 2 x 10 2 x 10, 2# 2 x 10   Open book X 10 X 10      SL hip abd        Seated trunk flexion        Seated piriformis stretch        Prayer stretch     X 10   Quadruped alternating arms/legs     X 10   Sit to stand X 10 X 10  With 6# ball  2 x 10 With 6# ball  2 x 10   Deep squat towards floor with soccer ball    2 x 10 With 6# ball   x 10   Seated forward reach stretch for mid back Back extension over fulcrum X 10   X 10    Step up 4 in step x 15 forward and lateral, no hand hold 4 in step x 15 forward and lateral, no hand hold 8 in step x 20 forwards and lateral 6 in step x 20  Forwards and lateral 6 in step x 20  Forwards and lateral   Stand L LE on step, R on ground, for balance        UT stretch        marching 4 laps x 40 ft 4 laps x 40 ft 4 laps x 40 ft 4 laps x 40 ft 4 laps x 40 ft   walking        KT tape application        rhomberg stand 15 sec hold x 3       squat        Step overs In II bar x 10       Side steps 4 laps x 40 ft 4 laps x 40 ft 4 laps x 40 ft 4 laps x 40 ft 4 laps x 40 ft   Standing hip abd X 10 in II bars X 10 in II bars      Heel raise        SLS on foam 5 sec hold x 10       lunges In II bars x 10       Walk on line  1 lap x 40 ft 4 laps x 40 ft     Stand on R, touch L forwards, lateral, back  X 10      shuttle   100# B LE  X 20 100# B LE  X 20        MANUAL THERAPY: ( minutes):     Manual Therapy technique and location 12/9 12/14 12/16 1/11           - gentle stm to R thoracolumbar paraspinals manually and using Edge tool and cupping  - point stimulation using handheld electric unit to R thoracic paraspinals  - PA mobilizations to mid thoracic to upper lumbar segments. - gentle stm to R thoracolumbar paraspinals manually and using Edge tool and cupping  - point stimulation using handheld electric unit to R thoracic paraspinals  - PA mobilizations to mid thoracic to upper lumbar segments. - gentle stm to R thoracolumbar paraspinals manually and using Edge tool and cupping  - point stimulation using handheld electric unit to R thoracic paraspinals  - PA mobilizations to mid thoracic to upper lumbar segments.    - gentle R foot mobilizations, mfr using edge tool to bottom of foot  - gentle stm to R thoracolumbar paraspinals manually   - PA mobilizations to mid thoracic to upper lumbar segments.g Edge tool  - gentle R foot mobilizations, mfr using edge tool to bottom of foot   - R piriformis release     MODALITIES ( minutes):    [employer] Portal  Treatment/Session Summary:    · Response to Treatment: Increased fatigue with activities, but improving strength and balance. · Communication/Consultation:  None today  · Equipment provided today:  None today  · Recommendations/Intent for next treatment session: Next visit will focus on back and LE stretches.   Total Treatment Billable Duration:  45 minutes therex   PT Patient Time In/Time Out  Time In: 0845  Time Out: 0930    John Aparicio, PT, DPT     Future Appointments   Date Time Provider [place]   3/19/2021  8:45 AM Oracio Parker, PT, DPT [facility]   3/26/2021  8:45 AM Mikhail Mclaughlin, PT, DPT [facility]   5/20/2021  9:45 AM Angela Ferguson MD [facility]

## 2021-03-19 ENCOUNTER — HOSPITAL ENCOUNTER (OUTPATIENT)
Dept: PHYSICAL THERAPY | Age: 66
Discharge: HOME | End: 2021-03-19
Payer: COMMERCIAL

## 2021-03-19 PROCEDURE — 97110 THERAPEUTIC EXERCISES: CPT

## 2021-03-19 NOTE — PROGRESS NOTES
Marciano Miller  : 1955  Primary: [facility]  Secondary:  [address] at [facility]  [address], [address], [place] [address]  [phone]   [phone]        OUTPATIENT PHYSICAL THERAPY: Daily Treatment Note 3/19/2021  ICD-10: Treatment Diagnosis: Pain in right leg (M79.604), Low back pain (M54.5)  Precautions/Allergies:   Latex   TREATMENT PLAN:  Effective Dates: 21 to 3/25/21. 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    Pain: Initial:     /10 Post Session:  /10   Medications Last Reviewed:  3/19/2021    Updated Objective Findings:  None Today   TREATMENT:   THERAPEUTIC EXERCISE: ( 45 minutes):      3.12 3/19   Activity/Exercise       Recumbent stepper 10 min level 3 10 min level 3 10 min level 3 10 min level 3   Supine knee to chest X 10 X 10 X 10 X 10   Supine piriformis stretch X 10 X 10 X 10 X 10   Supine hamstrings stretch  X 10     LTR   X 10 X 10   slr 2 x 10 2 x 10, 1# 2 x 10 2 x 10   clam 2 x 10, ytb 2 x 10, 2# 2 x 10 2 x 10   bridge 2 x 10 2 x 10, 2# 2 x 10 2 x 10   Open book       SL hip abd       Seated trunk flexion       Seated piriformis stretch       Prayer stretch   X 10 X 10   Quadruped alternating arms/legs   X 10 Legs x 10   Sit to stand  With 6# ball  2 x 10 With 6# ball  2 x 10 With soccer ball  2 x 10   Deep squat towards floor with soccer ball  2 x 10 With 6# ball   x 10 With soccer ball  X 10   Seated forward reach stretch for mid back       Back extension over fulcrum  X 10  X 10   Step up 8 in step x 20 forwards and lateral 6 in step x 20  Forwards and lateral 6 in step x 20  Forwards and lateral 6 in step x 20  Forwards and lateral   Stand L LE on step, R on ground, for balance       UT stretch       marching 4 laps x 40 ft 4 laps x 40 ft 4 laps x 40 ft 4 laps x 40 ft holding ball   walking       KT tape application       rhomberg stand       squat       Step overs       Side steps 4 laps x 40 ft 4 laps x 40 ft 4 laps x 40 ft 4 laps x 40 ft holding ball   Standing hip abd       Heel raise       SLS on foam       lunges       Walk on line 4 laps x 40 ft      Stand on R, touch L forwards, lateral, back       shuttle 100# B LE  X 20 100# B LE  X 20         MANUAL THERAPY: ( minutes):     Manual Therapy technique and location 12/9 12/14 12/16 1/11           - gentle stm to R thoracolumbar paraspinals manually and using Edge tool and cupping  - point stimulation using handheld electric unit to R thoracic paraspinals  - PA mobilizations to mid thoracic to upper lumbar segments. - gentle stm to R thoracolumbar paraspinals manually and using Edge tool and cupping  - point stimulation using handheld electric unit to R thoracic paraspinals  - PA mobilizations to mid thoracic to upper lumbar segments. - gentle stm to R thoracolumbar paraspinals manually and using Edge tool and cupping  - point stimulation using handheld electric unit to R thoracic paraspinals  - PA mobilizations to mid thoracic to upper lumbar segments.    - gentle R foot mobilizations, mfr using edge tool to bottom of foot  - gentle stm to R thoracolumbar paraspinals manually   - PA mobilizations to mid thoracic to upper lumbar segments.g Edge tool  - gentle R foot mobilizations, mfr using edge tool to bottom of foot   - R piriformis release     MODALITIES ( minutes):    [employer] Portal  Treatment/Session Summary:    · Response to Treatment: Increased fatigue with activities, but improving strength and balance.   · Communication/Consultation:  None today  · Equipment provided today:  None today  · Recommendations/Intent for next treatment session: Next visit will focus on back and LE stretches.  Total Treatment Billable Duration:  45 minutes therex   PT Patient Time In/Time Out  Time In: 0845  Time Out: 0930    Andrei Fontenot, PT, DPT     Future Appointments   Date Time Provider Department Center   3/26/2021  8:45 AM Andrei Fontenot, PT, DPT [facility]   4/2/2021  8:45 AM Andrei Fontenot H, PT, DPT [facility]   4/9/2021  8:45 AM Andrei Fontenot H, PT, DPT [facility]   4/16/2021  8:45 AM Andrei Fontenot H, PT, DPT [facility]   4/23/2021  8:45 AM Andrei Fontenot H, PT, DPT [facility]   4/30/2021  8:45 AM Andrei Fontenot H, PT, DPT [facility]   5/20/2021  9:45 AM Ruy Lindsay MD [facility]

## 2021-03-26 ENCOUNTER — HOSPITAL ENCOUNTER (OUTPATIENT)
Dept: PHYSICAL THERAPY | Age: 66
Discharge: HOME | End: 2021-03-26
Payer: COMMERCIAL

## 2021-03-26 PROCEDURE — 97140 MANUAL THERAPY 1/> REGIONS: CPT

## 2021-03-26 PROCEDURE — 97110 THERAPEUTIC EXERCISES: CPT

## 2021-03-26 NOTE — PROGRESS NOTES
Starr Chavis  : 1955  Primary: [facility]  Secondary:  [address] at [facility]  [address], [address], [place] [address]  [phone]   [phone]        OUTPATIENT PHYSICAL THERAPY: Daily Treatment Note 3/26/2021  ICD-10: Treatment Diagnosis: Pain in right leg (M79.604), Low back pain (M54.5)  Precautions/Allergies:   Latex   TREATMENT PLAN:  Effective Dates: 21 to 3/25/21. 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.816]  Other intervertebral disc degeneration, lumbar region [M51.36]  Radiculopathy, lumbar region [M54.16]   DATE OF ONSET: chronic  REFERRING PHYSICIAN: Newton Guthrie*  MD Orders: evaluate and treat  Return MD Appointment: --       Pre-treatment Symptoms/Complaints:  Pt reports increased stiffness in the low back, requests more manual work today.    Pain: Initial:     /10 Post Session:  /10   Medications Last Reviewed:  3/26/2021    Updated Objective Findings:  None Today   TREATMENT:   THERAPEUTIC EXERCISE: ( 30 minutes):      3.12 3/19 3/26   Activity/Exercise       Recumbent stepper 10 min level 3 10 min level 3 10 min level 3 10 min level 3   Supine knee to chest X 10 X 10 X 10 X 10   Supine piriformis stretch X 10 X 10 X 10 X 10   Supine hamstrings stretch X 10      LTR  X 10 X 10 X 10   slr 2 x 10, 1# 2 x 10 2 x 10 2 x 10   clam 2 x 10, 2# 2 x 10 2 x 10 2 x 10   bridge 2 x 10, 2# 2 x 10 2 x 10 2 x 10   Open book       SL hip abd       Seated trunk flexion       Seated piriformis stretch       Prayer stretch  X 10 X 10    Quadruped alternating arms/legs  X 10 Legs x 10    Sit to stand With 6# ball  2 x 10 With 6# ball  2 x 10 With soccer ball  2 x 10    Deep squat towards floor with soccer ball 2 x 10 With 6# ball   x 10 With soccer ball  X 10    Seated forward reach stretch for mid back       Back extension over fulcrum X 10  X 10    Step up 6 in step x 20  Forwards and lateral 6 in step x 20  Forwards and lateral 6 in step x 20  Forwards and lateral    Stand L LE on step, R on ground, for balance       UT stretch       marching 4 laps x 40 ft 4 laps x 40 ft 4 laps x 40 ft holding ball    walking       KT tape application       rhomberg stand       squat       Step overs       Side steps 4 laps x 40 ft 4 laps x 40 ft 4 laps x 40 ft holding ball    Standing hip abd       Heel raise       SLS on foam       lunges       Walk on line       Stand on R, touch L forwards, lateral, back       shuttle 100# B LE  X 20          MANUAL THERAPY: (30 minutes):     Manual Therapy technique and location 12/14 12/16 1/11 3/26           - gentle stm to R thoracolumbar paraspinals manually and using Edge tool and cupping  - point stimulation using handheld electric unit to R thoracic paraspinals  - PA mobilizations to mid thoracic to upper lumbar segments. - gentle stm to R thoracolumbar paraspinals manually and using Edge tool and cupping  - point stimulation using handheld electric unit to R thoracic paraspinals  - PA mobilizations to mid thoracic to upper lumbar segments. - gentle R foot mobilizations, mfr using edge tool to bottom of foot  - gentle stm to R thoracolumbar paraspinals manually   - PA mobilizations to mid thoracic to upper lumbar segments.g Edge tool  - gentle R foot mobilizations, mfr using edge tool to bottom of foot   - R piriformis release - gentle stm to R thoracolumbar paraspinals manually   - PA mobilizations to mid thoracic to upper lumbar segments  - R piriformis release     MODALITIES ( minutes):    [employer] Portal  Treatment/Session Summary:    · Response to Treatment: Pt reported feeling much looser and better after manual therapy.    · Communication/Consultation:  None today  · Equipment provided today:  None today  · Recommendations/Intent for next treatment session: Next visit will focus on back and LE stretches.   Total Treatment Billable Duration:  60 minutes therex   PT Patient Time In/Time Out  Time In: 0845  Time Out: 0945    Tatiana Moffett, PT, DPT     Future Appointments   Date Time Provider Socorro Geraldine   4/2/2021  8:45 AM Pasha Juab, PT, DPT [facility]   4/9/2021  8:45 AM Kady Ramírez, PT, DPT [facility]   4/16/2021  8:45 AM Pasha Juab, PT, DPT [facility]   4/23/2021  8:45 AM Pasha Juab, PT, DPT [facility]   4/30/2021  8:45 AM Pasha Juab, PT, DPT [facility]   5/20/2021  9:45 AM [facility]

## 2021-04-02 ENCOUNTER — HOSPITAL ENCOUNTER (OUTPATIENT)
Dept: PHYSICAL THERAPY | Age: 66
Discharge: HOME | End: 2021-04-02
Payer: COMMERCIAL

## 2021-04-02 PROCEDURE — 97110 THERAPEUTIC EXERCISES: CPT

## 2021-04-02 NOTE — PROGRESS NOTES
Chetan Boast  : 1955  Primary: [facility]  Secondary:  [address] at [facility]  [address], [place] [address]  [phone]   [phone]        OUTPATIENT PHYSICAL THERAPY: Daily Treatment Note 2021  ICD-10: Treatment Diagnosis: Pain in right leg (M79.604), Low back pain (M54.5)  Precautions/Allergies:   Latex   TREATMENT PLAN:  Effective Dates: 21 to 3/25/21.   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.816]  Other intervertebral disc degeneration, lumbar region [M51.36]  Radiculopathy, lumbar region [M54.16]   DATE OF ONSET: chronic  REFERRING PHYSICIAN: Ela LOYD MD Orders: evaluate and treat  Return MD Appointment: --       Pre-treatment Symptoms/Complaints:  Pt requests more exercises today  Pain: Initial:     /10 Post Session:  /10   Medications Last Reviewed:  2021    Updated Objective Findings:  None Today   TREATMENT:   THERAPEUTIC EXERCISE: ( 45 minutes):      3.12 3/19 3/26 4/2   Activity/Exercise        Recumbent stepper 10 min level 3 10 min level 3 10 min level 3 10 min level 3 10 min level 2   Supine knee to chest X 10 X 10 X 10 X 10 X 10   Supine piriformis stretch X 10 X 10 X 10 X 10 X 10   Supine hamstrings stretch X 10       LTR  X 10 X 10 X 10    slr 2 x 10, 1# 2 x 10 2 x 10 2 x 10 2 x 10, 1#   clam 2 x 10, 2# 2 x 10 2 x 10 2 x 10 2 x 10, ytb   bridge 2 x 10, 2# 2 x 10 2 x 10 2 x 10 X 10  X 10 with marching   Open book     X 10   SL hip abd     X 10, 1#   Seated trunk flexion        Seated piriformis stretch        Prayer stretch  X 10 X 10     Quadruped alternating arms/legs  X 10 Legs x 10     Sit to stand With 6# ball  2 x 10 With 6# ball  2 x 10 With soccer ball  2 x 10  With 6# ball   2 x 10   Deep squat towards floor with soccer ball 2 x 10 With 6# ball   x 10 With soccer ball  X 10  With 6# ball   x 10   Seated forward reach stretch for mid back        Back extension over fulcrum X 10  X 10     Step up 6 in step x 20  Forwards and lateral 6 in step x 20  Forwards and lateral 6 in step x 20  Forwards and lateral  6 in step x 20, forwards   Stand L LE on step, R on ground, for balance        UT stretch        marching 4 laps x 40 ft 4 laps x 40 ft 4 laps x 40 ft holding ball  4 laps x 40 ft holding soccer ball   walking        KT tape application        rhomberg stand        squat        Step overs        Side steps 4 laps x 40 ft 4 laps x 40 ft 4 laps x 40 ft holding ball  2 laps x 40 with ytb  2 laps x 40 without   Standing hip abd        Heel raise        SLS on foam        lunges     2 laps x 40 ft   Walk on line     2 laps x 40 ft   Stand on R, touch L forwards, lateral, back        shuttle 100# B LE  X 20       Shoulder extension     Gtb x 20   rows     gtb x 20       MANUAL THERAPY: (0 minutes):     Manual Therapy technique and location 12/14 12/16 1/11 3/26           - gentle stm to R thoracolumbar paraspinals manually and using Edge tool and cupping  - point stimulation using handheld electric unit to R thoracic paraspinals  - PA mobilizations to mid thoracic to upper lumbar segments. - gentle stm to R thoracolumbar paraspinals manually and using Edge tool and cupping  - point stimulation using handheld electric unit to R thoracic paraspinals  - PA mobilizations to mid thoracic to upper lumbar segments.    - gentle R foot mobilizations, mfr using edge tool to bottom of foot  - gentle stm to R thoracolumbar paraspinals manually   - PA mobilizations to mid thoracic to upper lumbar segments.g Edge tool  - gentle R foot mobilizations, mfr using edge tool to bottom of foot   - R piriformis release - gentle stm to R thoracolumbar paraspinals manually   - PA mobilizations to mid thoracic to upper lumbar segments  - R piriformis release     MODALITIES ( minutes):    [employer] Portal  Treatment/Session Summary:    · Response to Treatment: Pt reported  · Communication/Consultation:  None today  · Equipment provided today:  None today  · Recommendations/Intent for next treatment session: Next visit will focus on back and LE stretches.   Total Treatment Billable Duration:  45 minutes therex   PT Patient Time In/Time Out  Time In: 0845  Time Out: 0930    Suly Sinclair, PT, DPT     Future Appointments   Date Time Provider [facility]   4/9/2021  8:45 AM Shanta Henriquez PT, DPT [facility]   4/16/2021  8:45 AM Gabino Wright, PT, DPT [facility]   4/23/2021  8:45 AM Gabino Wright, PT, DPT [facility]   4/30/2021  8:45 AM Shanta Henriquez PT, DPT [facility]   5/20/2021  9:45 AM Roxanne Chan MD [facility]

## 2021-04-09 ENCOUNTER — HOSPITAL ENCOUNTER (OUTPATIENT)
Dept: PHYSICAL THERAPY | Age: 66
Discharge: HOME | End: 2021-04-09
Payer: COMMERCIAL

## 2021-04-09 PROCEDURE — 97110 THERAPEUTIC EXERCISES: CPT

## 2021-04-09 NOTE — PROGRESS NOTES
Uriel Fofana  : 1955  Primary: [facility]  Secondary:  [address] at [facility]  [address], [place] [address]  [phone]   [phone]        OUTPATIENT PHYSICAL THERAPY: Daily Treatment Note 2021  ICD-10: Treatment Diagnosis: Pain in right leg (M79.604), Low back pain (M54.5)  Precautions/Allergies:   Latex   TREATMENT PLAN:  Effective Dates: 21 to 3/25/21.   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.816]  Other intervertebral disc degeneration, lumbar region [M51.36]  Radiculopathy, lumbar region [M54.16]   DATE OF ONSET: chronic  REFERRING PHYSICIAN: Anthony LOYD MD Orders: evaluate and treat  Return MD Appointment: --       Pre-treatment Symptoms/Complaints:  Pt reports improving strength but still neuropathy pain  Pain: Initial:     /10 Post Session:  /10   Medications Last Reviewed:  2021    Updated Objective Findings:  None Today   TREATMENT:   THERAPEUTIC EXERCISE: ( 45 minutes):      3.12 3/19 3/26 4/2 4/7   Activity/Exercise         Recumbent stepper 10 min level 3 10 min level 3 10 min level 3 10 min level 3 10 min level 2 10 min level 2   Supine knee to chest X 10 X 10 X 10 X 10 X 10 X 10   Supine piriformis stretch X 10 X 10 X 10 X 10 X 10 X 10   Supine hamstrings stretch X 10        LTR  X 10 X 10 X 10     slr 2 x 10, 1# 2 x 10 2 x 10 2 x 10 2 x 10, 1# 2 x 10, 2#   clam 2 x 10, 2# 2 x 10 2 x 10 2 x 10 2 x 10, ytb 2 x 10, ytb   bridge 2 x 10, 2# 2 x 10 2 x 10 2 x 10 X 10  X 10 with marching 2 x 10, 2#   Open book     X 10    SL hip abd     X 10, 1# 2 X 10, 2#   Seated trunk flexion         Seated piriformis stretch         Prayer stretch  X 10 X 10      Quadruped alternating arms/legs  X 10 Legs x 10      Sit to stand With 6# ball  2 x 10 With 6# ball  2 x 10 With soccer ball  2 x 10  With 6# ball   2 x 10 With 6# ball   2 x 10   Deep squat towards floor with soccer ball 2 x 10 With 6# ball   x 10 With soccer ball  X 10  With 6# ball   x 10 With 6# ball   2 x 10   Seated forward reach stretch for mid back         Back extension over fulcrum X 10  X 10      Step up 6 in step x 20  Forwards and lateral 6 in step x 20  Forwards and lateral 6 in step x 20  Forwards and lateral  6 in step x 20, forwards 6 in step x 20, forwards and lateral,  1#   Stand L LE on step, R on ground, for balance         UT stretch         marching 4 laps x 40 ft 4 laps x 40 ft 4 laps x 40 ft holding ball  4 laps x 40 ft holding soccer ball 4 laps x 40 ft  1#    walking         KT tape application         rhomberg stand         squat         Step overs         Side steps 4 laps x 40 ft 4 laps x 40 ft 4 laps x 40 ft holding ball  2 laps x 40 with ytb  2 laps x 40 without 4 laps x 40 ft  1#   Standing hip abd         Heel raise         SLS on foam         lunges     2 laps x 40 ft    Walk on line     2 laps x 40 ft    Stand on R, touch L forwards, lateral, back      X 10 b   shuttle 100# B LE  X 20        Shoulder extension     Gtb x 20    rows     gtb x 20        MANUAL THERAPY: (0 minutes):     Manual Therapy technique and location 12/14 12/16 1/11 3/26           - gentle stm to R thoracolumbar paraspinals manually and using Edge tool and cupping  - point stimulation using handheld electric unit to R thoracic paraspinals  - PA mobilizations to mid thoracic to upper lumbar segments. - gentle stm to R thoracolumbar paraspinals manually and using Edge tool and cupping  - point stimulation using handheld electric unit to R thoracic paraspinals  - PA mobilizations to mid thoracic to upper lumbar segments.    - gentle R foot mobilizations, mfr using edge tool to bottom of foot  - gentle stm to R thoracolumbar paraspinals manually   - PA mobilizations to mid thoracic to upper lumbar segments.g Edge tool  - gentle R foot mobilizations, mfr using edge tool to bottom of foot   - R piriformis release - gentle stm to R thoracolumbar paraspinals manually   - PA mobilizations to mid thoracic to upper lumbar segments  - R piriformis release     MODALITIES ( minutes):    [employer] Portal  Treatment/Session Summary:    · Response to Treatment: Pt reported increased fatigue, no other issues. · Communication/Consultation:  None today  · Equipment provided today:  None today  · Recommendations/Intent for next treatment session: Next visit will focus on back and LE stretches.   Total Treatment Billable Duration:  45 minutes therex   PT Patient Time In/Time Out  Time In: 0845  Time Out: 0930    Bobby Prasad, PT, DPT     Future Appointments   Date Time Provider Socorro Chandler   4/16/2021  8:45 AM Caryl Telles PT, DPT [facility]   4/23/2021  8:45 AM Noella Charles, Dixon Hodgkin, PT, DPT [facility]   4/30/2021  8:45 AM Caryl Telles PT, DPT [facility]   5/20/2021  9:45 AM Jimena Wyatt MD [facility]

## 2021-04-14 NOTE — THERAPY RECERTIFICATION
Kaleigh Read : 1955 Payor: Bel Natarajan / Plan: Abdelrahman Santoyo / Product Type: Commerical /  [address] [place]  at [facility] [address], [place] [phone]   [phone] OUTPATIENT PHYSICAL THERAPY:Recertification 3/9/1238 ICD-10: Treatment Diagnosis: Pain in right leg (M79.604), Low back pain (M54.5) Precautions/Allergies:  
Latex TREATMENT PLAN: 
Effective Dates: 21 to 21. Frequency/Duration: 1-2 times a week for 90 Day(s) MEDICAL/REFERRING DIAGNOSIS: 
Spondylosis without myelopathy or radiculopathy, lumbar region [M47.816] Other intervertebral disc degeneration, lumbar region [M51.36] Radiculopathy, lumbar region [M54.16] DATE OF ONSET: chronic REFERRING PHYSICIAN: Marlon LOYD MD Orders: evaluate and treat Return MD Appointment: --  
 
Progress Summary:  Ms. Huang Mahoney has been attending physical therapy for back and neck pain s/p fall and prior back surgery. She has reported a complete resolution of neck pain. And she has reporting a significant improvement in back and leg pain and strength. She demonstrates an improved gait pattern and increased strength and activity tolerance with right leg and low back. She still reports radiating pain into R foot, and unequal strength L to R. Pt will continue to benefit from skilled physical therapy to address this strength deficit. PROBLEM LIST (Impacting functional limitations): 1. Decreased ADL/Functional Activities 2. Increased Pain 3. Decreased Flexibility/Joint Mobility 4. Decreased Slope with Home Exercise Program INTERVENTIONS PLANNED: (Treatment may consist of any combination of the following) 1. Home Exercise Program (HEP) 2. Manual Therapy including soft tissue and spinal manipulation and mobilization; and dry needling 3. Neuromuscular Re-education/Strengthening 4. Range of Motion (ROM) 5. Therapeutic Activites 6.  Therapeutic Exercise/Strengthening 7. Modalities including heat/cold application, electrical stimulation, vasopneumatic compression, ultrasound GOALS: (Goals have been discussed and agreed upon with patient.) Short-Term Functional Goals: Time Frame: 5 weeks 1. Pt will demonstrate independence with > or = 2 exercises in HEP. met 2. Pt will demonstrate 50% improvement in neck and trunk movements. met Discharge Goals: Time Frame: 10 weeks 1. Pt will demonstrate independence with > or = 4 exercises in HEP. met 2. Pt will demonstrate functional neck and trunk movements. Partially met OUTCOME MEASURE:  
Tool Used: Modified Oswestry Low Back Pain Questionnaire Score:  Initial: 19/50  Most Recent: X/50 (Date: -- ) Interpretation of Score: Each section is scored on a 0-5 scale, 5 representing the greatest disability. The scores of each section are added together for a total score of 50. MEDICAL NECESSITY:  
· Skilled intervention continues to be required due to decreased function. REASON FOR SERVICES/OTHER COMMENTS: 
· Patient continues to require skilled intervention due to decreased function. Total Duration: PT Patient Time In/Time Out Time In: 0845 Time Out: 0930 Rehabilitation Potential For Stated Goals: Good Regarding Jamaal Ace's therapy, I certify that the treatment plan above will be carried out by a therapist or under their direction. Thank you for this referral, Marivel Rodriguez, PT, DPT HISTORY:  
History of Injury/Illness (Reason for Referral): Back surgery last year. 3 months ago she fell onto her L side at [facility]. Went to ER, had scans no fractures. Now has neck, back, R LE pain into R foot. Was given oral steroids that helps with pain. Past Medical History/Comorbidities: Ms. Jay Groves  has a past medical history of DVT (deep venous thrombosis) ([facility] [address] (~2009), Hypertension, and Nausea & vomiting.   Ms. Jay Groves  has a past surgical history that includes hx open cholecystectomy (1995); hx hysterectomy (1991); and neurological procedure unlisted (07/2019). Social History/Living Environment:  
  house Prior Level of Function/Work/Activity: 
Works part time Dominant Side:  
      [facility] H2 Model Falls Risk Assessment Risk Factors: 
     No Risk Factors Identified Ability to Rise from Chair: 
     (0)  Ability to rise in a single movement Falls Prevention Plan: No modifications necessary Total: (5 or greater = High Risk): 0  
©2010 [facility] [address] [place] [address] Patent #[id number]. Federal Law prohibits the replication, distribution or use without written permission from [facility] [employer] Current Medications:   
  
Current Outpatient Medications:  
  fexofenadine HCl (FEXOFENADINE PO), Take  by mouth., Disp: , Rfl:  
  atorvastatin (LIPITOR) 20 mg tablet, Take 1 Tab by mouth nightly., Disp: 30 Tab, Rfl: 5 
  CALCIUM CITRATE-VITAMIN D3 PO, Take  by mouth., Disp: , Rfl:  
  cyanocobalamin (Vitamin B-12) 100 mcg tablet, Take 100 mcg by mouth daily. , Disp: , Rfl:  
  omeprazole (PRILOSEC) 20 mg capsule, Take 1 Cap by mouth daily. , Disp: 90 Cap, Rfl: 4 
  PARoxetine (PAXIL) 20 mg tablet, TAKE 1 TABLET BY MOUTH EVERY DAY, Disp: , Rfl:  
  amLODIPine (NORVASC) 10 mg tablet, Take 1 Tab by mouth daily. (Patient taking differently: Take 5 mg by mouth daily.), Disp: 90 Tab, Rfl: 3 
  ferrous sulfate 325 mg (65 mg iron) tablet, Take 1 Tab by mouth Daily (before breakfast). , Disp: 90 Tab, Rfl: 3 
  olopatadine (Pazeo) 0.7 % drop, Apply 1 Drop to eye daily. , Disp: , Rfl:  
  allergy injection, , Disp: , Rfl:  
  EPINEPHrine (EPIPEN) 0.3 mg/0.3 mL injection, USE AS DIRECTED, Disp: , Rfl:  
  olmesartan (Benicar) 40 mg tablet, Take 1 Tab by mouth daily. , Disp: 90 Tab, Rfl: 02 
  hydroCHLOROthiazide (HYDRODIURIL) 25 mg tablet, Take 1 Tab by mouth daily.  Indications: high blood pressure, Disp: 90 Tab, Rfl: 1 
  multivit-min/FA/lycopen/lutein (SPECTRAVITE ADULT 50 PLUS PO), Take  by mouth., Disp: , Rfl:  
  cholecalciferol, vitamin D3, (VITAMIN D3) 2,000 unit tab, Take  by mouth daily. , Disp: , Rfl:  
  albuterol (PROVENTIL HFA, VENTOLIN HFA, PROAIR HFA) 90 mcg/actuation inhaler, Take 2 Puffs by inhalation every four (4) hours as needed for Wheezing., Disp: , Rfl:  
  fluticasone (FLONASE) 50 mcg/actuation nasal spray, 2 Sprays by Nasal route daily. , Disp: , Rfl:  
  aspirin delayed-release 81 mg tablet, Take 81 mg by mouth daily. Indications: prevention of transient ischemic attack, Disp: , Rfl:   
Date Last Reviewed:  4/9/2021 EXAMINATION: from Initial Evaluation unless otherwise noted Observation: 
     Standing- elevated R shoulder complex Gait- decreased stance time on R Strength: Muscle/Movement Strength at Eval Reassessment Date: 1/25 4/9 Hip flexion L 5/5 
R 3-/5 L 5/5 
R 3+/5 L 5/5 
R 4/5 Hip abduction L 5/5 
R 3-/5 L 5/5 
R 3/5 L 5/5 
R 4/5 Shoulder elevation L 5/5 
R 3-/5 L 5/5 
R 4/5 L 5/5 
R 5/5 Range of Motion: Movement/Joint ROM at eval Reassessment Date: 1/25 4/9 Cervical flexion 50% of normal wfl wfl Cervical rotation 50% of normal B wfk wfl Cervical extension 25% of normal wfl wfl Trunk flexion 25% of normal 25% of normal 50% of nomral  
Trunk extension 15% of normal 25% of normal 50% of normal  
Trunk rotation R 25% of normal 
L 15% of normal R 25% of normal 
L 25% of normal R 50% of normal 
L 50% of nomrmal  
 
Palpation: 
Increased tone and tenderness in R UT, R cervical paraspinals, R lumbar paraspinals. Body Structures Involved: 1. Nerves 2. Bones 3. Joints 4. Muscles Body Functions Affected: 1. Sensory/Pain 2. Neuromusculoskeletal 
3. Movement Related Activities and Participation Affected: 1. General Tasks and Demands 2. Mobility 3. Community, Social and [place] PT Patient Time In/Time Out Time In: 0958 Time Out: 0930 Future Appointments Date Time Provider Socorro Chandler 4/16/2021  8:45 AM Cherry Fontenot, PT, DPT [facility]  
4/23/2021  8:45 AM Cuba Stewart [facility]  
4/30/2021  8:45 AM Cuba Stewart PT, DPT [facility]  
5/20/2021  9:45 AM [facility], [facility], DPT

## 2021-04-16 ENCOUNTER — HOSPITAL ENCOUNTER (OUTPATIENT)
Dept: PHYSICAL THERAPY | Age: 66
Discharge: HOME | End: 2021-04-16
Payer: COMMERCIAL

## 2021-04-16 NOTE — PROGRESS NOTES
Mariam Kothari  : 1955  Primary: Cynthia Alarcon [facility]  Secondary:  [address] [facility] at [address]  [facility], [facility] [address], [place]  [phone]   [phone]      Mariam Kothari cancelled due to illness.     Carmelita Hernandez, PT, DPT

## 2021-04-23 ENCOUNTER — HOSPITAL ENCOUNTER (OUTPATIENT)
Dept: PHYSICAL THERAPY | Age: 66
Discharge: HOME | End: 2021-04-23
Payer: COMMERCIAL

## 2021-04-23 PROCEDURE — 97110 THERAPEUTIC EXERCISES: CPT

## 2021-04-23 NOTE — PROGRESS NOTES
[facility]  : 1955  Primary: Ivan Steel [facility]  Secondary:  [address]  at [facility]  [address] , [address], [place]  [phone]   [phone]        OUTPATIENT PHYSICAL THERAPY: Daily Treatment Note 2021  ICD-10: Treatment Diagnosis: Pain in right leg (M79.604), Low back pain (M54.5)  Precautions/Allergies:   Latex   TREATMENT PLAN:  Effective Dates: 21 to 3/25/21.   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.816]  Other intervertebral disc degeneration, lumbar region [M51.36]  Radiculopathy, lumbar region [M54.16]   DATE OF ONSET: chronic  REFERRING PHYSICIAN: Elza LOYD MD Orders: evaluate and treat  Return MD Appointment: --       Pre-treatment Symptoms/Complaints:  Pt reports improving strength   Pain: Initial:     /10 Post Session:  /10   Medications Last Reviewed:  2021    Updated Objective Findings:  None Today   TREATMENT:   THERAPEUTIC EXERCISE: ( 45 minutes):     3/19 3/26 4/2 4/7 4/23   Activity/Exercise        Recumbent stepper 10 min level 3 10 min level 3 10 min level 2 10 min level 2 10 min level 3   Supine knee to chest X 10 X 10 X 10 X 10 X 10   Supine piriformis stretch X 10 X 10 X 10 X 10 X 10   Supine hamstrings stretch        LTR X 10 X 10      slr 2 x 10 2 x 10 2 x 10, 1# 2 x 10, 2# 2 x 10, 2#   clam 2 x 10 2 x 10 2 x 10, ytb 2 x 10, ytb 2 x 10, ytb   bridge 2 x 10 2 x 10 X 10  X 10 with marching 2 x 10, 2# 2 x 10, 2#   Open book   X 10     SL hip abd   X 10, 1# 2 X 10, 2# 2 x 10 2#   Seated trunk flexion        Seated piriformis stretch        Prayer stretch X 10       Quadruped alternating arms/legs Legs x 10       Sit to stand With soccer ball  2 x 10  With 6# ball   2 x 10 With 6# ball   2 x 10 With 6# ball   2 x 10   Deep squat towards floor with soccer ball With soccer ball  X 10  With 6# ball x 10 With 6# ball   2 x 10 With 6# ball   2 x 10   Seated forward reach stretch for mid back        Back extension over fulcrum X 10       Step up 6 in step x 20  Forwards and lateral  6 in step x 20, forwards 6 in step x 20, forwards and lateral,  1# 6 in step x 20, forwards and lateral,  2#   Stand L LE on step, R on ground, for balance        UT stretch        marching 4 laps x 40 ft holding ball  4 laps x 40 ft holding soccer ball 4 laps x 40 ft  1#  4 laps x 40 ft  2#    walking        KT tape application        rhomberg stand        squat        Step overs        Side steps 4 laps x 40 ft holding ball  2 laps x 40 with ytb  2 laps x 40 without 4 laps x 40 ft  1# 4 laps x 40 ft  2#   Standing hip abd        Heel raise        SLS on foam        lunges   2 laps x 40 ft     Walk on line   2 laps x 40 ft     Stand on R, touch L forwards, lateral, back    X 10 b X 10 b   shuttle        Shoulder extension   Gtb x 20     rows   gtb x 20         MANUAL THERAPY: (0 minutes):     Manual Therapy technique and location 12/14 12/16 1/11 3/26           - gentle stm to R thoracolumbar paraspinals manually and using Edge tool and cupping  - point stimulation using handheld electric unit to R thoracic paraspinals  - PA mobilizations to mid thoracic to upper lumbar segments. - gentle stm to R thoracolumbar paraspinals manually and using Edge tool and cupping  - point stimulation using handheld electric unit to R thoracic paraspinals  - PA mobilizations to mid thoracic to upper lumbar segments.    - gentle R foot mobilizations, mfr using edge tool to bottom of foot  - gentle stm to R thoracolumbar paraspinals manually   - PA mobilizations to mid thoracic to upper lumbar segments.g Edge tool  - gentle R foot mobilizations, mfr using edge tool to bottom of foot   - R piriformis release - gentle stm to R thoracolumbar paraspinals manually   - PA mobilizations to mid thoracic to upper lumbar segments  - R piriformis release MODALITIES ( minutes):    [employer] Portal  Treatment/Session Summary:    · Response to Treatment: Pt reported increased fatigue, no other issues. · Communication/Consultation:  None today  · Equipment provided today:  None today  · Recommendations/Intent for next treatment session: Next visit will focus on back and LE stretches.   Total Treatment Billable Duration:  45 minutes therex   PT Patient Time In/Time Out  Time In: 0845  Time Out: 0930    Cherry Sim PT, DPT     Future Appointments   Date Time Provider Socorro Krausi   4/30/2021  8:45 AM Luzmaria Nguyen PT, DPT [facility]   5/20/2021  9:45 AM [facility]

## 2021-04-30 ENCOUNTER — HOSPITAL ENCOUNTER (OUTPATIENT)
Dept: PHYSICAL THERAPY | Age: 66
Discharge: HOME | End: 2021-04-30
Payer: COMMERCIAL

## 2021-04-30 PROCEDURE — 97110 THERAPEUTIC EXERCISES: CPT

## 2021-04-30 NOTE — PROGRESS NOTES
Jesus Yen  : 1955  Primary: [facility]  Secondary:  [address]  at [facility]  [address], [address], [place]  [phone]   [phone]        OUTPATIENT PHYSICAL THERAPY: Daily Treatment Note 2021  ICD-10: Treatment Diagnosis: Pain in right leg (M79.604), Low back pain (M54.5)  Precautions/Allergies:   Latex   TREATMENT PLAN:  Effective Dates: 21 to 3/25/21. 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.816]  Other intervertebral disc degeneration, lumbar region [M51.36]  Radiculopathy, lumbar region [M54.16]   DATE OF ONSET: chronic  REFERRING PHYSICIAN: Savannah Casey*  MD Orders: evaluate and treat  Return MD Appointment: --       Pre-treatment Symptoms/Complaints:  Pt reports improving strength. Ready for this to be the last appointment for now.   Pain: Initial:     /10 Post Session:  /10   Medications Last Reviewed:  2021    Updated Objective Findings:  None Today   TREATMENT:   THERAPEUTIC EXERCISE: ( 45 minutes):     3/26 4/2 4/7 4/23 4/30   Activity/Exercise        Recumbent stepper 10 min level 3 10 min level 2 10 min level 2 10 min level 3 10 min level 3   Supine knee to chest X 10 X 10 X 10 X 10 X 10   Supine piriformis stretch X 10 X 10 X 10 X 10 X 10   Supine hamstrings stretch        LTR X 10       slr 2 x 10 2 x 10, 1# 2 x 10, 2# 2 x 10, 2# 2 x 10, 2#   clam 2 x 10 2 x 10, ytb 2 x 10, ytb 2 x 10, ytb 2 x 10, ytb   bridge 2 x 10 X 10  X 10 with marching 2 x 10, 2# 2 x 10, 2# 2 x 10, 2#   Open book  X 10      SL hip abd  X 10, 1# 2 X 10, 2# 2 x 10 2# 2 x 10, 2#   Seated trunk flexion        Seated piriformis stretch        Prayer stretch        Quadruped alternating arms/legs        Sit to stand  With 6# ball   2 x 10 With 6# ball   2 x 10 With 6# ball   2 x 10 With 6# ball   2 x 10   Deep squat towards floor with soccer ball  With 6# ball   x 10 With 6# ball   2 x 10 With 6# ball   2 x 10 With 6# ball   2 x 10   Seated forward reach stretch for mid back        Back extension over fulcrum        Step up  6 in step x 20, forwards 6 in step x 20, forwards and lateral,  1# 6 in step x 20, forwards and lateral,  2# 6 in step x 20, forwards and lateral,  2#   Stand L LE on step, R on ground, for balance        UT stretch        marching  4 laps x 40 ft holding soccer ball 4 laps x 40 ft  1#  4 laps x 40 ft  2#  4 laps x 40 ft  2#    walking        KT tape application        rhomberg stand        squat        Step overs        Side steps  2 laps x 40 with ytb  2 laps x 40 without 4 laps x 40 ft  1# 4 laps x 40 ft  2# 4 laps x 40 ft  2#    Standing hip abd        Heel raise        SLS on foam        lunges  2 laps x 40 ft      Walk on line  2 laps x 40 ft      Stand on R, touch L forwards, lateral, back   X 10 b X 10 b    shuttle        Shoulder extension  Gtb x 20      rows  gtb x 20          MANUAL THERAPY: (0 minutes):     Manual Therapy technique and location 12/14 12/16 1/11 3/26           - gentle stm to R thoracolumbar paraspinals manually and using Edge tool and cupping  - point stimulation using handheld electric unit to R thoracic paraspinals  - PA mobilizations to mid thoracic to upper lumbar segments. - gentle stm to R thoracolumbar paraspinals manually and using Edge tool and cupping  - point stimulation using handheld electric unit to R thoracic paraspinals  - PA mobilizations to mid thoracic to upper lumbar segments.    - gentle R foot mobilizations, mfr using edge tool to bottom of foot  - gentle stm to R thoracolumbar paraspinals manually   - PA mobilizations to mid thoracic to upper lumbar segments.g Edge tool  - gentle R foot mobilizations, mfr using edge tool to bottom of foot   - R piriformis release - gentle stm to R thoracolumbar paraspinals manually   - PA mobilizations to mid thoracic to upper lumbar segments  - R piriformis release     MODALITIES ( minutes):    [employer] Portal  Treatment/Session Summary:    · Response to Treatment: Pt reported increased fatigue, no other issues. · Communication/Consultation:  None today  · Equipment provided today:  None today  · Recommendations/Intent for next treatment session: Next visit will focus on back and LE stretches.   Total Treatment Billable Duration:  45 minutes therex   PT Patient Time In/Time Out  Time In: 0845  Time Out: 0930    Yenny Henderson, PT, DPT     Future Appointments   Date Time Provider Socorro Geraldine   5/20/2021  9:45 AM Anastasia Martin MD [facility]

## 2021-05-20 ENCOUNTER — TRANSCRIBE ORDER (OUTPATIENT)
Dept: SCHEDULING | Age: 66
End: 2021-05-20

## 2021-05-20 DIAGNOSIS — Z12.31 VISIT FOR SCREENING MAMMOGRAM: Primary | ICD-10-CM

## 2021-06-24 ENCOUNTER — HOSPITAL ENCOUNTER (OUTPATIENT)
Dept: MAMMOGRAPHY | Age: 66
Discharge: HOME | End: 2021-06-24
Attending: INTERNAL MEDICINE

## 2021-06-24 DIAGNOSIS — Z12.31 VISIT FOR SCREENING MAMMOGRAM: ICD-10-CM

## 2021-07-14 NOTE — THERAPY DISCHARGE
Rashard Mccauley  : 1955  Payor: Bianca Hernandez / Plan: Trevor Cooley / Product Type: Commerical /  [address]  at [facility], [address], [address]  [phone]   [phone]             OUTPATIENT PHYSICAL THERAPY:Discharge Summary    ICD-10: Treatment Diagnosis: Pain in right leg (M79.604), Low back pain (M54.5)  Precautions/Allergies:   Latex    MEDICAL/REFERRING DIAGNOSIS:  Spondylosis without myelopathy or radiculopathy, lumbar region [M47.816]  Other intervertebral disc degeneration, lumbar region [M51.36]  Radiculopathy, lumbar region [M54.16]   DATE OF ONSET: chronic  REFERRING PHYSICIAN: Renetta Villa MD Orders: evaluate and treat  Return MD Appointment: --     Discharge Summary:  Ms. Tierra Ceja attended physical therapy from 12/10/20 to 21 for back and leg pain, and leg weakness. She demonstrated a significant decrease in back pain and some reduction in leg pain, and a significant improvement in leg strength and activity tolerated. Plan of care is discharged.         Thank you for this referral,  Garcia Mcnulty, PT, DPT

## 2021-09-29 ENCOUNTER — APPOINTMENT (OUTPATIENT)
Dept: GENERAL RADIOLOGY | Age: 66
End: 2021-09-29
Attending: PHYSICIAN ASSISTANT
Payer: COMMERCIAL

## 2021-09-29 ENCOUNTER — HOSPITAL ENCOUNTER (EMERGENCY)
Age: 66
Discharge: HOME | End: 2021-09-29
Attending: EMERGENCY MEDICINE
Payer: COMMERCIAL

## 2021-09-29 VITALS
DIASTOLIC BLOOD PRESSURE: 70 MMHG | RESPIRATION RATE: 16 BRPM | HEIGHT: 62 IN | HEART RATE: 72 BPM | OXYGEN SATURATION: 99 % | SYSTOLIC BLOOD PRESSURE: 138 MMHG | BODY MASS INDEX: 29.44 KG/M2 | WEIGHT: 160 LBS | TEMPERATURE: 98 F

## 2021-09-29 DIAGNOSIS — S89.91XA INJURY OF RIGHT KNEE, INITIAL ENCOUNTER: Primary | ICD-10-CM

## 2021-09-29 PROCEDURE — 99283 EMERGENCY DEPT VISIT LOW MDM: CPT

## 2021-09-29 PROCEDURE — 74011250637 HC RX REV CODE- 250/637: Performed by: PHYSICIAN ASSISTANT

## 2021-09-29 PROCEDURE — 73562 X-RAY EXAM OF KNEE 3: CPT

## 2021-09-29 RX ORDER — TRAMADOL HYDROCHLORIDE 50 MG/1
50 TABLET ORAL
Qty: 15 TABLET | Refills: 0 | Status: SHIPPED | OUTPATIENT
Start: 2021-09-29 | End: 2021-10-04

## 2021-09-29 RX ORDER — TRAMADOL HYDROCHLORIDE 50 MG/1
50 TABLET ORAL
Status: COMPLETED | OUTPATIENT
Start: 2021-09-29 | End: 2021-09-29

## 2021-09-29 RX ADMIN — TRAMADOL HYDROCHLORIDE 50 MG: 50 TABLET, FILM COATED ORAL at 15:46

## 2021-09-29 NOTE — ED TRIAGE NOTES
Pt presents to ED c/o right knee pain after stepping in a hole, twisting and falling 3 weeks ago. Pain to back of knee and up lateral thigh. Has had to use cane d/t pain.

## 2021-09-29 NOTE — ED NOTES
I have reviewed discharge instructions with the patient. The patient verbalized understanding. Patient left ED via Discharge Method: ambulatory to Home with herself. Opportunity for questions and clarification provided. Patient given 1 scripts. To continue your aftercare when you leave the hospital, you may receive an automated call from our care team to check in on how you are doing. This is a free service and part of our promise to provide the best care and service to meet your aftercare needs.  If you have questions, or wish to unsubscribe from this service please call [phone]. [facility] you [facility] [place] [facility].

## 2021-09-29 NOTE — ED PROVIDER NOTES
Patient states about 2 weeks ago she is twisted her left knee after stepping in a hole. She has continued pain and swelling. Little relief with over-the-counter medications. She denies any hip pain or ankle pain    The history is provided by the patient. Knee Pain   This is a new problem. The current episode started more than 1 week ago. The problem occurs constantly. The problem has been gradually worsening. The pain is present in the right knee. The quality of the pain is described as aching. The pain is at a severity of 10/10. The pain is moderate. Associated symptoms include stiffness. The symptoms are aggravated by activity and palpation. She has tried OTC pain medications for the symptoms. The treatment provided mild relief. There has been a history of trauma.         Past Medical History:   Diagnosis Date    DVT (deep venous thrombosis) ([facility] [address] ~2009    RLE    Hypertension     managed with meds    Nausea & vomiting        Past Surgical History:   Procedure Laterality Date    HX HYSTERECTOMY  12    TVH in [facility] due to presumably high grade cervical dysplasia     HX OPEN CHOLECYSTECTOMY  1995    In [address] [place] [address]  07/2019    back surgery         Family History:   Problem Relation Age of Onset    Colon Cancer Mother     Breast Cancer Mother     Cancer Father         lung    Breast Cancer Sister     Cancer Sister         breast    Diabetes Maternal Aunt     Colon Cancer Maternal Grandfather     Lung Disease Brother         asthma    Cancer Sister         breast    Breast Cancer Sister     Hypertension Brother        Social History     Socioeconomic History    Marital status:      Spouse name: Not on file    Number of children: Not on file    Years of education: Not on file    Highest education level: Not on file   Occupational History    Not on file   Tobacco Use    Smoking status: Never Smoker    Smokeless tobacco: Never Used   Substance and Sexual Activity    Alcohol use: Yes     Comment: socially    Drug use: No    Sexual activity: Yes     Partners: Male     Birth control/protection: Surgical   Other Topics Concern    Not on file   Social History Narrative    Not on file     Social Determinants of Health     Financial Resource Strain:     Difficulty of Paying Living Expenses:    Food Insecurity:     Worried About Running Out of Food in the Last Year:     [address] in the Last Year:    Transportation Needs:     Lack of Transportation (Medical):  Lack of Transportation (Non-Medical):    Physical Activity:     Days of Exercise per Week:     Minutes of Exercise per Session:    Stress:     Feeling of Stress :    Social Connections:     Frequency of Communication with Friends and Family:     Frequency of Social Gatherings with Friends and Family:     Attends [employer] Services:     Active Member of Clubs or Organizations:     Attends Club or Organization Meetings:     Marital Status:    Intimate Partner Violence:     Fear of Current or Ex-Partner:     Emotionally Abused:     Physically Abused:     Sexually Abused: ALLERGIES: Latex    Review of Systems   Musculoskeletal: Positive for stiffness. All other systems reviewed and are negative. Vitals:    09/29/21 1443   BP: (!) 153/79   Pulse: 79   Resp: 16   Temp: 97.9 °F (36.6 °C)   SpO2: 98%   Weight: 72.6 kg (160 lb)   Height: 5' 2\" (1.575 m)            Physical Exam  Vitals and nursing note reviewed. Constitutional:       General: She is not in acute distress. Appearance: Normal appearance. She is well-developed and normal weight. She is not diaphoretic. HENT:      Head: Normocephalic and atraumatic. Eyes:      Pupils: Pupils are equal, round, and reactive to light. Cardiovascular:      Rate and Rhythm: Normal rate and regular rhythm. Pulmonary:      Effort: Pulmonary effort is normal.      Breath sounds: Normal breath sounds.    Abdominal: General: Bowel sounds are normal.      Palpations: Abdomen is soft. Musculoskeletal:         General: Swelling and tenderness present. Normal range of motion. Cervical back: Normal range of motion and neck supple. Comments: Diffuse swelling to anterior right knee, patient has some soreness in the popliteal fossa but no masses felt. She has full extension limited flexion. Ligaments are grossly stable. Calf is soft no pain to the hip or knee. Skin:     General: Skin is warm. Neurological:      General: No focal deficit present. Mental Status: She is alert and oriented to person, place, and time. Psychiatric:         Mood and Affect: Mood normal.         Behavior: Behavior normal.          MDM  Number of Diagnoses or Management Options  Diagnosis management comments: XR KNEE RT 3 V   Final Result    Mild osteoarthritis with small knee effusion.      Discussed patient with Ortho, will place in knee immobilizer and follow-up with Francisco Mclean/Dr. Hidalgo Slider in office       Amount and/or Complexity of Data Reviewed  Tests in the radiology section of CPT®: ordered and reviewed  Review and summarize past medical records: yes  Discuss the patient with other providers: yes    Risk of Complications, Morbidity, and/or Mortality  Presenting problems: moderate  Diagnostic procedures: moderate  Management options: low    Patient Progress  Patient progress: improved         Procedures

## 2021-09-29 NOTE — DISCHARGE INSTRUCTIONS
Ice and elevate, wear brace for comfort, call office to arrange follow up use ultram for severe pain, tylenol for lesser pain

## 2022-02-11 NOTE — H&P (VIEW-ONLY)
Name: Saulo Hawk  YOB: 1955  Gender: female  MRN: [id number]      CC: Pre-op Exam (Right knee DOS 2/18/22)       HPI: Saulo Hawk is a 61 y.o. female who returns for follow up on her right knee. She is scheduled for a right knee arthroscopy with medial meniscectomy on 2/18/2022 with Dr. Celestine Durham. She has continued knee pain and wishes to proceed with surgery. She reports that the pain is worse in the morning and she is ambulating with a cane. Current Outpatient Medications:     HYDROcodone-acetaminophen (Norco) 5-325 mg per tablet, Take 1 Tablet by mouth every six (6) hours as needed for Pain for up to 5 days. Max Daily Amount: 4 Tablets. , Disp: 15 Tablet, Rfl: 0    omalizumab (Xolair) 75 mg/0.5 mL syrg, by SubCUTAneous route once., Disp: , Rfl:     fexofenadine HCl (FEXOFENADINE PO), Take  by mouth., Disp: , Rfl:     atorvastatin (LIPITOR) 20 mg tablet, Take 1 Tab by mouth nightly., Disp: 30 Tab, Rfl: 5    CALCIUM CITRATE-VITAMIN D3 PO, Take  by mouth., Disp: , Rfl:     cyanocobalamin (Vitamin B-12) 100 mcg tablet, Take 100 mcg by mouth daily. , Disp: , Rfl:     omeprazole (PRILOSEC) 20 mg capsule, Take 1 Cap by mouth daily. , Disp: 90 Cap, Rfl: 4    PARoxetine (PAXIL) 20 mg tablet, TAKE 1 TABLET BY MOUTH EVERY DAY, Disp: , Rfl:     amLODIPine (NORVASC) 10 mg tablet, Take 1 Tab by mouth daily. (Patient taking differently: Take 5 mg by mouth daily.), Disp: 90 Tab, Rfl: 3    ferrous sulfate 325 mg (65 mg iron) tablet, Take 1 Tab by mouth Daily (before breakfast). , Disp: 90 Tab, Rfl: 3    olopatadine (Pazeo) 0.7 % drop, Apply 1 Drop to eye daily. , Disp: , Rfl:     allergy injection, , Disp: , Rfl:     EPINEPHrine (EPIPEN) 0.3 mg/0.3 mL injection, USE AS DIRECTED, Disp: , Rfl:     olmesartan (Benicar) 40 mg tablet, Take 1 Tab by mouth daily. , Disp: 90 Tab, Rfl: 02    hydroCHLOROthiazide (HYDRODIURIL) 25 mg tablet, Take 1 Tab by mouth daily.  Indications: high blood pressure, Disp: 90 Tab, Rfl: 1    multivit-min/FA/lycopen/lutein (SPECTRAVITE ADULT 50 PLUS PO), Take  by mouth., Disp: , Rfl:     cholecalciferol, vitamin D3, (VITAMIN D3) 2,000 unit tab, Take  by mouth daily. , Disp: , Rfl:     albuterol (PROVENTIL HFA, VENTOLIN HFA, PROAIR HFA) 90 mcg/actuation inhaler, Take 2 Puffs by inhalation every four (4) hours as needed for Wheezing., Disp: , Rfl:     fluticasone (FLONASE) 50 mcg/actuation nasal spray, 2 Sprays by Nasal route daily. , Disp: , Rfl:     aspirin delayed-release 81 mg tablet, Take 81 mg by mouth daily.  Indications: prevention of transient ischemic attack, Disp: , Rfl:   Allergies   Allergen Reactions    Latex Rash     Past Medical History:   Diagnosis Date    DVT (deep venous thrombosis) ([facility] [address] ~2009    RLE    Hypertension     managed with meds    Nausea & vomiting      Past Surgical History:   Procedure Laterality Date    HX HYSTERECTOMY  1991    TVH in [facility] due to presumably high grade cervical dysplasia     HX OPEN CHOLECYSTECTOMY  1995    In [address] [place] [address]  07/2019    back surgery     Family History   Problem Relation Age of Onset    Colon Cancer Mother     Breast Cancer Mother     Cancer Father         lung    Breast Cancer Sister     Cancer Sister         breast    Diabetes Maternal Aunt     Colon Cancer Maternal Grandfather     Lung Disease Brother         asthma    Cancer Sister         breast    Breast Cancer Sister     Hypertension Brother      Social History     Socioeconomic History    Marital status:      Spouse name: Not on file    Number of children: Not on file    Years of education: Not on file    Highest education level: Not on file   Occupational History    Not on file   Tobacco Use    Smoking status: Never Smoker    Smokeless tobacco: Never Used   Substance and Sexual Activity    Alcohol use: Yes     Comment: socially    Drug use: No    Sexual activity: Yes Partners: Male     Birth control/protection: Surgical   Other Topics Concern    Not on file   Social History Narrative    Not on file     Social Determinants of Health     Financial Resource Strain:     Difficulty of Paying Living Expenses: Not on file   Food Insecurity:     Worried About Running Out of Food in the Last Year: Not on file    Nikita of Food in the Last Year: Not on file   Transportation Needs:     Lack of Transportation (Medical): Not on file    Lack of Transportation (Non-Medical): Not on file   Physical Activity:     Days of Exercise per Week: Not on file    Minutes of Exercise per Session: Not on file   Stress:     Feeling of Stress : Not on file   Social Connections:     Frequency of Communication with Friends and Family: Not on file    Frequency of Social Gatherings with Friends and Family: Not on file    Attends [employer] Services: Not on file    Active Member of [address] [place] [address] [employer] or Organizations: Not on file    Attends Club or Organization Meetings: Not on file    Marital Status: Not on file   Intimate Partner Violence:     Fear of Current or Ex-Partner: Not on file    Emotionally Abused: Not on file    Physically Abused: Not on file    Sexually Abused: Not on file   Housing Stability:     Unable to Pay for Housing in the Last Year: Not on file    Number of [place] in the Last Year: Not on file    Unstable Housing in the Last Year: Not on file       Physical Examination:  General: no acute distress  Lungs: breathing easily, CTAB  HEENT: NCAT  Abdomen: soft, non-tender  CV: regular rhythm by pulse, S1/S2 by auscultation  Right Knee:Minimal effusion present. Tenderness to palpation diffusely throughout the medial joint line. Full active and passive range of motion of the knee with pain in the extreme of extension. Negative ligamentous exam x4. Pain with Luis's, positive bounce home. Assessment:     ICD-10-CM ICD-9-CM   1.  Tear of medial meniscus of right knee, current, subsequent encounter  S83.241D V58.89     836.0        Plan:   Surgical plan will be for medial meniscal debridement, loose body removal and possible chondroplasty. Instructed patient to bring her walker and her Iceman pad with her to the day of surgery. She is attending physical therapy at [facility]. I provided her with a postoperative pain medication prescription today and instructed her to pick it up within the next 5 days. We discussed the risks of the procedure including but not limited to infection, bleeding, anesthetic complications postoperative DVT/PE. All of her questions have been answered and they elect to proceed as planned.     Follow up       Dolly Granados NP    Orthopaedics and Sports Medicine

## 2022-02-17 ENCOUNTER — ANESTHESIA EVENT (OUTPATIENT)
Dept: SURGERY | Age: 67
End: 2022-02-17
Payer: COMMERCIAL

## 2022-02-17 RX ORDER — OXYCODONE HYDROCHLORIDE 5 MG/1
5 TABLET ORAL
Status: CANCELLED | OUTPATIENT
Start: 2022-02-17 | End: 2022-02-18

## 2022-02-17 RX ORDER — HALOPERIDOL 5 MG/ML
1 INJECTION INTRAMUSCULAR
Status: CANCELLED | OUTPATIENT
Start: 2022-02-17 | End: 2022-02-18

## 2022-02-17 RX ORDER — SODIUM CHLORIDE, SODIUM LACTATE, POTASSIUM CHLORIDE, CALCIUM CHLORIDE 600; 310; 30; 20 MG/100ML; MG/100ML; MG/100ML; MG/100ML
75 INJECTION, SOLUTION INTRAVENOUS CONTINUOUS
Status: CANCELLED | OUTPATIENT
Start: 2022-02-17

## 2022-02-17 RX ORDER — HYDROMORPHONE HYDROCHLORIDE 2 MG/ML
0.5 INJECTION, SOLUTION INTRAMUSCULAR; INTRAVENOUS; SUBCUTANEOUS
Status: CANCELLED | OUTPATIENT
Start: 2022-02-17

## 2022-02-17 RX ORDER — NALOXONE HYDROCHLORIDE 0.4 MG/ML
0.2 INJECTION, SOLUTION INTRAMUSCULAR; INTRAVENOUS; SUBCUTANEOUS AS NEEDED
Status: CANCELLED | OUTPATIENT
Start: 2022-02-17

## 2022-02-18 ENCOUNTER — HOSPITAL ENCOUNTER (OUTPATIENT)
Age: 67
Setting detail: OUTPATIENT SURGERY
Discharge: HOME | End: 2022-02-18
Attending: ORTHOPAEDIC SURGERY | Admitting: ORTHOPAEDIC SURGERY
Payer: COMMERCIAL

## 2022-02-18 ENCOUNTER — ANESTHESIA (OUTPATIENT)
Dept: SURGERY | Age: 67
End: 2022-02-18
Payer: COMMERCIAL

## 2022-02-18 VITALS
TEMPERATURE: 98.8 F | HEIGHT: 62 IN | RESPIRATION RATE: 18 BRPM | OXYGEN SATURATION: 97 % | BODY MASS INDEX: 27.79 KG/M2 | DIASTOLIC BLOOD PRESSURE: 64 MMHG | HEART RATE: 84 BPM | WEIGHT: 151 LBS | SYSTOLIC BLOOD PRESSURE: 116 MMHG

## 2022-02-18 LAB
HGB BLD-MCNC: 10.3 G/DL (ref 11.7–15.4)
POTASSIUM BLD-SCNC: 4.1 MMOL/L (ref 3.5–5.1)

## 2022-02-18 PROCEDURE — 76060000032 HC ANESTHESIA 0.5 TO 1 HR: Performed by: ORTHOPAEDIC SURGERY

## 2022-02-18 PROCEDURE — 74011000250 HC RX REV CODE- 250: Performed by: REGISTERED NURSE

## 2022-02-18 PROCEDURE — 77030018673: Performed by: ORTHOPAEDIC SURGERY

## 2022-02-18 PROCEDURE — 74011250636 HC RX REV CODE- 250/636: Performed by: ANESTHESIOLOGY

## 2022-02-18 PROCEDURE — 74011250636 HC RX REV CODE- 250/636: Performed by: ORTHOPAEDIC SURGERY

## 2022-02-18 PROCEDURE — 77030002933 HC SUT MCRYL J&J -A: Performed by: ORTHOPAEDIC SURGERY

## 2022-02-18 PROCEDURE — 2709999900 HC NON-CHARGEABLE SUPPLY: Performed by: ORTHOPAEDIC SURGERY

## 2022-02-18 PROCEDURE — 76210000063 HC OR PH I REC FIRST 0.5 HR: Performed by: ORTHOPAEDIC SURGERY

## 2022-02-18 PROCEDURE — 77030010509 HC AIRWY LMA MSK TELE -A: Performed by: ANESTHESIOLOGY

## 2022-02-18 PROCEDURE — 77030038066 HC BLD SHVR ARTH -B: Performed by: ORTHOPAEDIC SURGERY

## 2022-02-18 PROCEDURE — 76010000138 HC OR TIME 0.5 TO 1 HR: Performed by: ORTHOPAEDIC SURGERY

## 2022-02-18 PROCEDURE — 77030008494 HC TBNG ARTHSC IRR ARTH -B: Performed by: ORTHOPAEDIC SURGERY

## 2022-02-18 PROCEDURE — 84132 ASSAY OF SERUM POTASSIUM: CPT

## 2022-02-18 PROCEDURE — 77030040922 HC BLNKT HYPOTHRM STRY -A: Performed by: ANESTHESIOLOGY

## 2022-02-18 PROCEDURE — 85018 HEMOGLOBIN: CPT

## 2022-02-18 PROCEDURE — 74011250637 HC RX REV CODE- 250/637: Performed by: ANESTHESIOLOGY

## 2022-02-18 PROCEDURE — 29881 ARTHRS KNE SRG MNISECTMY M/L: CPT | Performed by: ORTHOPAEDIC SURGERY

## 2022-02-18 PROCEDURE — 76210000020 HC REC RM PH II FIRST 0.5 HR: Performed by: ORTHOPAEDIC SURGERY

## 2022-02-18 PROCEDURE — 74011250636 HC RX REV CODE- 250/636: Performed by: SPECIALIST/TECHNOLOGIST

## 2022-02-18 PROCEDURE — 74011250636 HC RX REV CODE- 250/636: Performed by: REGISTERED NURSE

## 2022-02-18 RX ORDER — FENTANYL CITRATE 50 UG/ML
INJECTION, SOLUTION INTRAMUSCULAR; INTRAVENOUS AS NEEDED
Status: DISCONTINUED | OUTPATIENT
Start: 2022-02-18 | End: 2022-02-18 | Stop reason: HOSPADM

## 2022-02-18 RX ORDER — ACETAMINOPHEN 500 MG
1000 TABLET ORAL ONCE
Status: COMPLETED | OUTPATIENT
Start: 2022-02-18 | End: 2022-02-18

## 2022-02-18 RX ORDER — LIDOCAINE HYDROCHLORIDE 10 MG/ML
0.1 INJECTION INFILTRATION; PERINEURAL AS NEEDED
Status: DISCONTINUED | OUTPATIENT
Start: 2022-02-18 | End: 2022-02-18 | Stop reason: HOSPADM

## 2022-02-18 RX ORDER — PROPOFOL 10 MG/ML
INJECTION, EMULSION INTRAVENOUS AS NEEDED
Status: DISCONTINUED | OUTPATIENT
Start: 2022-02-18 | End: 2022-02-18 | Stop reason: HOSPADM

## 2022-02-18 RX ORDER — NALOXONE HYDROCHLORIDE 0.4 MG/ML
0.2 INJECTION, SOLUTION INTRAMUSCULAR; INTRAVENOUS; SUBCUTANEOUS
Status: DISCONTINUED | OUTPATIENT
Start: 2022-02-18 | End: 2022-02-18 | Stop reason: HOSPADM

## 2022-02-18 RX ORDER — LIDOCAINE HYDROCHLORIDE 20 MG/ML
INJECTION, SOLUTION EPIDURAL; INFILTRATION; INTRACAUDAL; PERINEURAL AS NEEDED
Status: DISCONTINUED | OUTPATIENT
Start: 2022-02-18 | End: 2022-02-18 | Stop reason: HOSPADM

## 2022-02-18 RX ORDER — MIDAZOLAM HYDROCHLORIDE 1 MG/ML
2 INJECTION, SOLUTION INTRAMUSCULAR; INTRAVENOUS
Status: DISCONTINUED | OUTPATIENT
Start: 2022-02-18 | End: 2022-02-18 | Stop reason: HOSPADM

## 2022-02-18 RX ORDER — ONDANSETRON 2 MG/ML
INJECTION INTRAMUSCULAR; INTRAVENOUS AS NEEDED
Status: DISCONTINUED | OUTPATIENT
Start: 2022-02-18 | End: 2022-02-18 | Stop reason: HOSPADM

## 2022-02-18 RX ORDER — SODIUM CHLORIDE 0.9 % (FLUSH) 0.9 %
5-40 SYRINGE (ML) INJECTION EVERY 8 HOURS
Status: DISCONTINUED | OUTPATIENT
Start: 2022-02-18 | End: 2022-02-18 | Stop reason: HOSPADM

## 2022-02-18 RX ORDER — ONDANSETRON 2 MG/ML
4 INJECTION INTRAMUSCULAR; INTRAVENOUS
Status: DISCONTINUED | OUTPATIENT
Start: 2022-02-18 | End: 2022-02-18 | Stop reason: HOSPADM

## 2022-02-18 RX ORDER — EPHEDRINE SULFATE/0.9% NACL/PF 50 MG/5 ML
SYRINGE (ML) INTRAVENOUS AS NEEDED
Status: DISCONTINUED | OUTPATIENT
Start: 2022-02-18 | End: 2022-02-18 | Stop reason: HOSPADM

## 2022-02-18 RX ORDER — SODIUM CHLORIDE 0.9 % (FLUSH) 0.9 %
5-40 SYRINGE (ML) INJECTION AS NEEDED
Status: DISCONTINUED | OUTPATIENT
Start: 2022-02-18 | End: 2022-02-18 | Stop reason: HOSPADM

## 2022-02-18 RX ORDER — FENTANYL CITRATE 50 UG/ML
100 INJECTION, SOLUTION INTRAMUSCULAR; INTRAVENOUS ONCE
Status: DISCONTINUED | OUTPATIENT
Start: 2022-02-18 | End: 2022-02-18 | Stop reason: HOSPADM

## 2022-02-18 RX ORDER — CEFAZOLIN SODIUM/WATER 2 G/20 ML
2 SYRINGE (ML) INTRAVENOUS ONCE
Status: COMPLETED | OUTPATIENT
Start: 2022-02-18 | End: 2022-02-18

## 2022-02-18 RX ORDER — SODIUM CHLORIDE, SODIUM LACTATE, POTASSIUM CHLORIDE, CALCIUM CHLORIDE 600; 310; 30; 20 MG/100ML; MG/100ML; MG/100ML; MG/100ML
25 INJECTION, SOLUTION INTRAVENOUS CONTINUOUS
Status: DISCONTINUED | OUTPATIENT
Start: 2022-02-18 | End: 2022-02-18 | Stop reason: HOSPADM

## 2022-02-18 RX ORDER — ROPIVACAINE HYDROCHLORIDE 5 MG/ML
INJECTION, SOLUTION EPIDURAL; INFILTRATION; PERINEURAL AS NEEDED
Status: DISCONTINUED | OUTPATIENT
Start: 2022-02-18 | End: 2022-02-18 | Stop reason: HOSPADM

## 2022-02-18 RX ORDER — KETOROLAC TROMETHAMINE 30 MG/ML
INJECTION, SOLUTION INTRAMUSCULAR; INTRAVENOUS AS NEEDED
Status: DISCONTINUED | OUTPATIENT
Start: 2022-02-18 | End: 2022-02-18 | Stop reason: HOSPADM

## 2022-02-18 RX ORDER — MIDAZOLAM HYDROCHLORIDE 1 MG/ML
2 INJECTION, SOLUTION INTRAMUSCULAR; INTRAVENOUS ONCE
Status: DISCONTINUED | OUTPATIENT
Start: 2022-02-18 | End: 2022-02-18 | Stop reason: HOSPADM

## 2022-02-18 RX ADMIN — Medication 10 MG: at 08:24

## 2022-02-18 RX ADMIN — FENTANYL CITRATE 25 MCG: 50 INJECTION INTRAMUSCULAR; INTRAVENOUS at 08:19

## 2022-02-18 RX ADMIN — ONDANSETRON 4 MG: 2 INJECTION INTRAMUSCULAR; INTRAVENOUS at 08:41

## 2022-02-18 RX ADMIN — PROPOFOL 50 MG: 10 INJECTION, EMULSION INTRAVENOUS at 08:14

## 2022-02-18 RX ADMIN — PROPOFOL 150 MG: 10 INJECTION, EMULSION INTRAVENOUS at 08:13

## 2022-02-18 RX ADMIN — Medication 2 G: at 08:20

## 2022-02-18 RX ADMIN — SODIUM CHLORIDE, SODIUM LACTATE, POTASSIUM CHLORIDE, AND CALCIUM CHLORIDE 25 ML/HR: 600; 310; 30; 20 INJECTION, SOLUTION INTRAVENOUS at 07:00

## 2022-02-18 RX ADMIN — LIDOCAINE HYDROCHLORIDE 80 MG: 20 INJECTION, SOLUTION EPIDURAL; INFILTRATION; INTRACAUDAL; PERINEURAL at 08:13

## 2022-02-18 RX ADMIN — FENTANYL CITRATE 25 MCG: 50 INJECTION INTRAMUSCULAR; INTRAVENOUS at 08:13

## 2022-02-18 RX ADMIN — ACETAMINOPHEN 1000 MG: 500 TABLET, FILM COATED ORAL at 07:06

## 2022-02-18 RX ADMIN — FENTANYL CITRATE 50 MCG: 50 INJECTION INTRAMUSCULAR; INTRAVENOUS at 08:28

## 2022-02-18 RX ADMIN — SODIUM CHLORIDE, SODIUM LACTATE, POTASSIUM CHLORIDE, AND CALCIUM CHLORIDE: 600; 310; 30; 20 INJECTION, SOLUTION INTRAVENOUS at 08:42

## 2022-02-18 RX ADMIN — KETOROLAC TROMETHAMINE 30 MG: 30 INJECTION, SOLUTION INTRAMUSCULAR; INTRAVENOUS at 08:41

## 2022-02-18 NOTE — OP NOTES
Operative Report    Patient: Maximiliano Vasquez MRN: [id number]  SSN: [id number]    YOB: 1955  Age: 61 y.o. Sex: female       Date of Surgery: 2/18/2022     Preoperative Diagnosis: Right knee medial Meniscus tear    Postoperative Diagnosis: Same    Surgeon(s) and Role:     * Nabil Hardin MD - Primary    Anesthesia: General     Procedure:    1) right knee arthroscopy with partial medial meniscectomy/debridement      Estimated Blood Loss:  3 mL    Tourniquet Time:   Total Tourniquet Time Documented:  Thigh (Right) - 15 minutes  Total: Thigh (Right) - 15 minutes          Implants:   None           Specimens: * No specimens in log *        Drains: None                Complications: None    Counts: Sponge and needle counts were correct times two. 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 Patient was brought to the operating room placed supine the operating table general anesthesia was induced. The operative extremity was prepped and draped in usual orthopedic sterile fashion timeout was performed per protocol. Antibiotics were given per protocol. Initially a standard inferolateral arthroscopy portal was established. Diagnostic arthroscopy was carried out. Suprapatellar pouch was benign. Patellofemoral compartment demonstrated moderate of the articular surfaces of the undersurface of the patella and of the trochlea. Medial and lateral gutters were free of loose bodies. Anteromedial portal was established under spinal needle guidance. Synovitis in anterior interval was debrided with a motorized shaver. ACL was intact and stable to probing with good tension. PCL was intact.    Lateral compartment demonstrated mild degenerative changes with a very tight lateral compartment. The medial compartment demonstrated diffuse grade 4 full-thickness chondromalacia the medial femoral condyle and the tibial plateau. There was a high-grade radial tear adjacent to the posterior root attachment with instability of the posterior horn of meniscus upon probing. Combination of an arthroscopic biter and motorized shaver were used to debride the meniscus back to a stable rim and stabilizing to the posterior capsule and preserved as much as possible. We then drove through the posterior medial aspect of the knee but no loose bodies were visualized. Tourniquet was let down the knee was drained the portals were closed with buried Monocryl suture and Steri-Strips. Sterile dressings were applied.   Mrs Sheela Omer  tolerated the procedure well was awakened and transferred to the PACU in stable condition        Postoperative plan:  1) Discharge Home  2) DVT prophylaxis with aspirin 81 mg twice daily x 2 weeks  3) Rehab protocol: Routine knee arthroscopy protocol with range of motion as tolerated and gradual progression of weightbearing as tolerated     Consider viscosupplementation at 6 weeks postoperatively    Signed By:  Lou Davies MD     February 18, 2022

## 2022-02-18 NOTE — INTERVAL H&P NOTE
Update History & Physical    The Patient's History and Physical was reviewed   I discussed the surgery and patients medical condition with the patient. The chart was reviewed with the patient and I examined the patient. There was no change from previous note. The surgical site was confirmed by the patient and me. CV: RRR  RESP: CTAB    Plan:  The risk, benefits, expected outcome, and alternative to the recommended procedure have been discussed with the patient. Patient understands and elects to proceed with the procedure as planned.     Electronically signed by Latisha Malagon MD on 02/18/22 7:03 AM

## 2022-02-18 NOTE — DISCHARGE INSTRUCTIONS
Post-op instructions for Knee Arthroscopy (Dr. Jay Lomeli)    Day of Surgery:     DIET:   Begin with liquids and light foods (jell-o, soup, etc.). Progress to your normal diet if you are not nauseated. MEDICATION:   1. Pain- Norco (hydrocodone/acetaminophen) or Oxycodone. If you are taking oxycodone, you may also take two (2) Tylenol (acetaminophen) 500mg tabs every eight (8) hours. Do not take Tylenol (acetaminophen) if you are given Norco as this medicine already contains acetaminophen. Do not drink alcohol while taking pain medication. Slowly wean off the pain medication as the pain improves. 2. Aspirin 81 mg: Take one (1) tablet in the morning and at night each day x 2 weeks post-operatively. 3. Stool Softener-Pain medication can cause constipation. Be sure to drink plenty of water and take an over the counter stool softener as needed. ICE:  Do NOT put the ice machine directly on your skin. Use it frequently for the first 72 hours. You may also use a regular ice bag/pack for 20 minutes at a time. Place a thin layer of clothing or pillow case between ice pack and your skin. Ice for 20-30 minutes on and then 20-30 minutes off. If you have the Surgical dressing intact you may run your ice machine for as long as as comfortable as long as your skin is not irritated/burned. SHOWERING:  No showering. Leave bandages in place. ACTIVITY:  Keep leg elevated on a pillow placed under your ankle. **DO NOT PUT A PILLOW UNDER YOUR KNEE!!**  It is important for you to keep your leg straight. Use crutches and put no weight on the operative leg. EXERCISES:  Begin ankle pumps. Attempt quadriceps sets and straight leg raises    First & Second Post-Op Day:    MEDICATION: Continue as instructed above. BANDAGES: No showering. Keep bandage in place. EXERCISES: Continue Quad sets and ankle pumps as above. Bend and extend the knee as tolerated. You may put very light weight on the leg.  Continue to use the crutches. Place a pillow under the ankle and NOT under the knee when icing / elevating the leg. ICE: Continue to use the ice machine frequently as instructed above. Place a thin layer of clothing or pillow case between ice pack and your skin. Ice for 20-30 minutes on and then 20-30 minutes off. Third Post-Op Day:    MEDICATION:  Continue as instructed above. BANDAGES:   (after 72 hours/3 days): Remove outer bandages. Leave steri-strips in place. You may shower, but keep the incisions as dry as possible (cover with plastic wrap). It is best to sit down in the shower to avoid slipping. EXERCISES:    Continue exercises as noted above. Begin to gradually increase weight bearing. Continue with light, partial weight bearing while using crutches. If you have no pain and are able to walk without limping, you may increase weight bearing as tolerated and progress off crutches over the next couple of days. ICE:   Continue to ice frequently eight with the ice machine or regular ice pack. Do not put it directly on your skin. Place a thin layer of clothing or pillow case between ice pack and your skin. Ice for 20-30 minutes on and then 20-30 minutes off. PHYSICAL THERAPY APPOINTMENT:  If you do not already have a PT appointment scheduled, please make an appointment for 3-7 days post-op. GENERAL INFORMATION:     KNEE RESPONSE TO SURGERY:  -Your knee and lower leg will be swollen.  -It may take 4 weeks or longer for the swelling to go away. -It is also common to notice bruising around the thigh, knee and calf. Call with any problems or questions. [phone] if you have any questions or problems. Please contact us immediately if you notice fever greater than 101 degrees F, excessive bleeding, or drainage from the surgery site, calf pain, or shortness of breath.    If you are calling after hours or on a weekend, you may receive a call back from the \"on call\" physician    MEDICATION INTERACTION:  During your procedure you potentially received a medication or medications which may reduce the effectiveness of oral contraceptives. Please consider other forms of contraception for 1 month following your procedure if you are currently using oral contraceptives as your primary form of birth control. In addition to this, we recommend continuing your oral contraceptive as prescribed, unless otherwise instructed by your physician, during this time    After general anesthesia or intravenous sedation, for 24 hours or while taking prescription Narcotics:  · Limit your activities  · A responsible adult needs to be with you for the next 24 hours  · Do not drive and operate hazardous machinery  · Do not make important personal or business decisions  · Do not drink alcoholic beverages  · If you have not urinated within 8 hours after discharge, and you are experiencing discomfort from urinary retention, please go to the nearest ED. · If you have sleep apnea and have a CPAP machine, please use it for all naps and sleeping. · Please use caution when taking narcotics and any of your home medications that may cause drowsiness. *  Please give a list of your current medications to your Primary Care Provider. *  Please update this list whenever your medications are discontinued, doses are      changed, or new medications (including over-the-counter products) are added. *  Please carry medication information at all times in case of emergency situations. These are general instructions for a healthy lifestyle:  No smoking/ No tobacco products/ Avoid exposure to second hand smoke  Surgeon General's Warning:  Quitting smoking now greatly reduces serious risk to your health.   Obesity, smoking, and sedentary lifestyle greatly increases your risk for illness  A healthy diet, regular physical exercise & weight monitoring are important for maintaining a healthy lifestyle    You may be retaining fluid if you have a history of heart failure or if you experience any of the following symptoms:  Weight gain of 3 pounds or more overnight or 5 pounds in a week, increased swelling in our hands or feet or shortness of breath while lying flat in bed. Please call your doctor as soon as you notice any of these symptoms; do not wait until your next office visit.

## 2022-02-18 NOTE — ANESTHESIA POSTPROCEDURE EVALUATION
Procedure(s):  RIGHT KNEE ARTHROSCOPY WITH MEDIAL MENISCECTOMY. general    Anesthesia Post Evaluation      Multimodal analgesia: multimodal analgesia used between 6 hours prior to anesthesia start to PACU discharge  Patient location during evaluation: bedside  Patient participation: complete - patient participated  Level of consciousness: awake and alert  Pain score: 1  Pain management: adequate  Airway patency: patent  Anesthetic complications: no  Cardiovascular status: acceptable  Respiratory status: acceptable  Hydration status: acceptable  Comments: Pt doing well. Ok to d/c home. Post anesthesia nausea and vomiting:  none  Final Post Anesthesia Temperature Assessment:  Normothermia (36.0-37.5 degrees C)      INITIAL Post-op Vital signs:   Vitals Value Taken Time   /70 02/18/22 0858   Temp 37.1 °C (98.8 °F) 02/18/22 0855   Pulse 88 02/18/22 0900   Resp 18 02/18/22 0855   SpO2 99 % 02/18/22 0900   Vitals shown include unvalidated device data.

## 2022-02-21 ENCOUNTER — HOSPITAL ENCOUNTER (OUTPATIENT)
Dept: PHYSICAL THERAPY | Age: 67
Discharge: HOME | End: 2022-02-21
Payer: COMMERCIAL

## 2022-02-21 DIAGNOSIS — S83.241D TEAR OF MEDIAL MENISCUS OF RIGHT KNEE, CURRENT, SUBSEQUENT ENCOUNTER: ICD-10-CM

## 2022-02-21 PROCEDURE — 97110 THERAPEUTIC EXERCISES: CPT

## 2022-02-21 PROCEDURE — 97161 PT EVAL LOW COMPLEX 20 MIN: CPT

## 2022-02-21 NOTE — PROGRESS NOTES
Maximiliano Vasquez  : 1955  Primary: Elba Mart[facility]nez [facility]  Secondary:  [address] [facility] @ [address] [place] [address].  [phone]   [phone]      OUTPATIENT PHYSICAL THERAPY: Daily Treatment Note  2022    ICD-10: Treatment Diagnosis: Pain in right leg (M79.604), Pain in right knee (M25.561) and Stiffness of right knee, not elsewhere classified (M25.661)  Effective Dates: 2022 TO 2022 (90 days). Frequency/Duration: 2 times a week for 90 Days  GOALS: (Goals have been discussed and agreed upon with patient.)  Short-Term Functional Goals: Time Frame: 2 weeks  1. Patient will be independent with HEP. 2. Patient will improve passive knee extension to 0 ° to improve quad activation for weight bearing tasks. 3. Patient will improve knee flexion to 120 ° to improve mobility for sit to stand transfers. Discharge Goals: Time Frame: 6 weeks  1. Patient will be able to maintain single leg stance for 10 seconds to improve safety for independent ambulation. 2. Patient will restore ROM to 0 to 130 ° to normalize mobility for symmetric gait on all surfaces. 3. Patient will perform 5x sit to  <12 seconds to restore strength for safe community mobility.    _________________________________________________________________________  Pre-treatment Symptoms/Complaints:  See initial evaluation  Pain: Initial: 8/10 Post Session:  No increase/10   Medications Last Reviewed:  2022  Updated Objective Findings:  Below measures from initial evaluation unless otherwise noted. Observation/Orthostatic Postural Assessment:    Ambulates with rolling walker with knee flexed at 20 ° throughout gait cycle. Incision sites are dry and closed. Mild swelling present to the joint. Palpation:    Tender to joint line.   ROM:    Right knee: 8 to 40 ° active; 5 to 90 ° (flexion done in sitting) passive with painful end feel into extension and stiff end feel into flexion   SLR: 70 °   Strength:   Quad set is fair  SLR: 3/5   Knee extension: 3/5   Knee flexion: 4/5   Special Tests:    Not applicable  Neurological Screen:  Grossly intact; SLR is negative  Functional Mobility:   WNL  Balance:    Not applicable     TREATMENT:   THERAPEUTIC ACTIVITY: ( see below for minutes): Therapeutic activities per grid below to improve mobility, strength, balance and coordination. Required minimal visual, verbal, manual and tactile cues to improve independence and safety with daily activities . THERAPEUTIC EXERCISE: (see below for minutes):  Exercises per grid below to improve mobility, strength, balance and coordination. Required minimal verbal and manual cues to promote proper body alignment, promote proper body posture and promote proper body mechanics. Progressed resistance, range, repetitions and complexity of movement as indicated. MANUAL THERAPY: (see below for minutes): Joint mobilization and Soft tissue mobilization was utilized and necessary because of the patient's restricted joint motion, painful spasm, loss of articular motion and restricted motion of soft tissue. MODALITIES: (see below for minutes):      to decrease pain    SELF CARE: (see below for minutes): Procedure(s) (per grid) utilized to improve and/or restore self-care/home management as related to dressing, bathing and grooming. Required minimal verbal cueing to facilitate activities of daily living skills and compensatory activities. Date: 2/21/22       Modalities:                                Therapeutic Exercise: 25 min        Stretching Therapist assisted into extension and flexion       Quad set [id number]       SLR 20x       Heel slides 20x                       Proprioceptive Activities:                                Manual Therapy:                        Therapeutic Activities:                                  HEP: see 2/21/22 flow sheet for specifics.     [facility] Portal  Treatment/Session Summary:    · Response to Treatment:  Patient is independent with performance of above described home program.  · Communication/Consultation:  None today  · Equipment provided today:  None today  · Recommendations/Intent for next treatment session: Next visit will focus on progression of strength and return to prior level of function.     Total Treatment Billable Duration:  45 minutes  PT Patient Time In/Time Out  Time In: 1015  Time Out: Braeden 36, DPT    Future Appointments   Date Time Provider Socorro Chandler   2/23/2022 10:15 AM Aquilino Fiore DPT [facility]   3/3/2022 10:15 AM Sandra Gant MD [facility]   3/10/2022 11:45 AM Aquilino Fiore DPT [facility]   3/17/2022 11:45 AM Aquilino Fiore DPT [facility]   3/24/2022 11:45 AM Aquilino Fiore DPT [facility]   3/31/2022 11:00 AM Aquilino Fiore DPT [facility]

## 2022-02-21 NOTE — THERAPY EVALUATION
Issa Coleman  : 1955 [address] [facility]  [address] [address].  [phone]   [phone]        OUTPATIENT PHYSICAL THERAPY:Initial Assessment 2022         ICD-10: Treatment Diagnosis: Pain in right leg (M79.604), Pain in right knee (M25.561) and Stiffness of right knee, not elsewhere classified (M25.661)  Precautions/Allergies:   Latex, Other food, Chocolate [cocoa], Peanut, Prednisone, and Strawberry   Fall Risk Score:     Ambulatory/Rehab Services H2 Model Falls Risk Assessment    Risk Factors:       No Risk Factors Identified Ability to Rise from Chair:       (0)  Ability to rise in a single movement    Falls Prevention Plan:       No modifications necessary   Total: (5 or greater = High Risk): 0     [facility] [address] Patent #[id number]. Federal Law prohibits the replication, distribution or use without written permission from [facility] [employer]     MD Orders: evaluate and treat MEDICAL/REFERRING DIAGNOSIS:  Tear of medial meniscus of right knee, current, subsequent encounter [S83.241D]   DATE OF ONSET: date of surgery:  s/p medial mensicectomy of the posterior horn and grade 4 changes to the medial compartment  REFERRING PHYSICIAN: Neha Thakur NP  [address]: [address]     INITIAL ASSESSMENT:  Ms. Sheela Omer presents to therapy following above surgical repair. She has decreased mobility through the joint into extension and flexion due to stiffness, increased swelling, pain, and apprehension. She has decreased strength throughout the limb due to inhibition from pain, atrophy, and apprehension. She has impaired mobility requiring use of rolling walking for safety. Impairments cause difficulty with community mobility. Skilled therapy is required to return to prior level of function. PROBLEM LIST (Impacting functional limitations):  1.  Decreased Strength  2. Decreased ADL/Functional Activities  3. Decreased Transfer Abilities  4. Decreased Ambulation Ability/Technique  5. Decreased Balance  6. Increased Pain  7. Decreased Activity Tolerance  8. Decreased Flexibility/Joint Mobility INTERVENTIONS PLANNED:  1. Balance Exercise  2. Family Education  3. Gait Training  4. Home Exercise Program (HEP)  5. Manual Therapy  6. Neuromuscular Re-education/Strengthening  7. Range of Motion (ROM)  8. Therapeutic Activites  9. Therapeutic Exercise/Strengthening  10. modalities    TREATMENT PLAN:  Effective Dates: 2/21/2022 TO 5/23/2022 (90 days). Frequency/Duration: 2 times a week for 90 Days  GOALS: (Goals have been discussed and agreed upon with patient.)  Short-Term Functional Goals: Time Frame: 2 weeks  1. Patient will be independent with HEP. 2. Patient will improve passive knee extension to 0 ° to improve quad activation for weight bearing tasks. 3. Patient will improve knee flexion to 120 ° to improve mobility for sit to stand transfers. Discharge Goals: Time Frame: 6 weeks  1. Patient will be able to maintain single leg stance for 10 seconds to improve safety for independent ambulation. 2. Patient will restore ROM to 0 to 130 ° to normalize mobility for symmetric gait on all surfaces. 3. Patient will perform 5x sit to  <12 seconds to restore strength for safe community mobility. Rehabilitation Potential For Stated Goals: Excellent  Regarding Rome Ace's therapy, I certify that the treatment plan above will be carried out by a therapist or under their direction. Thank you for this referral,  Beth Corado DPT       Referring Physician Signature: Theo Guzman NP              Date                      HISTORY:   History of Present Injury/Illness (Reason for Referral):  Patient presents to therapy following above surgical repair. Initial injury occurred in mid Summer when she stepped in a hole and twisted her knee.  This caused her to have significant pain throughout the joint and altered her gait pattern and she began to use a cane for safety. She also began to experience increased pain down the leg which she attributes to altered gait affecting an underlying back issue. Symptoms did not resolve with rest and she presents today following surgical repair. She reports no longer getting shooting pain down the leg but is very apprehensive about moving the joint. She has started to decrease the dose of her pain medication and has been icing to manage her symptoms. Past Medical History/Comorbidities:   Ms. Washington Aaorn  has a past medical history of Asthma, Cancer ([facility] [address] DVT (deep venous thrombosis) ([facility] [address] (~2009), GERD (gastroesophageal reflux disease), Hypertension, and Iron deficiency anemia. Ms. Washington Aaron  has a past surgical history that includes hx open cholecystectomy (1995); hx hysterectomy (1991); and neurological procedure unlisted (07/2019). Social History/Living Environment:     Patient lives at home with her . 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 Dominant Side:         RIGHT  Current Medications:    Current Outpatient Medications:     loratadine 10 mg cap, Take  by mouth daily. , Disp: , Rfl:     omalizumab (Xolair) 75 mg/0.5 mL syrg, by SubCUTAneous route every thirty (30) days. , Disp: , Rfl:     fexofenadine HCl (FEXOFENADINE PO), Take  by mouth daily. , Disp: , Rfl:     atorvastatin (LIPITOR) 20 mg tablet, Take 1 Tab by mouth nightly., Disp: 30 Tab, Rfl: 5    CALCIUM CITRATE-VITAMIN D3 PO, Take  by mouth daily. , Disp: , Rfl:     cyanocobalamin (Vitamin B-12) 100 mcg tablet, Take 100 mcg by mouth daily. , Disp: , Rfl:     omeprazole (PRILOSEC) 20 mg capsule, Take 1 Cap by mouth daily.  (Patient taking differently: Take 20 mg by mouth nightly.), Disp: 90 Cap, Rfl: 4    PARoxetine (PAXIL) 20 mg tablet, TAKE 1 TABLET BY MOUTH EVERY DAY, Disp: , Rfl:    amLODIPine (NORVASC) 10 mg tablet, Take 1 Tab by mouth daily. , Disp: 90 Tab, Rfl: 3    ferrous sulfate 325 mg (65 mg iron) tablet, Take 1 Tab by mouth Daily (before breakfast). , Disp: 90 Tab, Rfl: 3    olopatadine (Pazeo) 0.7 % drop, Apply 1 Drop to eye daily. , Disp: , Rfl:     allergy injection, Twice a week, Disp: , Rfl:     EPINEPHrine (EPIPEN) 0.3 mg/0.3 mL injection, USE AS DIRECTED, Disp: , Rfl:     olmesartan (Benicar) 40 mg tablet, Take 1 Tab by mouth daily. , Disp: 90 Tab, Rfl: 02    hydroCHLOROthiazide (HYDRODIURIL) 25 mg tablet, Take 1 Tab by mouth daily. Indications: high blood pressure, Disp: 90 Tab, Rfl: 1    multivit-min/FA/lycopen/lutein (SPECTRAVITE ADULT 50 PLUS PO), Take  by mouth daily. , Disp: , Rfl:     cholecalciferol, vitamin D3, (VITAMIN D3) 2,000 unit tab, Take  by mouth daily. , Disp: , Rfl:     albuterol (PROVENTIL HFA, VENTOLIN HFA, PROAIR HFA) 90 mcg/actuation inhaler, Take 2 Puffs by inhalation every four (4) hours as needed for Wheezing., Disp: , Rfl:     fluticasone (FLONASE) 50 mcg/actuation nasal spray, 2 Sprays by Nasal route as needed. , Disp: , Rfl:     aspirin delayed-release 81 mg tablet, Take 81 mg by mouth daily. Indications: prevention of transient ischemic attack, Disp: , Rfl:    Date Last Reviewed:  2/21/2022   # of Personal Factors/Comorbidities that affect the Plan of Care: 0: LOW COMPLEXITY   EXAMINATION:   Observation/Orthostatic Postural Assessment:    Ambulates with rolling walker with knee flexed at 20 ° throughout gait cycle. Incision sites are dry and closed. Mild swelling present to the joint. Palpation:    Tender to joint line.   ROM:    Right knee: 8 to 40 ° active; 5 to 90 ° (flexion done in sitting) passive with painful end feel into extension and stiff end feel into flexion   SLR: 70 °   Strength:   Quad set is fair  SLR: 3/5   Knee extension: 3/5   Knee flexion: 4/5   Special Tests:    Not applicable  Neurological Screen:  Grossly intact; SLR is negative  Functional Mobility:   WNL  Balance:    Not applicable   Body Structures Involved:  1. Nerves  2. Bones  3. Joints  4. Muscles  5. Ligaments Body Functions Affected:  1. Sensory/Pain  2. Neuromusculoskeletal  3. Movement Related Activities and Participation Affected:  1. General Tasks and Demands  2. Mobility  3. Self Care  4. Domestic Life  5. Interpersonal Interactions and Relationships  6. Community, Social and [address] [place] [address]   # of elements that affect the Plan of Care: 1-2: LOW COMPLEXITY   CLINICAL PRESENTATION:   Presentation: Stable and uncomplicated: LOW COMPLEXITY   CLINICAL DECISION MAKING:   Tool Used: Lower Extremity Functional Scale (LEFS)  Score:  Initial: 20/80 Most Recent: X/80 (Date: -- )   Interpretation of Score: 20 questions each scored on a 5 point scale with 0 representing \"extreme difficulty or unable to perform\" and 4 representing \"no difficulty\". The lower the score, the greater the functional disability. 80/80 represents no disability. Minimal detectable change is 9 points. Medical Necessity:   · Patient is expected to demonstrate progress in strength, range of motion, balance and coordination  ·  to increase independence with community mobility and return to prior level of function  · .  Reason for Services/Other Comments:  · Patient has demonstrated an improvement in functional level by independent performance of HEP.    · .   Use of outcome tool(s) and clinical judgement create a POC that gives a: Clear prediction of patient's progress: LOW COMPLEXITY     Total Treatment Duration:  PT Patient Time In/Time Out  Time In: 1015  Time Out: [address] [facility]

## 2022-02-23 ENCOUNTER — HOSPITAL ENCOUNTER (OUTPATIENT)
Dept: PHYSICAL THERAPY | Age: 67
Discharge: HOME | End: 2022-02-23
Payer: COMMERCIAL

## 2022-02-23 PROCEDURE — 97110 THERAPEUTIC EXERCISES: CPT

## 2022-02-23 PROCEDURE — 97014 ELECTRIC STIMULATION THERAPY: CPT

## 2022-02-25 ENCOUNTER — HOSPITAL ENCOUNTER (OUTPATIENT)
Dept: PHYSICAL THERAPY | Age: 67
Discharge: HOME | End: 2022-02-25
Payer: COMMERCIAL

## 2022-02-25 PROCEDURE — 97110 THERAPEUTIC EXERCISES: CPT

## 2022-02-25 PROCEDURE — 97032 APPL MODALITY 1+ESTIM EA 15: CPT

## 2022-02-25 NOTE — PROGRESS NOTES
Noam Myles  : 1955  Primary: Dulce Roe [facility]  Secondary:  [address] [place] [address].  [phone]   [phone]      OUTPATIENT PHYSICAL THERAPY: Daily Treatment Note  2022    ICD-10: Treatment Diagnosis: Pain in right leg (M79.604), Pain in right knee (M25.561) and Stiffness of right knee, not elsewhere classified (M25.661)  Effective Dates: 2022 TO 2022 (90 days). Frequency/Duration: 2 times a week for 90 Days  GOALS: (Goals have been discussed and agreed upon with patient.)  Short-Term Functional Goals: Time Frame: 2 weeks  1. Patient will be independent with HEP. 2. Patient will improve passive knee extension to 0 ° to improve quad activation for weight bearing tasks. 3. Patient will improve knee flexion to 120 ° to improve mobility for sit to stand transfers. Discharge Goals: Time Frame: 6 weeks  1. Patient will be able to maintain single leg stance for 10 seconds to improve safety for independent ambulation. 2. Patient will restore ROM to 0 to 130 ° to normalize mobility for symmetric gait on all surfaces. 3. Patient will perform 5x sit to  <12 seconds to restore strength for safe community mobility.    _________________________________________________________________________  Pre-treatment Symptoms/Complaints:  Continues to get intermittent swelling. Was very fatigued following previous session. Pain: Initial: 8/10 Post Session:  No increase/10   Medications Last Reviewed:  2022  Updated Objective Findings:  Below measures from initial evaluation unless otherwise noted. Observation/Orthostatic Postural Assessment:    Ambulates with rolling walker with knee flexed at 20 ° throughout gait cycle. Incision sites are dry and closed. Mild swelling present to the joint. Palpation:    Tender to joint line.   ROM:    Right knee: 8 to 40 ° active; 5 to 90 ° (flexion done in sitting) passive with painful end feel into extension and stiff end feel into flexion   SLR: 70 °   Strength:   Quad set is fair  SLR: 3/5   Knee extension: 3/5   Knee flexion: 4/5   Special Tests:    Not applicable  Neurological Screen:  Grossly intact; SLR is negative  Functional Mobility:   WNL  Balance:    Not applicable     TREATMENT:   THERAPEUTIC ACTIVITY: ( see below for minutes): Therapeutic activities per grid below to improve mobility, strength, balance and coordination. Required minimal visual, verbal, manual and tactile cues to improve independence and safety with daily activities . THERAPEUTIC EXERCISE: (see below for minutes):  Exercises per grid below to improve mobility, strength, balance and coordination. Required minimal verbal and manual cues to promote proper body alignment, promote proper body posture and promote proper body mechanics. Progressed resistance, range, repetitions and complexity of movement as indicated. MANUAL THERAPY: (see below for minutes): Joint mobilization and Soft tissue mobilization was utilized and necessary because of the patient's restricted joint motion, painful spasm, loss of articular motion and restricted motion of soft tissue. MODALITIES: (see below for minutes):      to decrease pain    SELF CARE: (see below for minutes): Procedure(s) (per grid) utilized to improve and/or restore self-care/home management as related to dressing, bathing and grooming. Required minimal verbal cueing to facilitate activities of daily living skills and compensatory activities.      Date: 2/21/22 2/23/22 (visit 2) 2/25/22 (visit 3)     Modalities:  15 min  15 min        Ice with IFC for pain and swelling 15 min                      Therapeutic Exercise: 25 min  45 min  45 min      Stretching Therapist assisted into extension and flexion Therapist assisted stretching into extension and flexion; hamstring stretching [id number] repeat     Quad set [id number], FTKE 20x blue tband     SLR 20x 30x with minimal assist 30x independently      Heel slides 20x 20x; seated knee flexion 5 min  Seated knee flexion 5 min      SAQ   30x  30x     Nustep  5 min L3 5 min L3                             Proprioceptive Activities:                                Manual Therapy:                        Therapeutic Activities:                                  HEP: see 2/21/22 flow sheet for specifics. [facility] Portal  Treatment/Session Summary:    · Response to Treatment:  Extension is no longer guarded, but stiffness limits motion into full end range. · Communication/Consultation:  None today  · Equipment provided today:  None today  · Recommendations/Intent for next treatment session: Next visit will focus on progression of strength and return to prior level of function.     Total Treatment Billable Duration:  60 minutes  PT Patient Time In/Time Out  Time In: 1100  Time Out: UlLatesha Paul 107, DPT    Future Appointments   Date Time Provider Socorro Chandler   3/1/2022 11:45 AM Herb Stalker, PT [facility]   3/3/2022  8:45 AM Herb Stalker, PT [facility]   3/3/2022 10:15 AM Chris Younger MD [facility]   3/7/2022 11:45 AM Herb Stalker, PT [facility]   3/9/2022  9:30 AM Herb Stalker, PT [facility]   3/14/2022 11:45 AM Herb Stalker, PT [facility]   3/16/2022  4:15 PM Herb Stalker, PT [facility]   3/21/2022 11:45 AM Herb Stalker, PT [facility]   3/23/2022  9:30 AM Herb Stalker, PT [facility]   3/28/2022 11:45 AM Herb Stalker, PT [facility]   3/30/2022  9:30 AM Nicole Pacheco, PT [facility]

## 2022-03-01 ENCOUNTER — HOSPITAL ENCOUNTER (OUTPATIENT)
Dept: PHYSICAL THERAPY | Age: 67
Discharge: HOME | End: 2022-03-01
Payer: COMMERCIAL

## 2022-03-01 PROCEDURE — 97014 ELECTRIC STIMULATION THERAPY: CPT

## 2022-03-01 PROCEDURE — 97110 THERAPEUTIC EXERCISES: CPT

## 2022-03-01 NOTE — PROGRESS NOTES
Shivani Blanchard  : 1955  Primary: [facility]  Secondary:  [address] @ [address] [place].  [phone]   [phone]      OUTPATIENT PHYSICAL THERAPY: Daily Treatment Note  3/1/2022    ICD-10: Treatment Diagnosis: Pain in right leg (M79.604), Pain in right knee (M25.561) and Stiffness of right knee, not elsewhere classified (M25.661)  Effective Dates: 2022 TO 2022 (90 days). Frequency/Duration: 2 times a week for 90 Days  GOALS: (Goals have been discussed and agreed upon with patient.)  Short-Term Functional Goals: Time Frame: 2 weeks  1. Patient will be independent with HEP. 2. Patient will improve passive knee extension to 0 ° to improve quad activation for weight bearing tasks. 3. Patient will improve knee flexion to 120 ° to improve mobility for sit to stand transfers. Discharge Goals: Time Frame: 6 weeks  1. Patient will be able to maintain single leg stance for 10 seconds to improve safety for independent ambulation. 2. Patient will restore ROM to 0 to 130 ° to normalize mobility for symmetric gait on all surfaces. 3. Patient will perform 5x sit to  <12 seconds to restore strength for safe community mobility.    _________________________________________________________________________  Pre-treatment Symptoms/Complaints:  Fatigued after therapy sessions. Anxious to get knee back to normal, wants to be able to work in her garden. Working at home on [address] [place] [address]. Pain: Initial: 8/10 Post Session:  No increase/10   Medications Last Reviewed:  3/1/2022  Updated Objective Findings:  Below measures from initial evaluation unless otherwise noted. Observation/Orthostatic Postural Assessment:    Ambulates with rolling walker with knee flexed at 20 ° throughout gait cycle. Incision sites are dry and closed. Mild swelling present to the joint. Palpation:    Tender to joint line.   ROM:    Right knee: 8 to 40 ° active; 5 to 90 ° (flexion done in sitting) passive with painful end feel into extension and stiff end feel into flexion   SLR: 70 °   Strength:   Quad set is fair  SLR: 3/5   Knee extension: 3/5   Knee flexion: 4/5   Special Tests:    Not applicable  Neurological Screen:  Grossly intact; SLR is negative  Functional Mobility:   WNL  Balance:    Not applicable     TREATMENT:   THERAPEUTIC ACTIVITY: ( see below for minutes): Therapeutic activities per grid below to improve mobility, strength, balance and coordination. Required minimal visual, verbal, manual and tactile cues to improve independence and safety with daily activities . THERAPEUTIC EXERCISE: (see below for minutes):  Exercises per grid below to improve mobility, strength, balance and coordination. Required minimal verbal and manual cues to promote proper body alignment, promote proper body posture and promote proper body mechanics. Progressed resistance, range, repetitions and complexity of movement as indicated. MANUAL THERAPY: (see below for minutes): Joint mobilization and Soft tissue mobilization was utilized and necessary because of the patient's restricted joint motion, painful spasm, loss of articular motion and restricted motion of soft tissue. MODALITIES: (see below for minutes):      to decrease pain    SELF CARE: (see below for minutes): Procedure(s) (per grid) utilized to improve and/or restore self-care/home management as related to dressing, bathing and grooming. Required minimal verbal cueing to facilitate activities of daily living skills and compensatory activities. Date: 2/21/22 2/23/22 (visit 2) 2/25/22 (visit 3) 3/1/22 (visit 4)    Modalities:  15 min  15 min  15 min      Ice with IFC for pain and swelling 15 min  15 min IFC/e-stin for pain.                      Therapeutic Exercise: 25 min  45 min  45 min  39 min    Stretching Therapist assisted into extension and flexion Therapist assisted stretching into extension and flexion; hamstring stretching [id number] repeat Therapist assisted: extension overpressure,  SB under heels rolling flexion/extn: 20  min    Quad set [id number], FTKE 20x blue tband Manl cues popliteal: 5\" holds x 20     SLR 20x 30x with minimal assist 30x independently      Heel slides 20x 20x; seated knee flexion 5 min  Seated knee flexion 5 min  heel slides w/ strap assist and 30\" holds,    SAQ   30x  30x 30x    Nustep  5 min L3 5 min L3 6  min - no resist.                             Proprioceptive Activities:                                Manual Therapy:    6 min        Tibiofem. ER gr. 3, patellar inf/medial gr. 3            Therapeutic Activities:                                  HEP: see 2/21/22 flow sheet for specifics. [employer] Portal  Treatment/Session Summary:    · Response to Treatment:  Persisting end range stiffness. ~ 5 deg from full extension ROM at this point. Knee flexion at 95 deg after stretch. .   · Communication/Consultation:  None today  · Equipment provided today:  None today  · Recommendations/Intent for next treatment session: Next visit will focus on progression of strength and return to prior level of function, regain full knee ROM.      Total Treatment Billable Duration:  60 minutes  PT Patient Time In/Time Out  Time In: 1150  Time Out: 2215 Erica Mccormick PT    Future Appointments   Date Time Provider Socorro Chandler   3/3/2022  8:45 AM Ilda Patrick, PT [facility]   3/3/2022 10:15 AM Maribel Camacho MD [facility]   3/7/2022 11:45 AM Ilda Patrcik, PT [facility]   3/9/2022  9:30 AM [facility]ores[facility]   3/14/2022 11:45 AM [facility] Patrick[facility]   3/16/2022  4:15 PM Ilda Dolores, PT [facility]   3/21/2022 11:45 AM Ilda Dolores, PT [facility]   3/23/2022  9:30 AM Ilda Patrick, PT [facility]   3/28/2022 11:45 AM [facility] 3/30/2022  9:30 AM [facility] Parrish, [facility]

## 2022-03-03 ENCOUNTER — HOSPITAL ENCOUNTER (OUTPATIENT)
Dept: PHYSICAL THERAPY | Age: 67
Discharge: HOME | End: 2022-03-03
Payer: COMMERCIAL

## 2022-03-03 PROCEDURE — 97016 VASOPNEUMATIC DEVICE THERAPY: CPT

## 2022-03-03 PROCEDURE — 97110 THERAPEUTIC EXERCISES: CPT

## 2022-03-03 NOTE — PROGRESS NOTES
Suzie Wells  : 1955  Primary: [facility]  Secondary:  [address] [facility] @ [address], [address] [place] [address]  [phone]   [id number]      OUTPATIENT PHYSICAL THERAPY: Daily Treatment Note  3/3/2022    ICD-10: Treatment Diagnosis: Pain in right leg (M79.604), Pain in right knee (M25.561) and Stiffness of right knee, not elsewhere classified (M25.661)  Effective Dates: 2022 TO 2022 (90 days). Frequency/Duration: 2 times a week for 90 Days  GOALS: (Goals have been discussed and agreed upon with patient.)  Short-Term Functional Goals: Time Frame: 2 weeks  1. Patient will be independent with HEP. 2. Patient will improve passive knee extension to 0 ° to improve quad activation for weight bearing tasks. 3. Patient will improve knee flexion to 120 ° to improve mobility for sit to stand transfers. Discharge Goals: Time Frame: 6 weeks  1. Patient will be able to maintain single leg stance for 10 seconds to improve safety for independent ambulation. 2. Patient will restore ROM to 0 to 130 ° to normalize mobility for symmetric gait on all surfaces. 3. Patient will perform 5x sit to  <12 seconds to restore strength for safe community mobility.    _________________________________________________________________________  Pre-treatment Symptoms/Complaints:  Knee starting to improve. Sees Dr. Noble [place] later this am.   Pain: Initial: 8/10 Post Session:  No increase/10   Medications Last Reviewed:  3/3/2022  Updated Objective Findings:  Below measures from initial evaluation unless otherwise noted. Observation/Orthostatic Postural Assessment:    Ambulates with rolling walker with knee flexed at 20 ° throughout gait cycle. Incision sites are dry and closed. Mild swelling present to the joint. Palpation:    Tender to joint line.   ROM:    Right knee: 8 to 40 ° active; 5 to 90 ° (flexion done in sitting) passive with painful end feel into extension and stiff end feel into flexion   SLR: 70 °   Strength:   Quad set is fair  SLR: 3/5   Knee extension: 3/5   Knee flexion: 4/5   Special Tests:    Not applicable  Neurological Screen:  Grossly intact; SLR is negative  Functional Mobility:   WNL  Balance:    Not applicable     TREATMENT:   THERAPEUTIC ACTIVITY: ( see below for minutes): Therapeutic activities per grid below to improve mobility, strength, balance and coordination. Required minimal visual, verbal, manual and tactile cues to improve independence and safety with daily activities . THERAPEUTIC EXERCISE: (see below for minutes):  Exercises per grid below to improve mobility, strength, balance and coordination. Required minimal verbal and manual cues to promote proper body alignment, promote proper body posture and promote proper body mechanics. Progressed resistance, range, repetitions and complexity of movement as indicated. MANUAL THERAPY: (see below for minutes): Joint mobilization and Soft tissue mobilization was utilized and necessary because of the patient's restricted joint motion, painful spasm, loss of articular motion and restricted motion of soft tissue. MODALITIES: (see below for minutes):      to decrease pain    SELF CARE: (see below for minutes): Procedure(s) (per grid) utilized to improve and/or restore self-care/home management as related to dressing, bathing and grooming. Required minimal verbal cueing to facilitate activities of daily living skills and compensatory activities. Date: 2/21/22 2/23/22 (visit 2) 2/25/22 (visit 3) 3/1/22 (visit 4) 3/3/22 (visit 5)    Modalities:  15 min  15 min  15 min 13 min     Ice with IFC for pain and swelling 15 min  15 min IFC/e-stin for pain. Gameready vaso/cryo x 13 min.   `                   Therapeutic Exercise: 25 min  45 min  45 min  39 min 40 min   Stretching Therapist assisted into extension and flexion Therapist assisted stretching into extension and flexion; hamstring stretching [id number] repeat Therapist assisted: extension overpressure,  SB under heels rolling flexion/extn: 20  min Seated assisted flexion stretching: AAROM x 6 min   Quad set [id number], FTKE 20x blue tband Manl cues popliteal: 5\" holds x 20  25 x 5\",w pressure gauge feedback   SLR 20x 30x with minimal assist 30x independently   2 x 10    Heel slides 20x 20x; seated knee flexion 5 min  Seated knee flexion 5 min  heel slides w/ strap assist and 30\" holds, heel slides w/ strap assist and 30\" holds, x 5 min   SAQ   30x  30x 30x    Nustep  5 min L3 5 min L3 6  min - no resist.  6 min , no resist   SB hamstring curls     30x                   Proprioceptive Activities:                                Manual Therapy:    6 min 3 min       Tibiofem. ER gr. 3, patellar inf/medial gr. 3 Tibiofemoral distraction (seated, knee flxed) gr.  3, tibial post glides. Gr. 3           Therapeutic Activities:                                  HEP: see 2/21/22 flow sheet for specifics. [employer] Portal  Treatment/Session Summary:    · Response to Treatment:  Knee ROM's advanced to -2 to 108 deg. Today. Less stiffness during gait and use of seated stepper, able to exercise more fluidly. · Communication/Consultation:  None today  · Equipment provided today:  None today  · Recommendations/Intent for next treatment session: Next visit will focus on progression of strength and return to prior level of function, regain full knee ROM.      Total Treatment Billable Duration:  56 minutes  PT Patient Time In/Time Out  Time In: 8359  Time Out: [address] [place] [address], PT    Future Appointments   Date Time Provider Socorro Chandler   3/7/2022 11:45 AM Valeria Cifuentes, PT [facility]   3/9/2022  9:30 AM Valeria Cifuentes PT [facility]   3/14/2022 11:45 AM Valeria Cifuentes PT [facility]   3/16/2022  4:15 PM Valeria Cifuentes PT [facility]   3/21/2022 11:45 AM Fadumo Arroyo, PT [facility]   3/23/2022  9:30 AM Brooks Madison, PT [facility]   3/28/2022 11:45 AM Brooks Madison, PT [facility]   3/30/2022  9:30 AM Walt Powers, PT [facility]

## 2022-03-03 NOTE — PROGRESS NOTES
Saulo Hawk   (NEETU:[birth year]) [address] [place] [address].  [phone]   [phone]           PHYSICIAN COMMUNICATION    REFERRING PHYSICIAN: Adilene Darby NP  Return Physician Appointment: 3/3/22  MEDICAL/REFERRING DIAGNOSIS:  · Tear of medial meniscus of right knee, current, subsequent encounter [Z54.949N]  ATTENDANCE: Saulo Hawk has attended 5 out of 5 visits, with 0 cancellation(s) and 0 no shows. ASSESSMENT:  DATE: 3/3/2022    PROGRESS: Saulo Hawk is progressing slower than expected - current ROM's are at - 2 deg to 108 deg, limited by medial knee pain. Therapy emphasis at this point is on regaining ROM, improving gait with goal to wean from front-wheel walker and restoring normal LE strengths. RECOMMENDATIONS: Continue physical therapy to meet current long term goals.      Thank you for this referral,  Billie Hall, PT     Referring Physician Signature: Adilene Darby NP          Date

## 2022-03-07 ENCOUNTER — HOSPITAL ENCOUNTER (OUTPATIENT)
Dept: PHYSICAL THERAPY | Age: 67
Discharge: HOME | End: 2022-03-07
Payer: COMMERCIAL

## 2022-03-07 PROCEDURE — 97140 MANUAL THERAPY 1/> REGIONS: CPT

## 2022-03-07 PROCEDURE — 97110 THERAPEUTIC EXERCISES: CPT

## 2022-03-07 NOTE — PROGRESS NOTES
Maximiliano Vasquez  : 1955  Primary: [facility]  Secondary:  [address] Echols [address]  [phone]   [phone]      OUTPATIENT PHYSICAL THERAPY: Daily Treatment Note  3/7/2022    ICD-10: Treatment Diagnosis: Pain in right leg (M79.604), Pain in right knee (M25.561) and Stiffness of right knee, not elsewhere classified (M25.661)  Effective Dates: 2022 TO 2022 (90 days). Frequency/Duration: 2 times a week for 90 Days  GOALS: (Goals have been discussed and agreed upon with patient.)  Short-Term Functional Goals: Time Frame: 2 weeks  1. Patient will be independent with HEP. 2. Patient will improve passive knee extension to 0 ° to improve quad activation for weight bearing tasks. 3. Patient will improve knee flexion to 120 ° to improve mobility for sit to stand transfers. Discharge Goals: Time Frame: 6 weeks  1. Patient will be able to maintain single leg stance for 10 seconds to improve safety for independent ambulation. 2. Patient will restore ROM to 0 to 130 ° to normalize mobility for symmetric gait on all surfaces. 3. Patient will perform 5x sit to  <12 seconds to restore strength for safe community mobility.    _________________________________________________________________________  Pre-treatment Symptoms/Complaints:  Knee stiffer today. Pain: Initial: 8/10 Post Session:  No increase/10   Medications Last Reviewed:  3/7/2022  Updated Objective Findings:  Below measures from initial evaluation unless otherwise noted. Observation/Orthostatic Postural Assessment:    Ambulates with rolling walker with knee flexed at 20 ° throughout gait cycle. Incision sites are dry and closed. Mild swelling present to the joint. Palpation:    Tender to joint line.   ROM:    Right knee: 8 to 40 ° active; 5 to 90 ° (flexion done in sitting) passive with painful end feel into extension and stiff end feel into flexion   SLR: 70 °   Strength:   Quad set is fair  SLR: 3/5   Knee extension: 3/5   Knee flexion: 4/5   Special Tests:    Not applicable  Neurological Screen:  Grossly intact; SLR is negative  Functional Mobility:   WNL  Balance:    Not applicable     TREATMENT:   THERAPEUTIC ACTIVITY: ( see below for minutes): Therapeutic activities per grid below to improve mobility, strength, balance and coordination. Required minimal visual, verbal, manual and tactile cues to improve independence and safety with daily activities . THERAPEUTIC EXERCISE: (see below for minutes):  Exercises per grid below to improve mobility, strength, balance and coordination. Required minimal verbal and manual cues to promote proper body alignment, promote proper body posture and promote proper body mechanics. Progressed resistance, range, repetitions and complexity of movement as indicated. MANUAL THERAPY: (see below for minutes): Joint mobilization and Soft tissue mobilization was utilized and necessary because of the patient's restricted joint motion, painful spasm, loss of articular motion and restricted motion of soft tissue. MODALITIES: (see below for minutes):      to decrease pain    SELF CARE: (see below for minutes): Procedure(s) (per grid) utilized to improve and/or restore self-care/home management as related to dressing, bathing and grooming. Required minimal verbal cueing to facilitate activities of daily living skills and compensatory activities. Date: 2/21/22 2/23/22 (visit 2) 2/25/22 (visit 3) 3/1/22 (visit 4) 3/3/22 (visit 5)  3/7/22 (visit 6)   Modalities:  15 min  15 min  15 min 13 min 10 min     Ice with IFC for pain and swelling 15 min  15 min IFC/e-stin for pain. Gameready vaso/cryo x 13 min.  ` Gameready vaso/cryo x 110 min.   `                     Therapeutic Exercise: 25 min  45 min  45 min  39 min 40 min 45 min   Stretching Therapist assisted into extension and flexion Therapist assisted stretching into extension and flexion; hamstring stretching [id number] repeat Therapist assisted: extension overpressure,  SB under heels rolling flexion/extn: 20  min Seated assisted flexion stretching: AAROM x 6 min Seated knee flexion AAROM/end range stretches with Tibiofem joint glides: distraction gr. 3, ap glid: x 5 min   Quad set [id number], FTKE 20x blue tband Manl cues popliteal: 5\" holds x 20  25 x 5\",w pressure gauge feedback Supine heel slides w/ strap assist - 30 to 60\" holds x 10 min   SLR 20x 30x with minimal assist 30x independently   2 x 10  Knee extn. Stretch w/ manl overpressure: 5 min   Heel slides 20x 20x; seated knee flexion 5 min  Seated knee flexion 5 min  heel slides w/ strap assist and 30\" holds, heel slides w/ strap assist and 30\" holds, x 5 min SAQ's : 2  X 15    SAQ   30x  30x 30x     Nustep  5 min L3 5 min L3 6  min - no resist.  6 min , no resist Nustep : 5 min, no resistance. SB hamstring curls     30x 30x                     Proprioceptive Activities:                                    Manual Therapy:    6 min 3 min        Tibiofem. ER gr. 3, patellar inf/medial gr. 3 Tibiofemoral distraction (seated, knee flxed) gr.  3, tibial post glides. Gr. 3             Therapeutic Activities:                                      HEP: see 2/21/22 flow sheet for specifics. [facility] Portal  Treatment/Session Summary:    · Response to Treatment:  Knee ROM's at 115 deg flexion, but extension more limited, with -8 deg - more emphasis on knee extension stretching due to muscular guarding. · Communication/Consultation:  None today  · Equipment provided today:  None today  · Recommendations/Intent for next treatment session: Next visit will focus on progression of strength and return to prior level of function, regain full knee ROM.      Total Treatment Billable Duration:  55 minutes  PT Patient Time In/Time Out  Time In: 2624  Time Out: 36[address] Malinda Tran, PT    Future Appointments Date Time Provider Socorro Krausi   3/9/2022  9:30 AM Supa Griffin, PT [facility]   3/14/2022 11:45 AM Supa Griffin, PT [facility]   3/16/2022  4:15 PM Supa Griffin, PT [facility]   3/21/2022 11:45 AM Supa Griffin, PT [facility]   3/23/2022  9:30 AM Supa Griffin, PT [facility]   3/28/2022 11:45 AM Supa Griffin, PT [facility]   3/30/2022  9:30 AM Supa Griffin, PT [facility]   4/7/2022  8:45 AM Barbra Thompson MD [facility]

## 2022-03-09 ENCOUNTER — HOSPITAL ENCOUNTER (OUTPATIENT)
Dept: PHYSICAL THERAPY | Age: 67
Discharge: HOME | End: 2022-03-09
Payer: COMMERCIAL

## 2022-03-09 ENCOUNTER — APPOINTMENT (OUTPATIENT)
Dept: PHYSICAL THERAPY | Age: 67
End: 2022-03-09
Payer: COMMERCIAL

## 2022-03-09 PROCEDURE — 97014 ELECTRIC STIMULATION THERAPY: CPT

## 2022-03-09 PROCEDURE — 97110 THERAPEUTIC EXERCISES: CPT

## 2022-03-09 NOTE — PROGRESS NOTES
Shan Sherman  : 1955  Primary: [facility]  Secondary:  [facility] @ [address], [address] [place] [address]  [phone]   [phone]      OUTPATIENT PHYSICAL THERAPY: Daily Treatment Note  3/9/2022    ICD-10: Treatment Diagnosis: Pain in right leg (M79.604), Pain in right knee (M25.561) and Stiffness of right knee, not elsewhere classified (M25.661)  Effective Dates: 2022 TO 2022 (90 days). Frequency/Duration: 2 times a week for 90 Days  GOALS: (Goals have been discussed and agreed upon with patient.)  Short-Term Functional Goals: Time Frame: 2 weeks  1. Patient will be independent with HEP. 2. Patient will improve passive knee extension to 0 ° to improve quad activation for weight bearing tasks. 3. Patient will improve knee flexion to 120 ° to improve mobility for sit to stand transfers. Discharge Goals: Time Frame: 6 weeks  1. Patient will be able to maintain single leg stance for 10 seconds to improve safety for independent ambulation. 2. Patient will restore ROM to 0 to 130 ° to normalize mobility for symmetric gait on all surfaces. 3. Patient will perform 5x sit to  <12 seconds to restore strength for safe community mobility.    _________________________________________________________________________  Pre-treatment Symptoms/Complaints:  Difficult day yesterday, more pain - took cold showers on knee and ice to lessen pain. Knee feels like it's \"bone-on-bone\". Pain: Initial: 7/10 Post Session:  No increase/10   Medications Last Reviewed:  3/9/2022  Updated Objective Findings:  Below measures from initial evaluation unless otherwise noted. Observation/Orthostatic Postural Assessment:    Ambulates with rolling walker with knee flexed at 20 ° throughout gait cycle. Incision sites are dry and closed. Mild swelling present to the joint. Palpation:    Tender to joint line.   ROM:    Right knee: 8 to 40 ° active; 5 to 90 ° (flexion done in sitting) passive with painful end feel into extension and stiff end feel into flexion   SLR: 70 °   Strength:   Quad set is fair  SLR: 3/5   Knee extension: 3/5   Knee flexion: 4/5   Special Tests:    Not applicable  Neurological Screen:  Grossly intact; SLR is negative  Functional Mobility:   WNL  Balance:    Not applicable  2/1/52 : knee PROM: -5 extn to 110 deg flexion. SLR's with mild extensor lag. Gait w/ wheeled walker using heel-toe pattern. TREATMENT:   THERAPEUTIC ACTIVITY: ( see below for minutes): Therapeutic activities per grid below to improve mobility, strength, balance and coordination. Required minimal visual, verbal, manual and tactile cues to improve independence and safety with daily activities . THERAPEUTIC EXERCISE: (see below for minutes):  Exercises per grid below to improve mobility, strength, balance and coordination. Required minimal verbal and manual cues to promote proper body alignment, promote proper body posture and promote proper body mechanics. Progressed resistance, range, repetitions and complexity of movement as indicated. MANUAL THERAPY: (see below for minutes): Joint mobilization and Soft tissue mobilization was utilized and necessary because of the patient's restricted joint motion, painful spasm, loss of articular motion and restricted motion of soft tissue. MODALITIES: (see below for minutes):      to decrease pain    SELF CARE: (see below for minutes): Procedure(s) (per grid) utilized to improve and/or restore self-care/home management as related to dressing, bathing and grooming. Required minimal verbal cueing to facilitate activities of daily living skills and compensatory activities. Date: 3/9/22 (visit 7)        Modalities: 15 min         R ant. Knee ice w/ IFC estim  pps x 15 min, ~ 20 mA.                            Therapeutic Exercise: 40 min         Nustep - 8 min - level 1 resist.          Supine assisted heel slides to full extension/full flexion: 10 sec end range stretches: 8 min         Quad sets x 20 x 5\"          SAQ's : 2 x 15         SLR's 4 x 10          Slantboard stretch         TKE's into popliteal ball: 5\" x 5                          Proprioceptive Activities:                                    Manual Therapy: 3 min         R tibipfem distraction/ ap glides x 3 min w knee flexed 90 deg. Therapeutic Activities:                                      HEP: see 2/21/22 flow sheet for specifics. [facility] Portal  Treatment/Session Summary:    · Response to Treatment:  More deliberate work on knee ROM and quadriceps setting to regain full extension and flexion motions - pt demonstrating easily provoked stiffness and end range pain. · Communication/Consultation:  None today  · Equipment provided today:  None today  · Recommendations/Intent for next treatment session: Next visit will focus on progression of strength and return to prior level of function, regain full knee ROM.      Total Treatment Billable Duration:  58 minutes  PT Patient Time In/Time Out  Time In: 0930  Time Out: Lew Liao, PT    Future Appointments   Date Time Provider Socorro Chandler   3/14/2022 11:45 AM Jennifer Mills PT [facility]   3/16/2022  4:15 PM Jennifer Mills, PT [facility]   3/21/2022 11:45 AM Jennifer Mills, PT [facility]   3/23/2022  9:30 AM Jennifer Mills, PT [facility]   3/28/2022 11:45 AM Jennifer Mills PT [facility]   3/30/2022  9:30 AM Jennifer Mills, PT [facility]   4/7/2022  8:45 AM MD CHAGO Craft

## 2022-03-10 ENCOUNTER — APPOINTMENT (OUTPATIENT)
Dept: PHYSICAL THERAPY | Age: 67
End: 2022-03-10
Payer: COMMERCIAL

## 2022-03-14 ENCOUNTER — HOSPITAL ENCOUNTER (OUTPATIENT)
Dept: PHYSICAL THERAPY | Age: 67
Discharge: HOME | End: 2022-03-14
Payer: COMMERCIAL

## 2022-03-14 PROCEDURE — 97110 THERAPEUTIC EXERCISES: CPT

## 2022-03-14 PROCEDURE — 97530 THERAPEUTIC ACTIVITIES: CPT

## 2022-03-14 NOTE — PROGRESS NOTES
Sudha Wolf  : 1955  Primary: [facility]  Secondary:  Rohit Licona [address], [address] [place] [address]  [phone]   [phone]      OUTPATIENT PHYSICAL THERAPY: Daily Treatment Note  3/14/2022    ICD-10: Treatment Diagnosis: Pain in right leg (M79.604), Pain in right knee (M25.561) and Stiffness of right knee, not elsewhere classified (M25.661)  Effective Dates: 2022 TO 2022 (90 days). Frequency/Duration: 2 times a week for 90 Days  GOALS: (Goals have been discussed and agreed upon with patient.)  Short-Term Functional Goals: Time Frame: 2 weeks  1. Patient will be independent with HEP. 2. Patient will improve passive knee extension to 0 ° to improve quad activation for weight bearing tasks. 3. Patient will improve knee flexion to 120 ° to improve mobility for sit to stand transfers. Discharge Goals: Time Frame: 6 weeks  1. Patient will be able to maintain single leg stance for 10 seconds to improve safety for independent ambulation. 2. Patient will restore ROM to 0 to 130 ° to normalize mobility for symmetric gait on all surfaces. 3. Patient will perform 5x sit to  <12 seconds to restore strength for safe community mobility.    _________________________________________________________________________  Pre-treatment Symptoms/Complaints:  Has a cane at home but hasn't been using it yet, not sure which side she should put it on when walking. Knee continues to be painful to straighten. Pain: Initial: 7/10 Post Session:  No increase/10   Medications Last Reviewed:  3/14/2022  Updated Objective Findings:  Below measures from initial evaluation unless otherwise noted. Observation/Orthostatic Postural Assessment:    Ambulates with rolling walker with knee flexed at 20 ° throughout gait cycle. Incision sites are dry and closed. Mild swelling present to the joint. Palpation:    Tender to joint line.   ROM: Right knee: 8 to 40 ° active; 5 to 90 ° (flexion done in sitting) passive with painful end feel into extension and stiff end feel into flexion   SLR: 70 °   Strength:   Quad set is fair  SLR: 3/5   Knee extension: 3/5   Knee flexion: 4/5   Special Tests:    Not applicable  Neurological Screen:  Grossly intact; SLR is negative  Functional Mobility:   WNL  Balance:    Not applicable  0/2/30 : knee PROM: -5 extn to 110 deg flexion. SLR's with mild extensor lag. Gait w/ wheeled walker using heel-toe pattern. TREATMENT:   THERAPEUTIC ACTIVITY: ( see below for minutes): Therapeutic activities per grid below to improve mobility, strength, balance and coordination. Required minimal visual, verbal, manual and tactile cues to improve independence and safety with daily activities . THERAPEUTIC EXERCISE: (see below for minutes):  Exercises per grid below to improve mobility, strength, balance and coordination. Required minimal verbal and manual cues to promote proper body alignment, promote proper body posture and promote proper body mechanics. Progressed resistance, range, repetitions and complexity of movement as indicated. MANUAL THERAPY: (see below for minutes): Joint mobilization and Soft tissue mobilization was utilized and necessary because of the patient's restricted joint motion, painful spasm, loss of articular motion and restricted motion of soft tissue. MODALITIES: (see below for minutes):      to decrease pain    SELF CARE: (see below for minutes): Procedure(s) (per grid) utilized to improve and/or restore self-care/home management as related to dressing, bathing and grooming. Required minimal verbal cueing to facilitate activities of daily living skills and compensatory activities. Date: 3/9/22 (visit 7) 3/14/22:        Modalities: 15 min         R ant. Knee ice w/ IFC estim  pps x 15 min, ~ 20 mA.                            Therapeutic Exercise: 40 min 31 min        Nustep - 8 min - level 1 resist.  Nustep - 7 min - level 1 resist.         Supine assisted heel slides to full extension/full flexion: 10 sec end range stretches: 8 min Heel slides: w/ 30 holds x 5 min        Quad sets x 20 x 5\"  Quad sets x 15 x 10\"         SAQ's : 2 x 15 SB hamstring curls x 30         SLR's 4 x 10  SLR's 2 x 12        Slantboard stretch Calf stretch off end of // bars: 2 min        TKE's into popliteal ball: 5\" x 5                          Proprioceptive Activities:                                    Manual Therapy: 3 min 5 min        R tibi/fem distraction/ ap glides x 3 min w knee flexed 90 deg. R tib-fem distraction/ ap glides x 5 min w knee flexed, end range flexion stretches in sitting                Therapeutic Activities:  8 min         Gait: single point cane instruction w/ cane on R, 2 x 50'         Gait practice in // bars - SBA, x 5 laps. HEP: see 2/21/22 flow sheet for specifics. [employer] Portal  Treatment/Session Summary:    · Response to Treatment:  Less antalgia during gait, and pt able to follow cane/ LE sequencing cues well. Knee ROM's still limited ~ -5 deg to 110 deg. · Communication/Consultation:  None today  · Equipment provided today:  None today  · Recommendations/Intent for next treatment session: Next visit will focus on progression of strength and return to prior level of function, regain full knee ROM.      Total Treatment Billable Duration:  44 minutes  PT Patient Time In/Time Out  Time In: 0516  Time Out: [facility], PT    Future Appointments   Date Time Provider Socorro Chandler   3/16/2022  4:15 PM Audelia Glover, PT [facility]   3/21/2022 11:45 AM Mike Ojeda, PT [facility]   3/23/2022  9:30 AM Audelia Glover, PT [facility]   3/28/2022 11:45 AM Audelia Glover, PT [facility]   3/30/2022  9:30 AM Audelia Glover, PT [facility]   4/7/2022  8:45 AM [facility]

## 2022-03-16 ENCOUNTER — APPOINTMENT (OUTPATIENT)
Dept: PHYSICAL THERAPY | Age: 67
End: 2022-03-16
Payer: COMMERCIAL

## 2022-03-16 ENCOUNTER — HOSPITAL ENCOUNTER (OUTPATIENT)
Dept: PHYSICAL THERAPY | Age: 67
Discharge: HOME | End: 2022-03-16
Payer: COMMERCIAL

## 2022-03-16 PROCEDURE — 97140 MANUAL THERAPY 1/> REGIONS: CPT

## 2022-03-16 PROCEDURE — 97014 ELECTRIC STIMULATION THERAPY: CPT

## 2022-03-16 PROCEDURE — 97110 THERAPEUTIC EXERCISES: CPT

## 2022-03-16 NOTE — PROGRESS NOTES
Nacho Pendleton  : 1955  Primary: [facility]  Secondary:  [place] [address].  [phone]   [phone]      OUTPATIENT PHYSICAL THERAPY:Progress Note/  Daily Treatment Note  3/16/2022    ICD-10: Treatment Diagnosis: Pain in right leg (M79.604), Pain in right knee (M25.561) and Stiffness of right knee, not elsewhere classified (M25.661)  Effective Dates: 2022 TO 2022 (90 days). Frequency/Duration: 2 times a week for 90 Days  GOALS: (Goals have been discussed and agreed upon with patient.)  Short-Term Functional Goals: Time Frame: 2 weeks  1. Patient will be independent with HEP. (MET)  2. Patient will improve passive knee extension to 0 ° to improve quad activation for weight bearing tasks. (Progressing)  3. Patient will improve knee flexion to 120 ° to improve mobility for sit to stand transfers. (Progressing)  Discharge Goals: Time Frame: 6 weeks  1. Patient will be able to maintain single leg stance for 10 seconds to improve safety for independent ambulation. 2. Patient will restore ROM to 0 to 130 ° to normalize mobility for symmetric gait on all surfaces. 3. Patient will perform 5x sit to  <12 seconds to restore strength for safe community mobility.    _________________________________________________________________________  Pre-treatment Symptoms/Complaints:  Using the cane to walk today , but doesn't feel as steady on it as the walker. Pain: Initial: 10 Post Session:  No increase/10   Medications Last Reviewed:  3/16/2022  Updated Objective Findings:  Below measures from initial evaluation unless otherwise noted. Observation/Orthostatic Postural Assessment:    Ambulates with rolling walker with knee flexed at 20 ° throughout gait cycle. Incision sites are dry and closed. Mild swelling present to the joint. Palpation:    Tender to joint line.   ROM:    Right knee: 8 to 40 ° active; 5 to 90 ° (flexion done in sitting) passive with painful end feel into extension and stiff end feel into flexion   SLR: 70 °   Strength:   Quad set is fair  SLR: 3/5   Knee extension: 3/5   Knee flexion: 4/5   Special Tests:    Not applicable  Neurological Screen:  Grossly intact; SLR is negative  Functional Mobility:   WNL  Balance:    Not applicable  3/3/09 : knee PROM: -5 extn to 110 deg flexion. SLR's with mild extensor lag. Gait w/ wheeled walker using heel-toe pattern. 3/16/22 : LEFS = 20/80. Gait with cane with very slow rate. Knee PROM = - 3 deg extn, 110 deg flexion (A/P), end range pain with both flexion and extension, diffuse knee effusion with minimal temperature increase. TREATMENT:   THERAPEUTIC ACTIVITY: ( see below for minutes): Therapeutic activities per grid below to improve mobility, strength, balance and coordination. Required minimal visual, verbal, manual and tactile cues to improve independence and safety with daily activities . THERAPEUTIC EXERCISE: (see below for minutes):  Exercises per grid below to improve mobility, strength, balance and coordination. Required minimal verbal and manual cues to promote proper body alignment, promote proper body posture and promote proper body mechanics. Progressed resistance, range, repetitions and complexity of movement as indicated. MANUAL THERAPY: (see below for minutes): Joint mobilization and Soft tissue mobilization was utilized and necessary because of the patient's restricted joint motion, painful spasm, loss of articular motion and restricted motion of soft tissue. MODALITIES: (see below for minutes):      to decrease pain    SELF CARE: (see below for minutes): Procedure(s) (per grid) utilized to improve and/or restore self-care/home management as related to dressing, bathing and grooming. Required minimal verbal cueing to facilitate activities of daily living skills and compensatory activities.      Date: 3/9/22 (visit 7) 3/14/22: Visit 8 3/16/22 (visit 9, PN)      Modalities: 15 min  15 min       R ant. Knee ice w/ IFC estim  pps x 15 min, ~ 20 mA. R ant. Knee ice w/ IFC estim  pps x 15 min, ~ 15 mA. Therapeutic Exercise: 40 min 31 min 30 min       Nustep - 8 min - level 1 resist.  Nustep - 7 min - level 1 resist.  Nustep - 7 min - level 1 resist.        Supine assisted heel slides to full extension/full flexion: 10 sec end range stretches: 8 min Heel slides: w/ 30 holds x 5 min Heel slides: w/ 30 holds x 10 min       Quad sets x 20 x 5\"  Quad sets x 15 x 10\"  Quad sets; 2 x 20 x 3\"       SAQ's : 2 x 15 SB hamstring curls x 30  SB hamstring curls x 20        SLR's 4 x 10  SLR's 2 x 12 Calf stretch w/ strap assist : 1 min       Slantboard stretch Calf stretch off end of // bars: 2 min        TKE's into popliteal ball: 5\" x 5                          Proprioceptive Activities:                                    Manual Therapy: 3 min 5 min 10 min       R tibi/fem distraction/ ap glides x 3 min w knee flexed 90 deg. R tib-fem distraction/ ap glides x 5 min w knee flexed, end range flexion stretches in sitting R tib-fem distraction/ ap glides x 5 min w knee flexed         Knee extn stretch/tibial ER gr. 3      Therapeutic Activities:  8 min         Gait: single point cane instruction w/ cane on R, 2 x 50'         Gait practice in // bars - SBA, x 5 laps. HEP: see 2/21/22 flow sheet for specifics. [employer] Portal  Treatment/Session Summary:    · Response to Treatment:  Slower than expected progress - ROM to 110 deg flexion w/ considerable pain. Pt les steady with cane, I advised her to resume use of walker until further notice. · Communication/Consultation:  None today  · Equipment provided today:  None today  · Recommendations/Intent for next treatment session: Next visit will focus on progression of strength and return to prior level of function, regain full knee ROM. Total Treatment Billable Duration:  55 minutes  PT Patient Time In/Time Out  Time In: 1640  Time Out: [facility], PT    Future Appointments   Date Time Provider Socorro Chandler   3/21/2022 11:45 AM Alonso Roland, PT [facility]   3/23/2022  9:30 AM Alonso Roland, PT [facility]   3/28/2022 11:45 AM Alonso Roland, PT [facility]   3/30/2022  9:30 AM Alonso Roland, PT [facility]   4/7/2022  8:45 AM Ra Lange MD [facility]

## 2022-03-17 ENCOUNTER — APPOINTMENT (OUTPATIENT)
Dept: PHYSICAL THERAPY | Age: 67
End: 2022-03-17
Payer: COMMERCIAL

## 2022-03-18 PROBLEM — R53.83 FATIGUE: Status: ACTIVE | Noted: 2020-04-16

## 2022-03-18 PROBLEM — M65.30 ACQUIRED TRIGGER FINGER: Status: ACTIVE | Noted: 2020-04-16

## 2022-03-19 PROBLEM — R42 DIZZINESS: Status: ACTIVE | Noted: 2021-02-17

## 2022-03-19 PROBLEM — I10 BENIGN ESSENTIAL HTN: Status: ACTIVE | Noted: 2020-01-30

## 2022-03-19 PROBLEM — J30.1 SEASONAL ALLERGIC RHINITIS DUE TO POLLEN: Status: ACTIVE | Noted: 2020-01-30

## 2022-03-19 PROBLEM — D06.9 CIN III WITH SEVERE DYSPLASIA: Status: ACTIVE | Noted: 2017-02-13

## 2022-03-19 PROBLEM — R93.1 AGATSTON CORONARY ARTERY CALCIUM SCORE BETWEEN 100 AND 199: Status: ACTIVE | Noted: 2020-09-24

## 2022-03-19 PROBLEM — Z98.890 HISTORY OF BACK SURGERY: Status: ACTIVE | Noted: 2020-01-30

## 2022-03-19 PROBLEM — R94.31 ABNORMAL EKG: Status: ACTIVE | Noted: 2020-09-24

## 2022-03-19 PROBLEM — R94.6 ABNORMAL THYROID FUNCTION TEST: Status: ACTIVE | Noted: 2020-01-30

## 2022-03-19 PROBLEM — E78.00 PURE HYPERCHOLESTEROLEMIA: Status: ACTIVE | Noted: 2020-01-30

## 2022-03-19 PROBLEM — F41.9 ANXIETY: Status: ACTIVE | Noted: 2020-01-30

## 2022-03-19 PROBLEM — D50.9 MICROCYTIC ANEMIA: Status: ACTIVE | Noted: 2020-04-16

## 2022-03-19 PROBLEM — R55 SYNCOPE AND COLLAPSE: Status: ACTIVE | Noted: 2020-09-24

## 2022-03-20 PROBLEM — R87.619 ABNORMAL CERVICAL PAPANICOLAOU SMEAR: Status: ACTIVE | Noted: 2018-03-14

## 2022-03-20 PROBLEM — M48.062 LUMBAR STENOSIS WITH NEUROGENIC CLAUDICATION: Status: ACTIVE | Noted: 2019-07-09

## 2022-03-20 PROBLEM — E55.9 VITAMIN D DEFICIENCY: Status: ACTIVE | Noted: 2020-04-16

## 2022-03-20 PROBLEM — R07.9 CHEST PAIN: Status: ACTIVE | Noted: 2020-04-16

## 2022-03-21 ENCOUNTER — HOSPITAL ENCOUNTER (OUTPATIENT)
Dept: PHYSICAL THERAPY | Age: 67
Discharge: HOME | End: 2022-03-21
Payer: COMMERCIAL

## 2022-03-21 PROCEDURE — 97140 MANUAL THERAPY 1/> REGIONS: CPT

## 2022-03-21 PROCEDURE — 97110 THERAPEUTIC EXERCISES: CPT

## 2022-03-21 NOTE — PROGRESS NOTES
Rudy Swann  : 1955  Primary: [facility]  Secondary:  [address] @ [address] [address].  [phone]   [phone]      OUTPATIENT PHYSICAL THERAPY: Daily Treatment Note  3/21/2022    ICD-10: Treatment Diagnosis: Pain in right leg (M79.604), Pain in right knee (M25.561) and Stiffness of right knee, not elsewhere classified (M25.661)  Effective Dates: 2022 TO 2022 (90 days). Frequency/Duration: 2 times a week for 90 Days  GOALS: (Goals have been discussed and agreed upon with patient.)  Short-Term Functional Goals: Time Frame: 2 weeks  1. Patient will be independent with HEP. (MET)  2. Patient will improve passive knee extension to 0 ° to improve quad activation for weight bearing tasks. (Progressing)  3. Patient will improve knee flexion to 120 ° to improve mobility for sit to stand transfers. (Progressing)  Discharge Goals: Time Frame: 6 weeks  1. Patient will be able to maintain single leg stance for 10 seconds to improve safety for independent ambulation. 2. Patient will restore ROM to 0 to 130 ° to normalize mobility for symmetric gait on all surfaces. 3. Patient will perform 5x sit to  <12 seconds to restore strength for safe community mobility.    _________________________________________________________________________  Pre-treatment Symptoms/Complaints:  Still using the cane for walking. Wondering if she should move up her surgeon follow-up visit. Pain: Initial: 10 Post Session:  No increase/10   Medications Last Reviewed:  3/21/2022  Updated Objective Findings:  Below measures from initial evaluation unless otherwise noted. Observation/Orthostatic Postural Assessment:    Ambulates with rolling walker with knee flexed at 20 ° throughout gait cycle. Incision sites are dry and closed. Mild swelling present to the joint. Palpation:    Tender to joint line.   ROM:    Right knee: 8 to 40 ° active; 5 to 90 ° (flexion done in sitting) passive with painful end feel into extension and stiff end feel into flexion   SLR: 70 °   Strength:   Quad set is fair  SLR: 3/5   Knee extension: 3/5   Knee flexion: 4/5   Special Tests:    Not applicable  Neurological Screen:  Grossly intact; SLR is negative  Functional Mobility:   WNL  Balance:    Not applicable  9/9/07 : knee PROM: -5 extn to 110 deg flexion. SLR's with mild extensor lag. Gait w/ wheeled walker using heel-toe pattern. 3/16/22 : LEFS = 20/80. Gait with cane with very slow rate. Knee PROM = - 3 deg extn, 110 deg flexion (A/P), end range pain with both flexion and extension, diffuse knee effusion with minimal temperature increase. TREATMENT:   THERAPEUTIC ACTIVITY: ( see below for minutes): Therapeutic activities per grid below to improve mobility, strength, balance and coordination. Required minimal visual, verbal, manual and tactile cues to improve independence and safety with daily activities . THERAPEUTIC EXERCISE: (see below for minutes):  Exercises per grid below to improve mobility, strength, balance and coordination. Required minimal verbal and manual cues to promote proper body alignment, promote proper body posture and promote proper body mechanics. Progressed resistance, range, repetitions and complexity of movement as indicated. MANUAL THERAPY: (see below for minutes): Joint mobilization and Soft tissue mobilization was utilized and necessary because of the patient's restricted joint motion, painful spasm, loss of articular motion and restricted motion of soft tissue. MODALITIES: (see below for minutes):      to decrease pain    SELF CARE: (see below for minutes): Procedure(s) (per grid) utilized to improve and/or restore self-care/home management as related to dressing, bathing and grooming. Required minimal verbal cueing to facilitate activities of daily living skills and compensatory activities.      Date: 3/9/22 (visit 7) 3/14/22: Visit 8 3/16/22 (visit 9, PN) 3/21/22 (visit 10)     Modalities: 15 min  15 min       R ant. Knee ice w/ IFC estim  pps x 15 min, ~ 20 mA. R ant. Knee ice w/ IFC estim  pps x 15 min, ~ 15 mA. Therapeutic Exercise: 40 min 31 min 30 min 35 min      Nustep - 8 min - level 1 resist.  Nustep - 7 min - level 1 resist.  Nustep - 7 min - level 1 resist.  Nustep - 8 min - level 1 resist.      Supine assisted heel slides to full extension/full flexion: 10 sec end range stretches: 8 min Heel slides: w/ 30 holds x 5 min Heel slides: w/ 30 holds x 10 min Seated HS curls: 2 x 15 manl resist      Quad sets x 20 x 5\"  Quad sets x 15 x 10\"  Quad sets; 2 x 20 x 3\" Seated LAQ's: manl resist 2 x 15      SAQ's : 2 x 15 SB hamstring curls x 30  SB hamstring curls x 20  Quad sets manl resist 20 x 5\"       SLR's 4 x 10  SLR's 2 x 12 Calf stretch w/ strap assist : 1 min Sit-stands 2 x 15 (elevated seat)      Slantboard stretch Calf stretch off end of // bars: 2 min  Heel side stretch w/ static holds : 5 min total      TKE's into popliteal ball: 5\" x 5   Assisted knee flexion stretching : 5 min                       Proprioceptive Activities:                                    Manual Therapy: 3 min 5 min 10 min 8 min      R tibi/fem distraction/ ap glides x 3 min w knee flexed 90 deg. R tib-fem distraction/ ap glides x 5 min w knee flexed, end range flexion stretches in sitting R tib-fem distraction/ ap glides x 5 min w knee flexed R tib-fem distraction/ ap glides x 5 min w knee flexed        Knee extn stretch/tibial ER gr. 3 Knee extn stretch/tibial ER gr. 3     Therapeutic Activities:  8 min         Gait: single point cane instruction w/ cane on R, 2 x 50'         Gait practice in // bars - SBA, x 5 laps. HEP: see 2/21/22 flow sheet for specifics.     [facility] Portal  Treatment/Session Summary:    · Response to Treatment:  Knee flexion to 111 deg today - end range pain but not as pronounced. Able  To complete more activity today including sit-stand practice. Advised pt to wait until later this week before considering pursuing earlier followup w physician, based on progress made today. · Communication/Consultation:  None today  · Equipment provided today:  None today  · Recommendations/Intent for next treatment session: Next visit will focus on progression of strength and return to prior level of function, regain full knee ROM.      Total Treatment Billable Duration:  43 minutes  PT Patient Time In/Time Out  Time In: 1700  Time Out: [address] [facility], PT    Future Appointments   Date Time Provider [facility]   3/23/2022  9:30 AM [facility]   3/28/2022 11:45 AM Mitul Alvarez, PT [facility]   3/30/2022  9:30 AM Austin Engman, PT [facility]   4/4/2022  2:00 PM Austin Engman, PT [facility]   4/7/2022  8:45 AM Bree Blancas MD [facility]   4/7/2022 11:00 AM Austin Engman, PT [facility]   4/11/2022 11:00 AM Austin Engman, PT [facility]   4/13/2022  3:30 PM Austin Engman, PT [facility]   4/18/2022  2:45 PM Austin Engman, PT [facility]   4/20/2022  3:30 PM Austin Abisai, PT [facility]   4/25/2022  4:15 PM Austin Engman, PT [facility]   4/27/2022  4:15 PM Mitul Parrish, PT [facility]

## 2022-03-23 ENCOUNTER — APPOINTMENT (OUTPATIENT)
Dept: PHYSICAL THERAPY | Age: 67
End: 2022-03-23
Payer: COMMERCIAL

## 2022-03-23 ENCOUNTER — HOSPITAL ENCOUNTER (OUTPATIENT)
Dept: PHYSICAL THERAPY | Age: 67
Discharge: HOME | End: 2022-03-23
Payer: COMMERCIAL

## 2022-03-23 PROCEDURE — 97140 MANUAL THERAPY 1/> REGIONS: CPT

## 2022-03-23 PROCEDURE — 97014 ELECTRIC STIMULATION THERAPY: CPT

## 2022-03-23 PROCEDURE — 97110 THERAPEUTIC EXERCISES: CPT

## 2022-03-23 NOTE — PROGRESS NOTES
Frances   : 1955  Primary: Annet[facility]  Secondary:  [address] [facility] @ [address] [place] [address], [address] [place] [address]  [phone]   [phone]      OUTPATIENT PHYSICAL THERAPY: Daily Treatment Note  3/23/2022    ICD-10: Treatment Diagnosis: Pain in right leg (M79.604), Pain in right knee (M25.561) and Stiffness of right knee, not elsewhere classified (M25.661)  Effective Dates: 2022 TO 2022 (90 days). Frequency/Duration: 2 times a week for 90 Days  GOALS: (Goals have been discussed and agreed upon with patient.)  Short-Term Functional Goals: Time Frame: 2 weeks  1. Patient will be independent with HEP. (MET)  2. Patient will improve passive knee extension to 0 ° to improve quad activation for weight bearing tasks. (Progressing)  3. Patient will improve knee flexion to 120 ° to improve mobility for sit to stand transfers. (Progressing)  Discharge Goals: Time Frame: 6 weeks  1. Patient will be able to maintain single leg stance for 10 seconds to improve safety for independent ambulation. 2. Patient will restore ROM to 0 to 130 ° to normalize mobility for symmetric gait on all surfaces. 3. Patient will perform 5x sit to  <12 seconds to restore strength for safe community mobility.    _________________________________________________________________________  Pre-treatment Symptoms/Complaints:  Back to using the walker today, couldn't find her cane this morning. Knee moving better but feels pinching sensation in knee when walking. Pain: Initial: 5/10 Post Session:  No increase/10   Medications Last Reviewed:  3/23/2022  Updated Objective Findings:  Below measures from initial evaluation unless otherwise noted. Observation/Orthostatic Postural Assessment:    Ambulates with rolling walker with knee flexed at 20 ° throughout gait cycle. Incision sites are dry and closed. Mild swelling present to the joint.    Palpation:    Tender to joint line. ROM:    Right knee: 8 to 40 ° active; 5 to 90 ° (flexion done in sitting) passive with painful end feel into extension and stiff end feel into flexion   SLR: 70 °   Strength:   Quad set is fair  SLR: 3/5   Knee extension: 3/5   Knee flexion: 4/5   Special Tests:    Not applicable  Neurological Screen:  Grossly intact; SLR is negative  Functional Mobility:   WNL  Balance:    Not applicable  1/4/79 : knee PROM: -5 extn to 110 deg flexion. SLR's with mild extensor lag. Gait w/ wheeled walker using heel-toe pattern. 3/16/22 : LEFS = 20/80. Gait with cane with very slow rate. Knee PROM = - 3 deg extn, 110 deg flexion (A/P), end range pain with both flexion and extension, diffuse knee effusion with minimal temperature increase. 3/23/22: Knee flexion to 115 deg , end range pain. TREATMENT:   THERAPEUTIC ACTIVITY: ( see below for minutes): Therapeutic activities per grid below to improve mobility, strength, balance and coordination. Required minimal visual, verbal, manual and tactile cues to improve independence and safety with daily activities . THERAPEUTIC EXERCISE: (see below for minutes):  Exercises per grid below to improve mobility, strength, balance and coordination. Required minimal verbal and manual cues to promote proper body alignment, promote proper body posture and promote proper body mechanics. Progressed resistance, range, repetitions and complexity of movement as indicated. MANUAL THERAPY: (see below for minutes): Joint mobilization and Soft tissue mobilization was utilized and necessary because of the patient's restricted joint motion, painful spasm, loss of articular motion and restricted motion of soft tissue. MODALITIES: (see below for minutes):      to decrease pain    SELF CARE: (see below for minutes): Procedure(s) (per grid) utilized to improve and/or restore self-care/home management as related to dressing, bathing and grooming.  Required minimal verbal cueing to facilitate activities of daily living skills and compensatory activities. Date: 3/9/22 (visit 7) 3/14/22: Visit 8 3/16/22 (visit 9, PN) 3/21/22 (visit 10) 3/23/22 (visit 11)    Modalities: 15 min  15 min  15 min     R ant. Knee ice w/ IFC estim  pps x 15 min, ~ 20 mA. R ant. Knee ice w/ IFC estim  pps x 15 min, ~ 15 mA. R ant. Knee ice w/ IFC estim  pps x 15 min, ~ 14 mA. Therapeutic Exercise: 40 min 31 min 30 min 35 min 30 min     Nustep - 8 min - level 1 resist.  Nustep - 7 min - level 1 resist.  Nustep - 7 min - level 1 resist.  Nustep - 8 min - level 1 resist. Seated stepper x 8 min, level 4 resist.      Supine assisted heel slides to full extension/full flexion: 10 sec end range stretches: 8 min Heel slides: w/ 30 holds x 5 min Heel slides: w/ 30 holds x 10 min Seated HS curls: 2 x 15 manl resist Seated HS curls: 2 x 15 manl resist     Quad sets x 20 x 5\"  Quad sets x 15 x 10\"  Quad sets; 2 x 20 x 3\" Seated LAQ's: manl resist 2 x 15 Heel slide stretches w/ straps, end range holds w/ ankle pumps: 5 min     SAQ's : 2 x 15 SB hamstring curls x 30  SB hamstring curls x 20  Quad sets manl resist 20 x 5\"  Quad sets manl resist 15 x 5\"      SLR's 4 x 10  SLR's 2 x 12 Calf stretch w/ strap assist : 1 min Sit-stands 2 x 15 (elevated seat) LAQ's: manl resist R, 2 x15     Slantboard stretch Calf stretch off end of // bars: 2 min  Heel side stretch w/ static holds : 5 min total Sit-stands: elevated seat (3\" cushion): 2 x 15. TKE's into popliteal ball: 5\" x 5   Assisted knee flexion stretching : 5 min                       Proprioceptive Activities:                                    Manual Therapy: 3 min 5 min 10 min 8 min 13 min     R tibi/fem distraction/ ap glides x 3 min w knee flexed 90 deg.   R tib-fem distraction/ ap glides x 5 min w knee flexed, end range flexion stretches in sitting R tib-fem distraction/ ap glides x 5 min w knee flexed R tib-fem distraction/ ap glides x 5 min w knee flexed R tib-fem distraction/ ap glides  w knee flexed - gr. 3-4, end range flexion post glides gr. 4.: 8 min        Knee extn stretch/tibial ER gr. 3 Knee extn stretch/tibial ER gr. 3 Knee extn stretch/tibial ER gr. 3    Therapeutic Activities:  8 min         Gait: single point cane instruction w/ cane on R, 2 x 50'         Gait practice in // bars - SBA, x 5 laps. HEP: see 2/21/22 flow sheet for specifics. [employer] Portal  Treatment/Session Summary:    · Response to Treatment:  Knee flexion increased to 115 deg., extension still painful w/ sharp pinch during end range extension, hindering gait. · Communication/Consultation:  None today  · Equipment provided today:  None today  · Recommendations/Intent for next treatment session: Next visit will focus on progression of strength and return to prior level of function, regain full knee ROM.      Total Treatment Billable Duration: 58 minutes  PT Patient Time In/Time Out  Time In: 0930  Time Out: [address],     Future Appointments   Date Time Provider [facility]   3/28/2022 11:45 AM Billie Tapia, PT [facility]   3/30/2022  9:30 AM Billie Tapia, PT [facility]   4/4/2022  2:00 PM Billie Tapia, PT [facility]   4/7/2022  8:45 AM Aliya Fields MD [facility]   4/7/2022 11:00 AM Billie Tapia, PT [facility]   4/11/2022 11:00 AM Billie Tapia, PT [facility]   4/13/2022  3:30 PM Billie Tapia, PT [facility]   4/18/2022  2:45 PM Billie Tapia, PT [facility]   4/20/2022  3:30 PM Billie Tapia, PT [facility]   4/25/2022 10:15 AM Billie Tapia, PT [facility]   4/27/2022  4:15 PM Oren Parrish, PT [facility]

## 2022-03-24 ENCOUNTER — APPOINTMENT (OUTPATIENT)
Dept: PHYSICAL THERAPY | Age: 67
End: 2022-03-24
Payer: COMMERCIAL

## 2022-03-28 ENCOUNTER — HOSPITAL ENCOUNTER (OUTPATIENT)
Dept: PHYSICAL THERAPY | Age: 67
Discharge: HOME | End: 2022-03-28
Payer: COMMERCIAL

## 2022-03-28 PROCEDURE — 97110 THERAPEUTIC EXERCISES: CPT

## 2022-03-28 NOTE — PROGRESS NOTES
Jd Garcias  : 1955  Primary: [facility]  Secondary:  [address] [address].  [phone]   [phone]      OUTPATIENT PHYSICAL THERAPY: Daily Treatment Note  3/28/2022    ICD-10: Treatment Diagnosis: Pain in right leg (M79.604), Pain in right knee (M25.561) and Stiffness of right knee, not elsewhere classified (M25.661)  Effective Dates: 2022 TO 2022 (90 days). Frequency/Duration: 2 times a week for 90 Days  GOALS: (Goals have been discussed and agreed upon with patient.)  Short-Term Functional Goals: Time Frame: 2 weeks  1. Patient will be independent with HEP. (MET)  2. Patient will improve passive knee extension to 0 ° to improve quad activation for weight bearing tasks. (Progressing)  3. Patient will improve knee flexion to 120 ° to improve mobility for sit to stand transfers. (Progressing)  Discharge Goals: Time Frame: 6 weeks  1. Patient will be able to maintain single leg stance for 10 seconds to improve safety for independent ambulation. 2. Patient will restore ROM to 0 to 130 ° to normalize mobility for symmetric gait on all surfaces. 3. Patient will perform 5x sit to  <12 seconds to restore strength for safe community mobility.    _________________________________________________________________________  Pre-treatment Symptoms/Complaints:  Pt is back to using her cane today - better, but still gets sharp pain and knee isn't straightening fully. Pt states that her goal is to put her socks on by herself. Pain: Initial: 4/10 Post Session:  No increase/10   Medications Last Reviewed:  3/28/2022  Updated Objective Findings:  Below measures from initial evaluation unless otherwise noted. Observation/Orthostatic Postural Assessment:    Ambulates with rolling walker with knee flexed at 20 ° throughout gait cycle. Incision sites are dry and closed. Mild swelling present to the joint. Palpation:    Tender to joint line. ROM:    Right knee: 8 to 40 ° active; 5 to 90 ° (flexion done in sitting) passive with painful end feel into extension and stiff end feel into flexion   SLR: 70 °   Strength:   Quad set is fair  SLR: 3/5   Knee extension: 3/5   Knee flexion: 4/5   Special Tests:    Not applicable  Neurological Screen:  Grossly intact; SLR is negative  Functional Mobility:   WNL  Balance:    Not applicable  4/8/93 : knee PROM: -5 extn to 110 deg flexion. SLR's with mild extensor lag. Gait w/ wheeled walker using heel-toe pattern. 3/16/22 : LEFS = 20/80. Gait with cane with very slow rate. Knee PROM = - 3 deg extn, 110 deg flexion (A/P), end range pain with both flexion and extension, diffuse knee effusion with minimal temperature increase. 3/23/22: Knee flexion to 115 deg , end range pain. 3/28/22: Knee flexion to 120 deg, extension ` -5 deg, end range pain w/ each, stiff extension end feel. TREATMENT:   THERAPEUTIC ACTIVITY: ( see below for minutes): Therapeutic activities per grid below to improve mobility, strength, balance and coordination. Required minimal visual, verbal, manual and tactile cues to improve independence and safety with daily activities . THERAPEUTIC EXERCISE: (see below for minutes):  Exercises per grid below to improve mobility, strength, balance and coordination. Required minimal verbal and manual cues to promote proper body alignment, promote proper body posture and promote proper body mechanics. Progressed resistance, range, repetitions and complexity of movement as indicated. MANUAL THERAPY: (see below for minutes): Joint mobilization and Soft tissue mobilization was utilized and necessary because of the patient's restricted joint motion, painful spasm, loss of articular motion and restricted motion of soft tissue.    MODALITIES: (see below for minutes):      to decrease pain    SELF CARE: (see below for minutes): Procedure(s) (per grid) utilized to improve and/or restore self-care/home management as related to dressing, bathing and grooming. Required minimal verbal cueing to facilitate activities of daily living skills and compensatory activities. Date: 3/9/22 (visit 7) 3/14/22: Visit 8 3/16/22 (visit 9, PN) 3/21/22 (visit 10) 3/23/22 (visit 11) 3/28/22 (visit 12)   Modalities: 15 min  15 min  15 min     R ant. Knee ice w/ IFC estim  pps x 15 min, ~ 20 mA. R ant. Knee ice w/ IFC estim  pps x 15 min, ~ 15 mA. R ant. Knee ice w/ IFC estim  pps x 15 min, ~ 14 mA. Therapeutic Exercise: 40 min 31 min 30 min 35 min 30 min 42 min    Nustep - 8 min - level 1 resist.  Nustep - 7 min - level 1 resist.  Nustep - 7 min - level 1 resist.  Nustep - 8 min - level 1 resist. Seated stepper x 8 min, level 4 resist.  Seated stepper x 8 min, level 4 resist.    Supine assisted heel slides to full extension/full flexion: 10 sec end range stretches: 8 min Heel slides: w/ 30 holds x 5 min Heel slides: w/ 30 holds x 10 min Seated HS curls: 2 x 15 manl resist Seated HS curls: 2 x 15 manl resist Knee extn stretch supine 5 x 30\"     Quad sets x 20 x 5\"  Quad sets x 15 x 10\"  Quad sets; 2 x 20 x 3\" Seated LAQ's: manl resist 2 x 15 Heel slide stretches w/ straps, end range holds w/ ankle pumps: 5 min Heel slide knee flexion stretch : 5 x 30\"    SAQ's : 2 x 15 SB hamstring curls x 30  SB hamstring curls x 20  Quad sets manl resist 20 x 5\"  Quad sets manl resist 15 x 5\"  Quad sets: 5 x 20\"     SLR's 4 x 10  SLR's 2 x 12 Calf stretch w/ strap assist : 1 min Sit-stands 2 x 15 (elevated seat) LAQ's: manl resist R, 2 x15 SB hamstring curls: 3 x 15    Slantboard stretch Calf stretch off end of // bars: 2 min  Heel side stretch w/ static holds : 5 min total Sit-stands: elevated seat (3\" cushion): 2 x 15. SLR's: 3#: 2x 10 , f/b  10 no  Wt.     TKE's into popliteal ball: 5\" x 5   Assisted knee flexion stretching : 5 min  Mini squats 2 z 12 slantboard stretch: 2 min         Gait: heel-toe practice w/ single point cane: 200   Proprioceptive Activities:                                    Manual Therapy: 3 min 5 min 10 min 8 min 13 min 4 min    R tibi/fem distraction/ ap glides x 3 min w knee flexed 90 deg. R tib-fem distraction/ ap glides x 5 min w knee flexed, end range flexion stretches in sitting R tib-fem distraction/ ap glides x 5 min w knee flexed R tib-fem distraction/ ap glides x 5 min w knee flexed R tib-fem distraction/ ap glides  w knee flexed - gr. 3-4, end range flexion post glides gr. 4.: 8 min  R tib-fem distraction gr. 3 x 2 min      Knee extn stretch/tibial ER gr. 3 Knee extn stretch/tibial ER gr. 3 Knee extn stretch/tibial ER gr. 3 Knee extn stretch /tibial ER gr. 3 x 2 min   Therapeutic Activities:  8 min         Gait: single point cane instruction w/ cane on R, 2 x 50'    '     Gait practice in // [facility] - SBA, x 5 laps. HEP: see 2/21/22 flow sheet for specifics. [employer] Portal  Treatment/Session Summary:    · Response to Treatment:  Knee flexion slowly increasing, but extension continues to be limited, painful and hindering normal gait kinematics. · Communication/Consultation:  None today  · Equipment provided today:  None today  · Recommendations/Intent for next treatment session: Next visit will focus on progression of strength and return to prior level of function, regain full knee ROM.      Total Treatment Billable Duration: 46  minutes  PT Patient Time In/Time Out  Time In: 0895  [facility]: [address] [facility]    Future Appointments   Date Time Provider Socorro Chandler   3/30/2022  2:00 PM Jax Mitchell PT [facility]   4/4/2022  2:00 PM Jax Mitchell PT [facility]   4/7/2022  8:45 AM Xenia Houser MD [facility]   4/7/2022 11:00 AM Jax Mitchell PT [facility]   4/11/2022 11:00 AM Jax Mitchell PT [facility]   4/13/2022 10:15 AM Sampson Burkitt, Petra Rinne, PT [facility]   4/18/2022  2:45 PM Frances Monterroso, PT [facility]   4/20/2022  3:30 PM Frances Monterroso, PT [facility]   4/25/2022 10:15 AM Frances Monterroso, PT [facility]   4/27/2022  4:15 PM Shree Jones, PT [facility]

## 2022-03-30 ENCOUNTER — HOSPITAL ENCOUNTER (OUTPATIENT)
Dept: PHYSICAL THERAPY | Age: 67
Discharge: HOME | End: 2022-03-30
Payer: COMMERCIAL

## 2022-03-30 ENCOUNTER — APPOINTMENT (OUTPATIENT)
Dept: PHYSICAL THERAPY | Age: 67
End: 2022-03-30
Payer: COMMERCIAL

## 2022-03-30 PROCEDURE — 97110 THERAPEUTIC EXERCISES: CPT

## 2022-03-30 NOTE — PROGRESS NOTES
Emilee Chávez  : 1955  Primary: [facility]et[facility]  Secondary:  Ozzy [place] [address].  [phone]   [phone]      OUTPATIENT PHYSICAL THERAPY: Daily Treatment Note  3/30/2022    ICD-10: Treatment Diagnosis: Pain in right leg (M79.604), Pain in right knee (M25.561) and Stiffness of right knee, not elsewhere classified (M25.661)  Effective Dates: 2022 TO 2022 (90 days). Frequency/Duration: 2 times a week for 90 Days  GOALS: (Goals have been discussed and agreed upon with patient.)  Short-Term Functional Goals: Time Frame: 2 weeks  1. Patient will be independent with HEP. (MET)  2. Patient will improve passive knee extension to 0 ° to improve quad activation for weight bearing tasks. (Progressing)  3. Patient will improve knee flexion to 120 ° to improve mobility for sit to stand transfers. (Progressing)  Discharge Goals: Time Frame: 6 weeks  1. Patient will be able to maintain single leg stance for 10 seconds to improve safety for independent ambulation. 2. Patient will restore ROM to 0 to 130 ° to normalize mobility for symmetric gait on all surfaces. 3. Patient will perform 5x sit to  <12 seconds to restore strength for safe community mobility.    _________________________________________________________________________  Pre-treatment Symptoms/Complaints:  Pt' otis knee continues to hurt when walking, forces her to walk slowly and take pauses due to sharp pain. Pain: Initial: 4/10 Post Session:  No increase/10   Medications Last Reviewed:  3/30/2022  Updated Objective Findings:  Below measures from initial evaluation unless otherwise noted. Observation/Orthostatic Postural Assessment:    Ambulates with rolling walker with knee flexed at 20 ° throughout gait cycle. Incision sites are dry and closed. Mild swelling present to the joint. Palpation:    Tender to joint line.   ROM:    Right knee: 8 to 40 ° active; 5 to 90 ° (flexion done in sitting) passive with painful end feel into extension and stiff end feel into flexion   SLR: 70 °   Strength:   Quad set is fair  SLR: 3/5   Knee extension: 3/5   Knee flexion: 4/5   Special Tests:    Not applicable  Neurological Screen:  Grossly intact; SLR is negative  Functional Mobility:   WNL  Balance:    Not applicable  6/9/45 : knee PROM: -5 extn to 110 deg flexion. SLR's with mild extensor lag. Gait w/ wheeled walker using heel-toe pattern. 3/16/22 : LEFS = 20/80. Gait with cane with very slow rate. Knee PROM = - 3 deg extn, 110 deg flexion (A/P), end range pain with both flexion and extension, diffuse knee effusion with minimal temperature increase. 3/23/22: Knee flexion to 115 deg , end range pain. 3/28/22: Knee flexion to 120 deg, extension ` -5 deg, end range pain w/ each, stiff extension end feel. TREATMENT:   THERAPEUTIC ACTIVITY: ( see below for minutes): Therapeutic activities per grid below to improve mobility, strength, balance and coordination. Required minimal visual, verbal, manual and tactile cues to improve independence and safety with daily activities . THERAPEUTIC EXERCISE: (see below for minutes):  Exercises per grid below to improve mobility, strength, balance and coordination. Required minimal verbal and manual cues to promote proper body alignment, promote proper body posture and promote proper body mechanics. Progressed resistance, range, repetitions and complexity of movement as indicated. MANUAL THERAPY: (see below for minutes): Joint mobilization and Soft tissue mobilization was utilized and necessary because of the patient's restricted joint motion, painful spasm, loss of articular motion and restricted motion of soft tissue.    MODALITIES: (see below for minutes):      to decrease pain    SELF CARE: (see below for minutes): Procedure(s) (per grid) utilized to improve and/or restore self-care/home management as related to dressing, bathing and grooming. Required minimal verbal cueing to facilitate activities of daily living skills and compensatory activities. Date: 3/30/22 (visit 13)         Modalities:                                    Therapeutic Exercise: 42 min         Supine slow load knee flexion stretch x 4 min. Knee extn stretch x 2 min         SB hamstring curls 2 x 15          Quad sets: 20 x 5\"          SLR's : 3 x 15         LAQ isometrics: 2 x 10 x 4\" bilat @ 75 deg flexion. Seated stepper x 8 min, level 2 resist.                           Proprioceptive Activities:                                    Manual Therapy: 3 min. R tibio-fem distraction w/ ap glides gr. 3: 3 min                 Therapeutic Activities:               '                       HEP: see 2/21/22 flow sheet for specifics. [facility]  Treatment/Session Summary:    · Response to Treatment:  Knee flexion limited to 115 deg today, extension to - 4 deg, both with medial joint line pain. · Communication/Consultation:  None today  · Equipment provided today:  None today  · Recommendations/Intent for next treatment session: Next visit will focus on progression of strength and return to prior level of function, further work on regaining full knee ROM.      Total Treatment Billable Duration: 45  minutes  PT Patient Time In/Time Out  Time In: 1402  Time Out: IMANI Box    Future Appointments   Date Time Provider [facility]   4/4/2022  2:00 PM Maryan Alarcon, PT [facility]   4/7/2022  8:45 AM Swati Luu MD [facility]   4/7/2022 11:00 AM Maryan Alarcon PT [facility]   4/11/2022 11:00 AM Maryan Alarcon PT [facility]   4/13/2022 10:15 AM Maryan Alarcon PT [facility]   4/18/2022  2:45 PM Maryan Alarcon, PT [facility]   4/20/2022  3:30 PM Maryan Alarcon, PT [facility]   4/25/2022 10:15 AM Kee Eubanks, PT [facility] 4/27/2022  4:15 PM [facility]

## 2022-03-31 ENCOUNTER — APPOINTMENT (OUTPATIENT)
Dept: PHYSICAL THERAPY | Age: 67
End: 2022-03-31
Payer: COMMERCIAL

## 2022-04-04 ENCOUNTER — HOSPITAL ENCOUNTER (OUTPATIENT)
Dept: PHYSICAL THERAPY | Age: 67
Discharge: HOME | End: 2022-04-04
Payer: COMMERCIAL

## 2022-04-04 PROCEDURE — 97110 THERAPEUTIC EXERCISES: CPT

## 2022-04-04 PROCEDURE — 97530 THERAPEUTIC ACTIVITIES: CPT

## 2022-04-04 NOTE — PROGRESS NOTES
Shara Pappas  : 1955  Primary: [employer] [employer]  Secondary:  [id number] [facility] @ [address] [place] [address].  [phone]   [phone]      OUTPATIENT PHYSICAL THERAPY: Daily Treatment Note  2022    ICD-10: Treatment Diagnosis: Pain in right leg (M79.604), Pain in right knee (M25.561) and Stiffness of right knee, not elsewhere classified (M25.661)  Effective Dates: 2022 TO 2022 (90 days). Frequency/Duration: 2 times a week for 90 Days  GOALS: (Goals have been discussed and agreed upon with patient.)  Short-Term Functional Goals: Time Frame: 2 weeks  1. Patient will be independent with HEP. (MET)  2. Patient will improve passive knee extension to 0 ° to improve quad activation for weight bearing tasks. (Progressing)  3. Patient will improve knee flexion to 120 ° to improve mobility for sit to stand transfers. (Progressing)  Discharge Goals: Time Frame: 6 weeks  1. Patient will be able to maintain single leg stance for 10 seconds to improve safety for independent ambulation. 2. Patient will restore ROM to 0 to 130 ° to normalize mobility for symmetric gait on all surfaces. 3. Patient will perform 5x sit to  <12 seconds to restore strength for safe community mobility.    _________________________________________________________________________  Pre-treatment Symptoms/Complaints:  Pt has returned to work and to driving. Was able to descend the stairs to the therapy clinic insteady of taking the elevator. Uses a footstool at work to prop up her foot when at work. Notes that her walking is better, is able to place her right foot flat during steps. Pain: Initial: 4/10 Post Session:  No increase/10   Medications Last Reviewed:  2022  Updated Objective Findings:  Below measures from initial evaluation unless otherwise noted.      Observation/Orthostatic Postural Assessment:    Ambulates with rolling walker with knee flexed at 20 ° throughout gait cycle. Incision sites are dry and closed. Mild swelling present to the joint. Palpation:    Tender to joint line. ROM:    Right knee: 8 to 40 ° active; 5 to 90 ° (flexion done in sitting) passive with painful end feel into extension and stiff end feel into flexion   SLR: 70 °   Strength:   Quad set is fair  SLR: 3/5   Knee extension: 3/5   Knee flexion: 4/5   Special Tests:    Not applicable  Neurological Screen:  Grossly intact; SLR is negative  Functional Mobility:   WNL  Balance:    Not applicable  5/8/89 : knee PROM: -5 extn to 110 deg flexion. SLR's with mild extensor lag. Gait w/ wheeled walker using heel-toe pattern. 3/16/22 : LEFS = 20/80. Gait with cane with very slow rate. Knee PROM = - 3 deg extn, 110 deg flexion (A/P), end range pain with both flexion and extension, diffuse knee effusion with minimal temperature increase. 3/23/22: Knee flexion to 115 deg , end range pain. 3/28/22: Knee flexion to 120 deg, extension ` -5 deg, end range pain w/ each, stiff extension end feel. TREATMENT:   THERAPEUTIC ACTIVITY: ( see below for minutes): Therapeutic activities per grid below to improve mobility, strength, balance and coordination. Required minimal visual, verbal, manual and tactile cues to improve independence and safety with daily activities . THERAPEUTIC EXERCISE: (see below for minutes):  Exercises per grid below to improve mobility, strength, balance and coordination. Required minimal verbal and manual cues to promote proper body alignment, promote proper body posture and promote proper body mechanics. Progressed resistance, range, repetitions and complexity of movement as indicated. MANUAL THERAPY: (see below for minutes): Joint mobilization and Soft tissue mobilization was utilized and necessary because of the patient's restricted joint motion, painful spasm, loss of articular motion and restricted motion of soft tissue.    MODALITIES: (see below for minutes): to decrease pain    SELF CARE: (see below for minutes): Procedure(s) (per grid) utilized to improve and/or restore self-care/home management as related to dressing, bathing and grooming. Required minimal verbal cueing to facilitate activities of daily living skills and compensatory activities. Date: 3/30/22 (visit 13)  4/4/22 (visit 14)        Modalities:                                    Therapeutic Exercise: 42 min 40 min        Supine slow load knee flexion stretch x 4 min. Seated stepper: 7 min, level 4 resist). Knee extn stretch x 2 min Knee extn stretch : 3 x 1 min        SB hamstring curls 2 x 15  SAQ's w/ manl resist: 2 x 15        Quad sets: 20 x 5\"  SB hamstring curls x 30        SLR's : 3 x 15 Bridges: 3 x 10        LAQ isometrics: 2 x 10 x 4\" bilat @ 75 deg flexion. SLR's w/  3#:  x8        Seated stepper x 8 min, level 2 resist.  DL calf raises: x 30         R LAQ's: manl resist: 3 x 10          Seated knee flexion stretch        Proprioceptive Activities:                                    Manual Therapy: 3 min. R tibio-fem distraction w/ ap glides gr. 3: 3 min                 Therapeutic Activities:  13 min         Forward step ups: 8\" 2 x 15    '     Mini squats: 3 x 15         Lateral walking: 2 x 15' L/R.          HEP: see 2/21/22 flow sheet for specifics. [employer] Portal  Treatment/Session Summary:    · Response to Treatment:  Knee flexion limited similarly to last visit, extension to - 2 deg w/ passive stretch. Started forward step training on  3\" step and 6\" step - pt demonstrated difficulty flexing R hip /knee adequately to place R foot on step but was able to do so with 3\" step. · Communication/Consultation:  None today  · Equipment provided today:  None today  · Recommendations/Intent for next treatment session: Next visit will focus on progression of strength and return to prior level of function, further work on regaining full knee ROM.      Total Treatment Billable Duration: 53  minutes  PT Patient Time In/Time Out  Time In: 1400  Time Out: [address], PT    Future Appointments   Date Time Provider Socorro Chandler   4/7/2022  8:45 AM Caio Gorman MD [facility]   4/7/2022 11:00 AM Gonzales Umang, PT [facility]   4/11/2022 11:00 AM Gonzales Umang, PT [facility]   4/13/2022 10:15 AM Gonzales Umang, PT [facility]   4/18/2022  2:45 PM Gonzales Umang, PT [facility]   4/20/2022  3:30 PM [facility]   4/25/2022 10:15 AM Gonzales Umang, PT [facility]   4/27/2022  4:15 PM Hien Parrish, PT [facility]

## 2022-04-07 ENCOUNTER — HOSPITAL ENCOUNTER (OUTPATIENT)
Dept: PHYSICAL THERAPY | Age: 67
Discharge: HOME | End: 2022-04-07
Payer: COMMERCIAL

## 2022-04-07 PROCEDURE — 97016 VASOPNEUMATIC DEVICE THERAPY: CPT

## 2022-04-07 PROCEDURE — 97110 THERAPEUTIC EXERCISES: CPT

## 2022-04-07 NOTE — PROGRESS NOTES
Leroy Sat  : 1955  Primary: Sarika Coole[facility]  Secondary:  Montez Miller @ [address] [place] [address].  [phone]   [phone]      OUTPATIENT PHYSICAL THERAPY: Daily Treatment Note  2022    ICD-10: Treatment Diagnosis: Pain in right leg (M79.604), Pain in right knee (M25.561) and Stiffness of right knee, not elsewhere classified (M25.661)  Effective Dates: 2022 TO 2022 (90 days). Frequency/Duration: 2 times a week for 90 Days  GOALS: (Goals have been discussed and agreed upon with patient.)  Short-Term Functional Goals: Time Frame: 2 weeks  1. Patient will be independent with HEP. (MET)  2. Patient will improve passive knee extension to 0 ° to improve quad activation for weight bearing tasks. (Progressing)  3. Patient will improve knee flexion to 120 ° to improve mobility for sit to stand transfers. (Progressing)  Discharge Goals: Time Frame: 6 weeks  1. Patient will be able to maintain single leg stance for 10 seconds to improve safety for independent ambulation. 2. Patient will restore ROM to 0 to 130 ° to normalize mobility for symmetric gait on all surfaces. 3. Patient will perform 5x sit to  <12 seconds to restore strength for safe community mobility.    _________________________________________________________________________  Pre-treatment Symptoms/Complaints:  Pt saw Dr. Phillip Mace since last visit, received cortisone injection and is to both continue PT and to receive viscosupplementation injection in knee once this is authorized. Knee very sore today. Pain: Initial: 4/10 Post Session:  No increase/10   Medications Last Reviewed:  2022  Updated Objective Findings:  Below measures from initial evaluation unless otherwise noted. Observation/Orthostatic Postural Assessment:    Ambulates with rolling walker with knee flexed at 20 ° throughout gait cycle. Incision sites are dry and closed.  Mild swelling present to the joint. Palpation:    Tender to joint line. ROM:    Right knee: 8 to 40 ° active; 5 to 90 ° (flexion done in sitting) passive with painful end feel into extension and stiff end feel into flexion   SLR: 70 °   Strength:   Quad set is fair  SLR: 3/5   Knee extension: 3/5   Knee flexion: 4/5   Special Tests:    Not applicable  Neurological Screen:  Grossly intact; SLR is negative  Functional Mobility:   WNL  Balance:    Not applicable  0/1/76 : knee PROM: -5 extn to 110 deg flexion. SLR's with mild extensor lag. Gait w/ wheeled walker using heel-toe pattern. 3/16/22 : LEFS = 20/80. Gait with cane with very slow rate. Knee PROM = - 3 deg extn, 110 deg flexion (A/P), end range pain with both flexion and extension, diffuse knee effusion with minimal temperature increase. 3/23/22: Knee flexion to 115 deg , end range pain. 3/28/22: Knee flexion to 120 deg, extension ` -5 deg, end range pain w/ each, stiff extension end feel  4/7/22: anterior knee effused. Knee extension at start fo treatment - 8 deg, at end of treatment - 5 deg. Kena Jackson TREATMENT:   THERAPEUTIC ACTIVITY: ( see below for minutes): Therapeutic activities per grid below to improve mobility, strength, balance and coordination. Required minimal visual, verbal, manual and tactile cues to improve independence and safety with daily activities . THERAPEUTIC EXERCISE: (see below for minutes):  Exercises per grid below to improve mobility, strength, balance and coordination. Required minimal verbal and manual cues to promote proper body alignment, promote proper body posture and promote proper body mechanics. Progressed resistance, range, repetitions and complexity of movement as indicated. MANUAL THERAPY: (see below for minutes): Joint mobilization and Soft tissue mobilization was utilized and necessary because of the patient's restricted joint motion, painful spasm, loss of articular motion and restricted motion of soft tissue. MODALITIES: (see below for minutes):      to decrease pain    SELF CARE: (see below for minutes): Procedure(s) (per grid) utilized to improve and/or restore self-care/home management as related to dressing, bathing and grooming. Required minimal verbal cueing to facilitate activities of daily living skills and compensatory activities. Date: 3/30/22 (visit 13)  22 (visit 14)  22 (visit 15)      Modalities:   15 min         [employer] vaso/cryo x 15 min R knee at 34 deg- 45 deg                         Therapeutic Exercise: 42 min 40 min 25 min       Supine slow load knee flexion stretch x 4 min. Seated stepper: 7 min, level 4 resist). R knee passive extension stretchin min- static hold       Knee extn stretch x 2 min Knee extn stretch : 3 x 1 min SB hamstring curls: 30x. SB hamstring curls 2 x 15  SAQ's w/ manl resist: 2 x 15        Quad sets: 20 x 5\"  SB hamstring curls x 30        SLR's : 3 x 15 Bridges: 3 x 10        LAQ isometrics: 2 x 10 x 4\" bilat @ 75 deg flexion. SLR's w/  3#:  x8        Seated stepper x 8 min, level 2 resist.  DL calf raises: x 30         R LAQ's: manl resist: 3 x 10          Seated knee flexion stretch        Proprioceptive Activities:                                    Manual Therapy: 3 min. 5 min       R tibio-fem distraction w/ ap glides gr. 3: 3 min  R tibio-fem distraction w/ ap glides gr. 3:               Therapeutic Activities:  13 min         Forward step ups: 8\" 2 x 15    '     Mini squats: 3 x 15         Lateral walking: 2 x 15' L/R.          HEP: see 22 flow sheet for specifics. [employer] Portal  Treatment/Session Summary:    · Response to Treatment:  Treatment plan modified ue to pt havving undergone steroid  Injection R knee earlier today - knee was very effused, ROM performed with pain. Knee extension ROM improved by end of treatment.  .   · Communication/Consultation:  None today  · Equipment provided today:  None today  · Recommendations/Intent for next treatment session: Next visit will focus on progression of strength and return to prior level of function, further work on regaining full knee ROM.      Total Treatment Billable Duration: 45  minutes  PT Patient Time In/Time Out  Time In: 1100  Time Out: Gordon [address], PT    Future Appointments   Date Time Provider Socorro Chandler   4/11/2022 11:00 AM Nemo Arts, PT [facility]   4/13/2022 10:15 AM Nemo Arts, PT [facility]   4/14/2022 10:30 AM MD PEDRO Crenshaw [facility]   4/18/2022  2:45 PM Nemo Arts, PT [facility]   4/20/2022  3:30 PM Nemo Arts, PT [facility]   4/25/2022 10:15 AM Nemo Arts, PT [facility]   4/27/2022  4:15 PM [facility]

## 2022-04-11 ENCOUNTER — HOSPITAL ENCOUNTER (OUTPATIENT)
Dept: PHYSICAL THERAPY | Age: 67
Discharge: HOME | End: 2022-04-11
Payer: COMMERCIAL

## 2022-04-11 PROCEDURE — 97110 THERAPEUTIC EXERCISES: CPT

## 2022-04-11 PROCEDURE — 97530 THERAPEUTIC ACTIVITIES: CPT

## 2022-04-11 NOTE — PROGRESS NOTES
Shaniqua Lopez  : 1955  Primary: [facility]  Secondary:  Conor Downey [place] [address].  [phone]   [phone]      OUTPATIENT PHYSICAL THERAPY: Progress Report /  Daily Treatment Note  2022    ICD-10: Treatment Diagnosis: Pain in right leg (M79.604), Pain in right knee (M25.561) and Stiffness of right knee, not elsewhere classified (M25.661)  Effective Dates: 2022 TO 2022 (90 days). Frequency/Duration: 2 times a week for 90 Days  GOALS: (Goals have been discussed and agreed upon with patient.)  Short-Term Functional Goals: Time Frame: 2 weeks  1. Patient will be independent with HEP. (MET)  2. Patient will improve passive knee extension to 0 ° to improve quad activation for weight bearing tasks. (Progressing)  3. Patient will improve knee flexion to 120 ° to improve mobility for sit to stand transfers. (MET)  Discharge Goals: Time Frame: 6 weeks  1. Patient will be able to maintain single leg stance for 10 seconds to improve safety for independent ambulation. (Progressing)  2. Patient will restore ROM to 0 to 130 ° to normalize mobility for symmetric gait on all surfaces. (Progressing)  3. Patient will perform 5x sit to  <12 seconds to restore strength for safe community mobility. (Progressing)    _________________________________________________________________________  Pre-treatment Symptoms/Complaints: Better! - knee less painful, was able to sleep 4 hours last night, knee pain is no longer constant but still has tingling on top of foot and toes. Can bend her knee further since injection last week. Pain: Initial: 2/10 Post Session:  No increase/10   Medications Last Reviewed:  2022  Updated Objective Findings:  Below measures from initial evaluation unless otherwise noted.      Observation/Orthostatic Postural Assessment:    Ambulates with rolling walker with knee flexed at 20 ° throughout gait cycle. Incision sites are dry and closed. Mild swelling present to the joint. Palpation:    Tender to joint line. ROM:    Right knee: 8 to 40 ° active; 5 to 90 ° (flexion done in sitting) passive with painful end feel into extension and stiff end feel into flexion   SLR: 70 °   Strength:   Quad set is fair  SLR: 3/5   Knee extension: 3/5   Knee flexion: 4/5   Special Tests:    Not applicable  Neurological Screen:  Grossly intact; SLR is negative  Functional Mobility:   WNL  Balance:    Not applicable  1/8/11 : knee PROM: -5 extn to 110 deg flexion. SLR's with mild extensor lag. Gait w/ wheeled walker using heel-toe pattern. 3/16/22 : LEFS = 20/80. Gait with cane with very slow rate. Knee PROM = - 3 deg extn, 110 deg flexion (A/P), end range pain with both flexion and extension, diffuse knee effusion with minimal temperature increase. 3/23/22: Knee flexion to 115 deg , end range pain. 3/28/22: Knee flexion to 120 deg, extension ` -5 deg, end range pain w/ each, stiff extension end feel  4/7/22: anterior knee effused. Knee extension at start fo treatment - 8 deg, at end of treatment - 5 deg.  4/11/22: TUG test = 20.69 , using single point cane. PROM's R knee: - 3 °  extension, 123 ° flexion. TREATMENT:   THERAPEUTIC ACTIVITY: ( see below for minutes): Therapeutic activities per grid below to improve mobility, strength, balance and coordination. Required minimal visual, verbal, manual and tactile cues to improve independence and safety with daily activities . THERAPEUTIC EXERCISE: (see below for minutes):  Exercises per grid below to improve mobility, strength, balance and coordination. Required minimal verbal and manual cues to promote proper body alignment, promote proper body posture and promote proper body mechanics. Progressed resistance, range, repetitions and complexity of movement as indicated.   MANUAL THERAPY: (see below for minutes): Joint mobilization and Soft tissue mobilization was utilized and necessary because of the patient's restricted joint motion, painful spasm, loss of articular motion and restricted motion of soft tissue. MODALITIES: (see below for minutes):      to decrease pain    SELF CARE: (see below for minutes): Procedure(s) (per grid) utilized to improve and/or restore self-care/home management as related to dressing, bathing and grooming. Required minimal verbal cueing to facilitate activities of daily living skills and compensatory activities. Date: 3/30/22 (visit 13)  22 (visit 14)  22 (visit 15) 22 (visit 16, PN)     Modalities:   15 min         Gameready vaso/cryo x 15 min R knee at 34 deg- 45 deg                         Therapeutic Exercise: 42 min 40 min 25 min 37 min      Supine slow load knee flexion stretch x 4 min. Seated stepper: 7 min, level 4 resist). R knee passive extension stretchin min- static hold Seated stepper: level 4  X 8 min. Knee extn stretch x 2 min Knee extn stretch : 3 x 1 min SB hamstring curls: 30x. Knee extension stretch w/ strap/quad sets: 20 x 5\"       SB hamstring curls 2 x 15  SAQ's w/ manl resist: 2 x 15  Heel slides: 4 x 30 \" stretch R knee      Quad sets: 20 x 5\"  SB hamstring curls x 30  SLR's:  1 x 25. SLR's : 3 x 15 Bridges: 3 x 10  SB hamstring curls x 30       LAQ isometrics: 2 x 10 x 4\" bilat @ 75 deg flexion. SLR's w/  3#:  x8  Bridges: 2 x 10      Seated stepper x 8 min, level 2 resist.  DL calf raises: x 30  LAQ's: 3 x 15: 10# R.        R LAQ's: manl resist: 3 x 10   Calf raises : 2 x 15 dl standing         Slantboard calf stretch: 1 min                         Seated knee flexion stretch        Proprioceptive Activities:                                    Manual Therapy: 3 min. 5 min 2 min      R tibio-fem distraction w/ ap glides gr. 3: 3 min  R tibio-fem distraction w/ ap glides gr.  3: R tibio-fem distraction w/ ap glides gr. 3:              Therapeutic Activities:  13 min  12 min Forward step ups: 8\" 2 x 15  R forward step ups w/6\" step: 1 x 15  '     Mini squats: 3 x 15  Mini squats: 2 x 15       Lateral walking: 2 x 15' L/R. Lateral walking w red band at feet: 4 x 10' L/R.        HEP: see 2/21/22 flow sheet for specifics. [employer] Portal  Treatment/Session Summary:    · Response to Treatment: Much better response today than in preceding visits- STG's for ROM nearly met, now able to partial squat, walk with better speed (using single point cane). Anticipate continued progress in upcoming visits with better potential now to meet all LTG's. · Communication/Consultation:  None today  · Equipment provided today:  None today  · Recommendations/Intent for next treatment session: Next visit will focus on progression of strength and return to prior level of function, further work on regaining full knee ROM.      Total Treatment Billable Duration: 51  minutes  PT Patient Time In/Time Out  Time In: 2421  Time Out: [address], PT    Future Appointments   Date Time Provider Socorro Chandler   4/13/2022 10:15 AM Jax Mitchell PT [facility]   4/14/2022 10:30 AM MD PEDRO Nielson   4/18/2022  2:45 PM [facility]   4/20/2022  3:30 PM Jax Mitchell PT [facility]   4/25/2022 10:15 AM Jax Mitchell PT [facility]   4/27/2022  4:15 PM Kurfman[facility] Rinne[facility]

## 2022-04-13 ENCOUNTER — HOSPITAL ENCOUNTER (OUTPATIENT)
Dept: PHYSICAL THERAPY | Age: 67
Discharge: HOME | End: 2022-04-13
Payer: COMMERCIAL

## 2022-04-13 PROCEDURE — 97110 THERAPEUTIC EXERCISES: CPT

## 2022-04-13 PROCEDURE — 97530 THERAPEUTIC ACTIVITIES: CPT

## 2022-04-13 NOTE — PROGRESS NOTES
Nj Zheng  : 1955  Primary: [facility]  Secondary:  [facility]  [address] [place] [address].  [phone]   [phone]      OUTPATIENT PHYSICAL THERAPY:  Daily Treatment Note  2022    ICD-10: Treatment Diagnosis: Pain in right leg (M79.604), Pain in right knee (M25.561) and Stiffness of right knee, not elsewhere classified (M25.661)  Effective Dates: 2022 TO 2022 (90 days). Frequency/Duration: 2 times a week for 90 Days  GOALS: (Goals have been discussed and agreed upon with patient.)  Short-Term Functional Goals: Time Frame: 2 weeks  1. Patient will be independent with HEP. (MET)  2. Patient will improve passive knee extension to 0 ° to improve quad activation for weight bearing tasks. (Progressing)  3. Patient will improve knee flexion to 120 ° to improve mobility for sit to stand transfers. (MET)  Discharge Goals: Time Frame: 6 weeks  1. Patient will be able to maintain single leg stance for 10 seconds to improve safety for independent ambulation. (Progressing)  2. Patient will restore ROM to 0 to 130 ° to normalize mobility for symmetric gait on all surfaces. (Progressing)  3. Patient will perform 5x sit to  <12 seconds to restore strength for safe community mobility. (Progressing)    _________________________________________________________________________  Pre-treatment Symptoms/Complaints: Walking better, starting to walk short periods withotu her cane. Still waiting for 's office to call to schedule gel injection for knee. Pain: Initial: 210 Post Session:  No increase/10   Medications Last Reviewed:  2022  Updated Objective Findings:  Below measures from initial evaluation unless otherwise noted. Observation/Orthostatic Postural Assessment:    Ambulates with rolling walker with knee flexed at 20 ° throughout gait cycle. Incision sites are dry and closed.  Mild swelling present to the joint. Palpation:    Tender to joint line. ROM:    Right knee: 8 to 40 ° active; 5 to 90 ° (flexion done in sitting) passive with painful end feel into extension and stiff end feel into flexion   SLR: 70 °   Strength:   Quad set is fair  SLR: 3/5   Knee extension: 3/5   Knee flexion: 4/5   Special Tests:    Not applicable  Neurological Screen:  Grossly intact; SLR is negative  Functional Mobility:   WNL  Balance:    Not applicable  1/4/58 : knee PROM: -5 extn to 110 deg flexion. SLR's with mild extensor lag. Gait w/ wheeled walker using heel-toe pattern. 3/16/22 : LEFS = 20/80. Gait with cane with very slow rate. Knee PROM = - 3 deg extn, 110 deg flexion (A/P), end range pain with both flexion and extension, diffuse knee effusion with minimal temperature increase. 3/23/22: Knee flexion to 115 deg , end range pain. 3/28/22: Knee flexion to 120 deg, extension ` -5 deg, end range pain w/ each, stiff extension end feel  4/7/22: anterior knee effused. Knee extension at start fo treatment - 8 deg, at end of treatment - 5 deg.  4/11/22: TUG test = 20.69 , using single point cane. PROM's R knee: - 3 °  extension, 123 ° flexion. 4/13/22: Knee flexion PROM to 125 deg today. TREATMENT:   THERAPEUTIC ACTIVITY: ( see below for minutes): Therapeutic activities per grid below to improve mobility, strength, balance and coordination. Required minimal visual, verbal, manual and tactile cues to improve independence and safety with daily activities . THERAPEUTIC EXERCISE: (see below for minutes):  Exercises per grid below to improve mobility, strength, balance and coordination. Required minimal verbal and manual cues to promote proper body alignment, promote proper body posture and promote proper body mechanics. Progressed resistance, range, repetitions and complexity of movement as indicated.   MANUAL THERAPY: (see below for minutes): Joint mobilization and Soft tissue mobilization was utilized and necessary because of the patient's restricted joint motion, painful spasm, loss of articular motion and restricted motion of soft tissue. MODALITIES: (see below for minutes):      to decrease pain    SELF CARE: (see below for minutes): Procedure(s) (per grid) utilized to improve and/or restore self-care/home management as related to dressing, bathing and grooming. Required minimal verbal cueing to facilitate activities of daily living skills and compensatory activities. Date: 3/30/22 (visit 13)  22 (visit 14)  22 (visit 15) 22 (visit 16, PN) 22 (visit 17)    Modalities:   15 min         Gameready vaso/cryo x 15 min R knee at 34 deg- 45 deg                         Therapeutic Exercise: 42 min 40 min 25 min 37 min 39 min     Supine slow load knee flexion stretch x 4 min. Seated stepper: 7 min, level 4 resist). R knee passive extension stretchin min- static hold Seated stepper: level 4  X 8 min. Supine R knee extension stretch w/ calf stretch: 4 x 30\"     Knee extn stretch x 2 min Knee extn stretch : 3 x 1 min SB hamstring curls: 30x. Knee extension stretch w/ strap/quad sets: 20 x 5\"  SB hamstring curls x 30     SB hamstring curls 2 x 15  SAQ's w/ manl resist: 2 x 15  Heel slides: 4 x 30 \" stretch R knee Quad sets R 2 x 10     Quad sets: 20 x 5\"  SB hamstring curls x 30  SLR's:  1 x 25. R SLR's 4 x 8 w/ 4#     SLR's : 3 x 15 Bridges: 3 x 10  SB hamstring curls x 30  Seated stepper x 7 min, # 5 resistance. LAQ isometrics: 2 x 10 x 4\" bilat @ 75 deg flexion. SLR's w/  3#:  x8  Bridges: 2 x 10 Bridges: 3 x 10     Seated stepper x 8 min, level 2 resist.  DL calf raises: x 30  LAQ's: 3 x 15: 10# R.  LAQ's DL: 3 x [email or web address]      R LAQ's: manl resist: 3 x 10   Calf raises : 2 x 15 dl standing slantboard stretch x 1 min        Slantboard calf stretch: 1 min                         Seated knee flexion stretch        Proprioceptive Activities:                                    Manual Therapy: 3 min.    5 min 2 min 3 min     R tibio-fem distraction w/ ap glides gr. 3: 3 min  R tibio-fem distraction w/ ap glides gr. 3: R tibio-fem distraction w/ ap glides gr. 3: R tibio-fem distraction w/ ap glides gr. 3:             Therapeutic Activities:  13 min  12 min 14  min      Forward step ups: 8\" 2 x 15  R forward step ups w/6\" step: 1 x 15 R forward step ups w/6\" step:2 x 8 '        Tandem balance:8 x 30\" (4 sets each L/R      Mini squats: 3 x 15  Mini squats: 2 x 15 Mini squats 2 x 8      Lateral walking: 2 x 15' L/R. Lateral walking w red band at feet: 4 x 10' L/R. Lateral walking:5 x 10' L/ R - red band @ ankles      HEP: see 2/21/22 flow sheet for specifics. [employer] Portal  Treatment/Session Summary:    · Response to Treatment: ROM improved again ( 125 ° flexion), but more guarded responses with step ups and mini squats, likely due to effects of cortisone injection wearing off. · Communication/Consultation:  None today  · Equipment provided today:  None today  · Recommendations/Intent for next treatment session: Next visit will focus on progression of strength and return to prior level of function, further work on regaining full knee ROM.      Total Treatment Billable Duration: 56 minutes  PT Patient Time In/Time Out  Time In: 5376  Time Out: [address] [facility], PT    Future Appointments   Date Time Provider Socorro Chandler   4/14/2022 10:30 AM Charu Oneill MD [facility]   4/18/2022  2:45 PM Beth Michael PT [facility]   4/20/2022  3:30 PM Beth Michael PT [facility]   4/25/2022 10:15 AM Beth Michael PT [facility]   4/27/2022  4:15 PM Juve Parrish, PT [facility]

## 2022-04-18 ENCOUNTER — HOSPITAL ENCOUNTER (OUTPATIENT)
Dept: PHYSICAL THERAPY | Age: 67
Discharge: HOME | End: 2022-04-18
Payer: COMMERCIAL

## 2022-04-18 NOTE — PROGRESS NOTES
Pt cancelled today's appointment due to illness, plans to return at her next scheduled appointment.      Veronica Perea, PT,DPT,OCS,MTC

## 2022-04-20 ENCOUNTER — HOSPITAL ENCOUNTER (OUTPATIENT)
Dept: PHYSICAL THERAPY | Age: 67
Discharge: HOME | End: 2022-04-20
Payer: COMMERCIAL

## 2022-04-20 PROCEDURE — 97014 ELECTRIC STIMULATION THERAPY: CPT

## 2022-04-20 PROCEDURE — 97110 THERAPEUTIC EXERCISES: CPT

## 2022-04-20 NOTE — PROGRESS NOTES
Maria E Dow  : 1955  Primary: [facility]  Secondary:  Sid Silva [address]  [phone]   [phone]      OUTPATIENT PHYSICAL THERAPY:  Daily Treatment Note  2022    ICD-10: Treatment Diagnosis: Pain in right leg (M79.604), Pain in right knee (M25.561) and Stiffness of right knee, not elsewhere classified (M25.661)  Effective Dates: 2022 TO 2022 (90 days). Frequency/Duration: 2 times a week for 90 Days  GOALS: (Goals have been discussed and agreed upon with patient.)  Short-Term Functional Goals: Time Frame: 2 weeks  1. Patient will be independent with HEP. (MET)  2. Patient will improve passive knee extension to 0 ° to improve quad activation for weight bearing tasks. (Progressing)  3. Patient will improve knee flexion to 120 ° to improve mobility for sit to stand transfers. (MET)  Discharge Goals: Time Frame: 6 weeks  1. Patient will be able to maintain single leg stance for 10 seconds to improve safety for independent ambulation. (Progressing)  2. Patient will restore ROM to 0 to 130 ° to normalize mobility for symmetric gait on all surfaces. (Progressing)  3. Patient will perform 5x sit to  <12 seconds to restore strength for safe community mobility. (Progressing)    _________________________________________________________________________  Pre-treatment Symptoms/Complaints: Waiting for approval for knee injection, and trying to walk some without cane but with difficulty. Pain: Initial: 2/10 Post Session:  No increase/10   Medications Last Reviewed:  2022  Updated Objective Findings:  Below measures from initial evaluation unless otherwise noted. Observation/Orthostatic Postural Assessment:    Ambulates with rolling walker with knee flexed at 20 ° throughout gait cycle. Incision sites are dry and closed. Mild swelling present to the joint.    Palpation:    Tender to joint line.  ROM:    Right knee: 8 to 40 ° active; 5 to 90 ° (flexion done in sitting) passive with painful end feel into extension and stiff end feel into flexion   SLR: 70 °   Strength:   Quad set is fair  SLR: 3/5   Knee extension: 3/5   Knee flexion: 4/5   Special Tests:    Not applicable  Neurological Screen:  Grossly intact; SLR is negative  Functional Mobility:   WNL  Balance:    Not applicable  6/1/16 : knee PROM: -5 extn to 110 deg flexion. SLR's with mild extensor lag. Gait w/ wheeled walker using heel-toe pattern. 3/16/22 : LEFS = 20/80. Gait with cane with very slow rate. Knee PROM = - 3 deg extn, 110 deg flexion (A/P), end range pain with both flexion and extension, diffuse knee effusion with minimal temperature increase. 3/23/22: Knee flexion to 115 deg , end range pain. 3/28/22: Knee flexion to 120 deg, extension ` -5 deg, end range pain w/ each, stiff extension end feel  4/7/22: anterior knee effused. Knee extension at start fo treatment - 8 deg, at end of treatment - 5 deg.  4/11/22: TUG test = 20.69 , using single point cane. PROM's R knee: - 3 °  extension, 123 ° flexion. 4/13/22: Knee flexion PROM to 125 deg today. TREATMENT:   THERAPEUTIC ACTIVITY: ( see below for minutes): Therapeutic activities per grid below to improve mobility, strength, balance and coordination. Required minimal visual, verbal, manual and tactile cues to improve independence and safety with daily activities . THERAPEUTIC EXERCISE: (see below for minutes):  Exercises per grid below to improve mobility, strength, balance and coordination. Required minimal verbal and manual cues to promote proper body alignment, promote proper body posture and promote proper body mechanics. Progressed resistance, range, repetitions and complexity of movement as indicated.   MANUAL THERAPY: (see below for minutes): Joint mobilization and Soft tissue mobilization was utilized and necessary because of the patient's restricted joint motion, painful spasm, loss of articular motion and restricted motion of soft tissue. MODALITIES: (see below for minutes):      to decrease pain    SELF CARE: (see below for minutes): Procedure(s) (per grid) utilized to improve and/or restore self-care/home management as related to dressing, bathing and grooming. Required minimal verbal cueing to facilitate activities of daily living skills and compensatory activities. Date: 3/30/22 (visit 13)  22 (visit 14)  22 (visit 15) 22 (visit 16, PN) 22 (visit 17) 22 (visit 18)   Modalities:   15 min   15 min      Gameready vaso/cryo x 15 min R knee at 34 deg- 45 deg    R knee IFC/ice packs @  pps x 15 min, ~                      Therapeutic Exercise: 42 min 40 min 25 min 37 min 39 min 40 min    Supine slow load knee flexion stretch x 4 min. Seated stepper: 7 min, level 4 resist). R knee passive extension stretchin min- static hold Seated stepper: level 4  X 8 min. Supine R knee extension stretch w/ calf stretch: 4 x 30\" Supine R knee extension x 1 min, f/b stretch w/ calf stretch: 5 x 30\"    Knee extn stretch x 2 min Knee extn stretch : 3 x 1 min SB hamstring curls: 30x. Knee extension stretch w/ strap/quad sets: 20 x 5\"  SB hamstring curls x 30 SB hamstring curls x 30    SB hamstring curls 2 x 15  SAQ's w/ manl resist: 2 x 15  Heel slides: 4 x 30 \" stretch R knee Quad sets R 2 x 10 R SLR's 4 x 10     Quad sets: 20 x 5\"  SB hamstring curls x 30  SLR's:  1 x 25. R SLR's 4 x 8 w/ 4# R standng TKE's: 30x    SLR's : 3 x 15 Bridges: 3 x 10  SB hamstring curls x 30  Seated stepper x 7 min, # 5 resistance. R LAQ's 10# x 3 x 10     LAQ isometrics: 2 x 10 x 4\" bilat @ 75 deg flexion.   SLR's w/  3#:  x8  Bridges: 2 x 10 Bridges: 3 x 10 Standing DL calf raises x 30 off back of slantboard    Seated stepper x 8 min, level 2 resist.  DL calf raises: x 30  LAQ's: 3 x 15: 10# R.  RONNIE's DL: 3 x [email or web address] Slantboard calf stretch (knees extended) x 2 min     R LAQ's: manl resist: 3 x 10   Calf raises : 2 x 15 dl standing slantboard stretch x 1 min Mini squats x 30       Slantboard calf stretch: 1 min                         Seated knee flexion stretch        Proprioceptive Activities:                                    Manual Therapy: 3 min. 5 min 2 min 3 min 3 min    R tibio-fem distraction w/ ap glides gr. 3: 3 min  R tibio-fem distraction w/ ap glides gr. 3: R tibio-fem distraction w/ ap glides gr. 3: R tibio-fem distraction w/ ap glides gr. 3: R tibio-fem distraction w/ ap glides gr. 3:            Therapeutic Activities:  13 min  12 min 14  min      Forward step ups: 8\" 2 x 15  R forward step ups w/6\" step: 1 x 15 R forward step ups w/6\" step:2 x 8 '        Tandem balance:8 x 30\" (4 sets each L/R      Mini squats: 3 x 15  Mini squats: 2 x 15 Mini squats 2 x 8      Lateral walking: 2 x 15' L/R. Lateral walking w red band at feet: 4 x 10' L/R. Lateral walking:5 x 10' L/ R - red band @ ankles      HEP: see 2/21/22 flow sheet for specifics. [employer] Portal  Treatment/Session Summary:    · Response to Treatment: Pt more guarded with gait and transitional movements, therefore less emphasis on CKC activities. Walking without support of cane is performed very slowly and with short steps. · Communication/Consultation:  None today  · Equipment provided today:  None today  · Recommendations/Intent for next treatment session: Next visit will focus on progression of strength and return to prior level of function, further work on regaining full knee ROM.      Total Treatment Billable Duration: 58 minutes  PT Patient Time In/Time Out  Time In: 5975  Time Out: [address], PT    Future Appointments   Date Time Provider Socorro Chandler   4/25/2022 10:15 AM Surya Roberto PT [facility]   4/27/2022  4:15 PM [facility]   4/28/2022  2:00 PM Efren Thomas MD [facility]   5/2/2022  8:45 AM Delisa Rojas KEY, PT [facility]   5/4/2022  8:00 AM Alexandra Aguilera, PT [facility]   5/11/2022  8:00 AM Alexandra Aguilera, PT [facility]   5/13/2022  8:00 AM Alexandra Aguilera, PT [facility]   5/16/2022  8:45 AM Alexandra Aguilera, PT [facility]   5/20/2022  8:00 AM Alexandra Aguilera, PT [facility]   5/23/2022  8:45 AM Alexandra Aguilera, PT [facility]   5/25/2022  8:00 AM Venkata Gunderson, PT [facility]

## 2022-04-25 ENCOUNTER — HOSPITAL ENCOUNTER (OUTPATIENT)
Dept: PHYSICAL THERAPY | Age: 67
Discharge: HOME | End: 2022-04-25
Payer: COMMERCIAL

## 2022-04-25 PROCEDURE — 97014 ELECTRIC STIMULATION THERAPY: CPT

## 2022-04-25 PROCEDURE — 97110 THERAPEUTIC EXERCISES: CPT

## 2022-04-25 NOTE — PROGRESS NOTES
Angelita Desai  : 1955  Primary: [facility]  Secondary:  [facility] @ [address] [place] [address].  [phone]   [phone]      OUTPATIENT PHYSICAL THERAPY:  Daily Treatment Note  2022    ICD-10: Treatment Diagnosis: Pain in right leg (M79.604), Pain in right knee (M25.561) and Stiffness of right knee, not elsewhere classified (M25.661)  Effective Dates: 2022 TO 2022 (90 days). Frequency/Duration: 2 times a week for 90 Days  GOALS: (Goals have been discussed and agreed upon with patient.)  Short-Term Functional Goals: Time Frame: 2 weeks  1. Patient will be independent with HEP. (MET)  2. Patient will improve passive knee extension to 0 ° to improve quad activation for weight bearing tasks. (Progressing)  3. Patient will improve knee flexion to 120 ° to improve mobility for sit to stand transfers. (MET)  Discharge Goals: Time Frame: 6 weeks  1. Patient will be able to maintain single leg stance for 10 seconds to improve safety for independent ambulation. (Progressing)  2. Patient will restore ROM to 0 to 130 ° to normalize mobility for symmetric gait on all surfaces. (Progressing)  3. Patient will perform 5x sit to  <12 seconds to restore strength for safe community mobility. (Progressing)    _________________________________________________________________________  Pre-treatment Symptoms/Complaints: Viscosupplementation injections were approved, just needs to get scheduled for these. Knee still painful. Remembers that she walked on her toes on the right for 1+ years before surgery due to pain. Pain: Initial: 2/10 Post Session:  No increase/10   Medications Last Reviewed:  2022  Updated Objective Findings:  Below measures from initial evaluation unless otherwise noted. Observation/Orthostatic Postural Assessment:    Ambulates with rolling walker with knee flexed at 20 ° throughout gait cycle.  Incision sites are dry and closed. Mild swelling present to the joint. Palpation:    Tender to joint line. ROM:    Right knee: 8 to 40 ° active; 5 to 90 ° (flexion done in sitting) passive with painful end feel into extension and stiff end feel into flexion   SLR: 70 °   Strength:   Quad set is fair  SLR: 3/5   Knee extension: 3/5   Knee flexion: 4/5   Special Tests:    Not applicable  Neurological Screen:  Grossly intact; SLR is negative  Functional Mobility:   WNL  Balance:    Not applicable  3/6/90 : knee PROM: -5 extn to 110 deg flexion. SLR's with mild extensor lag. Gait w/ wheeled walker using heel-toe pattern. 3/16/22 : LEFS = 20/80. Gait with cane with very slow rate. Knee PROM = - 3 deg extn, 110 deg flexion (A/P), end range pain with both flexion and extension, diffuse knee effusion with minimal temperature increase. 3/23/22: Knee flexion to 115 deg , end range pain. 3/28/22: Knee flexion to 120 deg, extension ` -5 deg, end range pain w/ each, stiff extension end feel  4/7/22: anterior knee effused. Knee extension at start fo treatment - 8 deg, at end of treatment - 5 deg.  4/11/22: TUG test = 20.69 , using single point cane. PROM's R knee: - 3 °  extension, 123 ° flexion. 4/13/22: Knee flexion PROM to 125 deg today. TREATMENT:   THERAPEUTIC ACTIVITY: ( see below for minutes): Therapeutic activities per grid below to improve mobility, strength, balance and coordination. Required minimal visual, verbal, manual and tactile cues to improve independence and safety with daily activities . THERAPEUTIC EXERCISE: (see below for minutes):  Exercises per grid below to improve mobility, strength, balance and coordination. Required minimal verbal and manual cues to promote proper body alignment, promote proper body posture and promote proper body mechanics. Progressed resistance, range, repetitions and complexity of movement as indicated.   MANUAL THERAPY: (see below for minutes): Joint mobilization and Soft tissue mobilization was utilized and necessary because of the patient's restricted joint motion, painful spasm, loss of articular motion and restricted motion of soft tissue. MODALITIES: (see below for minutes):      to decrease pain    SELF CARE: (see below for minutes): Procedure(s) (per grid) utilized to improve and/or restore self-care/home management as related to dressing, bathing and grooming. Required minimal verbal cueing to facilitate activities of daily living skills and compensatory activities. Date: 4/25/22 (visit 19)         Modalities: 15 min         R knee IFC/ice packs @  pps x 15 min, 10mA                           Therapeutic Exercise: 40 min         Supine R knee ext stretch 2 min         Quad sets w/ popliteal cues for extension: 3 x 10 x  5\"          R SLR's 4 x 10          SB hamstring curls x 30         R LAQ's: 3 x 10: 10# + mild overpressure         Slantboard calf stretch: 3 min         Mini squats: 20         R 3\" step ups  X 20          Seated stepper: 8 min, level 5 resist.                                    Proprioceptive Activities:                                    Manual Therapy: 3 min         R tibio-fem distraction w/ ap glides gr. 3:                 Therapeutic Activities:               '                                HEP: see 2/21/22 flow sheet for specifics. Powered Portal  Treatment/Session Summary:    · Response to Treatment: Pt able to perform mini-squats and short-step step ups with partial weightbearing. Knee extension ROM continues to be sticky, with pain with full weightbearing, but pt demonstrating more fluid gait at end of treatment. · Communication/Consultation:  None today  · Equipment provided today:  None today  · Recommendations/Intent for next treatment session: Next visit will focus on progression of strength and return to prior level of function, further work on regaining full knee ROM.      Total Treatment Billable Duration: 58 minutes  PT Patient Time In/Time Out  Time In: 1015  Time Out: [facility], PT    Future Appointments   Date Time Provider Socorro Chandler   4/27/2022  4:15 PM Rayma Lowell, PT [facility]   4/28/2022  2:00 PM MD PEDRO Jimenez PO[facility]   5/2/2022  8:45 AM [facility]   5/4/2022  8:00 AM [facility] Low[facility]   5/11/2022  8:00 AM [facility] Lowell, [facility]   5/13/2022  8:00 AM [facility] Lowell, [facility]   5/16/2022  8:45 AM Rayma Lowell, [facility]   5/20/2022  8:00 AM [facility]   5/23/2022  8:45 AM [facility] Lowell[facility]   5/25/2022  8:00 AM Leopoldo Bradley, PT [facility]

## 2022-04-27 ENCOUNTER — HOSPITAL ENCOUNTER (OUTPATIENT)
Dept: PHYSICAL THERAPY | Age: 67
Discharge: HOME | End: 2022-04-27
Payer: COMMERCIAL

## 2022-04-27 PROCEDURE — 97140 MANUAL THERAPY 1/> REGIONS: CPT

## 2022-04-27 PROCEDURE — 97014 ELECTRIC STIMULATION THERAPY: CPT

## 2022-04-27 PROCEDURE — 97110 THERAPEUTIC EXERCISES: CPT

## 2022-04-27 NOTE — PROGRESS NOTES
Raulito Snowthomas  : 1955  Primary: [facility]  Secondary:  [address] @ [address], [place].  [phone]   [phone]      OUTPATIENT PHYSICAL THERAPY:  Daily Treatment Note  2022    ICD-10: Treatment Diagnosis: Pain in right leg (M79.604), Pain in right knee (M25.561) and Stiffness of right knee, not elsewhere classified (M25.661)  Effective Dates: 2022 TO 2022 (90 days). Frequency/Duration: 2 times a week for 90 Days  GOALS: (Goals have been discussed and agreed upon with patient.)  Short-Term Functional Goals: Time Frame: 2 weeks  1. Patient will be independent with HEP. (MET)  2. Patient will improve passive knee extension to 0 ° to improve quad activation for weight bearing tasks. (Progressing)  3. Patient will improve knee flexion to 120 ° to improve mobility for sit to stand transfers. (MET)  Discharge Goals: Time Frame: 6 weeks  1. Patient will be able to maintain single leg stance for 10 seconds to improve safety for independent ambulation. (Progressing)  2. Patient will restore ROM to 0 to 130 ° to normalize mobility for symmetric gait on all surfaces. (Progressing)  3. Patient will perform 5x sit to  <12 seconds to restore strength for safe community mobility. (Progressing)    _________________________________________________________________________  Pre-treatment Symptoms/Complaints: Knee pain increased since Tuesday morning, not sure if she overstretched it. Was informed that she will get her first knee gel injection on 22   Pain: Initial: 8/10 Post Session:  No increase/10   Medications Last Reviewed:  2022  Updated Objective Findings:  Below measures from initial evaluation unless otherwise noted. Observation/Orthostatic Postural Assessment:    Ambulates with rolling walker with knee flexed at 20 ° throughout gait cycle. Incision sites are dry and closed.  Mild swelling present to the joint. Palpation:    Tender to joint line. ROM:    Right knee: 8 to 40 ° active; 5 to 90 ° (flexion done in sitting) passive with painful end feel into extension and stiff end feel into flexion   SLR: 70 °   Strength:   Quad set is fair  SLR: 3/5   Knee extension: 3/5   Knee flexion: 4/5   Special Tests:    Not applicable  Neurological Screen:  Grossly intact; SLR is negative  Functional Mobility:   WNL  Balance:    Not applicable  9/1/48 : knee PROM: -5 extn to 110 deg flexion. SLR's with mild extensor lag. Gait w/ wheeled walker using heel-toe pattern. 3/16/22 : LEFS = 20/80. Gait with cane with very slow rate. Knee PROM = - 3 deg extn, 110 deg flexion (A/P), end range pain with both flexion and extension, diffuse knee effusion with minimal temperature increase. 3/23/22: Knee flexion to 115 deg , end range pain. 3/28/22: Knee flexion to 120 deg, extension ` -5 deg, end range pain w/ each, stiff extension end feel  4/7/22: anterior knee effused. Knee extension at start fo treatment - 8 deg, at end of treatment - 5 deg.  4/11/22: TUG test = 20.69 , using single point cane. PROM's R knee: - 3 °  extension, 123 ° flexion. 4/13/22: Knee flexion PROM to 125 deg today. TREATMENT:   THERAPEUTIC ACTIVITY: ( see below for minutes): Therapeutic activities per grid below to improve mobility, strength, balance and coordination. Required minimal visual, verbal, manual and tactile cues to improve independence and safety with daily activities . THERAPEUTIC EXERCISE: (see below for minutes):  Exercises per grid below to improve mobility, strength, balance and coordination. Required minimal verbal and manual cues to promote proper body alignment, promote proper body posture and promote proper body mechanics. Progressed resistance, range, repetitions and complexity of movement as indicated.   MANUAL THERAPY: (see below for minutes): Joint mobilization and Soft tissue mobilization was utilized and necessary because of the patient's restricted joint motion, painful spasm, loss of articular motion and restricted motion of soft tissue. MODALITIES: (see below for minutes):      to decrease pain    SELF CARE: (see below for minutes): Procedure(s) (per grid) utilized to improve and/or restore self-care/home management as related to dressing, bathing and grooming. Required minimal verbal cueing to facilitate activities of daily living skills and compensatory activities. Date: 4/25/22 (visit 19)  4/27/22 (visit 20)        Modalities: 15 min 15 min        R knee IFC/ice packs @  pps x 15 min, 10mA  R knee IFC/ice packs @  pps x 15 min, 15mA                          Therapeutic Exercise: 40 min 30 min        Supine R knee ext stretch 2 min Seated stepper: level 1 resistance x 8 min , slow RPM (~ 15)        Quad sets w/ popliteal cues for extension: 3 x 10 x  5\"  Assisted heel slides knee flexion ROM x 5 min         R SLR's 4 x 10  Supine assisted knee extension stretching        SB hamstring curls x 30 Quad sets  X 20 w 5\" holds        R LAQ's: 3 x 10: 10# + mild overpressure         Slantboard calf stretch: 3 min         Mini squats: 20         R 3\" step ups  X 20          Seated stepper: 8 min, level 5 resist.                                    Proprioceptive Activities:                                    Manual Therapy: 3 min 8 min        R tibio-fem distraction w/ ap glides gr. 3: R tibio-fem distraction w/ ap glides gr. 3 and assisted flexion /extension ROM                Therapeutic Activities:               '                                HEP: see 2/21/22 flow sheet for specifics. [employer] Portal  Treatment/Session Summary:    · Response to Treatment: Deterioration in knee ROM and function compared to preceding visit, without any patient indication of increased activity or change in activity to explain this. Pt demonstrating pain guarded knee AAROM of - 10 to 90 deg.    · Communication/Consultation: None today  · Equipment provided today:  None today  · Recommendations/Intent for next treatment session: Next visit will focus on progression of strength and return to prior level of function, further work on regaining full knee ROM.      Total Treatment Billable Duration: 53 minutes  PT Patient Time In/Time Out  Time In: 0592  Time Out: [address], PT    Future Appointments   Date Time Provider Socorro Chandler   5/2/2022  8:45 AM Cuca Real, PT [facility]   5/4/2022  8:00 AM [facility]   5/5/2022 10:45 AM Maryan Cannon MD [facility]   5/11/2022  8:00 AM Cuca Real, PT [facility]   5/13/2022  8:00 AM Cuca Real, [facility]   5/16/2022  8:45 AM Cuca Real, PT [facility]   5/20/2022  8:00 AM [facility]   5/23/2022  8:45 AM Cuca Real, [facility]   5/25/2022  8:00 AM [place] [employer], Reggie Din, PT [facility]

## 2022-05-02 ENCOUNTER — HOSPITAL ENCOUNTER (OUTPATIENT)
Dept: PHYSICAL THERAPY | Age: 67
Discharge: HOME | End: 2022-05-02
Payer: COMMERCIAL

## 2022-05-02 PROCEDURE — 97110 THERAPEUTIC EXERCISES: CPT

## 2022-05-02 PROCEDURE — 97140 MANUAL THERAPY 1/> REGIONS: CPT

## 2022-05-02 PROCEDURE — 97014 ELECTRIC STIMULATION THERAPY: CPT

## 2022-05-02 NOTE — PROGRESS NOTES
Marcos Arteaga  : 1955  Primary: [facility]  Secondary:  Flako Colón @ [address]  [phone]   [phone]      OUTPATIENT PHYSICAL THERAPY:  Daily Treatment Note  2022    ICD-10: Treatment Diagnosis: Pain in right leg (M79.604), Pain in right knee (M25.561) and Stiffness of right knee, not elsewhere classified (M25.661)  Effective Dates: 2022 TO 2022 (90 days). Frequency/Duration: 2 times a week for 90 Days  GOALS: (Goals have been discussed and agreed upon with patient.)  Short-Term Functional Goals: Time Frame: 2 weeks  1. Patient will be independent with HEP. (MET)  2. Patient will improve passive knee extension to 0 ° to improve quad activation for weight bearing tasks. (Progressing)  3. Patient will improve knee flexion to 120 ° to improve mobility for sit to stand transfers. (MET)  Discharge Goals: Time Frame: 6 weeks  1. Patient will be able to maintain single leg stance for 10 seconds to improve safety for independent ambulation. (Progressing)  2. Patient will restore ROM to 0 to 130 ° to normalize mobility for symmetric gait on all surfaces. (Progressing)  3. Patient will perform 5x sit to  <12 seconds to restore strength for safe community mobility. (Progressing)    _________________________________________________________________________  Pre-treatment Symptoms/Complaints: Pt reporting that her left knee continues to be very painful, is having difficulty walking. She receives her first viscosupplementation injection on 22  Pain: Initial: 8/10 Post Session:  No increase/10   Medications Last Reviewed:  2022  Updated Objective Findings:  Below measures from initial evaluation unless otherwise noted. Observation/Orthostatic Postural Assessment:    Ambulates with rolling walker with knee flexed at 20 ° throughout gait cycle. Incision sites are dry and closed.  Mild swelling present to the joint. Palpation:    Tender to joint line. ROM:    Right knee: 8 to 40 ° active; 5 to 90 ° (flexion done in sitting) passive with painful end feel into extension and stiff end feel into flexion   SLR: 70 °   Strength:   Quad set is fair  SLR: 3/5   Knee extension: 3/5   Knee flexion: 4/5   Special Tests:    Not applicable  Neurological Screen:  Grossly intact; SLR is negative  Functional Mobility:   WNL  Balance:    Not applicable  6/4/03 : knee PROM: -5 extn to 110 deg flexion. SLR's with mild extensor lag. Gait w/ wheeled walker using heel-toe pattern. 3/16/22 : LEFS = 20/80. Gait with cane with very slow rate. Knee PROM = - 3 deg extn, 110 deg flexion (A/P), end range pain with both flexion and extension, diffuse knee effusion with minimal temperature increase. 3/23/22: Knee flexion to 115 deg , end range pain. 3/28/22: Knee flexion to 120 deg, extension ` -5 deg, end range pain w/ each, stiff extension end feel  4/7/22: anterior knee effused. Knee extension at start fo treatment - 8 deg, at end of treatment - 5 deg.  4/11/22: TUG test = 20.69 , using single point cane. PROM's R knee: - 3 °  extension, 123 ° flexion. 4/13/22: Knee flexion PROM to 125 deg today. 5/2/22: knee PROM's R: 95 deg flexion to -3 deg extension   TREATMENT:   THERAPEUTIC ACTIVITY: ( see below for minutes): Therapeutic activities per grid below to improve mobility, strength, balance and coordination. Required minimal visual, verbal, manual and tactile cues to improve independence and safety with daily activities . THERAPEUTIC EXERCISE: (see below for minutes):  Exercises per grid below to improve mobility, strength, balance and coordination. Required minimal verbal and manual cues to promote proper body alignment, promote proper body posture and promote proper body mechanics. Progressed resistance, range, repetitions and complexity of movement as indicated.   MANUAL THERAPY: (see below for minutes): Joint mobilization and Soft tissue mobilization was utilized and necessary because of the patient's restricted joint motion, painful spasm, loss of articular motion and restricted motion of soft tissue. MODALITIES: (see below for minutes):      to decrease pain    SELF CARE: (see below for minutes): Procedure(s) (per grid) utilized to improve and/or restore self-care/home management as related to dressing, bathing and grooming. Required minimal verbal cueing to facilitate activities of daily living skills and compensatory activities. Date: 4/25/22 (visit 19)  4/27/22 (visit 20)  5/2/22 (visit 21)      Modalities: 15 min 15 min 15 min       R knee IFC/ice packs @  pps x 15 min, 10mA  R knee IFC/ice packs @  pps x 15 min, 15mA  R knee IFC/ice packs @  pps x 15 min, 10mA                         Therapeutic Exercise: 40 min 30 min 37 min       Supine R knee ext stretch 2 min Seated stepper: level 1 resistance x 8 min , slow RPM (~ 15) Seated stepper: level 5 resistance x 8 min , slow RPM (~ 15)       Quad sets w/ popliteal cues for extension: 3 x 10 x  5\"  Assisted heel slides knee flexion ROM x 5 min  Heel slides w/strap assist, 30\" holds x 10 min       R SLR's 4 x 10  Supine assisted knee extension stretching Knee extn. Stretch w/ strap assist calf stretch: 5 x 30\"       SB hamstring curls x 30 Quad sets  X 20 w 5\" holds Quad sets : 30 x 5\" holds w/ pressure gauge feedback       R LAQ's: 3 x 10: 10# + mild overpressure  SLR's R x 30       Slantboard calf stretch: 3 min  SB hamstring curls x 30       Mini squats: 20         R 3\" step ups  X 20          Seated stepper: 8 min, level 5 resist.                                    Proprioceptive Activities:                                    Manual Therapy: 3 min 8 min 8 min       R tibio-fem distraction w/ ap glides gr.  3: R tibio-fem distraction w/ ap glides gr. 3 and assisted flexion /extension ROM R tibio-fem distraction w/ ap glides gr. 3 Therapeutic Activities:               '                                HEP: see 2/21/22 flow sheet for specifics. [employer] Portal  Treatment/Session Summary:    · Response to Treatment: Mild improvement in knee extension ROM compared to last visit, but pt still walks/transfers using a very pain-guarded movement pattern. · Communication/Consultation:  None today  · Equipment provided today:  None today  · Recommendations/Intent for next treatment session: Next visit will focus on progression of strength and return to prior level of function, further work on regaining full knee ROM.      Total Treatment Billable Duration: 55 minutes  PT Patient Time In/Time Out  Time In: 0845  Time Out: [address], PT    Future Appointments   Date Time Provider Socorro Chandler   5/4/2022  8:00 AM Dali Broomfield, PT [facility]   5/5/2022 10:45 AM MD CHAGO Goetz   5/11/2022  8:00 AM Dali Broomfield, PT [facility]   5/13/2022  8:00 AM Dali Broomfield, PT [facility]   5/16/2022  8:45 AM Dali Broomfield, [facility]   5/20/2022  8:00 AM Dali Broomfield, PT [facility]   5/23/2022  8:45 AM Dali Broomfield, PT [facility]   5/25/2022  8:00 AM Sally Mcconnell, PT [facility]

## 2022-05-04 ENCOUNTER — HOSPITAL ENCOUNTER (OUTPATIENT)
Dept: PHYSICAL THERAPY | Age: 67
Discharge: HOME | End: 2022-05-04
Payer: COMMERCIAL

## 2022-05-04 PROCEDURE — 97110 THERAPEUTIC EXERCISES: CPT

## 2022-05-04 PROCEDURE — 97014 ELECTRIC STIMULATION THERAPY: CPT

## 2022-05-04 NOTE — PROGRESS NOTES
Salo Freedman  : 1955  Primary: [facility]  Secondary:  Savannah Cooley @ [address], [place].  [phone]   [phone]      OUTPATIENT PHYSICAL THERAPY:  Daily Treatment Note  2022    ICD-10: Treatment Diagnosis: Pain in right leg (M79.604), Pain in right knee (M25.561) and Stiffness of right knee, not elsewhere classified (M25.661)  Effective Dates: 2022 TO 2022 (90 days). Frequency/Duration: 2 times a week for 90 Days  GOALS: (Goals have been discussed and agreed upon with patient.)  Short-Term Functional Goals: Time Frame: 2 weeks  1. Patient will be independent with HEP. (MET)  2. Patient will improve passive knee extension to 0 ° to improve quad activation for weight bearing tasks. (Progressing)  3. Patient will improve knee flexion to 120 ° to improve mobility for sit to stand transfers. (MET)  Discharge Goals: Time Frame: 6 weeks  1. Patient will be able to maintain single leg stance for 10 seconds to improve safety for independent ambulation. (Progressing)  2. Patient will restore ROM to 0 to 130 ° to normalize mobility for symmetric gait on all surfaces. (Progressing)  3. Patient will perform 5x sit to  <12 seconds to restore strength for safe community mobility. (Progressing)    _________________________________________________________________________  Pre-treatment Symptoms/Complaints: Pt stating that she's been up since 2am due to knee pain -  She receives her first viscosupplementation injection on 22 and is looking forward to this. Pain: Initial: 8/10 Post Session:  No increase/10   Medications Last Reviewed:  2022  Updated Objective Findings:  Below measures from initial evaluation unless otherwise noted. Observation/Orthostatic Postural Assessment:    Ambulates with rolling walker with knee flexed at 20 ° throughout gait cycle. Incision sites are dry and closed.  Mild swelling present to the joint. Palpation:    Tender to joint line. ROM:    Right knee: 8 to 40 ° active; 5 to 90 ° (flexion done in sitting) passive with painful end feel into extension and stiff end feel into flexion   SLR: 70 °   Strength:   Quad set is fair  SLR: 3/5   Knee extension: 3/5   Knee flexion: 4/5   Special Tests:    Not applicable  Neurological Screen:  Grossly intact; SLR is negative  Functional Mobility:   WNL  Balance:    Not applicable  0/6/12 : knee PROM: -5 extn to 110 deg flexion. SLR's with mild extensor lag. Gait w/ wheeled walker using heel-toe pattern. 3/16/22 : LEFS = 20/80. Gait with cane with very slow rate. Knee PROM = - 3 deg extn, 110 deg flexion (A/P), end range pain with both flexion and extension, diffuse knee effusion with minimal temperature increase. 3/23/22: Knee flexion to 115 deg , end range pain. 3/28/22: Knee flexion to 120 deg, extension ` -5 deg, end range pain w/ each, stiff extension end feel  4/7/22: anterior knee effused. Knee extension at start fo treatment - 8 deg, at end of treatment - 5 deg.  4/11/22: TUG test = 20.69 , using single point cane. PROM's R knee: - 3 °  extension, 123 ° flexion. 4/13/22: Knee flexion PROM to 125 deg today. 5/2/22: knee PROM's R: 95 deg flexion to -3 deg extension   TREATMENT:   THERAPEUTIC ACTIVITY: ( see below for minutes): Therapeutic activities per grid below to improve mobility, strength, balance and coordination. Required minimal visual, verbal, manual and tactile cues to improve independence and safety with daily activities . THERAPEUTIC EXERCISE: (see below for minutes):  Exercises per grid below to improve mobility, strength, balance and coordination. Required minimal verbal and manual cues to promote proper body alignment, promote proper body posture and promote proper body mechanics. Progressed resistance, range, repetitions and complexity of movement as indicated.   MANUAL THERAPY: (see below for minutes): Joint mobilization and Soft tissue mobilization was utilized and necessary because of the patient's restricted joint motion, painful spasm, loss of articular motion and restricted motion of soft tissue. MODALITIES: (see below for minutes):      to decrease pain    SELF CARE: (see below for minutes): Procedure(s) (per grid) utilized to improve and/or restore self-care/home management as related to dressing, bathing and grooming. Required minimal verbal cueing to facilitate activities of daily living skills and compensatory activities. Date: 4/25/22 (visit 19)  4/27/22 (visit 20)  5/2/22 (visit 21) 5/4/22 (visit 22)     Modalities: 15 min 15 min 15 min 15 min      R knee IFC/ice packs @  pps x 15 min, 10mA  R knee IFC/ice packs @  pps x 15 min, 15mA  R knee IFC/ice packs @  pps x 15 min, 10mA  R knee IFC/ice packs @  pps x 15 min, 10mA                        Therapeutic Exercise: 40 min 30 min 37 min 38 min      Supine R knee ext stretch 2 min Seated stepper: level 1 resistance x 8 min , slow RPM (~ 15) Seated stepper: level 5 resistance x 8 min , slow RPM (~ 15) Seated stepper: level 5 resistance x 8 min , slow RPM (~ 15)      Quad sets w/ popliteal cues for extension: 3 x 10 x  5\"  Assisted heel slides knee flexion ROM x 5 min  Heel slides w/strap assist, 30\" holds x 10 min Seated knee flexion stretching w/ 30\" holds x 5 min      R SLR's 4 x 10  Supine assisted knee extension stretching Knee extn. Stretch w/ strap assist calf stretch: 5 x 30\" Supine knee extn.  Stretch assisted x 3 min      SB hamstring curls x 30 Quad sets  X 20 w 5\" holds Quad sets : 30 x 5\" holds w/ pressure gauge feedback R SLR's x 30       R LAQ's: 3 x 10: 10# + mild overpressure  SLR's R x 30 Swiss ball hamstring curls x 30      Slantboard calf stretch: 3 min  SB hamstring curls x 30 Bridges: 2 x 10 (legs over physioball      Mini squats: 20   Bilat LAQ's into physioball (quadriceps dwayne.) 2 x 15 x 5\"       R 3\" step ups X 20    R TKE's 15 x 5\"      Seated stepper: 8 min, level 5 resist.    slantboard stretch x 2 min                                Proprioceptive Activities:                                    Manual Therapy: 3 min 8 min 8 min 5 min      R tibio-fem distraction w/ ap glides gr. 3: R tibio-fem distraction w/ ap glides gr. 3 and assisted flexion /extension ROM R tibio-fem distraction w/ ap glides gr. 3 R tibio-fem distraction w/ ap glides gr. 3              Therapeutic Activities:               '                                HEP: see 2/21/22 flow sheet for specifics. [facility]  Treatment/Session Summary:    · Response to Treatment: Knee flexion slightly better today compared to last visit (95 deg flexion), with extension still very stiff, limited to -5 deg. Anticipate I mproved ROM after viscosupplementation tomorrow. · Communication/Consultation:  None today  · Equipment provided today:  None today  · Recommendations/Intent for next treatment session: Next visit will focus on progression of strength and return to prior level of function, further work on regaining full knee ROM.      Total Treatment Billable Duration: 58 minutes  PT Patient Time In/Time Out  Time In: 0801  Time Out: [id number] [facility], PT    Future Appointments   Date Time Provider Socorro Chandler   5/5/2022 10:45 AM Isaías Sanchez MD [facility]   5/11/2022  8:00 AM [facility]   5/13/2022  8:00 AM [facility]   5/16/2022  8:45 AM Genevieve Short, PT [facility]   5/20/2022  8:00 AM Genevieve Short, PT [facility]   5/23/2022  8:45 AM Genevieve Short, PT [facility]   5/25/2022  8:00 AM Carlos Diamond, PT [facility]

## 2022-05-11 ENCOUNTER — HOSPITAL ENCOUNTER (OUTPATIENT)
Dept: PHYSICAL THERAPY | Age: 67
Discharge: HOME | End: 2022-05-11
Payer: COMMERCIAL

## 2022-05-11 PROCEDURE — 97110 THERAPEUTIC EXERCISES: CPT

## 2022-05-11 NOTE — PROGRESS NOTES
Leroy Neri  : 1955  Primary: Sarika Ye [facility]  Secondary:  Marcelino Phillips [address]  [phone]   [phone]      OUTPATIENT PHYSICAL THERAPY:  Daily Treatment Note  2022    ICD-10: Treatment Diagnosis: Pain in right leg (M79.604), Pain in right knee (M25.561) and Stiffness of right knee, not elsewhere classified (M25.661)  Effective Dates: 2022 TO 2022 (90 days). Frequency/Duration: 2 times a week for 90 Days  GOALS: (Goals have been discussed and agreed upon with patient.)  Short-Term Functional Goals: Time Frame: 2 weeks  1. Patient will be independent with HEP. (MET)  2. Patient will improve passive knee extension to 0 ° to improve quad activation for weight bearing tasks. (Progressing)  3. Patient will improve knee flexion to 120 ° to improve mobility for sit to stand transfers. (MET)  Discharge Goals: Time Frame: 6 weeks  1. Patient will be able to maintain single leg stance for 10 seconds to improve safety for independent ambulation. (Progressing)  2. Patient will restore ROM to 0 to 130 ° to normalize mobility for symmetric gait on all surfaces. (Progressing)  3. Patient will perform 5x sit to  <12 seconds to restore strength for safe community mobility. (Progressing)    _________________________________________________________________________  Pre-treatment Symptoms/Complaints: Pt's knee is better! - no pain since injection and walking better. Has two more knee injections (one this week and one next). Pt states that her lower back and posterior neck are bothering her, now has prescription for treatment of her low back for PT. Pain: Initial: 8/10 Post Session:  No increase/10   Medications Last Reviewed:  2022  Updated Objective Findings:  Below measures from initial evaluation unless otherwise noted.      Observation/Orthostatic Postural Assessment:    Ambulates with rolling walker with knee flexed at 20 ° throughout gait cycle. Incision sites are dry and closed. Mild swelling present to the joint. Palpation:    Tender to joint line. ROM:    Right knee: 8 to 40 ° active; 5 to 90 ° (flexion done in sitting) passive with painful end feel into extension and stiff end feel into flexion   SLR: 70 °   Strength:   Quad set is fair  SLR: 3/5   Knee extension: 3/5   Knee flexion: 4/5   Special Tests:    Not applicable  Neurological Screen:  Grossly intact; SLR is negative  Functional Mobility:   WNL  Balance:    Not applicable  9/8/32 : knee PROM: -5 extn to 110 deg flexion. SLR's with mild extensor lag. Gait w/ wheeled walker using heel-toe pattern. 3/16/22 : LEFS = 20/80. Gait with cane with very slow rate. Knee PROM = - 3 deg extn, 110 deg flexion (A/P), end range pain with both flexion and extension, diffuse knee effusion with minimal temperature increase. 3/23/22: Knee flexion to 115 deg , end range pain. 3/28/22: Knee flexion to 120 deg, extension ` -5 deg, end range pain w/ each, stiff extension end feel  4/7/22: anterior knee effused. Knee extension at start fo treatment - 8 deg, at end of treatment - 5 deg.  4/11/22: TUG test = 20.69 , using single point cane. PROM's R knee: - 3 °  extension, 123 ° flexion. 4/13/22: Knee flexion PROM to 125 deg today. 5/2/22: knee PROM's R: 95 deg flexion to -3 deg extension  5/11/22: Knee PROM's R: 118 deg flexion, - 4 deg extn. With miniminal end range pain. TREATMENT:   THERAPEUTIC ACTIVITY: ( see below for minutes): Therapeutic activities per grid below to improve mobility, strength, balance and coordination. Required minimal visual, verbal, manual and tactile cues to improve independence and safety with daily activities . THERAPEUTIC EXERCISE: (see below for minutes):  Exercises per grid below to improve mobility, strength, balance and coordination.   Required minimal verbal and manual cues to promote proper body alignment, promote proper body posture and promote proper body mechanics. Progressed resistance, range, repetitions and complexity of movement as indicated. MANUAL THERAPY: (see below for minutes): Joint mobilization and Soft tissue mobilization was utilized and necessary because of the patient's restricted joint motion, painful spasm, loss of articular motion and restricted motion of soft tissue. MODALITIES: (see below for minutes):      to decrease pain    SELF CARE: (see below for minutes): Procedure(s) (per grid) utilized to improve and/or restore self-care/home management as related to dressing, bathing and grooming. Required minimal verbal cueing to facilitate activities of daily living skills and compensatory activities. Date: 4/25/22 (visit 19)  4/27/22 (visit 20)  5/2/22 (visit 21) 5/4/22 (visit 22) 5/11/22 (visit 23)    Modalities: 15 min 15 min 15 min 15 min      R knee IFC/ice packs @  pps x 15 min, 10mA  R knee IFC/ice packs @  pps x 15 min, 15mA  R knee IFC/ice packs @  pps x 15 min, 10mA  R knee IFC/ice packs @  pps x 15 min, 10mA                        Therapeutic Exercise: 40 min 30 min 37 min 38 min 41 min     Supine R knee ext stretch 2 min Seated stepper: level 1 resistance x 8 min , slow RPM (~ 15) Seated stepper: level 5 resistance x 8 min , slow RPM (~ 15) Seated stepper: level 5 resistance x 8 min , slow RPM (~ 15) Seated stepper: level 5 resist, x 7 min. Quad sets w/ popliteal cues for extension: 3 x 10 x  5\"  Assisted heel slides knee flexion ROM x 5 min  Heel slides w/strap assist, 30\" holds x 10 min Seated knee flexion stretching w/ 30\" holds x 5 min Heel slides for knee flex stretch x 30\" x 3     R SLR's 4 x 10  Supine assisted knee extension stretching Knee extn. Stretch w/ strap assist calf stretch: 5 x 30\" Supine knee extn. Stretch assisted x 3 min Knee extn. / calf stretch w/ strap x 2 min     SB hamstring curls x 30 Quad sets  X 20 w 5\" holds Quad sets : 30 x 5\" holds w/ pressure gauge feedback R SLR's x 30  R SLR's x 30      R LAQ's: 3 x 10: 10# + mild overpressure  SLR's R x 30 Swiss ball hamstring curls x 30 SB hamstring curls x 30     Slantboard calf stretch: 3 min  SB hamstring curls x 30 Bridges: 2 x 10 (legs over physioball LTR's w/ legs over swiss ball x 20 L/R     Mini squats: 20   Bilat LAQ's into physioball (quadriceps dwayne.) 2 x 15 x 5\"  Bridges x 5, legs over physioball     R 3\" step ups  X 20    R TKE's 15 x 5\" R LAQ's: 10#: x 30      Seated stepper: 8 min, level 5 resist.    slantboard stretch x 2 min Calf raises DL x 30 on floor, 20 off back of slantboard         slantboard stretch 1 min         Wall squats x 20         R TKE's w/ gray band resist x 20     Proprioceptive Activities:                                    Manual Therapy: 3 min 8 min 8 min 5 min      R tibio-fem distraction w/ ap glides gr. 3: R tibio-fem distraction w/ ap glides gr. 3 and assisted flexion /extension ROM R tibio-fem distraction w/ ap glides gr. 3 R tibio-fem distraction w/ ap glides gr. 3              Therapeutic Activities:               '                                HEP: see 2/21/22 flow sheet for specifics. [facility] Portal  Treatment/Session Summary:    · Response to Treatment: Improvement since last visit, with 1st viscossuplementaiton injeciton having been performed. Today's treatment emphasis was on lower resistance, higher rep exercise to both test knee capacity without increasing knee compression or shear - pt responded quite well. · Communication/Consultation:  None today  · Equipment provided today:  None today  · Recommendations/Intent for next treatment session: Next visit will focus on progression of strength and return to prior level of function, further work on regaining full knee ROM. Examine/start treatment of pt's lower back.     Total Treatment Billable Duration: 41 minutes  PT Patient Time In/Time Out  Time In: 0804  Time Out: [address], PT    Future Appointments   Date Time Provider Socorro Krausi   5/12/2022 10:30 AM Swati Luu MD [facility]   5/13/2022  8:00 AM Maryan Alarcon, PT [facility]   5/16/2022  8:45 AM Maryan Alarcon, PT [facility]   5/19/2022 10:30 AM Swati Luu MD [facility]   5/20/2022  8:00 AM aMryan Alarcon, PT [facility]   5/23/2022  8:45 AM Maryan Alarcon, PT [facility]   5/25/2022  8:00 AM Kee Eubanks, PT [facility]

## 2022-05-13 ENCOUNTER — HOSPITAL ENCOUNTER (OUTPATIENT)
Dept: PHYSICAL THERAPY | Age: 67
Discharge: HOME | End: 2022-05-13
Payer: COMMERCIAL

## 2022-05-13 PROCEDURE — 97110 THERAPEUTIC EXERCISES: CPT

## 2022-05-13 PROCEDURE — 97140 MANUAL THERAPY 1/> REGIONS: CPT

## 2022-05-13 NOTE — PROGRESS NOTES
Yannick Rhodes  : 1955  Primary: [facility]  Secondary:  [place] [address]  [phone]   [phone]      OUTPATIENT PHYSICAL THERAPY:  Progress Report / Daily Treatment Note  2022    ICD-10: Treatment Diagnosis: Pain in right leg (M79.604), Pain in right knee (M25.561) and Stiffness of right knee, not elsewhere classified (M25.661)  Effective Dates: 2022 TO 2022 (90 days). Frequency/Duration: 2 times a week for 90 Days  GOALS: (Goals have been discussed and agreed upon with patient.)  Short-Term Functional Goals: Time Frame: 2 weeks  1. Patient will be independent with HEP. (MET)  2. Patient will improve passive knee extension to 0 ° to improve quad activation for weight bearing tasks. (Progressing)  3. Patient will improve knee flexion to 120 ° to improve mobility for sit to stand transfers. (MET)  Discharge Goals: Time Frame: 12 weeks  1. Patient will be able to maintain single leg stance for 10 seconds to improve safety for independent ambulation. (Progressing)  2. Patient will restore ROM to -3 to 125 ° to normalize mobility for symmetric gait on all surfaces. (Progressing)  3. Patient will perform 5x sit to  <14 seconds to restore strength for safe community mobility. (Progressing)  4. Independent performance of lumbar home program to modulate back pain.     _________________________________________________________________________  Pre-treatment Symptoms/Complaints: Pt's knee continues to do well, less pain. She received her second viscosupplementation injection yesterday, has one more in a week. Low back pain is constant - radiates bilaterally into mid back from sacral, radiates into R posterior thigh. Pain: Initial: 8/10 Post Session:  No increase/10   Medications Last Reviewed:  2022  Updated Objective Findings:  Below measures from initial evaluation unless otherwise noted. Observation/Orthostatic Postural Assessment:    Ambulates with rolling walker with knee flexed at 20 ° throughout gait cycle. Incision sites are dry and closed. Mild swelling present to the joint. Palpation:    Tender to joint line. ROM:    Right knee: 8 to 40 ° active; 5 to 90 ° (flexion done in sitting) passive with painful end feel into extension and stiff end feel into flexion   SLR: 70 °   Strength:   Quad set is fair  SLR: 3/5   Knee extension: 3/5   Knee flexion: 4/5   Special Tests:    Not applicable  Neurological Screen:  Grossly intact; SLR is negative  Functional Mobility:   WNL  Balance:    Not applicable  3/8/21 : knee PROM: -5 extn to 110 deg flexion. SLR's with mild extensor lag. Gait w/ wheeled walker using heel-toe pattern. 3/16/22 : LEFS = 20/80. Gait with cane with very slow rate. Knee PROM = - 3 deg extn, 110 deg flexion (A/P), end range pain with both flexion and extension, diffuse knee effusion with minimal temperature increase. 3/23/22: Knee flexion to 115 deg , end range pain. 3/28/22: Knee flexion to 120 deg, extension ` -5 deg, end range pain w/ each, stiff extension end feel  4/7/22: anterior knee effused. Knee extension at start fo treatment - 8 deg, at end of treatment - 5 deg.  4/11/22: TUG test = 20.69 , using single point cane. PROM's R knee: - 3 °  extension, 123 ° flexion. 4/13/22: Knee flexion PROM to 125 deg today. 5/2/22: knee PROM's R: 95 deg flexion to -3 deg extension  5/11/22: Knee PROM's R: 118 deg flexion, - 4 deg extn. With miniminal end range pain. 5/13/22: Lumbar: ROM: flexion = 10 deg , pain (trunk flexion 60 deg), extension = 10 deg, pain, Sidebend right = 10 deg, pain. Sidebend left = 14 deg, no pain. Repeated flexion and repeated extension both pain limited but do not increase pain. DTR's: Patellar and achilles both +1 bilat. Hip flexion, dorsiflexion, EHL, ankle eversion all symmetrical, 4+ to 5 bilat. Pain reproduced with bridging.  Chart review: L4-5-S1 laminectomy/foraminectomies 7/2019. TREATMENT:   THERAPEUTIC ACTIVITY: ( see below for minutes): Therapeutic activities per grid below to improve mobility, strength, balance and coordination. Required minimal visual, verbal, manual and tactile cues to improve independence and safety with daily activities . THERAPEUTIC EXERCISE: (see below for minutes):  Exercises per grid below to improve mobility, strength, balance and coordination. Required minimal verbal and manual cues to promote proper body alignment, promote proper body posture and promote proper body mechanics. Progressed resistance, range, repetitions and complexity of movement as indicated. MANUAL THERAPY: (see below for minutes): Joint mobilization and Soft tissue mobilization was utilized and necessary because of the patient's restricted joint motion, painful spasm, loss of articular motion and restricted motion of soft tissue. MODALITIES: (see below for minutes):      to decrease pain    SELF CARE: (see below for minutes): Procedure(s) (per grid) utilized to improve and/or restore self-care/home management as related to dressing, bathing and grooming. Required minimal verbal cueing to facilitate activities of daily living skills and compensatory activities.      Date: 4/25/22 (visit 19)  4/27/22 (visit 20)  5/2/22 (visit 21) 5/4/22 (visit 22) 5/11/22 (visit 23) 5/13/22 (visit 24)   Modalities: 15 min 15 min 15 min 15 min      R knee IFC/ice packs @  pps x 15 min, 10mA  R knee IFC/ice packs @  pps x 15 min, 15mA  R knee IFC/ice packs @  pps x 15 min, 10mA  R knee IFC/ice packs @  pps x 15 min, 10mA                        Therapeutic Exercise: 40 min 30 min 37 min 38 min 41 min 45 min    Supine R knee ext stretch 2 min Seated stepper: level 1 resistance x 8 min , slow RPM (~ 15) Seated stepper: level 5 resistance x 8 min , slow RPM (~ 15) Seated stepper: level 5 resistance x 8 min , slow RPM (~ 15) Seated stepper: level 5 resist, x 7 min. Lower trunkl rotation AROM x 2 min    Quad sets w/ popliteal cues for extension: 3 x 10 x  5\"  Assisted heel slides knee flexion ROM x 5 min  Heel slides w/strap assist, 30\" holds x 10 min Seated knee flexion stretching w/ 30\" holds x 5 min Heel slides for knee flex stretch x 30\" x 3 Lower trunk rotation w/ legs over physioball x 3 min    R SLR's 4 x 10  Supine assisted knee extension stretching Knee extn. Stretch w/ strap assist calf stretch: 5 x 30\" Supine knee extn. Stretch assisted x 3 min Knee extn. / calf stretch w/ strap x 2 min Double knee to chest sttretch w/ legs over physioball: 4 x 30\"    SB hamstring curls x 30 Quad sets  X 20 w 5\" holds Quad sets : 30 x 5\" holds w/ pressure gauge feedback R SLR's x 30  R SLR's x 30  HS stretch, w/ strap assist: 3 x 30\" each L/R    R LAQ's: 3 x 10: 10# + mild overpressure  SLR's R x 30 Swiss ball hamstring curls x 30 SB hamstring curls x 30 Seated lumbar flexion stretch: 1 min    Slantboard calf stretch: 3 min  SB hamstring curls x 30 Bridges: 2 x 10 (legs over physioball LTR's w/ legs over swiss ball x 20 L/R Seated lumbar flexion stretch w/ legs over physioball: 3 x 30\"     Mini squats: 20   Bilat LAQ's into physioball (quadriceps dwayne.) 2 x 15 x 5\"  Bridges x 5, legs over physioball Seated stepper: level 5 resist x 7  min    R 3\" step ups  X 20    R TKE's 15 x 5\" R LAQ's: 10#: x 30  HEP: see notes below    Seated stepper: 8 min, level 5 resist.    slantboard stretch x 2 min Calf raises DL x 30 on floor, 20 off back of slantboard         slantboard stretch 1 min         Wall squats x 20         R TKE's w/ gray band resist x 20     Proprioceptive Activities:                                    Manual Therapy: 3 min 8 min 8 min 5 min  10 min    R tibio-fem distraction w/ ap glides gr.  3: R tibio-fem distraction w/ ap glides gr. 3 and assisted flexion /extension ROM R tibio-fem distraction w/ ap glides gr. 3 R tibio-fem distraction w/ ap kate gr. 3  STM - bilateral lumbar: 8 min         R long leg traction at 30 deg hip flexion : 2 min   Therapeutic Activities:               '                                HEP:   Date: 05/13/2022  Prepared by: Reilly Mckenzie    Exercises  Supine Lower Trunk Rotation - 1 x daily - 7 x weekly - 20 reps  Supine Lower Trunk Rotation with Swiss Ball - 1 x daily - 7 x weekly - 20 reps  Supine Double Knee to Chest - 3 x daily - 7 x weekly - 3 sets - 30 seconds hold  Supine Hamstring Stretch with Strap - 3 x daily - 7 x weekly - 3 sets - 30 second hold  Seated Lumbar Flexion Stretch - 3 x daily - 7 x weekly - 3 sets - 30 second hold  Seated Flexion Stretch with Swiss Ball - 3 x daily - 7 x weekly - 3 sets - 30 second hold      [employer]  Treatment/Session Summary:    · Response to Treatment: Lumbar symptoms related to preceding history of stenosis and aggravated by changes in gait following arthroscopy - anticipate that she will respond well to current combination of additional lumbar treatment and that as her gait improves with viscosupplementation and therapy that she will finally progress to long term goal completion in next 3 weeks.    · Communication/Consultation:  None today  · Equipment provided today:  None today  Recommendations/Intent for next treatment session: Next visit will focus on progression of strength and return to prior level of function, further work on regaining more functional knee ROM, progressing trunk mobility program.    Total Treatment Billable Duration: 55 minutes  PT Patient Time In/Time Out  Time In: 0800  Time Out: 6002 Drew Quintero, PT    Future Appointments   Date Time Provider Socorro Geraldine   5/16/2022  8:45 AM Jonathan Meeks PT [facility]   5/19/2022 10:30 AM Darion Lowe MD [facility]   5/20/2022  8:00 AM Jonathan Meeks PT [facility]   5/23/2022  8:45 AM Jonathan Meeks PT [facility]   5/25/2022  8:00 AM Jeanette Westbrook, PT [facility]

## 2022-05-16 ENCOUNTER — HOSPITAL ENCOUNTER (OUTPATIENT)
Dept: PHYSICAL THERAPY | Age: 67
Discharge: HOME | End: 2022-05-16
Payer: COMMERCIAL

## 2022-05-16 PROCEDURE — 97140 MANUAL THERAPY 1/> REGIONS: CPT

## 2022-05-16 PROCEDURE — 97110 THERAPEUTIC EXERCISES: CPT

## 2022-05-16 NOTE — PROGRESS NOTES
Tim Castle  : 1955  Primary: Pilar Cox [facility]  Secondary:  [place] [address].  [phone]   [phone]      OUTPATIENT PHYSICAL THERAPY: Daily Treatment Note  2022    ICD-10: Treatment Diagnosis: Pain in right leg (M79.604), Pain in right knee (M25.561) and Stiffness of right knee, not elsewhere classified (M25.661)  Effective Dates: 2022 TO 2022 (90 days). Frequency/Duration: 2 times a week for 90 Days  GOALS: (Goals have been discussed and agreed upon with patient.)  Short-Term Functional Goals: Time Frame: 2 weeks  1. Patient will be independent with HEP. (MET)  2. Patient will improve passive knee extension to 0 ° to improve quad activation for weight bearing tasks. (Progressing)  3. Patient will improve knee flexion to 120 ° to improve mobility for sit to stand transfers. (MET)  Discharge Goals: Time Frame: 12 weeks  1. Patient will be able to maintain single leg stance for 10 seconds to improve safety for independent ambulation. (Progressing)  2. Patient will restore ROM to -3 to 125 ° to normalize mobility for symmetric gait on all surfaces. (Progressing)  3. Patient will perform 5x sit to  <14 seconds to restore strength for safe community mobility. (Progressing)  4. Independent performance of lumbar home program to modulate back pain.     _________________________________________________________________________  Pre-treatment Symptoms/Complaints: Pt notes both back and right knee pain - feels that how she walks due to right knee pain is affecting her back. Pain: Initial: 810 Post Session:  No increase/10   Medications Last Reviewed:  2022  Updated Objective Findings:  Below measures from initial evaluation unless otherwise noted. Observation/Orthostatic Postural Assessment:    Ambulates with rolling walker with knee flexed at 20 ° throughout gait cycle.  Incision sites are dry and closed. Mild swelling present to the joint. Palpation:    Tender to joint line. ROM:    Right knee: 8 to 40 ° active; 5 to 90 ° (flexion done in sitting) passive with painful end feel into extension and stiff end feel into flexion   SLR: 70 °   Strength:   Quad set is fair  SLR: 3/5   Knee extension: 3/5   Knee flexion: 4/5   Special Tests:    Not applicable  Neurological Screen:  Grossly intact; SLR is negative  Functional Mobility:   WNL  Balance:    Not applicable  6/0/42 : knee PROM: -5 extn to 110 deg flexion. SLR's with mild extensor lag. Gait w/ wheeled walker using heel-toe pattern. 3/16/22 : LEFS = 20/80. Gait with cane with very slow rate. Knee PROM = - 3 deg extn, 110 deg flexion (A/P), end range pain with both flexion and extension, diffuse knee effusion with minimal temperature increase. 3/23/22: Knee flexion to 115 deg , end range pain. 3/28/22: Knee flexion to 120 deg, extension ` -5 deg, end range pain w/ each, stiff extension end feel  4/7/22: anterior knee effused. Knee extension at start fo treatment - 8 deg, at end of treatment - 5 deg.  4/11/22: TUG test = 20.69 , using single point cane. PROM's R knee: - 3 °  extension, 123 ° flexion. 4/13/22: Knee flexion PROM to 125 deg today. 5/2/22: knee PROM's R: 95 deg flexion to -3 deg extension  5/11/22: Knee PROM's R: 118 deg flexion, - 4 deg extn. With miniminal end range pain. 5/13/22: Lumbar: ROM: flexion = 10 deg , pain (trunk flexion 60 deg), extension = 10 deg, pain, Sidebend right = 10 deg, pain. Sidebend left = 14 deg, no pain. Repeated flexion and repeated extension both pain limited but do not increase pain. DTR's: Patellar and achilles both +1 bilat. Hip flexion, dorsiflexion, EHL, ankle eversion all symmetrical, 4+ to 5 bilat. Pain reproduced with bridging. Chart review: L4-5-S1 laminectomy/foraminectomies 7/2019. TREATMENT:   THERAPEUTIC ACTIVITY: ( see below for minutes):   Therapeutic activities per grid below to improve mobility, strength, balance and coordination. Required minimal visual, verbal, manual and tactile cues to improve independence and safety with daily activities . THERAPEUTIC EXERCISE: (see below for minutes):  Exercises per grid below to improve mobility, strength, balance and coordination. Required minimal verbal and manual cues to promote proper body alignment, promote proper body posture and promote proper body mechanics. Progressed resistance, range, repetitions and complexity of movement as indicated. MANUAL THERAPY: (see below for minutes): Joint mobilization and Soft tissue mobilization was utilized and necessary because of the patient's restricted joint motion, painful spasm, loss of articular motion and restricted motion of soft tissue. MODALITIES: (see below for minutes):      to decrease pain    SELF CARE: (see below for minutes): Procedure(s) (per grid) utilized to improve and/or restore self-care/home management as related to dressing, bathing and grooming. Required minimal verbal cueing to facilitate activities of daily living skills and compensatory activities. Date: 4/25/22 (visit 19)  4/27/22 (visit 20)  5/2/22 (visit 21) 5/4/22 (visit 22) 5/11/22 (visit 23) 5/13/22 (visit 24) 5/16/22 (visit 25)   Modalities: 15 min 15 min 15 min 15 min       R knee IFC/ice packs @  pps x 15 min, 10mA  R knee IFC/ice packs @  pps x 15 min, 15mA  R knee IFC/ice packs @  pps x 15 min, 10mA  R knee IFC/ice packs @  pps x 15 min, 10mA                           Therapeutic Exercise: 40 min 30 min 37 min 38 min 41 min 45 min 44 min    Supine R knee ext stretch 2 min Seated stepper: level 1 resistance x 8 min , slow RPM (~ 15) Seated stepper: level 5 resistance x 8 min , slow RPM (~ 15) Seated stepper: level 5 resistance x 8 min , slow RPM (~ 15) Seated stepper: level 5 resist, x 7 min.   Lower trunk rotation AROM x 2 min Lower trunk rotations: 2 min/2 min w /egs over physioball    Quad sets w/ popliteal cues for extension: 3 x 10 x  5\"  Assisted heel slides knee flexion ROM x 5 min  Heel slides w/strap assist, 30\" holds x 10 min Seated knee flexion stretching w/ 30\" holds x 5 min Heel slides for knee flex stretch x 30\" x 3 Lower trunk rotation w/ legs over physioball x 3 min SB hamstring curl - knee to chest stretches x 5 x 20\"     R SLR's 4 x 10  Supine assisted knee extension stretching Knee extn. Stretch w/ strap assist calf stretch: 5 x 30\" Supine knee extn. Stretch assisted x 3 min Knee extn. / calf stretch w/ strap x 2 min Double knee to chest sttretch w/ legs over physioball: 4 x 30\" SLR's 3 x 15 L/R alternating    SB hamstring curls x 30 Quad sets  X 20 w 5\" holds Quad sets : 30 x 5\" holds w/ pressure gauge feedback R SLR's x 30  R SLR's x 30  HS stretch, w/ strap assist: 3 x 30\" each L/R Bridges x 20 reps. R LAQ's: 3 x 10: 10# + mild overpressure  SLR's R x 30 Swiss ball hamstring curls x 30 SB hamstring curls x 30 Seated lumbar flexion stretch: 1 min Hip rotation stretching: crossed leg at knee figure 4 and piriformis: 2 x 30\" each L/R    Slantboard calf stretch: 3 min  SB hamstring curls x 30 Bridges: 2 x 10 (legs over physioball LTR's w/ legs over swiss ball x 20 L/R Seated lumbar flexion stretch w/ legs over physioball: 3 x 30\"  Hamstring stretch w/strap: 3 x 30\" each L/R.      Mini squats: 20   Bilat LAQ's into physioball (quadriceps dwayne.) 2 x 15 x 5\"  Bridges x 5, legs over physioball Seated stepper: level 5 resist x 7  min Seated stepper: level 6 resist x 8  min    R 3\" step ups  X 20    R TKE's 15 x 5\" R LAQ's: 10#: x 30  HEP: see notes below Standing DL calf raises off incline board x 20    Seated stepper: 8 min, level 5 resist.    slantboard stretch x 2 min Calf raises DL x 30 on floor, 20 off back of slantboard  Standing R TKE's w/ gray tband resistance: 35 reps        slantboard stretch 1 min  Rockerboard: ap axis standing dl side to side tipping x 2 min, level board balance x 1 min        Wall squats x 20          R TKE's w/ gray band resist x 20      Proprioceptive Activities:                                        Manual Therapy: 3 min 8 min 8 min 5 min  10 min 10 min    R tibio-fem distraction w/ ap glides gr. 3: R tibio-fem distraction w/ ap glides gr. 3 and assisted flexion /extension ROM R tibio-fem distraction w/ ap glides gr. 3 R tibio-fem distraction w/ ap glides gr. 3  STM - bilateral lumbar: 8 min Bilat lumbar STM : 4 min          L4-5 lumbar pa mobs gr. 3 x 4 min         R long leg traction at 30 deg hip flexion : 2 min R long leg traction at 30 deg hip flexion x 2 min   Therapeutic Activities:                '                                    HEP:   Date: 05/13/2022  Prepared by: Jose Jamison    Exercises  Supine Lower Trunk Rotation - 1 x daily - 7 x weekly - 20 reps  Supine Lower Trunk Rotation with Swiss Ball - 1 x daily - 7 x weekly - 20 reps  Supine Double Knee to Chest - 3 x daily - 7 x weekly - 3 sets - 30 seconds hold  Supine Hamstring Stretch with Strap - 3 x daily - 7 x weekly - 3 sets - 30 second hold  Seated Lumbar Flexion Stretch - 3 x daily - 7 x weekly - 3 sets - 30 second hold  Seated Flexion Stretch with Swiss Ball - 3 x daily - 7 x weekly - 3 sets - 30 second hold      [employer]  Treatment/Session Summary:    · Response to Treatment: Lumbar program progressed today - further work on hip mobility and addition of balance board training for both back and knee - pt very wobbly with doulbe leg rockerboard balance tasks. Walks with slow gait, continued issues with incomplete R knee extension at [facility].    · Communication/Consultation:  None today  · Equipment provided today:  None today  Recommendations/Intent for next treatment session: Next visit will focus on progression of strength and return to prior level of function, further work on regaining more functional knee ROM, progressing trunk mobility program.    Total Treatment Billable Duration: 54 minutes  PT Patient Time In/Time Out  Time In: 0845  Time Out: Salvador 75, PT    Future Appointments   Date Time Provider Socorro Chandler   5/19/2022 10:30 AM Nena Cottrell MD [facility]   5/20/2022  8:00 AM Buzz Estrella, PT [facility]   5/23/2022  8:45 AM Buzz Estrella, IMANI HERRERA [facility]   5/25/2022  8:00 AM [facility]

## 2022-05-20 ENCOUNTER — HOSPITAL ENCOUNTER (OUTPATIENT)
Dept: PHYSICAL THERAPY | Age: 67
Discharge: HOME | End: 2022-05-20
Payer: COMMERCIAL

## 2022-05-20 PROCEDURE — 97110 THERAPEUTIC EXERCISES: CPT

## 2022-05-20 NOTE — PROGRESS NOTES
Eleni Quinn  : 1955  Primary: [facility]  Secondary:  Leila Taylor [place] [address].  [phone]   [phone]      OUTPATIENT PHYSICAL THERAPY: Daily Treatment Note  2022    ICD-10: Treatment Diagnosis: Pain in right leg (M79.604), Pain in right knee (M25.561) and Stiffness of right knee, not elsewhere classified (M25.661)  Effective Dates: 2022 TO 2022 (90 days). Frequency/Duration: 2 times a week for 90 Days  GOALS: (Goals have been discussed and agreed upon with patient.)  Short-Term Functional Goals: Time Frame: 2 weeks  1. Patient will be independent with HEP. (MET)  2. Patient will improve passive knee extension to 0 ° to improve quad activation for weight bearing tasks. (Progressing)  3. Patient will improve knee flexion to 120 ° to improve mobility for sit to stand transfers. (MET)  Discharge Goals: Time Frame: 12 weeks  1. Patient will be able to maintain single leg stance for 10 seconds to improve safety for independent ambulation. (Progressing)  2. Patient will restore ROM to -3 to 125 ° to normalize mobility for symmetric gait on all surfaces. (Progressing)  3. Patient will perform 5x sit to  <14 seconds to restore strength for safe community mobility. (Progressing)  4. Independent performance of lumbar home program to modulate back pain.     _________________________________________________________________________  Pre-treatment Symptoms/Complaints: Pt had her last voscosupplementation injection yesterday - knee is feeling good. Back is still bothering her. Pain: Initial: 5/10 Post Session:  No increase/10   Medications Last Reviewed:  2022  Updated Objective Findings:  Below measures from initial evaluation unless otherwise noted. Observation/Orthostatic Postural Assessment:    Ambulates with rolling walker with knee flexed at 20 ° throughout gait cycle.  Incision sites are dry and closed. Mild swelling present to the joint. Palpation:    Tender to joint line. ROM:    Right knee: 8 to 40 ° active; 5 to 90 ° (flexion done in sitting) passive with painful end feel into extension and stiff end feel into flexion   SLR: 70 °   Strength:   Quad set is fair  SLR: 3/5   Knee extension: 3/5   Knee flexion: 4/5   Special Tests:    Not applicable  Neurological Screen:  Grossly intact; SLR is negative  Functional Mobility:   WNL  Balance:    Not applicable  2/6/67 : knee PROM: -5 extn to 110 deg flexion. SLR's with mild extensor lag. Gait w/ wheeled walker using heel-toe pattern. 3/16/22 : LEFS = 20/80. Gait with cane with very slow rate. Knee PROM = - 3 deg extn, 110 deg flexion (A/P), end range pain with both flexion and extension, diffuse knee effusion with minimal temperature increase. 3/23/22: Knee flexion to 115 deg , end range pain. 3/28/22: Knee flexion to 120 deg, extension ` -5 deg, end range pain w/ each, stiff extension end feel  4/7/22: anterior knee effused. Knee extension at start fo treatment - 8 deg, at end of treatment - 5 deg.  4/11/22: TUG test = 20.69 , using single point cane. PROM's R knee: - 3 °  extension, 123 ° flexion. 4/13/22: Knee flexion PROM to 125 deg today. 5/2/22: knee PROM's R: 95 deg flexion to -3 deg extension  5/11/22: Knee PROM's R: 118 deg flexion, - 4 deg extn. With miniminal end range pain. 5/13/22: Lumbar: ROM: flexion = 10 deg , pain (trunk flexion 60 deg), extension = 10 deg, pain, Sidebend right = 10 deg, pain. Sidebend left = 14 deg, no pain. Repeated flexion and repeated extension both pain limited but do not increase pain. DTR's: Patellar and achilles both +1 bilat. Hip flexion, dorsiflexion, EHL, ankle eversion all symmetrical, 4+ to 5 bilat. Pain reproduced with bridging. Chart review: L4-5-S1 laminectomy/foraminectomies 7/2019. TREATMENT:   THERAPEUTIC ACTIVITY: ( see below for minutes):   Therapeutic activities per grid below to improve mobility, strength, balance and coordination. Required minimal visual, verbal, manual and tactile cues to improve independence and safety with daily activities . THERAPEUTIC EXERCISE: (see below for minutes):  Exercises per grid below to improve mobility, strength, balance and coordination. Required minimal verbal and manual cues to promote proper body alignment, promote proper body posture and promote proper body mechanics. Progressed resistance, range, repetitions and complexity of movement as indicated. MANUAL THERAPY: (see below for minutes): Joint mobilization and Soft tissue mobilization was utilized and necessary because of the patient's restricted joint motion, painful spasm, loss of articular motion and restricted motion of soft tissue. MODALITIES: (see below for minutes):      to decrease pain    SELF CARE: (see below for minutes): Procedure(s) (per grid) utilized to improve and/or restore self-care/home management as related to dressing, bathing and grooming. Required minimal verbal cueing to facilitate activities of daily living skills and compensatory activities. Date: 4/25/22 (visit 19)  4/27/22 (visit 20)  5/2/22 (visit 21) 5/4/22 (visit 22) 5/11/22 (visit 23) 5/13/22 (visit 24) 5/16/22 (visit 25) 5/20/22 (visit 26   Modalities: 15 min 15 min 15 min 15 min        R knee IFC/ice packs @  pps x 15 min, 10mA  R knee IFC/ice packs @  pps x 15 min, 15mA  R knee IFC/ice packs @  pps x 15 min, 10mA  R knee IFC/ice packs @  pps x 15 min, 10mA                              Therapeutic Exercise: 40 min 30 min 37 min 38 min 41 min 45 min 44 min 44 min    Supine R knee ext stretch 2 min Seated stepper: level 1 resistance x 8 min , slow RPM (~ 15) Seated stepper: level 5 resistance x 8 min , slow RPM (~ 15) Seated stepper: level 5 resistance x 8 min , slow RPM (~ 15) Seated stepper: level 5 resist, x 7 min.   Lower trunk rotation AROM x 2 min Lower trunk rotations: 2 min/2 min w /egs over physioball Seated stepper: level 6, 8 min    Quad sets w/ popliteal cues for extension: 3 x 10 x  5\"  Assisted heel slides knee flexion ROM x 5 min  Heel slides w/strap assist, 30\" holds x 10 min Seated knee flexion stretching w/ 30\" holds x 5 min Heel slides for knee flex stretch x 30\" x 3 Lower trunk rotation w/ legs over physioball x 3 min SB hamstring curl - knee to chest stretches x 5 x 20\"  hooklying LTR's AROM w/ legs over ball x 2 min    R SLR's 4 x 10  Supine assisted knee extension stretching Knee extn. Stretch w/ strap assist calf stretch: 5 x 30\" Supine knee extn. Stretch assisted x 3 min Knee extn. / calf stretch w/ strap x 2 min Double knee to chest sttretch w/ legs over physioball: 4 x 30\" SLR's 3 x 15 L/R alternating SB HS curl knee to chest stretches: 5\" x 20     SB hamstring curls x 30 Quad sets  X 20 w 5\" holds Quad sets : 30 x 5\" holds w/ pressure gauge feedback R SLR's x 30  R SLR's x 30  HS stretch, w/ strap assist: 3 x 30\" each L/R Bridges x 20 reps.   SLR's 3 x 15 L/R alternating sets w/ cue for abdominal bracing    R LAQ's: 3 x 10: 10# + mild overpressure  SLR's R x 30 Swiss ball hamstring curls x 30 SB hamstring curls x 30 Seated lumbar flexion stretch: 1 min Hip rotation stretching: crossed leg at knee figure 4 and piriformis: 2 x 30\" each L/R LAQ's : DL - 15#: 3 x 10    Slantboard calf stretch: 3 min  SB hamstring curls x 30 Bridges: 2 x 10 (legs over physioball LTR's w/ legs over swiss ball x 20 L/R Seated lumbar flexion stretch w/ legs over physioball: 3 x 30\"  Hamstring stretch w/strap: 3 x 30\" each L/R.  R lateral step ups 6\" step 2 x 5    Mini squats: 20   Bilat LAQ's into physioball (quadriceps dwayne.) 2 x 15 x 5\"  Bridges x 5, legs over physioball Seated stepper: level 5 resist x 7  min Seated stepper: level 6 resist x 8  min semiprone hip extensions over table: 3 x 10 L/R    R 3\" step ups  X 20    R TKE's 15 x 5\" R LAQ's: 10#: x 30  HEP: see notes below Standing DL calf raises off incline board x 20 Standing DL calf raises off incline board x 20    Seated stepper: 8 min, level 5 resist.    slantboard stretch x 2 min Calf raises DL x 30 on floor, 20 off back of slantboard  Standing R TKE's w/ gray tband resistance: 35 reps Hip rotator stretching: 3 x 30\" each crossed leg hip ER/piriformis-IR - L and R        slantboard stretch 1 min  Rockerboard: ap axis standing dl side to side tipping x 2 min, level board balance x 1 min         Wall squats x 20           R TKE's w/ gray band resist x 20       Proprioceptive Activities:                                            Manual Therapy: 3 min 8 min 8 min 5 min  10 min 10 min     R tibio-fem distraction w/ ap glides gr. 3: R tibio-fem distraction w/ ap glides gr. 3 and assisted flexion /extension ROM R tibio-fem distraction w/ ap glides gr. 3 R tibio-fem distraction w/ ap glides gr. 3  STM - bilateral lumbar: 8 min Bilat lumbar STM : 4 min           L4-5 lumbar pa mobs gr. 3 x 4 min          R long leg traction at 30 deg hip flexion : 2 min R long leg traction at 30 deg hip flexion x 2 min    Therapeutic Activities:                 '                                        HEP:   Date: 05/13/2022  Prepared by: Miriam Arms    Exercises  Supine Lower Trunk Rotation - 1 x daily - 7 x weekly - 20 reps  Supine Lower Trunk Rotation with Swiss Ball - 1 x daily - 7 x weekly - 20 reps  Supine Double Knee to Chest - 3 x daily - 7 x weekly - 3 sets - 30 seconds hold  Supine Hamstring Stretch with Strap - 3 x daily - 7 x weekly - 3 sets - 30 second hold  Seated Lumbar Flexion Stretch - 3 x daily - 7 x weekly - 3 sets - 30 second hold  Seated Flexion Stretch with Swiss Ball - 3 x daily - 7 x weekly - 3 sets - 30 second hold      [employer] Portal  Treatment/Session Summary:    · Response to Treatment: Near a plateau in overall progress - discussed discharge plans today, will complete therapy at end of next week . · Communication/Consultation:  None today  · Equipment provided today:  None today  Recommendations/Intent for next treatment session: Next visit will focus on progression of strength and return to prior level of function, further work on regaining more functional knee ROM, progressing trunk mobility program.    Total Treatment Billable Duration: 44 minutes  PT Patient Time In/Time Out  Time In: 0802  Time Out: 925 Harshad Ortiz, PT    Future Appointments   Date Time Provider [place]   5/23/2022  8:45 AM Rashard Bustos, PT [facility]   5/25/2022  8:00 AM Madina Parrish, PT [facility]

## 2022-05-23 ENCOUNTER — HOSPITAL ENCOUNTER (OUTPATIENT)
Dept: PHYSICAL THERAPY | Age: 67
Setting detail: RECURRING SERIES
Discharge: HOME | End: 2022-05-26
Payer: COMMERCIAL

## 2022-05-23 ENCOUNTER — APPOINTMENT (OUTPATIENT)
Dept: PHYSICAL THERAPY | Age: 67
End: 2022-05-23
Payer: COMMERCIAL

## 2022-05-23 PROCEDURE — 97110 THERAPEUTIC EXERCISES: CPT

## 2022-05-23 ASSESSMENT — PAIN SCALES - GENERAL: PAINLEVEL_OUTOF10: 3

## 2022-05-23 NOTE — PROGRESS NOTES
Mari Urbina  : 1955  Primary: [address]  Secondary:  [address] [place]  [address] [facility] [address]  Phone: [phone]  Fax: [phone] Plan Frequency: 2 times a week    Plan of Care/Certification Expiration Date: 22      PT Visit Info:    No data recorded    OUTPATIENT PHYSICAL THERAPY:OP NOTE TYPE: Treatment Note 2022     Appt Desk   Episode      Treatment Diagnosis:  Pain in Right Knee (M25.561)  Stiffness of Right Knee, Not elsewhere classified (M25.661)  Difficulty in walking, Not elsewhere classified (R26.2)  Low Back Pain (M54.5)     GOALS: (Goals have been discussed and agreed upon with patient.)  Short-Term Functional Goals: Time Frame: 2 weeks  1. Patient will be independent with HEP. (MET)  2. Patient will improve passive knee extension to 0 ° to improve quad activation for weight bearing tasks. (Progressing)  3. Patient will improve knee flexion to 120 ° to improve mobility for sit to stand transfers. (MET)  Discharge Goals: Time Frame: 12 weeks  1. Patient will be able to maintain single leg stance for 10 seconds to improve safety for independent ambulation. (Progressing)  2. Patient will restore ROM to -3 to 125 ° to normalize mobility for symmetric gait on all surfaces. (Progressing)  3. Patient will perform 5x sit to  <14 seconds to restore strength for safe community mobility. (Progressing)  4. Independent performance of lumbar home program to modulate back pain.      Medical/Referring Diagnosis:  Other tear of medial meniscus, current injury, right knee, subsequent encounter [K72.207H]  Referring Physician:  JUAN ALBERTO Lowe - *  MD Orders:  PT Eval and Treat  Date of Onset:  No data recorded   Allergies:  Latex, Cocoa, Strawberry extract, and Prednisone  Restrictions/Precautions:    Restrictions/Precautions: None  No data recorded   Interventions Planned (Treatment may consist of any combination of the following):    1. Balance Exercise  2. Family Education  3. Gait Training  4. Home Exercise Program (HEP)  5. Manual Therapy  6. Range of Motion (ROM)  7. Therapeutic Activites  8. Therapeutic Exercise/Strengthening   Subjective Comments:  Knee and back both feeling better. Was able to walk at [employer] over the weekend, wouldn't have been able to do that before. Initial:     3/10 Post Session:     3/10  Medications Last Reviewed:  5/23/2022  Updated Objective Findings:  Below measures from initial evaluation unless otherwise noted. Observation/Orthostatic Postural Assessment:   Ambulates with rolling walker with knee flexed at 20 ° throughout gait cycle. Incision sites are dry and closed. Mild swelling present to the joint. Palpation:   Tender to joint line. ROM:   Right knee: 8 to 40 ° active; 5 to 90 ° (flexion done in sitting) passive with painful end feel into extension and stiff end feel into flexion   SLR: 70 °   Strength:   Quad set is fair  SLR: 3/5   Knee extension: 3/5   Knee flexion: 4/5   Special Tests:   Not applicable  Neurological Screen:  Grossly intact; SLR is negative  Functional Mobility:   WNL  Balance:   Not applicable  2/1/34 : knee PROM: -5 extn to 110 deg flexion. SLR's with mild extensor lag. Gait w/ wheeled walker using heel-toe pattern. 3/16/22 : LEFS = 20/80. Gait with cane with very slow rate. Knee PROM = - 3 deg extn, 110 deg flexion (A/P), end range pain with both flexion and extension, diffuse knee effusion with minimal temperature increase. 3/23/22: Knee flexion to 115 deg , end range pain. 3/28/22: Knee flexion to 120 deg, extension ` -5 deg, end range pain w/ each, stiff extension end feel  4/7/22: anterior knee effused. Knee extension at start fo treatment - 8 deg, at end of treatment - 5 deg.  4/11/22: TUG test = 20.69 , using single point cane. PROM's R knee: - 3 ° extension, 123 ° flexion. 4/13/22: Knee flexion PROM to 125 deg today.      Treatment TREATMENT:   THERAPEUTIC ACTIVITY: ( see below for minutes): Therapeutic activities per grid below to improve mobility, strength, balance and coordination. Required minimal visual, verbal, manual and tactile cues to improve independence and safety with daily activities . THERAPEUTIC EXERCISE: (see below for minutes): Exercises per grid below to improve mobility, strength, balance and coordination. Required minimal verbal and manual cues to promote proper body alignment, promote proper body posture and promote proper body mechanics. Progressed resistance, range, repetitions and complexity of movement as indicated. MANUAL THERAPY: (see below for minutes): Joint mobilization and Soft tissue mobilization was utilized and necessary because of the patient's restricted joint motion, painful spasm, loss of articular motion and restricted motion of soft tissue. MODALITIES: (see below for minutes): to decrease pain   SELF CARE: (see below for minutes): Procedure(s) (per grid) utilized to improve and/or restore self-care/home management as related to dressing, bathing and grooming. Required minimal verbal cueing to facilitate activities of daily living skills and compensatory activities    Date: 5/23/22 (visit 27)       Modalities:                                Therapeutic Exercise: 43 min        Seated stepper: 8 min, level 5 resist        Lower trunk rotation - legs over physioball. 3 minutes        SLR's L/R 3 x 10 each. LAQ's : bilat: 12.5# : 5 x 8 reps        Semiprone hip extn's L/R 3 x 10 each (over table).          Trial - stationary bike - 1 min, instruction in seat ajustment for home use        Lateral step ups: 6\" 15 each L/R alternating reps        Slantboard stretch: 1 min        Calf raises, double leg, incline board x 20        Seated lumbar flexion stretching x 2 minutes               Proprioceptive Activities:                                Manual Therapy:                        Functional Activities:                                        HEP:   Date: 05/13/2022  Prepared by: Adolfo Hooker    Exercises  Supine Lower Trunk Rotation - 1 x daily - 7 x weekly - 20 reps  Supine Lower Trunk Rotation with Swiss Ball - 1 x daily - 7 x weekly - 20 reps  Supine Double Knee to Chest - 3 x daily - 7 x weekly - 3 sets - 30 seconds hold  Supine Hamstring Stretch with Strap - 3 x daily - 7 x weekly - 3 sets - 30 second hold  Seated Lumbar Flexion Stretch - 3 x daily - 7 x weekly - 3 sets - 30 second hold  Seated Flexion Stretch with Swiss Ball - 3 x daily - 7 x weekly - 3 sets - 30 second hold      Treatment/Session Summary:    · Treatment Assessment:   Improving daily function noted, less limping and fewer reports of low back pain. . On track for discharge later this week. · Communication/Consultation:  None today  · Equipment provided today: None today  · Recommendations/Intent for next treatment session: Next visit will focus on Final home exercise program instruction, knee/lumbar therapeutic exercise, manual therapy as needed. .    Total Treatment Billable Duration:  43 minutes  Time In: 0845  Time Out: 0930    Petra Schmitz PT       Post Session Pain  Charge Capture  [employer] Portal  MD Guidelines  Scanned Media  Benefits  MyChart

## 2022-05-23 NOTE — THERAPY RECERTIFICATION
Savannah Minor  : 1955  Primary: [address]  Secondary:  Marga Villanueva @ [facility]  [address] [address]  Phone: [phone]  Fax: [phone] Plan Frequency: 2 times a week    Plan of Care/Certification Expiration Date: 22      PT Visit Info:    No data recorded    OUTPATIENT PHYSICAL THERAPY:OP NOTE TYPE: Recertification      Appt Desk   Episode      Treatment Diagnosis:  Pain in Right Knee (M25.561)  Stiffness of Right Knee, Not elsewhere classified (M25.661)  Difficulty in walking, Not elsewhere classified (R26.2)  Low Back Pain (M54.5)    Medical/Referring Diagnosis:  Other tear of medial meniscus, current injury, right knee, subsequent encounter [Q68.832Q]  Referring Physician:  JUAN ALBERTO Lepe - *  MD Orders:  PT Eval and Treat   Return MD Appt:  TBD  Date of Onset:  No data recorded   Allergies:  Latex, Cocoa, Strawberry extract, and Prednisone  Restrictions/Precautions:    Restrictions/Precautions: None  No data recorded   Medications Last Reviewed:  2022     SUBJECTIVE   History of Injury/Illness (Reason for Referral):  Pt continuing therapy following R knee arthroscopic meniscectomy. Patient Stated Goal(s):  \"Return to normal walking , decrease back pain\"  Initial:     3/10 Post Session:     3/10  Past Medical History/Comorbidities:   Ms. Minerva Medina  has a past medical history of Asthma, Cancer ([facility] [address] DVT (deep venous thrombosis) ([facility] [address] GERD (gastroesophageal reflux disease), Hypertension, and Iron deficiency anemia. Ms. Minerva Medina  has a past surgical history that includes neurological surgery (2019); Hysterectomy (); and Cholecystectomy, open ().   Social History/Living Environment:   No data recorded   Prior Level of Function/Work/Activity:   No data recordedNo data recordedNo data recorded   Learning:   No data recorded   Fall Risk Scale:   No data recorded   Dominant Side:  right handed OBJECTIVE   Below measures from initial evaluation unless otherwise noted. Observation/Orthostatic Postural Assessment:   Ambulates with rolling walker with knee flexed at 20 ° throughout gait cycle. Incision sites are dry and closed. Mild swelling present to the joint. Palpation:   Tender to joint line. ROM:   Right knee: 8 to 40 ° active; 5 to 90 ° (flexion done in sitting) passive with painful end feel into extension and stiff end feel into flexion   SLR: 70 °   Strength:   Quad set is fair  SLR: 3/5   Knee extension: 3/5   Knee flexion: 4/5   Special Tests:   Not applicable  Neurological Screen:  Grossly intact; SLR is negative  Functional Mobility:   WNL  Balance:   Not applicable  9/3/86 : knee PROM: -5 extn to 110 deg flexion. SLR's with mild extensor lag. Gait w/ wheeled walker using heel-toe pattern. 3/16/22 : LEFS = 20/80. Gait with cane with very slow rate. Knee PROM = - 3 deg extn, 110 deg flexion (A/P), end range pain with both flexion and extension, diffuse knee effusion with minimal temperature increase. 3/23/22: Knee flexion to 115 deg , end range pain. 3/28/22: Knee flexion to 120 deg, extension ` -5 deg, end range pain w/ each, stiff extension end feel  4/7/22: anterior knee effused. Knee extension at start fo treatment - 8 deg, at end of treatment - 5 deg.  4/11/22: TUG test = 20.69 , using single point cane. PROM's R knee: - 3 ° extension, 123 ° flexion. 4/13/22: Knee flexion PROM to 125 deg today. 5/2/22: knee PROM's R: 95 deg flexion to -3 deg extension  5/11/22: Knee PROM's R: 118 deg flexion, - 4 deg extn. With miniminal end range pain. 5/13/22: Lumbar: ROM: flexion = 10 deg , pain (trunk flexion 60 deg), extension = 10 deg, pain, Sidebend right = 10 deg, pain. Sidebend left = 14 deg, no pain. Repeated flexion and repeated extension both pain limited but do not increase pain. DTR's: Patellar and achilles both +1 bilat.  Hip flexion, dorsiflexion, EHL, ankle eversion all symmetrical, 4+ to 5 bilat. Pain reproduced with bridging. Chart review: L4-5-S1 laminectomy/foraminectomies 7/2019. ASSESSMENT   Initial Assessment:  Pt requires ongoing skilled PT intervention due to recovery from R knee arthroscopy and low back pain that resulted from compensatory gait pattern. Problem List: (Impacting functional limitations): Body Structures, Functions, Activity Limitations Requiring Skilled Therapeutic Intervention: Decreased functional mobility ; Decreased ROM; Decreased body mechanics; Decreased tolerance to work activity; Decreased strength; Decreased balance     Therapy Prognosis:   Therapy Prognosis: Good     Assessment Complexity:   Medium Complexity  PLAN   Effective Dates: 5/22/2022 TO Plan of Care/Certification Expiration Date: 05/30/22     Frequency/Duration: Plan Frequency: 2 times a week     Interventions Planned (Treatment may consist of any combination of the following):    No data recorded   GOALS: (Goals have been discussed and agreed upon with patient.)  Short-Term Functional Goals: Time Frame: 2 weeks  1. Patient will be independent with HEP. (MET)  2. Patient will improve passive knee extension to 0 ° to improve quad activation for weight bearing tasks. (Progressing)  3. Patient will improve knee flexion to 120 ° to improve mobility for sit to stand transfers. (MET)  Discharge Goals: Time Frame: 12 weeks  1. Patient will be able to maintain single leg stance for 10 seconds to improve safety for independent ambulation. (Progressing)  2. Patient will restore ROM to -3 to 125 ° to normalize mobility for symmetric gait on all surfaces. (Progressing)  3. Patient will perform 5x sit to  <14 seconds to restore strength for safe community mobility. (Progressing)  4. Independent performance of lumbar home program to modulate back pain. Outcome Measure:    Tool Used: Modified Oswestry Low Back Pain Questionnaire  Score:  Initial: 19/50  Most Recent: X/50 (Date: -- )   Interpretation of Score: Each section is scored on a 0-5 scale, 5 representing the greatest disability. The scores of each section are added together for a total score of 50. Medical Necessity:   Skilled intervention continues to be required due to persisting knee pain and stiffness, onset of low back pain due to compensatory gait. Plan is to complete PT in the next week with home exercise program.  Reason For Services/Other Comments:  Patient has tolerated an increase in activity as demonstrated by: increased resistance/repetitions during therapeutic exercise. Total Duration:  Time In: 0845  Time Out: 0930    Regarding Wallace Angel Marah's therapy, I certify that the treatment plan above will be carried out by a therapist or under their direction.   Thank you for this referral,  Roselyn Carter, PT     Referring Physician Signature: JUAN ALBERTO Lowe - * _______________________________ Date _____________        Post Session Pain  Charge Capture   POC Link  Treatment Note Link  MD Guidelines  MyChart

## 2022-05-25 ENCOUNTER — APPOINTMENT (OUTPATIENT)
Dept: PHYSICAL THERAPY | Age: 67
End: 2022-05-25
Payer: COMMERCIAL

## 2022-05-25 ENCOUNTER — HOSPITAL ENCOUNTER (OUTPATIENT)
Dept: PHYSICAL THERAPY | Age: 67
Setting detail: RECURRING SERIES
Discharge: HOME | End: 2022-05-28
Payer: COMMERCIAL

## 2022-05-25 PROCEDURE — 97110 THERAPEUTIC EXERCISES: CPT

## 2022-05-25 NOTE — PROGRESS NOTES
Caroline Gaston  : 1955  Primary: [address]  Secondary:  Mis Garza @ [facility]  [address] [facility] [address]  Phone: [phone]  Fax: [phone] Plan Frequency: 2 times a week    Plan of Care/Certification Expiration Date: 22      PT Visit Info:    No data recorded    OUTPATIENT PHYSICAL THERAPY:OP NOTE TYPE: Treatment Note 2022     Appt Desk   Episode      Treatment Diagnosis:  Pain in Right Knee (M25.561)  Stiffness of Right Knee, Not elsewhere classified (M25.661)  Difficulty in walking, Not elsewhere classified (R26.2)  Low Back Pain (M54.5)     GOALS: (Goals have been discussed and agreed upon with patient.)  Short-Term Functional Goals: Time Frame: 2 weeks  1. Patient will be independent with HEP. (MET)  2. Patient will improve passive knee extension to 0 ° to improve quad activation for weight bearing tasks. (Progressing)  3. Patient will improve knee flexion to 120 ° to improve mobility for sit to stand transfers. (MET)  Discharge Goals: Time Frame: 12 weeks  1. Patient will be able to maintain single leg stance for 10 seconds to improve safety for independent ambulation. (MET)  2. Patient will restore ROM to -3 to 125 ° to normalize mobility for symmetric gait on all surfaces. (Progressing)  3. Patient will perform 5x sit to  <14 seconds to restore strength for safe community mobility.(MET)  4.  Independent performance of lumbar home program to modulate back pain. (MET)    Medical/Referring Diagnosis:  Other tear of medial meniscus, current injury, right knee, subsequent encounter [B21.120Z]  Referring Physician:  JUAN ALBERTO Estrada - *  MD Orders:  PT Eval and Treat  Date of Onset:  No data recorded   Allergies:  Latex, Cocoa, Strawberry extract, and Prednisone  Restrictions/Precautions:    Restrictions/Precautions: None  No data recorded   Interventions Planned (Treatment may consist of any combination of the following):    1. Balance Exercise  2. Family Education  3. Gait Training  4. Home Exercise Program (HEP)  5. Manual Therapy  6. Range of Motion (ROM)  7. Therapeutic Activites  8. Therapeutic Exercise/Strengthening   Subjective Comments:  Back pain is much less since starting exercise, LBP is 1/10. Knee pain is 0/10 now. Initial:    1  /10 Post Session:    1  /10  Medications Last Reviewed:  5/25/2022  Updated Objective Findings:  Below measures from initial evaluation unless otherwise noted. Observation/Orthostatic Postural Assessment:   Ambulates with rolling walker with knee flexed at 20 ° throughout gait cycle. Incision sites are dry and closed. Mild swelling present to the joint. Palpation:   Tender to joint line. ROM:   Right knee: 8 to 40 ° active; 5 to 90 ° (flexion done in sitting) passive with painful end feel into extension and stiff end feel into flexion   SLR: 70 °   Strength:   Quad set is fair  SLR: 3/5   Knee extension: 3/5   Knee flexion: 4/5   Special Tests:   Not applicable  Neurological Screen:  Grossly intact; SLR is negative  Functional Mobility:   WNL  Balance:   Not applicable  0/3/65 : knee PROM: -5 extn to 110 deg flexion. SLR's with mild extensor lag. Gait w/ wheeled walker using heel-toe pattern. 3/16/22 : LEFS = 20/80. Gait with cane with very slow rate. Knee PROM = - 3 deg extn, 110 deg flexion (A/P), end range pain with both flexion and extension, diffuse knee effusion with minimal temperature increase. 3/23/22: Knee flexion to 115 deg , end range pain. 3/28/22: Knee flexion to 120 deg, extension ` -5 deg, end range pain w/ each, stiff extension end feel  4/7/22: anterior knee effused. Knee extension at start fo treatment - 8 deg, at end of treatment - 5 deg.  4/11/22: TUG test = 20.69 , using single point cane. PROM's R knee: - 3 ° extension, 123 ° flexion. 4/13/22: Knee flexion PROM to 125 deg today. 5/25/2022: Knee ROM R: - 3 deg extn, 125 deg flexion.  HHD: quadriceps R 12.4= kg, L = 19.9kg, hamstrings R = 6.6 kg, L = 9.9 kg  5x sit- stand = 13.3 seconds. Single leg balance: left = 30 seconds, right = 13 seconds. Treatment   TREATMENT:   THERAPEUTIC ACTIVITY: ( see below for minutes): Therapeutic activities per grid below to improve mobility, strength, balance and coordination. Required minimal visual, verbal, manual and tactile cues to improve independence and safety with daily activities . THERAPEUTIC EXERCISE: (see below for minutes): Exercises per grid below to improve mobility, strength, balance and coordination. Required minimal verbal and manual cues to promote proper body alignment, promote proper body posture and promote proper body mechanics. Progressed resistance, range, repetitions and complexity of movement as indicated. MANUAL THERAPY: (see below for minutes): Joint mobilization and Soft tissue mobilization was utilized and necessary because of the patient's restricted joint motion, painful spasm, loss of articular motion and restricted motion of soft tissue. MODALITIES: (see below for minutes): to decrease pain   SELF CARE: (see below for minutes): Procedure(s) (per grid) utilized to improve and/or restore self-care/home management as related to dressing, bathing and grooming. Required minimal verbal cueing to facilitate activities of daily living skills and compensatory activities    Date: 5/23/22 (visit 27) 5/25/22 (visit 28, D/C)      Modalities:                                Therapeutic Exercise: 43 min 43 min       Seated stepper: 8 min, level 5 resist LTR's w/ legs over physioball: 3 min       Lower trunk rotation - legs over physioball. 3 minutes SB hamstring curls x 3 min       SLR's L/R 3 x 10 each. SLR's L/R 4 x 10 L/R       LAQ's : bilat: 12.5# : 5 x 8 reps Bridges: 20x       Semiprone hip extn's L/R 3 x 10 each (over table).   5x sit - stand: 2 sets       Trial - stationary bike - 1 min, instruction in seat ajustment for home use Tandem balance: 2 x 30\" L/R       Lateral step ups: 6\" 15 each L/R alternating reps Single leg balance trial 1 set each L/R. Slantboard stretch: 1 min LAQ's: 5 x 10 @ 12.5#.        Calf raises, double leg, incline board x 20 Calf raises on incline board DL x 30       Seated lumbar flexion stretching x 2 minutes Slantboard stretch: 1 min        Seated stepper: L5 x 7 min      Proprioceptive Activities:                                Manual Therapy:                        Functional Activities:                                        HEP:   Date: 05/13/2022  Prepared by: Marquis Hobbs    Exercises  Supine Lower Trunk Rotation - 1 x daily - 7 x weekly - 20 reps  Supine Lower Trunk Rotation with Swiss Ball - 1 x daily - 7 x weekly - 20 reps  Supine Double Knee to Chest - 3 x daily - 7 x weekly - 3 sets - 30 seconds hold  Supine Hamstring Stretch with Strap - 3 x daily - 7 x weekly - 3 sets - 30 second hold  Seated Lumbar Flexion Stretch - 3 x daily - 7 x weekly - 3 sets - 30 second hold  Seated Flexion Stretch with Swiss Ball - 3 x daily - 7 x weekly - 3 sets - 30 second hold      Treatment/Session Summary:    · Treatment Assessment:   Goals adequately met for discharge at this point - pt satisfied with overall progress.    · Communication/Consultation:  None today  · Equipment provided today: None today  Recommendations/Intent for next treatment session: Discharge after today's visit to home exercise program.    Total Treatment Billable Duration:  43 minutes  Time In: 0800  Time Out: Huy Wu, PT       Post Session Pain  Charge Capture  [email or web address] Portal  MD Guidelines  Scanned Media  Benefits  MyChart

## 2022-05-25 NOTE — THERAPY DISCHARGE
Philip Rodriguez  : 1955  Primary: [address]  Secondary:  [address] @ [facility]  [address] [facility] [address]  Phone: [phone]  Fax: [phone] Plan Frequency: 2 times a week    Plan of Care/Certification Expiration Date: 22      PT Visit Info:    No data recorded    OUTPATIENT PHYSICAL THERAPY:OP NOTE TYPE: Discharge Summary 2022               Episode  Appt Desk         Treatment Diagnosis:  Pain in Right Knee (M25.561)  Stiffness of Right Knee, Not elsewhere classified (M25.661)  Difficulty in walking, Not elsewhere classified (R26.2)  Low Back Pain (M54.5)  * No diagnoses found *  Medical/Referring Diagnosis:  Other tear of medial meniscus, current injury, right knee, subsequent encounter [T02.184U]  Referring Physician:  JUAN ALBERTO Carver - *  MD Orders:  PT Eval and Treat   Return MD Appt:  TBD  Date of Onset:  No data recorded   Allergies:  Latex, Cocoa, Strawberry extract, and Prednisone  Restrictions/Precautions:    Restrictions/Precautions: None  No data recorded   Medications Last Reviewed:  2022     SUBJECTIVE   History of Injury/Illness (Reason for Referral):  See initial eval report and Therapy recertification from   Patient Stated Goal(s):  \"Walk without pain\"  Initial:    1  /10 Post Session:    1  /10  Past Medical History/Comorbidities:   Ms. Vaishnavi Robles  has a past medical history of Asthma, Cancer ([facility] [address] DVT (deep venous thrombosis) ([facility] [address] GERD (gastroesophageal reflux disease), Hypertension, and Iron deficiency anemia. Ms. Vaishnavi Robles  has a past surgical history that includes neurological surgery (2019); Hysterectomy (); and Cholecystectomy, open ().   Social History/Living Environment:   Type of Home: House  Home Layout: One level  Home Access: Stairs to enter with rails     Prior Level of Function/Work/Activity:   Prior level of function: Independent  Occupation: Part time employment  Homemaking Responsibilities: Yes  Ambulation Assistance: Independent  Active : Yes  No data recorded   Learning:   Does the patient/guardian have any barriers to learning?: No barriers  Will there be a co-learner?: No  What is the preferred language of the patient/guardian?: English  Is an  required?: No  How does the patient/guardian prefer to learn new concepts?: Listening; Reading; Demonstration; Pictures/Videos     Fall Risk Scale: Total Score: 15  [place] Fall Risk: Low (0-24)     Dominant Side:  right handed        OBJECTIVE    Below measures from initial evaluation unless otherwise noted. Observation/Orthostatic Postural Assessment:   Ambulates with rolling walker with knee flexed at 20 ° throughout gait cycle. Incision sites are dry and closed. Mild swelling present to the joint. Palpation:   Tender to joint line. ROM:   Right knee: 8 to 40 ° active; 5 to 90 ° (flexion done in sitting) passive with painful end feel into extension and stiff end feel into flexion   SLR: 70 °   Strength:   Quad set is fair  SLR: 3/5   Knee extension: 3/5   Knee flexion: 4/5   Special Tests:   Not applicable  Neurological Screen:  Grossly intact; SLR is negative  Functional Mobility:   WNL  Balance:   Not applicable  2/8/99 : knee PROM: -5 extn to 110 deg flexion. SLR's with mild extensor lag. Gait w/ wheeled walker using heel-toe pattern. 3/16/22 : LEFS = 20/80. Gait with cane with very slow rate. Knee PROM = - 3 deg extn, 110 deg flexion (A/P), end range pain with both flexion and extension, diffuse knee effusion with minimal temperature increase. 3/23/22: Knee flexion to 115 deg , end range pain. 3/28/22: Knee flexion to 120 deg, extension ` -5 deg, end range pain w/ each, stiff extension end feel  4/7/22: anterior knee effused. Knee extension at start fo treatment - 8 deg, at end of treatment - 5 deg.  4/11/22: TUG test = 20.69 , using single point cane. PROM's R knee: - 3 ° extension, 123 ° flexion. 4/13/22: Knee flexion PROM to 125 deg today. 5/25/2022: Knee ROM R: - 3 deg extn, 125 deg flexion. HHD: quadriceps R 12.4= kg, L = 19.9kg, hamstrings R = 6.6 kg, L = 9.9 kg  5x sit- stand = 13.3 seconds. Single leg balance: left = 30 seconds, right = 13 seconds. ASSESSMENT   Initial Assessment:  Pt completing skilled PT intervention due to recovery from R knee arthroscopy and low back pain that resulted from compensatory gait pattern. Problem List: (Impacting functional limitations): Body Structures, Functions, Activity Limitations Requiring Skilled Therapeutic Intervention: Decreased functional mobility ; Decreased ROM; Decreased body mechanics; Decreased tolerance to work activity; Decreased strength; Decreased balance     Therapy Prognosis:   Therapy Prognosis: Good     Assessment Complexity:      PLAN   Effective Dates: 8/71/63ZQ Plan of Care/Certification Expiration Date: 05/30/22     Frequency/Duration: Plan Frequency: 2 times a week     Interventions Planned (Treatment may consist of any combination of the following):    Current Treatment Recommendations: Strengthening; ROM; Balance training; Functional mobility training; Transfer training; IADL training; Cognitive/Perceptual training; Endurance training     Goals: (Goals have been discussed and agreed upon with patient.)  Short-Term Functional Goals: Time Frame: 2 weeks  1. Patient will be independent with HEP. (MET)  2. Patient will improve passive knee extension to 0 ° to improve quad activation for weight bearing tasks. (Progressing)  3. Patient will improve knee flexion to 120 ° to improve mobility for sit to stand transfers. (MET)  Discharge Goals: Time Frame: 12 weeks  1. Patient will be able to maintain single leg stance for 10 seconds to improve safety for independent ambulation. (MET)  2. Patient will restore ROM to -3 to 125 ° to normalize mobility for symmetric gait on all surfaces. (Progressing)  3.  Patient will perform 5x sit to  <14 seconds to restore strength for safe community mobility.(MET)  4. Independent performance of lumbar home program to modulate back pain. (MET)           Outcome Measure: Tool Used: Lower Extremity Functional Scale (LEFS)  Score:  Initial: 20/80 Most Recent: 67/80 (Date: 5/25/22 )   Interpretation of Score: 20 questions each scored on a 5 point scale with 0 representing \"extreme difficulty or unable to perform\" and 4 representing \"no difficulty\". The lower the score, the greater the functional disability. 80/80 represents no disability. Minimal detectable change is 9 points. Medical Necessity:   Patient has experienced a delay in progress due to Development of low back pain and extended issues with knee OA that hindered treatment progress. The following measures have been taken to improve response to treatment. Treatment of low back was added to program.  Reason For Services/Other Comments:  Discharge at completion of today's session. Total Duration:  Time In: 0800  Time Out: 0845    Regarding Katie Crystal's therapy, I certify that the treatment plan above will be carried out by a therapist or under their direction. Thank you for this referral,  Chito Villar PT     Referring Physician Signature: JUAN ALBERTO Ponce - * No Signature is Required for this note.         Post Session Pain  Charge Capture   POC Link  Treatment Note Link  MD Burke Jarvis

## 2022-09-20 ENCOUNTER — HOSPITAL ENCOUNTER (OUTPATIENT)
Dept: MAMMOGRAPHY | Age: 67
Discharge: HOME | End: 2022-09-23

## 2022-09-20 DIAGNOSIS — Z12.31 VISIT FOR SCREENING MAMMOGRAM: ICD-10-CM

## 2022-12-13 NOTE — PROGRESS NOTES
Chema Peter  : 1955  Primary: [facility]  Secondary:  [address] at [facility]  [address], [address], [place]  [phone]   [phone]        OUTPATIENT PHYSICAL THERAPY: Daily Treatment Note 2021  ICD-10: Treatment Diagnosis: Pain in right leg (M79.604), Low back pain (M54.5)  Precautions/Allergies:   Latex   TREATMENT PLAN:  Effective Dates: 21 to 3/25/21. 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.816]  Other intervertebral disc degeneration, lumbar region [M51.36]  Radiculopathy, lumbar region [M54.16]   DATE OF ONSET: chronic  REFERRING PHYSICIAN: Nikita Styles*  MD Orders: evaluate and treat  Return MD Appointment: --       Pre-treatment Symptoms/Complaints:  Pt reports her leg is getting stronger.    Pain: Initial:     /10 Post Session:  /10   Medications Last Reviewed:  2021    Updated Objective Findings:  None Today   TREATMENT:   THERAPEUTIC EXERCISE: ( 55 minutes):     2/5 2/10 2/12 2/19 2/26   Activity/Exercise        Recumbent stepper 10 min level 2 10 min level 2 10 min level 2 10 min level 3 10 min level 3   Supine knee to chest X 10 X 10 X 10 X 10 X 10   Supine piriformis stretch X 10 X 10 X 10 X 10 X 10   Supine hamstrings stretch     X 10   LTR X 10       slr 2 x 10 2 x 10 2 x 10 2 x 10 2 x 10, 1#   clam X 10 2 x 10, YTB 2 x 10 2 x 10, ytb 2 x 10, 2#   bridge X 10 2 x 10 2 x 10 2 x 10 2 x 10, 2#   Open book X 10 X 10 X 10     SL hip abd        Seated trunk flexion        Seated piriformis stretch        Sit to stand  X 10 X 10  With 6# ball  2 x 10   Deep squat towards floor with soccer ball     2 x 10   Seated forward reach stretch for mid back        Back extension over fulcrum X 10 X 10   X 10   Step up 4 in step x 15 forward and lateral, no hand hold 4 in step x 15 forward and lateral, no hand hold [Parents] : parents 4 in step x 15 forward and lateral, no hand hold 8 in step x 20 forwards and lateral 6 in step x 20  Forwards and lateral   Stand L LE on step, R on ground, for balance        UT stretch        marching 4 laps x 40 ft 4 laps x 40 ft 4 laps x 40 ft 4 laps x 40 ft 4 laps x 40 ft   walking        KT tape application        rhomberg stand 15 sec hold x 3 15 sec hold x 3      squat        Step overs  In II bar x 10      Side steps 4 laps x 40 ft 4 laps x 40 ft 4 laps x 40 ft 4 laps x 40 ft 4 laps x 40 ft   Standing hip abd X 10 in II bars X 10 in II bars X 10 in II bars     Heel raise X 10       SLS on foam  5 sec hold x 10      lunges  In II bars x 10      Walk on line   1 lap x 40 ft 4 laps x 40 ft    Stand on R, touch L forwards, lateral, back   X 10     shuttle    100# B LE  X 20 100# B LE  X 20       MANUAL THERAPY: ( minutes):     Manual Therapy technique and location 12/9 12/14 12/16 1/11           - gentle stm to R thoracolumbar paraspinals manually and using Edge tool and cupping  - point stimulation using handheld electric unit to R thoracic paraspinals  - PA mobilizations to mid thoracic to upper lumbar segments. - gentle stm to R thoracolumbar paraspinals manually and using Edge tool and cupping  - point stimulation using handheld electric unit to R thoracic paraspinals  - PA mobilizations to mid thoracic to upper lumbar segments. - gentle stm to R thoracolumbar paraspinals manually and using Edge tool and cupping  - point stimulation using handheld electric unit to R thoracic paraspinals  - PA mobilizations to mid thoracic to upper lumbar segments.    - gentle R foot mobilizations, mfr using edge tool to bottom of foot  - gentle stm to R thoracolumbar paraspinals manually   - PA mobilizations to mid thoracic to upper lumbar segments.g Edge tool  - gentle R foot mobilizations, mfr using edge tool to bottom of foot   - R piriformis release     MODALITIES ( minutes):    Open CS Portal  Treatment/Session Summary:    · Response to Treatment: Increased fatigue with activities, but improving strength and balance.   · Communication/Consultation:  None today  · Equipment provided today:  None today  · Recommendations/Intent for next treatment session: Next visit will focus on back and LE stretches.  Total Treatment Billable Duration:  55 minutes therex   PT Patient Time In/Time Out  Time In: 0845  Time Out: 0945    Andrei Fontenot, PT, DPT

## 2023-01-19 ENCOUNTER — OFFICE VISIT (OUTPATIENT)
Dept: ORTHOPEDIC SURGERY | Age: 68
End: 2023-01-19

## 2023-01-19 DIAGNOSIS — M17.11 UNILATERAL PRIMARY OSTEOARTHRITIS, RIGHT KNEE: Primary | ICD-10-CM

## 2023-01-19 DIAGNOSIS — M54.16 RADICULOPATHY, LUMBAR REGION: ICD-10-CM

## 2023-01-19 RX ORDER — TRIAMCINOLONE ACETONIDE 40 MG/ML
40 INJECTION, SUSPENSION INTRA-ARTICULAR; INTRAMUSCULAR ONCE
Status: COMPLETED | OUTPATIENT
Start: 2023-01-19 | End: 2023-01-19

## 2023-01-19 RX ADMIN — TRIAMCINOLONE ACETONIDE 40 MG: 40 INJECTION, SUSPENSION INTRA-ARTICULAR; INTRAMUSCULAR at 11:27

## 2023-01-19 NOTE — PROGRESS NOTES
Name: Magdiel Rousseau  YOB: 1955  Gender: female  MRN: [id number]      CC: Follow-up (Right knee)       HPI: Magdiel Rousseau is a 74 y.o. female who returns for follow up on her right knee. She is about 11 months s/p partial menisectomy. She has been treated for osteoarthritis since the procedure and underwent a steroid injection in April, 2022 followed by a Euflexxa series in May, 2022. She did well over the summer and fall, but reports a return of pain after the winter months. She also reports continued burning pain and numbness down into her big toe and lower leg. Physical Examination:  General: no acute distress  Lungs: breathing easily  CV: regular rhythm by pulse  Right Knee: Diffuse tenderness to palpation medial and lateral joint line. Pain at the extremes of the medial joint line. She has loss of sensation and burning pain down to her great toe and the first webspace. 5 and 5 motor strength throughout the lower extremity. Gross sensation intact light touch. Assessment:     ICD-10-CM    1. Unilateral primary osteoarthritis, right knee  M17.11 triamcinolone acetonide (KENALOG-40) injection 40 mg     NV ARTHROCENTESIS ASPIR&/INJ MAJOR JT/BURSA W/O US     Ambulatory Referral to Ortho Injection      2. Radiculopathy, lumbar region  M54.16 MRI LUMBAR SPINE WO CONTRAST          Plan:   Right knee has arthritis she had good response and intra-articular injection and viscosupplementation previously. We discussed repeating the viscosupplementation series as well as potentially an intra-articular cortisone injection which she elected to proceed with today. As far as the pain down her leg and into her foot and toe I think this likely is coming from her lumbar spine. She did complete physical therapy and then has completed the home exercise program with these exercises over the past 6+ months. I think an MRI scan of her lumbar spine is warranted.   This was ordered today she can follow-up with the spine team for definitive management of this most likely will include an epidural steroid injection. I will see her back for the knee. The patient elected to proceed with an intraarticular knee injection today. After verbal informed consent was obtained after sterile prep the Right knee  was injected from a anterior lateral approach with 4 cc of 0.25% Marcaine and 1 cc of 40 mg Kenalog. The patient tolerated the procedure well was given postinjection flare precautions. Lizeth Rivera MD, [address] [place] [address] and Sports Medicine

## 2023-02-21 ENCOUNTER — OFFICE VISIT (OUTPATIENT)
Dept: ORTHOPEDIC SURGERY | Age: 68
End: 2023-02-21
Payer: COMMERCIAL

## 2023-02-21 VITALS — HEIGHT: 63 IN | WEIGHT: 157 LBS | BODY MASS INDEX: 27.82 KG/M2

## 2023-02-21 DIAGNOSIS — M51.36 LUMBAR DEGENERATIVE DISC DISEASE: ICD-10-CM

## 2023-02-21 DIAGNOSIS — M54.16 LUMBAR RADICULOPATHY: Primary | ICD-10-CM

## 2023-02-21 DIAGNOSIS — M51.16 LUMBAR DISC HERNIATION WITH RADICULOPATHY: ICD-10-CM

## 2023-02-21 PROCEDURE — 99204 OFFICE O/P NEW MOD 45 MIN: CPT | Performed by: NURSE PRACTITIONER

## 2023-02-21 PROCEDURE — 1123F ACP DISCUSS/DSCN MKR DOCD: CPT | Performed by: NURSE PRACTITIONER

## 2023-02-21 RX ORDER — ATENOLOL 25 MG/1
TABLET ORAL
COMMUNITY

## 2023-02-21 NOTE — PROGRESS NOTES
Name: Eli Gill  YOB: 1955  Gender: female  MRN: [id number]    CC: Right leg pain    HPI: This is a 74y.o. year old female who has a history of previous laminectomy by Dr. Kal Casarez in 2019. She recently had knee surgery. She developed pain in the right L5 distribution. It is been ongoing for greater than 6 months she has been in physical therapy with no improvement. Physical therapy was completed in May 2022 through September 2022. Patient continued to do home exercises 4-5 times a week. He also has been under the care of her primary doctor greater than the last 8 weeks doing continued care, home exercises and conservative treatment. The patient denies any change in bowel or bladder function since the onset of the symptoms. Thus far, the patient has tried tylenol, oral steroids, gabapentin or lyrica, opiod pain medicine, spine injections , physician directed home exercise program, physical therapy 2 months, and surgery    Current pain level: 8  Activities limited by pain: Advanced activity, ADLs, sleep       AMB PAIN ASSESSMENT 2/21/2023   Location of Pain Leg   Location Modifiers Right   Severity of Pain 6   Quality of Pain Throbbing;Aching   Duration of Pain Persistent   Frequency of Pain Constant   Aggravating Factors Other (Comment); Walking;Standing   Limiting Behavior Yes   Relieving Factors Ice   Result of Injury Yes   Work-Related Injury No   Are there other pain locations you wish to document? No            ROS/Meds/PSH/PMH/FH/SH: I personally reviewed the patient's collected intake data.   Below are the pertinents:    Allergies   Allergen Reactions    Latex Rash    Cocoa Other (See Comments)    Strawberry Extract Other (See Comments)    Prednisone Nausea And Vomiting         Current Outpatient Medications:     CALCIUM CITRATE-VITAMIN D3 PO, Take by mouth daily, Disp: , Rfl:     albuterol sulfate  (90 Base) MCG/ACT inhaler, Inhale 2 puffs into the lungs every 4 hours as needed, Disp: , Rfl:     amLODIPine (NORVASC) 10 MG tablet, Take 10 mg by mouth daily, Disp: , Rfl:     aspirin 81 MG EC tablet, Take 81 mg by mouth daily, Disp: , Rfl:     atorvastatin (LIPITOR) 20 MG tablet, Take 20 mg by mouth, Disp: , Rfl:     Cholecalciferol 50 MCG (2000 UT) TABS, Take by mouth daily, Disp: , Rfl:     cyanocobalamin 100 MCG tablet, Take 100 mcg by mouth daily, Disp: , Rfl:     EPINEPHrine (EPIPEN) 0.3 MG/0.3ML SOAJ injection, USE AS DIRECTED, Disp: , Rfl:     ferrous sulfate (IRON 325) 325 (65 Fe) MG tablet, Take 325 mg by mouth every morning (before breakfast), Disp: , Rfl:     fluticasone (FLONASE) 50 MCG/ACT nasal spray, 2 sprays by Nasal route as needed, Disp: , Rfl:     hydroCHLOROthiazide (HYDRODIURIL) 25 MG tablet, Take 25 mg by mouth daily, Disp: , Rfl:     loratadine (CLARITIN) 10 MG capsule, Take by mouth daily, Disp: , Rfl:     olmesartan (BENICAR) 40 MG tablet, Take 40 mg by mouth daily, Disp: , Rfl:     Olopatadine HCl (PAZEO) 0.7 % SOLN, Apply 1 drop to eye daily, Disp: , Rfl:     omalizumab (XOLAIR) 75 MG/0.5ML SOSY injection, Inject into the skin every 30 days, Disp: , Rfl:     PARoxetine (PAXIL) 20 MG tablet, TAKE 1 TABLET BY MOUTH EVERY DAY, Disp: , Rfl:     zoster recombinant adjuvanted vaccine (SHINGRIX) 50 MCG/0.5ML SUSR injection, Shingrix (PF) 50 mcg/0.5 mL intramuscular suspension, kit, Disp: , Rfl:     atenolol (TENORMIN) 25 MG tablet, atenolol 25 mg tablet, Disp: , Rfl:     cetirizine (ZYRTEC) 10 MG tablet, Take 10 mg by mouth daily (Patient not taking: Reported on 2/21/2023), Disp: , Rfl:     omeprazole (PRILOSEC) 20 MG delayed release capsule, Take 20 mg by mouth daily (Patient not taking: Reported on 2/21/2023), Disp: , Rfl:     Past Surgical History:   Procedure Laterality Date    CHOLECYSTECTOMY, OPEN  1995    In [facility] [address] [place] due to presumably high grade cervical dysplasia NEUROLOGICAL SURGERY  07/2019    back surgery       Patient Active Problem List   Diagnosis    Fatigue    Acquired trigger finger    Seasonal allergic rhinitis due to pollen    Benign essential HTN    Allergic rhinitis    History of back surgery    Abnormal EKG    Anxiety    Hyperlipidemia    Pure hypercholesterolemia    Dizziness    Depressive disorder    Gastroesophageal reflux disease    BMI 32.0-32.9,adult    JOSE FRANCISCO III with severe dysplasia    Syncope and collapse    Abnormal thyroid function test    Agatston coronary artery calcium score between 100 and 199    Microcytic anemia    Abnormal glucose level    Postconcussion syndrome    Abnormal cervical Papanicolaou smear    Osteoarthrosis    Chest pain    Lumbar stenosis with neurogenic claudication    Vitamin D deficiency     Tobacco:  reports that she has never smoked. She has never used smokeless tobacco.  Alcohol:   Social History     Substance and Sexual Activity   Alcohol Use Yes          Physical Exam:   BMI: Body mass index is 27.81 kg/m². GENERAL:  Adult in no acute distress, well developed, well nourished Patient is appropriately conversant  MSK:  Examination of the lumbar spine reveals paraspinal tenderness    There is moderate tenderness to palpation along the spinous processes and paraspinal musculature. The patient ambulates with a unsteady gait. ROM of bilateral hip(s) reveals no irritability. NEURO:  Cranial nerves grossly intact. No motor deficits.     Straight leg testing is positive right  Sensory testing reveals intact sensation to light touch and in the distribution of the L3-S1 dermatomes bilaterally  Ankle jerk is negative for clonus    Reflexes   Right Left   Quadriceps (L4) 2 2   Achilles (S1) 2 2     Strength testing in the lower extremity reveals the following based on the 5 point grading scale:     HF (L2) H Ab (L5) KE (L3/4) ADF (L4) EHL (L5) A Ev (S1) APF (S1)   Right 5 4 5 5 5 5 5   Left 5 5 5 5 5 5 5     PSYCH:  Alert and oriented X 3. Appropriate affect. Intact judgment and insight. Radiographic Studies:         MRI of Lumbar spine images independently reviewed:   MRI Result (most recent): MRI LUMBAR SPINE WO CONTRAST 02/15/2023    Narrative  Exam: MRI lumbar spine without contrast.  Indication: Low back pain and right leg tingling for years. No injury. Previous  surgery in 2019. Comparison: Lumbar spine MRI dated October 25, 2018. Contrast: None. Technique: Multiplanar multisequence imaging of the lumbar spine without  contrast.      FINDINGS:  The last well-formed disk is designated as L5-S1 for the purpose of this report. Vertebral bodies were numbered using this convention. Lumbar spinal alignment is normal. Vertebral body heights are preserved. Mild  heterogeneity of the bone marrow signal intensity. Postsurgical change of  laminectomy at L4 and L5 which is new from prior. Lumbar spinal cord is normal  in size and signal intensity. 8 mm interpolar right-sided renal cyst. No  evidence of acute lumbosacral spine fracture. T11-T12 through L2-L3: No disc bulge or spinal canal stenosis. No neural  foraminal stenosis. L3-L4: No disc bulge. Mild bilateral facet arthropathy with osseous hypertrophy  and mild spinal canal stenosis, unchanged. L4-L5: No disc bulge. Mild bilateral facet arthropathy osseous hypertrophy with  minimal effacement of the lateral recesses. No neural foraminal stenosis. L5-S1: Far right lateral disc osteophyte complex with abutment and slight mass  effect of the exiting right L5 nerve which appears unchanged. Mild facet  arthropathy. Unchanged mild right neural foraminal stenosis. Impression  1. Postoperative change of L4 and L5 bilateral laminectomy. 2. Far right lateral disc osteophyte complex at L5-S1 with abutment and slight  mass effect upon the exiting right L5 nerve root is unchanged from prior with  unchanged mild right neural foraminal stenosis.         Assessment/Plan: Diagnosis Orders   1. Lumbar radiculopathy        2. Lumbar degenerative disc disease        3. Lumbar disc herniation with radiculopathy            This patient's clinical history and physical exam is consistent with a right  L-5 lumbar radiculopathy. It has a far lateral disc osteophyte complex at L5 obviously causing irritation to the right L5 nerve root that was exacerbated by rehabbing from her knee surgery. She is exhausted conservative treatment with no improvement. I would recommend epidural injection. We discussed the natural history of lumbar radiculopathy in that many of these patients have near complete resolution of their symptoms within eight to twelve weeks with conservative care. We discussed that conservative treatments typically start with activity modification, and medication followed by physical therapy as symptoms allow. Oral and/or epidural steroids are other options. I also discussed potential surgical options if the symptoms fail to improve or there is a progressive neurologic deficit and conservative management has been exhausted. We discussed that surgery is not typically a reliable treatment for isolated back pain, but is usually very reliable in relieving buttock and leg symptoms.        - A home exercise program was prescribed for stretching and strengthening. A list of exercises was provided. - Injection: The patient will be referred for a lumbar steroid injection to help reduce the symptoms. The patient understands the risks including hyperglycemia, immunosuppression, meningitis, cerebrospinal fluid leak, epidural hematoma, and reaction to medication. The patient may benefit from additional injections depending on the response. The injection should be a right L5 selective nerve root block    4 This is a chronic illness/condition with exacerbation and progression    No orders of the defined types were placed in this encounter.        No orders of the defined types were placed in this encounter. Return for Injection follow up. JUAN ALBERTO Harris CNP  02/21/23      Elements of this note were created using speech recognition software. As such, errors of speech recognition may be present.

## 2023-02-28 ENCOUNTER — OFFICE VISIT (OUTPATIENT)
Dept: ORTHOPEDIC SURGERY | Age: 68
End: 2023-02-28

## 2023-02-28 ENCOUNTER — TELEPHONE (OUTPATIENT)
Dept: ORTHOPEDIC SURGERY | Age: 68
End: 2023-02-28

## 2023-02-28 DIAGNOSIS — M54.16 LUMBAR RADICULOPATHY: Primary | ICD-10-CM

## 2023-02-28 RX ORDER — TRIAMCINOLONE ACETONIDE 40 MG/ML
80 INJECTION, SUSPENSION INTRA-ARTICULAR; INTRAMUSCULAR ONCE
Status: COMPLETED | OUTPATIENT
Start: 2023-02-28 | End: 2023-02-28

## 2023-02-28 RX ADMIN — TRIAMCINOLONE ACETONIDE 80 MG: 40 INJECTION, SUSPENSION INTRA-ARTICULAR; INTRAMUSCULAR at 11:33

## 2023-02-28 NOTE — PROGRESS NOTES
Date: 02/28/23   Name: Irving Gomez    Pre-Procedural Diagnosis:    Diagnosis Orders   1. Lumbar radiculopathy  FL NERVE BLOCK LUMBOSACRAL 1ST    triamcinolone acetonide (KENALOG-40) injection 80 mg          Procedure: Selective Nerve Root Blocks (Transforaminal) - Single Level    Precautions: [facility] Precautions spine injections: None. Patient denies any prior sensitivity to steroid, local anesthetic, contrast dye, iodine or shellfish. The procedure was discussed at length with the patient and informed consent was signed. The patient was placed in a prone position on the fluoroscopy table and the skin was prepped and draped in a routine sterile fashion. The areas to be injected was/were anesthetized with approximately 5 cc of 1% Lidocaine. A 22-gauge 3.5 inch inch spinal needle was carefully advanced under fluoroscopic guidance to the right L5 transforaminal space(s). At this time 0.25 cc of omnipaque administered. Once proper placement was confirmed, 2 cc of 0.25% Marcaine and 80 mg of Kenalog were injected through the spinal needle at that/each site. Fluoroscopic guidance was used intermittently over a 10-minute period to insure proper needle placement and patient safety. A hard copy of the fluoroscopic  images has been placed in the patient's chart. The patient was monitored after the procedure and discharged home in stable fashion.      Resume Meds:  N/A    Mercedez Ariza MD  02/28/23

## 2023-02-28 NOTE — TELEPHONE ENCOUNTER
Spoke with patient and it was scheduled for 3/28 instead of today. Made apt for her to come in at 1130.

## 2023-02-28 NOTE — TELEPHONE ENCOUNTER
Pts  called and thought the injection was this morning. Pt doesn't want to wait until 03/28. Can she have this done sooner? Please call pts .

## 2023-03-27 ENCOUNTER — OFFICE VISIT (OUTPATIENT)
Dept: ORTHOPEDIC SURGERY | Age: 68
End: 2023-03-27
Payer: COMMERCIAL

## 2023-03-27 DIAGNOSIS — M54.16 LUMBAR RADICULOPATHY: Primary | ICD-10-CM

## 2023-03-27 DIAGNOSIS — M48.061 LUMBAR FORAMINAL STENOSIS: ICD-10-CM

## 2023-03-27 DIAGNOSIS — M51.16 LUMBAR DISC HERNIATION WITH RADICULOPATHY: ICD-10-CM

## 2023-03-27 PROCEDURE — 99214 OFFICE O/P EST MOD 30 MIN: CPT | Performed by: NURSE PRACTITIONER

## 2023-03-27 PROCEDURE — 1123F ACP DISCUSS/DSCN MKR DOCD: CPT | Performed by: NURSE PRACTITIONER

## 2023-03-27 NOTE — PROGRESS NOTES
EVERY DAY, Disp: , Rfl:     zoster recombinant adjuvanted vaccine (SHINGRIX) 50 MCG/0.5ML SUSR injection, Shingrix (PF) 50 mcg/0.5 mL intramuscular suspension, kit, Disp: , Rfl:   Past Medical History:   Diagnosis Date    Asthma     \"little bit\"    Cancer ([facility] [address]     uterine cancer    DVT (deep venous thrombosis) ([facility] [address]     2009    GERD (gastroesophageal reflux disease)     managed with med    Hypertension     managed with meds    Iron deficiency anemia      Tobacco:  reports that she has never smoked. She has never used smokeless tobacco.  Alcohol:   Social History     Substance and Sexual Activity   Alcohol Use Yes          Radiographic Studies:       Assessment/Plan:        ICD-10-CM    1. Lumbar radiculopathy  M54.16       2. Lumbar foraminal stenosis  M48.061       3. Lumbar disc herniation with radiculopathy  M51.16            Patient did obtain moderate relief with the injection. I think she would benefit from a repeat right L5 selective nerve root block. Especially for her international trip. We will go ahead and set her up for this. She will follow-up with me when she returns back. - Injection: The patient will be referred for a lumbar steroid injection to help reduce the symptoms. The patient understands the risks including hyperglycemia, immunosuppression, meningitis, cerebrospinal fluid leak, epidural hematoma, and reaction to medication. The patient may benefit from additional injections depending on the response. The injection should be a right L5 selective nerve root block    No orders of the defined types were placed in this encounter. No orders of the defined types were placed in this encounter. 4 This is a chronic illness/condition with exacerbation and progression      Return for Injection follow up. Jenene Homans, APRN - CNP  03/27/23      Elements of this note were created using speech recognition software. As such, errors of speech recognition may be present.

## 2023-04-04 ENCOUNTER — OFFICE VISIT (OUTPATIENT)
Dept: ORTHOPEDIC SURGERY | Age: 68
End: 2023-04-04

## 2023-04-04 DIAGNOSIS — M54.16 LUMBAR RADICULOPATHY: Primary | ICD-10-CM

## 2023-04-04 RX ORDER — TRIAMCINOLONE ACETONIDE 40 MG/ML
80 INJECTION, SUSPENSION INTRA-ARTICULAR; INTRAMUSCULAR ONCE
Status: COMPLETED | OUTPATIENT
Start: 2023-04-04 | End: 2023-04-04

## 2023-04-04 RX ADMIN — TRIAMCINOLONE ACETONIDE 80 MG: 40 INJECTION, SUSPENSION INTRA-ARTICULAR; INTRAMUSCULAR at 08:44

## 2023-04-04 NOTE — PROGRESS NOTES
Date: 04/04/23   Name: Sadia Hess    Pre-Procedural Diagnosis:    Diagnosis Orders   1. Lumbar radiculopathy  FL NERVE BLOCK LUMBOSACRAL 1ST          Procedure: Selective Nerve Root Blocks (Transforaminal) - Single Level    Precautions: [facility] Precautions spine injections: None. Patient denies any prior sensitivity to steroid, local anesthetic, contrast dye, iodine or shellfish. The procedure was discussed at length with the patient and informed consent was signed. The patient was placed in a prone position on the fluoroscopy table and the skin was prepped and draped in a routine sterile fashion. The areas to be injected was/were anesthetized with approximately 5 cc of 1% Lidocaine. A 22-gauge 3.5 inch spinal needle was carefully advanced under fluoroscopic guidance to the right L5 transforaminal space. At this time 0.25 cc of omnipaque administered. Once proper placement was confirmed, 2 cc of 0.25% Marcaine and 80 mg of Kenalog were injected through the spinal needle at that/each site. Fluoroscopic guidance was used intermittently over a 10-minute period to insure proper needle placement and patient safety. A hard copy of the fluoroscopic  images has been placed in the patient's chart. The patient was monitored after the procedure and discharged home in stable fashion.      Resume Meds:  Pt remains on asa 81 mg.    Alec Kawasaki, MD  04/04/23

## 2023-05-30 ENCOUNTER — OFFICE VISIT (OUTPATIENT)
Dept: ORTHOPEDIC SURGERY | Age: 68
End: 2023-05-30
Payer: MEDICARE

## 2023-05-30 DIAGNOSIS — M54.16 LUMBAR RADICULOPATHY: ICD-10-CM

## 2023-05-30 DIAGNOSIS — M51.16 LUMBAR DISC HERNIATION WITH RADICULOPATHY: ICD-10-CM

## 2023-05-30 DIAGNOSIS — M48.061 LUMBAR FORAMINAL STENOSIS: Primary | ICD-10-CM

## 2023-05-30 PROCEDURE — 99214 OFFICE O/P EST MOD 30 MIN: CPT | Performed by: NURSE PRACTITIONER

## 2023-05-30 PROCEDURE — 1123F ACP DISCUSS/DSCN MKR DOCD: CPT | Performed by: NURSE PRACTITIONER

## 2023-05-30 RX ORDER — GABAPENTIN 100 MG/1
100-300 CAPSULE ORAL NIGHTLY
Qty: 90 CAPSULE | Refills: 0 | Status: SHIPPED | OUTPATIENT
Start: 2023-05-30 | End: 2023-06-29

## 2023-05-30 NOTE — PROGRESS NOTES
Name: Polo Cao  YOB: 1955  Gender: female  MRN: [id number]    CC: Follow-up right leg pain     HPI: This is a 74y.o. year old female who returns today after lumbar injections. Patient has a far lateral disc osteophyte complex at L5-S1 causing mass effect on the exiting right L5 nerve root. She had a history of lumbar laminectomy 2019 by Dr. [place] Ryder. She had recent right knee surgery and developed right L5 nerve pain. Back in March she underwent nerve block with about 80% relief. She recently went on a international trip and we repeated the injection. She states she had again 60 to 80% relief. But is having a lot of cramping in her leg basically from the shin down. She has tried multiple dynamics in the past.  She continues to do her home exercise program.    Percentage of pain relief: 60-80%    Activity limitation or improvement: Sitting and walking    Current pain level: 6-7       AMB PAIN ASSESSMENT 2/21/2023   Location of Pain Leg   Location Modifiers Right   Severity of Pain 6   Quality of Pain Throbbing;Aching   Duration of Pain Persistent   Frequency of Pain Constant   Aggravating Factors Other (Comment); Walking;Standing   Limiting Behavior Yes   Relieving Factors Ice   Result of Injury Yes   Work-Related Injury No   Are there other pain locations you wish to document? No           Allergies   Allergen Reactions    Latex Rash    Cocoa Other (See Comments)    Strawberry Extract Other (See Comments)    Prednisone Nausea And Vomiting       Current Outpatient Medications:     gabapentin (NEURONTIN) 100 MG capsule, Take 1-3 capsules by mouth nightly for 30 days. , Disp: 90 capsule, Rfl: 0    atenolol (TENORMIN) 25 MG tablet, atenolol 25 mg tablet, Disp: , Rfl:     CALCIUM CITRATE-VITAMIN D3 PO, Take by mouth daily, Disp: , Rfl:     albuterol sulfate  (90 Base) MCG/ACT inhaler, Inhale 2 puffs into the lungs every 4 hours as needed, Disp: , Rfl:     amLODIPine

## 2023-07-10 ENCOUNTER — CLINICAL DOCUMENTATION (OUTPATIENT)
Dept: ORTHOPEDIC SURGERY | Age: 68
End: 2023-07-10

## 2023-07-21 ENCOUNTER — CLINICAL DOCUMENTATION (OUTPATIENT)
Dept: ORTHOPEDIC SURGERY | Age: 68
End: 2023-07-21

## 2023-10-27 ENCOUNTER — TRANSCRIBE ORDERS (OUTPATIENT)
Dept: SCHEDULING | Age: 68
End: 2023-10-27

## 2023-10-27 DIAGNOSIS — Z12.31 ENCOUNTER FOR SCREENING MAMMOGRAM FOR MALIGNANT NEOPLASM OF BREAST: Primary | ICD-10-CM

## 2023-11-21 ENCOUNTER — HOSPITAL ENCOUNTER (OUTPATIENT)
Dept: MAMMOGRAPHY | Age: 68
Discharge: HOME | End: 2023-11-24
Attending: INTERNAL MEDICINE

## 2023-11-21 DIAGNOSIS — Z12.31 ENCOUNTER FOR SCREENING MAMMOGRAM FOR MALIGNANT NEOPLASM OF BREAST: ICD-10-CM

## 2024-02-19 ENCOUNTER — HOSPITAL ENCOUNTER (EMERGENCY)
Age: 69
Discharge: HOME | End: 2024-02-19
Attending: EMERGENCY MEDICINE
Payer: MEDICARE

## 2024-02-19 ENCOUNTER — APPOINTMENT (OUTPATIENT)
Dept: GENERAL RADIOLOGY | Age: 69
End: 2024-02-19
Payer: MEDICARE

## 2024-02-19 ENCOUNTER — APPOINTMENT (OUTPATIENT)
Dept: CT IMAGING | Age: 69
End: 2024-02-19
Payer: MEDICARE

## 2024-02-19 VITALS
WEIGHT: 161 LBS | TEMPERATURE: 99.3 F | RESPIRATION RATE: 15 BRPM | HEIGHT: 62 IN | BODY MASS INDEX: 29.63 KG/M2 | DIASTOLIC BLOOD PRESSURE: 78 MMHG | HEART RATE: 89 BPM | OXYGEN SATURATION: 96 % | SYSTOLIC BLOOD PRESSURE: 121 MMHG

## 2024-02-19 DIAGNOSIS — R11.2 NAUSEA VOMITING AND DIARRHEA: ICD-10-CM

## 2024-02-19 DIAGNOSIS — J10.1 INFLUENZA A: ICD-10-CM

## 2024-02-19 DIAGNOSIS — R55 SYNCOPE AND COLLAPSE: Primary | ICD-10-CM

## 2024-02-19 DIAGNOSIS — R19.7 NAUSEA VOMITING AND DIARRHEA: ICD-10-CM

## 2024-02-19 DIAGNOSIS — E86.0 DEHYDRATION: ICD-10-CM

## 2024-02-19 LAB
ALBUMIN SERPL-MCNC: 4.1 G/DL (ref 3.2–4.6)
ALBUMIN/GLOB SERPL: 1.1 (ref 0.4–1.6)
ALP SERPL-CCNC: 77 U/L (ref 50–136)
ALT SERPL-CCNC: 32 U/L (ref 12–65)
ANION GAP SERPL CALC-SCNC: 6 MMOL/L (ref 2–11)
AST SERPL-CCNC: 29 U/L (ref 15–37)
BASOPHILS # BLD: 0 K/UL (ref 0–0.2)
BASOPHILS NFR BLD: 0 % (ref 0–2)
BILIRUB SERPL-MCNC: 0.5 MG/DL (ref 0.2–1.1)
BUN SERPL-MCNC: 16 MG/DL (ref 8–23)
CALCIUM SERPL-MCNC: 9.1 MG/DL (ref 8.3–10.4)
CHLORIDE SERPL-SCNC: 108 MMOL/L (ref 103–113)
CO2 SERPL-SCNC: 23 MMOL/L (ref 21–32)
CREAT SERPL-MCNC: 0.7 MG/DL (ref 0.6–1)
DIFFERENTIAL METHOD BLD: ABNORMAL
EKG ATRIAL RATE: 83 BPM
EKG DIAGNOSIS: NORMAL
EKG P AXIS: 53 DEGREES
EKG P-R INTERVAL: 168 MS
EKG Q-T INTERVAL: 368 MS
EKG QRS DURATION: 68 MS
EKG QTC CALCULATION (BAZETT): 432 MS
EKG R AXIS: 25 DEGREES
EKG T AXIS: 56 DEGREES
EKG VENTRICULAR RATE: 83 BPM
EOSINOPHIL # BLD: 0 K/UL (ref 0–0.8)
EOSINOPHIL NFR BLD: 0 % (ref 0.5–7.8)
ERYTHROCYTE [DISTWIDTH] IN BLOOD BY AUTOMATED COUNT: 17.3 % (ref 11.9–14.6)
FLUAV RNA SPEC QL NAA+PROBE: DETECTED
FLUBV RNA SPEC QL NAA+PROBE: NOT DETECTED
GLOBULIN SER CALC-MCNC: 3.9 G/DL (ref 2.8–4.5)
GLUCOSE SERPL-MCNC: 139 MG/DL (ref 65–100)
HCT VFR BLD AUTO: 37.8 % (ref 35.8–46.3)
HGB BLD-MCNC: 11.7 G/DL (ref 11.7–15.4)
IMM GRANULOCYTES # BLD AUTO: 0 K/UL (ref 0–0.5)
IMM GRANULOCYTES NFR BLD AUTO: 0 % (ref 0–5)
LACTATE SERPL-SCNC: 1.4 MMOL/L (ref 0.4–2)
LYMPHOCYTES # BLD: 1.3 K/UL (ref 0.5–4.6)
LYMPHOCYTES NFR BLD: 15 % (ref 13–44)
MAGNESIUM SERPL-MCNC: 2.2 MG/DL (ref 1.8–2.4)
MCH RBC QN AUTO: 18.8 PG (ref 26.1–32.9)
MCHC RBC AUTO-ENTMCNC: 31 G/DL (ref 31.4–35)
MCV RBC AUTO: 60.9 FL (ref 82–102)
MONOCYTES # BLD: 0.7 K/UL (ref 0.1–1.3)
MONOCYTES NFR BLD: 9 % (ref 4–12)
NEUTS SEG # BLD: 6.5 K/UL (ref 1.7–8.2)
NEUTS SEG NFR BLD: 76 % (ref 43–78)
NRBC # BLD: 0 K/UL (ref 0–0.2)
PLATELET # BLD AUTO: 269 K/UL (ref 150–450)
PMV BLD AUTO: 9.8 FL (ref 9.4–12.3)
POTASSIUM SERPL-SCNC: 3.4 MMOL/L (ref 3.5–5.1)
PROCALCITONIN SERPL-MCNC: 0.14 NG/ML (ref 0–0.49)
PROT SERPL-MCNC: 8 G/DL (ref 6.3–8.2)
RBC # BLD AUTO: 6.21 M/UL (ref 4.05–5.2)
SARS-COV-2 RDRP RESP QL NAA+PROBE: NOT DETECTED
SODIUM SERPL-SCNC: 137 MMOL/L (ref 136–146)
SOURCE: NORMAL
WBC # BLD AUTO: 8.5 K/UL (ref 4.3–11.1)

## 2024-02-19 PROCEDURE — 96374 THER/PROPH/DIAG INJ IV PUSH: CPT

## 2024-02-19 PROCEDURE — 84145 PROCALCITONIN (PCT): CPT

## 2024-02-19 PROCEDURE — 96361 HYDRATE IV INFUSION ADD-ON: CPT

## 2024-02-19 PROCEDURE — 99285 EMERGENCY DEPT VISIT HI MDM: CPT

## 2024-02-19 PROCEDURE — 96376 TX/PRO/DX INJ SAME DRUG ADON: CPT

## 2024-02-19 PROCEDURE — 71045 X-RAY EXAM CHEST 1 VIEW: CPT

## 2024-02-19 PROCEDURE — 6360000002 HC RX W HCPCS: Performed by: EMERGENCY MEDICINE

## 2024-02-19 PROCEDURE — 83605 ASSAY OF LACTIC ACID: CPT

## 2024-02-19 PROCEDURE — 80053 COMPREHEN METABOLIC PANEL: CPT

## 2024-02-19 PROCEDURE — 2580000003 HC RX 258: Performed by: EMERGENCY MEDICINE

## 2024-02-19 PROCEDURE — 93010 ELECTROCARDIOGRAM REPORT: CPT | Performed by: INTERNAL MEDICINE

## 2024-02-19 PROCEDURE — 85025 COMPLETE CBC W/AUTO DIFF WBC: CPT

## 2024-02-19 PROCEDURE — 87635 SARS-COV-2 COVID-19 AMP PRB: CPT

## 2024-02-19 PROCEDURE — 87502 INFLUENZA DNA AMP PROBE: CPT

## 2024-02-19 PROCEDURE — 70450 CT HEAD/BRAIN W/O DYE: CPT

## 2024-02-19 PROCEDURE — 83735 ASSAY OF MAGNESIUM: CPT

## 2024-02-19 PROCEDURE — 93005 ELECTROCARDIOGRAM TRACING: CPT | Performed by: EMERGENCY MEDICINE

## 2024-02-19 PROCEDURE — 87040 BLOOD CULTURE FOR BACTERIA: CPT

## 2024-02-19 RX ORDER — SODIUM CHLORIDE, SODIUM LACTATE, POTASSIUM CHLORIDE, AND CALCIUM CHLORIDE .6; .31; .03; .02 G/100ML; G/100ML; G/100ML; G/100ML
1000 INJECTION, SOLUTION INTRAVENOUS ONCE
Status: COMPLETED | OUTPATIENT
Start: 2024-02-19 | End: 2024-02-19

## 2024-02-19 RX ORDER — DIPHENOXYLATE HYDROCHLORIDE AND ATROPINE SULFATE 2.5; .025 MG/1; MG/1
1 TABLET ORAL 4 TIMES DAILY PRN
Qty: 8 TABLET | Refills: 0 | Status: SHIPPED | OUTPATIENT
Start: 2024-02-19 | End: 2024-02-21

## 2024-02-19 RX ORDER — ONDANSETRON 4 MG/1
4 TABLET, ORALLY DISINTEGRATING ORAL 3 TIMES DAILY PRN
Qty: 9 TABLET | Refills: 0 | Status: SHIPPED | OUTPATIENT
Start: 2024-02-19

## 2024-02-19 RX ORDER — ONDANSETRON 2 MG/ML
4 INJECTION INTRAMUSCULAR; INTRAVENOUS
Status: COMPLETED | OUTPATIENT
Start: 2024-02-19 | End: 2024-02-19

## 2024-02-19 RX ADMIN — ONDANSETRON 4 MG: 2 INJECTION INTRAMUSCULAR; INTRAVENOUS at 17:28

## 2024-02-19 RX ADMIN — SODIUM CHLORIDE, POTASSIUM CHLORIDE, SODIUM LACTATE AND CALCIUM CHLORIDE 1000 ML: 600; 310; 30; 20 INJECTION, SOLUTION INTRAVENOUS at 17:31

## 2024-02-19 RX ADMIN — ONDANSETRON 4 MG: 2 INJECTION INTRAMUSCULAR; INTRAVENOUS at 18:31

## 2024-02-19 ASSESSMENT — PAIN DESCRIPTION - LOCATION: LOCATION: GENERALIZED

## 2024-02-19 ASSESSMENT — PAIN SCALES - GENERAL
PAINLEVEL_OUTOF10: 6
PAINLEVEL_OUTOF10: 5

## 2024-02-19 ASSESSMENT — ENCOUNTER SYMPTOMS
BLOOD IN STOOL: 0
SHORTNESS OF BREATH: 1
VOMITING: 1
BACK PAIN: 0
WHEEZING: 0
DIARRHEA: 1
ABDOMINAL PAIN: 1
NAUSEA: 1
COUGH: 1

## 2024-02-19 ASSESSMENT — PAIN DESCRIPTION - DESCRIPTORS
DESCRIPTORS: ACHING
DESCRIPTORS: ACHING

## 2024-02-19 ASSESSMENT — LIFESTYLE VARIABLES
HOW OFTEN DO YOU HAVE A DRINK CONTAINING ALCOHOL: NEVER
HOW MANY STANDARD DRINKS CONTAINING ALCOHOL DO YOU HAVE ON A TYPICAL DAY: PATIENT DOES NOT DRINK

## 2024-02-19 ASSESSMENT — PAIN DESCRIPTION - ORIENTATION: ORIENTATION: RIGHT

## 2024-02-19 ASSESSMENT — PAIN - FUNCTIONAL ASSESSMENT
PAIN_FUNCTIONAL_ASSESSMENT: 0-10
PAIN_FUNCTIONAL_ASSESSMENT: 0-10

## 2024-02-19 NOTE — ED PROVIDER NOTES
for up to 2 days. Max Daily Amount: 4 tablets, Disp-8 tablet, R-0Print              Past Medical History:   Diagnosis Date    Asthma     \"little bit\"    Cancer (HCC)     uterine cancer    DVT (deep venous thrombosis) (HCC)     2009    GERD (gastroesophageal reflux disease)     managed with med    Hypertension     managed with meds    Iron deficiency anemia         Past Surgical History:   Procedure Laterality Date    CHOLECYSTECTOMY, OPEN  1995    In [place]    HYSTERECTOMY (CERVIX STATUS UNKNOWN)  1991    [place] due to presumably high grade cervical dysplasia     NEUROLOGICAL SURGERY  07/2019    back surgery        Social History     Socioeconomic History    Marital status:    Tobacco Use    Smoking status: Never    Smokeless tobacco: Never   Substance and Sexual Activity    Alcohol use: Yes    Drug use: No        Discharge Medication List as of 2/19/2024  6:52 PM        CONTINUE these medications which have NOT CHANGED    Details   gabapentin (NEURONTIN) 100 MG capsule Take 1-3 capsules by mouth nightly for 30 days., Disp-90 capsule, R-0Normal      atenolol (TENORMIN) 25 MG tablet atenolol 25 mg tabletHistorical Med      CALCIUM CITRATE-VITAMIN D3 PO Take by mouth dailyHistorical Med      albuterol sulfate  (90 Base) MCG/ACT inhaler Inhale 2 puffs into the lungs every 4 hours as neededHistorical Med      amLODIPine (NORVASC) 10 MG tablet Take 10 mg by mouth dailyHistorical Med      aspirin 81 MG EC tablet Take 81 mg by mouth dailyHistorical Med      atorvastatin (LIPITOR) 20 MG tablet Take 20 mg by mouthHistorical Med      cetirizine (ZYRTEC) 10 MG tablet Take 10 mg by mouth dailyHistorical Med      Cholecalciferol 50 MCG (2000 UT) TABS Take by mouth dailyHistorical Med      cyanocobalamin 100 MCG tablet Take 100 mcg by mouth dailyHistorical Med      EPINEPHrine (EPIPEN) 0.3 MG/0.3ML SOAJ injection USE AS DIRECTEDHistorical Med      ferrous sulfate (IRON 325) 325 (65 Fe) MG tablet Take 325

## 2024-02-19 NOTE — ED TRIAGE NOTES
Ambulatory to triage. States has not felt well for about a week. States passed out this AM.  Seen at PCP this AM after syncopal episode. Has had cough nasal congestion and that started last week. Pt  thinks patient is dehydrated.

## 2024-02-19 NOTE — DISCHARGE INSTRUCTIONS
,   Sips clear liquids 6-12 hours, then BRAT diet (bananas, rice, apple sauce, toast). Advance to soup/sanwiches as tolerated. Recheck your doctor 2 days if not improving. Recheck sooner for worse pain/fever/vomiting/bleeding.  Tylenol if fever.  If prescribed, phenergan or Zofran are for Nausea.  If prescribed, Lomotil is for diarrhea

## 2024-02-24 LAB
BACTERIA SPEC CULT: NORMAL
SERVICE CMNT-IMP: NORMAL

## 2024-08-21 ENCOUNTER — OFFICE VISIT (OUTPATIENT)
Dept: ORTHOPEDIC SURGERY | Age: 69
End: 2024-08-21
Payer: MEDICARE

## 2024-08-21 DIAGNOSIS — M17.11 UNILATERAL PRIMARY OSTEOARTHRITIS, RIGHT KNEE: Primary | ICD-10-CM

## 2024-08-21 DIAGNOSIS — M25.561 RIGHT KNEE PAIN, UNSPECIFIED CHRONICITY: ICD-10-CM

## 2024-08-21 DIAGNOSIS — M17.11 PRIMARY OSTEOARTHRITIS OF RIGHT KNEE: ICD-10-CM

## 2024-08-21 PROCEDURE — 1123F ACP DISCUSS/DSCN MKR DOCD: CPT | Performed by: ORTHOPAEDIC SURGERY

## 2024-08-21 PROCEDURE — 99204 OFFICE O/P NEW MOD 45 MIN: CPT | Performed by: ORTHOPAEDIC SURGERY

## 2024-08-21 RX ORDER — EZETIMIBE 10 MG/1
10 TABLET ORAL DAILY
COMMUNITY
Start: 2024-06-13

## 2024-08-21 RX ORDER — ROSUVASTATIN CALCIUM 20 MG/1
20 TABLET, COATED ORAL
COMMUNITY
Start: 2024-06-13

## 2024-08-21 NOTE — PROGRESS NOTES
Patient ID:  Odalis Crystal  [id number]  68 y.o.  1955    Today: August 21, 2024       CC:  right knee pain    HPI:   Odalis Crystal presents for evaluation of their right knee.  She has chronic weightbearing knee pain globally about her knee predominately about the medial aspect and behind her patella.  She has no hip pain but she does have baseline low back pain without any obvious radicular symptoms.  She does note that is refractory to nonoperative managed with anti-inflammatory medicines bracing activity modification as well as multiple injections most recently a year ago.  She notes chronic nighttime pain that wakes her up from night.  She has difficulty ambulating secondary to her knee and has actually fallen multiple times.  She is a nondiabetic non-smoker lives with her  she does have a history of a prior knee arthroscopy on that side in 2022    Past Medical History:  Past Medical History:   Diagnosis Date    Asthma     \"little bit\"    Cancer (HCC)     uterine cancer    DVT (deep venous thrombosis) (HCC)     2009    GERD (gastroesophageal reflux disease)     managed with med    Hypertension     managed with meds    Iron deficiency anemia        Past Surgical History:  Past Surgical History:   Procedure Laterality Date    CHOLECYSTECTOMY, OPEN  1995    In [facility]    HYSTERECTOMY (CERVIX STATUS UNKNOWN)  1991    [facility] in [place] due to presumably high grade cervical dysplasia     NEUROLOGICAL SURGERY  07/2019    back surgery        Medications:     Prior to Admission medications    Medication Sig Start Date End Date Taking? Authorizing Provider   rosuvastatin (CRESTOR) 20 MG tablet Take 1 tablet by mouth 6/13/24  Yes ProviderChiquis MD   ezetimibe (ZETIA) 10 MG tablet Take 1 tablet by mouth daily 6/13/24  Yes Provider, Historical, MD   Handicap Placard [facility] prescription to [facility] with completed form [id number]. 3/31/22  Yes ProviderChiquis MD   zinc acetate 10

## 2024-09-17 ENCOUNTER — PREP FOR PROCEDURE (OUTPATIENT)
Dept: ORTHOPEDIC SURGERY | Age: 69
End: 2024-09-17

## 2024-09-17 DIAGNOSIS — M17.11 UNILATERAL PRIMARY OSTEOARTHRITIS, RIGHT KNEE: Primary | ICD-10-CM

## 2024-09-17 RX ORDER — SODIUM CHLORIDE 9 MG/ML
INJECTION, SOLUTION INTRAVENOUS PRN
Status: CANCELLED | OUTPATIENT
Start: 2024-09-17

## 2024-09-17 RX ORDER — SODIUM CHLORIDE 0.9 % (FLUSH) 0.9 %
5-40 SYRINGE (ML) INJECTION PRN
Status: CANCELLED | OUTPATIENT
Start: 2024-09-17

## 2024-09-17 RX ORDER — SODIUM CHLORIDE 0.9 % (FLUSH) 0.9 %
5-40 SYRINGE (ML) INJECTION EVERY 12 HOURS SCHEDULED
Status: CANCELLED | OUTPATIENT
Start: 2024-09-17

## 2024-09-17 RX ORDER — ACETAMINOPHEN 325 MG/1
1000 TABLET ORAL ONCE
Status: CANCELLED | OUTPATIENT
Start: 2024-09-17 | End: 2024-09-17

## 2024-09-17 NOTE — H&P
Patient ID:  Odalis Crystal  [id number]  68 y.o.  1955    Today: September 17, 2024       CC:  right knee pain    HPI:   Odalis Crystal presents for evaluation of their right knee.  She has chronic weightbearing knee pain globally about her knee predominately about the medial aspect and behind her patella.  She has no hip pain but she does have baseline low back pain without any obvious radicular symptoms.  She does note that is refractory to nonoperative managed with anti-inflammatory medicines bracing activity modification as well as multiple injections most recently a year ago.  She notes chronic nighttime pain that wakes her up from night.  She has difficulty ambulating secondary to her knee and has actually fallen multiple times.  She is a nondiabetic non-smoker lives with her  she does have a history of a prior knee arthroscopy on that side in 2022    Past Medical History:  Past Medical History:   Diagnosis Date    Asthma     \"little bit\"    Cancer (HCC)     uterine cancer    DVT (deep venous thrombosis) (HCC)     2009    GERD (gastroesophageal reflux disease)     managed with med    Hypertension     managed with meds    Iron deficiency anemia        Past Surgical History:  Past Surgical History:   Procedure Laterality Date    CHOLECYSTECTOMY, OPEN  1995    In [place]    HYSTERECTOMY (CERVIX STATUS UNKNOWN)  1991    [facility] in [place] due to presumably high grade cervical dysplasia     NEUROLOGICAL SURGERY  07/2019    back surgery        Medications:     Prior to Admission medications    Medication Sig Start Date End Date Taking? Authorizing Provider   rosuvastatin (CRESTOR) 20 MG tablet Take 1 tablet by mouth 6/13/24   Chiquis Lowe MD   ezetimibe (ZETIA) 10 MG tablet Take 1 tablet by mouth daily 6/13/24   Chiquis Lowe MD   zinc acetate 10 mg/mL oral syrup Take 1 tablet by mouth daily    Chiquis Lowe MD   Multiple Vitamin (MULTIVITAMIN ADULT PO) Take by

## 2024-10-01 ENCOUNTER — HOSPITAL ENCOUNTER (OUTPATIENT)
Dept: SURGERY | Age: 69
Discharge: HOME | End: 2024-10-04
Payer: MEDICARE

## 2024-10-01 VITALS
OXYGEN SATURATION: 98 % | RESPIRATION RATE: 16 BRPM | SYSTOLIC BLOOD PRESSURE: 173 MMHG | WEIGHT: 163.4 LBS | BODY MASS INDEX: 30.07 KG/M2 | DIASTOLIC BLOOD PRESSURE: 83 MMHG | HEART RATE: 79 BPM | HEIGHT: 62 IN | TEMPERATURE: 98.5 F

## 2024-10-01 DIAGNOSIS — M17.11 UNILATERAL PRIMARY OSTEOARTHRITIS, RIGHT KNEE: ICD-10-CM

## 2024-10-01 LAB
ALBUMIN SERPL-MCNC: 3.8 G/DL (ref 3.2–4.6)
ALBUMIN/GLOB SERPL: 1.1 (ref 1–1.9)
ALP SERPL-CCNC: 76 U/L (ref 35–104)
ALT SERPL-CCNC: 20 U/L (ref 8–45)
ANION GAP SERPL CALC-SCNC: 12 MMOL/L (ref 9–18)
AST SERPL-CCNC: 21 U/L (ref 15–37)
BASOPHILS # BLD: 0.1 K/UL (ref 0–0.2)
BASOPHILS NFR BLD: 1 % (ref 0–2)
BILIRUB SERPL-MCNC: 0.4 MG/DL (ref 0–1.2)
BUN SERPL-MCNC: 13 MG/DL (ref 8–23)
CALCIUM SERPL-MCNC: 9.3 MG/DL (ref 8.8–10.2)
CHLORIDE SERPL-SCNC: 105 MMOL/L (ref 98–107)
CO2 SERPL-SCNC: 24 MMOL/L (ref 20–28)
CREAT SERPL-MCNC: 0.61 MG/DL (ref 0.6–1.1)
DIFFERENTIAL METHOD BLD: ABNORMAL
EOSINOPHIL # BLD: 0.2 K/UL (ref 0–0.8)
EOSINOPHIL NFR BLD: 3 % (ref 0.5–7.8)
ERYTHROCYTE [DISTWIDTH] IN BLOOD BY AUTOMATED COUNT: 17.1 % (ref 11.9–14.6)
GLOBULIN SER CALC-MCNC: 3.4 G/DL (ref 2.3–3.5)
GLUCOSE SERPL-MCNC: 98 MG/DL (ref 70–99)
HCT VFR BLD AUTO: 37.1 % (ref 35.8–46.3)
HGB BLD-MCNC: 11.2 G/DL (ref 11.7–15.4)
IMM GRANULOCYTES # BLD AUTO: 0 K/UL (ref 0–0.5)
IMM GRANULOCYTES NFR BLD AUTO: 0 % (ref 0–5)
INR PPP: 1
LYMPHOCYTES # BLD: 2.4 K/UL (ref 0.5–4.6)
LYMPHOCYTES NFR BLD: 38 % (ref 13–44)
MCH RBC QN AUTO: 18.9 PG (ref 26.1–32.9)
MCHC RBC AUTO-ENTMCNC: 30.2 G/DL (ref 31.4–35)
MCV RBC AUTO: 62.6 FL (ref 82–102)
MONOCYTES # BLD: 0.4 K/UL (ref 0.1–1.3)
MONOCYTES NFR BLD: 6 % (ref 4–12)
NEUTS SEG # BLD: 3.2 K/UL (ref 1.7–8.2)
NEUTS SEG NFR BLD: 52 % (ref 43–78)
NRBC # BLD: 0 K/UL (ref 0–0.2)
PLATELET # BLD AUTO: 243 K/UL (ref 150–450)
PMV BLD AUTO: ABNORMAL FL (ref 9.4–12.3)
POTASSIUM SERPL-SCNC: 4.3 MMOL/L (ref 3.5–5.1)
PROT SERPL-MCNC: 7.2 G/DL (ref 6.3–8.2)
PROTHROMBIN TIME: 13 SEC (ref 11.3–14.9)
RBC # BLD AUTO: 5.93 M/UL (ref 4.05–5.2)
SODIUM SERPL-SCNC: 141 MMOL/L (ref 136–145)
WBC # BLD AUTO: 6.3 K/UL (ref 4.3–11.1)

## 2024-10-01 PROCEDURE — 80053 COMPREHEN METABOLIC PANEL: CPT

## 2024-10-01 PROCEDURE — 87641 MR-STAPH DNA AMP PROBE: CPT

## 2024-10-01 PROCEDURE — 85025 COMPLETE CBC W/AUTO DIFF WBC: CPT

## 2024-10-01 PROCEDURE — 83036 HEMOGLOBIN GLYCOSYLATED A1C: CPT

## 2024-10-01 PROCEDURE — 85610 PROTHROMBIN TIME: CPT

## 2024-10-01 ASSESSMENT — PAIN SCALES - GENERAL: PAINLEVEL_OUTOF10: 6

## 2024-10-01 ASSESSMENT — PAIN DESCRIPTION - LOCATION: LOCATION: KNEE

## 2024-10-01 ASSESSMENT — PAIN DESCRIPTION - ORIENTATION: ORIENTATION: RIGHT

## 2024-10-02 NOTE — PROGRESS NOTES
Patient verified name and .    Order for consent not found in EHR and unable to match consent with case posting; patient verified.     Type 3 surgery, joint  assessment complete.    Labs per surgeon: CBC,CMP, PT/INR, A1C ; results pending  Labs per anesthesia protocol: no additional  EKG:completed 24 and abnormal, will have anesthesia review along with ECHO 10/22/20; stress 10/19/20; cardiology office note Dr Arzola 21    MRSA/MSSA swab collected per policy. MD to consult pharmacy to dose Vanc if appropriate.     Hospital approved surgical skin cleanser and instructions to return bottle on DOS given per hospital policy.    Patient provided with handouts including Guide to Surgery, Pain Management, Preventing Surgical Site Infections, and [facility] Brochure.    Patient answered medical/surgical history questions at their best of ability. All prior to admission medications documented in [facility]. Original medication prescription bottle  visualized during patient appointment.     Patient instructed to hold all vitamins 3 weeks prior to surgery and NSAIDS 5 days prior to surgery.     Patient teach back successful and patient demonstrates knowledge of instruction.     
PLEASE CONTINUE TAKING ALL PRESCRIPTION MEDICATIONS UP TO THE DAY OF SURGERY UNLESS OTHERWISE DIRECTED BELOW.    DISCONTINUE all vitamins, herbals and supplements 3 weeks prior to surgery. DISCONTINUE Non-Steroidal Anti-Inflammatory (NSAIDS) such as Advil, Ibuprofen, Motrin, Naproxen and Aleve 5 days prior to surgery.       Home Medications to take  the day of surgery    Aspirin            Amlodipine   USE/Bring Albuterol inhaler   Loratadine      Flonase nasal spray, if needed and bring     Home Medications to Hold- please continue all other medications except these.            Comments   On the day before surgery please take Acetaminophen 1000mg in the morning and then again before bed. You may substitute for Tylenol 650 mg.      Bring Incentive Spirometer with you to hospital on the day of surgery.            Please do not bring home medications with you on the day of surgery unless otherwise directed by your nurse.  If you are instructed to bring home medications, please give them to your nurse as they will be administered by the nursing staff.    If you have any questions, please call [facility] [phone].    A copy of this note was provided to the patient for reference.    
spirometer when you aren't using it    
Granulocytes % 0 0.0 - 5.0 %    Neutrophils Absolute 3.2 1.7 - 8.2 K/UL    Lymphocytes Absolute 2.4 0.5 - 4.6 K/UL    Monocytes Absolute 0.4 0.1 - 1.3 K/UL    Eosinophils Absolute 0.2 0.0 - 0.8 K/UL    Basophils Absolute 0.1 0.0 - 0.2 K/UL    Immature Granulocytes Absolute 0.0 0.0 - 0.5 K/UL         Problem List:  )  Patient Active Problem List   Diagnosis    Fatigue    Acquired trigger finger    Seasonal allergic rhinitis due to pollen    Benign essential HTN    Allergic rhinitis    History of back surgery    Abnormal EKG    Anxiety    Hyperlipidemia    Pure hypercholesterolemia    Dizziness    Depressive disorder    Gastroesophageal reflux disease    BMI 32.0-32.9,adult    JOSE FRANCISCO III with severe dysplasia    Syncope and collapse    Abnormal thyroid function test    Agatston coronary artery calcium score between 100 and 199    Microcytic anemia    Abnormal glucose level    Postconcussion syndrome    Abnormal cervical Papanicolaou smear    Osteoarthrosis    Chest pain    Lumbar stenosis with neurogenic claudication    Vitamin D deficiency    Primary osteoarthritis of right knee       Total Joint Surgery Pre-Assessment Recommendations:           Continuous saturation monitoring during hospitalization. O2 prn per respiratory protocol.   Albuterol every 6 hours as need during hospitalization.     Signed By: Ro Lara, APRN - CNP-C    October 2, 2024

## 2024-10-03 LAB
EST. AVERAGE GLUCOSE BLD GHB EST-MCNC: 111 MG/DL
HBA1C MFR BLD: 5.5 % (ref 0–5.6)
MRSA DNA SPEC QL NAA+PROBE: NOT DETECTED
S AUREUS CPE NOSE QL NAA+PROBE: NOT DETECTED

## 2024-10-09 ENCOUNTER — OFFICE VISIT (OUTPATIENT)
Dept: ORTHOPEDIC SURGERY | Age: 69
End: 2024-10-09

## 2024-10-09 VITALS — HEIGHT: 62 IN | BODY MASS INDEX: 30 KG/M2 | WEIGHT: 163 LBS

## 2024-10-09 DIAGNOSIS — M17.11 UNILATERAL PRIMARY OSTEOARTHRITIS, RIGHT KNEE: Primary | ICD-10-CM

## 2024-10-09 PROCEDURE — 99024 POSTOP FOLLOW-UP VISIT: CPT | Performed by: ORTHOPAEDIC SURGERY

## 2024-10-09 RX ORDER — SENNA AND DOCUSATE SODIUM 50; 8.6 MG/1; MG/1
1 TABLET, FILM COATED ORAL DAILY
Qty: 30 TABLET | Refills: 1 | Status: SHIPPED | OUTPATIENT
Start: 2024-10-09

## 2024-10-09 RX ORDER — TRAMADOL HYDROCHLORIDE 50 MG/1
50 TABLET ORAL EVERY 4 HOURS PRN
Qty: 30 TABLET | Refills: 0 | Status: SHIPPED | OUTPATIENT
Start: 2024-10-09 | End: 2024-10-14

## 2024-10-09 RX ORDER — ACETAMINOPHEN 325 MG/1
975 TABLET ORAL EVERY 8 HOURS
Qty: 60 TABLET | Refills: 2 | Status: SHIPPED | OUTPATIENT
Start: 2024-10-09

## 2024-10-09 RX ORDER — CELECOXIB 200 MG/1
200 CAPSULE ORAL 2 TIMES DAILY
Qty: 60 CAPSULE | Refills: 1 | Status: SHIPPED | OUTPATIENT
Start: 2024-10-09

## 2024-10-09 RX ORDER — OXYCODONE HYDROCHLORIDE 5 MG/1
5-10 TABLET ORAL EVERY 4 HOURS PRN
Qty: 40 TABLET | Refills: 0 | Status: SHIPPED | OUTPATIENT
Start: 2024-10-09 | End: 2024-10-14

## 2024-10-09 RX ORDER — ASPIRIN 81 MG/1
81 TABLET ORAL EVERY 12 HOURS
Qty: 70 TABLET | Refills: 0 | Status: SHIPPED | OUTPATIENT
Start: 2024-10-09

## 2024-10-09 NOTE — H&P (VIEW-ONLY)
Strawberry Extract Other (See Comments)    Prednisone Nausea And Vomiting        Vitals:    Ht 1.575 m (5' 2\")   Wt 73.9 kg (163 lb)   BMI 29.81 kg/m²      Objective:     General: No Acute distress                  HEENT: Normocephalic/atramatic                  Lungs:  Breathing non-labored                  Heart:   WWP, BCR                  Abdomen: non distended  Extremities:    Trace effusion  Ttp about the  joint line medial lateral   Knee ROM 10 to 110  TTP about the pes tendons  Stable to varus valgus stress at 0 and 30  Negative lachman/negative tejal  Fires ehl fhl ta gs p  Splt s/s/sp/dp/t  WWP BCR    Imagin view knee x-rays AP PA sunrise view and lateral demonstrate tricompartmental arthritis with particular osteophytes predominately about the medial compartment on the sunrise view Kellgren-Robinson grade 4      1. end stage osteoarthritis right knee    Plan:   The patient has end stage osteoarthritis of the right kneewith severe worsening pain which has not improved despite non operative treatments.    X-rays demonstrate end stage osteoarthritis of the right knee  They have tried non operative treatments as outlined per HPI and have declined additional non-surgical care due to worsening pain and limited mobility including, additional NSAIDs, cortisone injections, physical therapy, assistive devices, and pain management.    The symptoms have worsened and ambulation has become difficulty.  After discussion involving shared decision making of different treatment options, they have elected to proceed with a  total knee arthroplastyand this is medically necessary for failed non operative treatment of end stage osteoarthritis.    The surgery was discussed  in detail with a model including the technical aspects of the surgery. with Depuy components.  We will plan for the surgery at [facility].  They will be same day surgery.    The risks, benefits, and alternatives were discussed.  The risks

## 2024-10-14 ENCOUNTER — ANESTHESIA EVENT (OUTPATIENT)
Dept: SURGERY | Age: 69
End: 2024-10-14
Payer: MEDICARE

## 2024-10-15 ENCOUNTER — HOSPITAL ENCOUNTER (OUTPATIENT)
Age: 69
Discharge: HOME HEALTH | End: 2024-10-16
Attending: ORTHOPAEDIC SURGERY | Admitting: ORTHOPAEDIC SURGERY
Payer: MEDICARE

## 2024-10-15 ENCOUNTER — ANESTHESIA (OUTPATIENT)
Dept: SURGERY | Age: 69
End: 2024-10-15
Payer: MEDICARE

## 2024-10-15 ENCOUNTER — APPOINTMENT (OUTPATIENT)
Dept: GENERAL RADIOLOGY | Age: 69
End: 2024-10-15
Attending: ORTHOPAEDIC SURGERY
Payer: MEDICARE

## 2024-10-15 PROBLEM — M17.11 ARTHRITIS OF RIGHT KNEE: Status: ACTIVE | Noted: 2024-10-15

## 2024-10-15 PROCEDURE — 97161 PT EVAL LOW COMPLEX 20 MIN: CPT

## 2024-10-15 PROCEDURE — 64447 NJX AA&/STRD FEMORAL NRV IMG: CPT | Performed by: ANESTHESIOLOGY

## 2024-10-15 PROCEDURE — 97165 OT EVAL LOW COMPLEX 30 MIN: CPT

## 2024-10-15 PROCEDURE — C1776 JOINT DEVICE (IMPLANTABLE): HCPCS | Performed by: ORTHOPAEDIC SURGERY

## 2024-10-15 PROCEDURE — 2709999900 HC NON-CHARGEABLE SUPPLY: Performed by: ORTHOPAEDIC SURGERY

## 2024-10-15 PROCEDURE — 73560 X-RAY EXAM OF KNEE 1 OR 2: CPT

## 2024-10-15 PROCEDURE — 6370000000 HC RX 637 (ALT 250 FOR IP): Performed by: ORTHOPAEDIC SURGERY

## 2024-10-15 PROCEDURE — 3600000005 HC SURGERY LEVEL 5 BASE: Performed by: ORTHOPAEDIC SURGERY

## 2024-10-15 PROCEDURE — 6360000002 HC RX W HCPCS: Performed by: ANESTHESIOLOGY

## 2024-10-15 PROCEDURE — 2580000003 HC RX 258: Performed by: ORTHOPAEDIC SURGERY

## 2024-10-15 PROCEDURE — 3600000015 HC SURGERY LEVEL 5 ADDTL 15MIN: Performed by: ORTHOPAEDIC SURGERY

## 2024-10-15 PROCEDURE — 3700000001 HC ADD 15 MINUTES (ANESTHESIA): Performed by: ORTHOPAEDIC SURGERY

## 2024-10-15 PROCEDURE — 2500000003 HC RX 250 WO HCPCS: Performed by: NURSE ANESTHETIST, CERTIFIED REGISTERED

## 2024-10-15 PROCEDURE — 6360000002 HC RX W HCPCS: Performed by: NURSE ANESTHETIST, CERTIFIED REGISTERED

## 2024-10-15 PROCEDURE — 94760 N-INVAS EAR/PLS OXIMETRY 1: CPT

## 2024-10-15 PROCEDURE — 6370000000 HC RX 637 (ALT 250 FOR IP): Performed by: ANESTHESIOLOGY

## 2024-10-15 PROCEDURE — 27447 TOTAL KNEE ARTHROPLASTY: CPT | Performed by: ORTHOPAEDIC SURGERY

## 2024-10-15 PROCEDURE — 94761 N-INVAS EAR/PLS OXIMETRY MLT: CPT

## 2024-10-15 PROCEDURE — C1713 ANCHOR/SCREW BN/BN,TIS/BN: HCPCS | Performed by: ORTHOPAEDIC SURGERY

## 2024-10-15 PROCEDURE — 7100000000 HC PACU RECOVERY - FIRST 15 MIN: Performed by: ORTHOPAEDIC SURGERY

## 2024-10-15 PROCEDURE — 6360000002 HC RX W HCPCS: Performed by: ORTHOPAEDIC SURGERY

## 2024-10-15 PROCEDURE — 3700000000 HC ANESTHESIA ATTENDED CARE: Performed by: ORTHOPAEDIC SURGERY

## 2024-10-15 PROCEDURE — 97530 THERAPEUTIC ACTIVITIES: CPT

## 2024-10-15 PROCEDURE — 7100000001 HC PACU RECOVERY - ADDTL 15 MIN: Performed by: ORTHOPAEDIC SURGERY

## 2024-10-15 PROCEDURE — 2580000003 HC RX 258: Performed by: ANESTHESIOLOGY

## 2024-10-15 PROCEDURE — 97535 SELF CARE MNGMENT TRAINING: CPT

## 2024-10-15 DEVICE — IMPLANTABLE DEVICE: Type: IMPLANTABLE DEVICE | Site: KNEE | Status: FUNCTIONAL

## 2024-10-15 DEVICE — KNEE K1 TOT HEMI STD CEM IMPL CAPPED SYNTHES: Type: IMPLANTABLE DEVICE | Status: FUNCTIONAL

## 2024-10-15 DEVICE — CEMENT BONE 40GM HI VISC PALACOS R: Type: IMPLANTABLE DEVICE | Site: KNEE | Status: FUNCTIONAL

## 2024-10-15 DEVICE — BASEPLATE TIB SZ 4 FIX BEAR CEM S+ TECHNOLOGY ATTUNE: Type: IMPLANTABLE DEVICE | Site: KNEE | Status: FUNCTIONAL

## 2024-10-15 DEVICE — COMPONENT PAT DIA35MM KNEE POLY DOME CEM MEDIALIZED ATTUNE: Type: IMPLANTABLE DEVICE | Site: PATELLA | Status: FUNCTIONAL

## 2024-10-15 RX ORDER — SODIUM CHLORIDE 9 MG/ML
INJECTION, SOLUTION INTRAVENOUS PRN
Status: DISCONTINUED | OUTPATIENT
Start: 2024-10-15 | End: 2024-10-15 | Stop reason: HOSPADM

## 2024-10-15 RX ORDER — ROPIVACAINE HYDROCHLORIDE 2 MG/ML
INJECTION, SOLUTION EPIDURAL; INFILTRATION; PERINEURAL PRN
Status: DISCONTINUED | OUTPATIENT
Start: 2024-10-15 | End: 2024-10-15 | Stop reason: ALTCHOICE

## 2024-10-15 RX ORDER — SODIUM CHLORIDE 0.9 % (FLUSH) 0.9 %
5-40 SYRINGE (ML) INJECTION EVERY 12 HOURS SCHEDULED
Status: DISCONTINUED | OUTPATIENT
Start: 2024-10-15 | End: 2024-10-15

## 2024-10-15 RX ORDER — OXYCODONE HYDROCHLORIDE 5 MG/1
5 TABLET ORAL
Status: DISCONTINUED | OUTPATIENT
Start: 2024-10-15 | End: 2024-10-15 | Stop reason: HOSPADM

## 2024-10-15 RX ORDER — FENTANYL CITRATE 50 UG/ML
100 INJECTION, SOLUTION INTRAMUSCULAR; INTRAVENOUS
Status: COMPLETED | OUTPATIENT
Start: 2024-10-15 | End: 2024-10-15

## 2024-10-15 RX ORDER — NALOXONE HYDROCHLORIDE 0.4 MG/ML
INJECTION, SOLUTION INTRAMUSCULAR; INTRAVENOUS; SUBCUTANEOUS PRN
Status: DISCONTINUED | OUTPATIENT
Start: 2024-10-15 | End: 2024-10-15 | Stop reason: HOSPADM

## 2024-10-15 RX ORDER — TRANEXAMIC ACID 100 MG/ML
INJECTION, SOLUTION INTRAVENOUS
Status: DISCONTINUED | OUTPATIENT
Start: 2024-10-15 | End: 2024-10-15 | Stop reason: SDUPTHER

## 2024-10-15 RX ORDER — OXYCODONE HYDROCHLORIDE 5 MG/1
10 TABLET ORAL
Status: DISCONTINUED | OUTPATIENT
Start: 2024-10-15 | End: 2024-10-16 | Stop reason: HOSPADM

## 2024-10-15 RX ORDER — OXYCODONE HYDROCHLORIDE 5 MG/1
5 TABLET ORAL
Status: DISCONTINUED | OUTPATIENT
Start: 2024-10-15 | End: 2024-10-16 | Stop reason: HOSPADM

## 2024-10-15 RX ORDER — SODIUM CHLORIDE, SODIUM LACTATE, POTASSIUM CHLORIDE, CALCIUM CHLORIDE 600; 310; 30; 20 MG/100ML; MG/100ML; MG/100ML; MG/100ML
INJECTION, SOLUTION INTRAVENOUS CONTINUOUS
Status: DISCONTINUED | OUTPATIENT
Start: 2024-10-15 | End: 2024-10-15 | Stop reason: HOSPADM

## 2024-10-15 RX ORDER — DEXAMETHASONE SODIUM PHOSPHATE 10 MG/ML
INJECTION INTRAMUSCULAR; INTRAVENOUS
Status: DISCONTINUED | OUTPATIENT
Start: 2024-10-15 | End: 2024-10-15 | Stop reason: SDUPTHER

## 2024-10-15 RX ORDER — KETOROLAC TROMETHAMINE 30 MG/ML
INJECTION, SOLUTION INTRAMUSCULAR; INTRAVENOUS PRN
Status: DISCONTINUED | OUTPATIENT
Start: 2024-10-15 | End: 2024-10-15 | Stop reason: ALTCHOICE

## 2024-10-15 RX ORDER — ONDANSETRON 4 MG/1
4 TABLET, ORALLY DISINTEGRATING ORAL EVERY 8 HOURS PRN
Status: DISCONTINUED | OUTPATIENT
Start: 2024-10-15 | End: 2024-10-16 | Stop reason: HOSPADM

## 2024-10-15 RX ORDER — SODIUM CHLORIDE 0.9 % (FLUSH) 0.9 %
5-40 SYRINGE (ML) INJECTION EVERY 12 HOURS SCHEDULED
Status: DISCONTINUED | OUTPATIENT
Start: 2024-10-15 | End: 2024-10-16 | Stop reason: HOSPADM

## 2024-10-15 RX ORDER — LIDOCAINE HYDROCHLORIDE 10 MG/ML
1 INJECTION, SOLUTION INFILTRATION; PERINEURAL
Status: DISCONTINUED | OUTPATIENT
Start: 2024-10-15 | End: 2024-10-15 | Stop reason: HOSPADM

## 2024-10-15 RX ORDER — PROCHLORPERAZINE EDISYLATE 5 MG/ML
5 INJECTION INTRAMUSCULAR; INTRAVENOUS
Status: DISCONTINUED | OUTPATIENT
Start: 2024-10-15 | End: 2024-10-15 | Stop reason: HOSPADM

## 2024-10-15 RX ORDER — SODIUM CHLORIDE 0.9 % (FLUSH) 0.9 %
5-40 SYRINGE (ML) INJECTION PRN
Status: DISCONTINUED | OUTPATIENT
Start: 2024-10-15 | End: 2024-10-16 | Stop reason: HOSPADM

## 2024-10-15 RX ORDER — MIDAZOLAM HYDROCHLORIDE 2 MG/2ML
2 INJECTION, SOLUTION INTRAMUSCULAR; INTRAVENOUS
Status: COMPLETED | OUTPATIENT
Start: 2024-10-15 | End: 2024-10-15

## 2024-10-15 RX ORDER — ACETAMINOPHEN 500 MG
1000 TABLET ORAL EVERY 8 HOURS
Status: DISCONTINUED | OUTPATIENT
Start: 2024-10-15 | End: 2024-10-16 | Stop reason: HOSPADM

## 2024-10-15 RX ORDER — SODIUM CHLORIDE 0.9 % (FLUSH) 0.9 %
5-40 SYRINGE (ML) INJECTION EVERY 12 HOURS SCHEDULED
Status: DISCONTINUED | OUTPATIENT
Start: 2024-10-15 | End: 2024-10-15 | Stop reason: HOSPADM

## 2024-10-15 RX ORDER — ASPIRIN 81 MG/1
81 TABLET ORAL 2 TIMES DAILY
Status: DISCONTINUED | OUTPATIENT
Start: 2024-10-15 | End: 2024-10-16 | Stop reason: HOSPADM

## 2024-10-15 RX ORDER — ROPIVACAINE HYDROCHLORIDE 2 MG/ML
INJECTION, SOLUTION EPIDURAL; INFILTRATION; PERINEURAL
Status: COMPLETED | OUTPATIENT
Start: 2024-10-15 | End: 2024-10-15

## 2024-10-15 RX ORDER — BUPIVACAINE HYDROCHLORIDE 7.5 MG/ML
INJECTION, SOLUTION INTRASPINAL
Status: COMPLETED | OUTPATIENT
Start: 2024-10-15 | End: 2024-10-15

## 2024-10-15 RX ORDER — DEXAMETHASONE SODIUM PHOSPHATE 4 MG/ML
INJECTION, SOLUTION INTRA-ARTICULAR; INTRALESIONAL; INTRAMUSCULAR; INTRAVENOUS; SOFT TISSUE
Status: COMPLETED | OUTPATIENT
Start: 2024-10-15 | End: 2024-10-15

## 2024-10-15 RX ORDER — PROPOFOL 10 MG/ML
INJECTION, EMULSION INTRAVENOUS
Status: DISCONTINUED | OUTPATIENT
Start: 2024-10-15 | End: 2024-10-15 | Stop reason: SDUPTHER

## 2024-10-15 RX ORDER — DIPHENHYDRAMINE HYDROCHLORIDE 50 MG/ML
25 INJECTION INTRAMUSCULAR; INTRAVENOUS EVERY 6 HOURS PRN
Status: DISCONTINUED | OUTPATIENT
Start: 2024-10-15 | End: 2024-10-16 | Stop reason: HOSPADM

## 2024-10-15 RX ORDER — LABETALOL HYDROCHLORIDE 5 MG/ML
10 INJECTION, SOLUTION INTRAVENOUS
Status: DISCONTINUED | OUTPATIENT
Start: 2024-10-15 | End: 2024-10-15 | Stop reason: HOSPADM

## 2024-10-15 RX ORDER — SODIUM CHLORIDE 9 MG/ML
INJECTION, SOLUTION INTRAVENOUS CONTINUOUS
Status: DISCONTINUED | OUTPATIENT
Start: 2024-10-15 | End: 2024-10-16 | Stop reason: HOSPADM

## 2024-10-15 RX ORDER — DIPHENHYDRAMINE HCL 25 MG
25 CAPSULE ORAL EVERY 6 HOURS PRN
Status: DISCONTINUED | OUTPATIENT
Start: 2024-10-15 | End: 2024-10-16 | Stop reason: HOSPADM

## 2024-10-15 RX ORDER — ONDANSETRON 2 MG/ML
4 INJECTION INTRAMUSCULAR; INTRAVENOUS EVERY 6 HOURS PRN
Status: DISCONTINUED | OUTPATIENT
Start: 2024-10-15 | End: 2024-10-16 | Stop reason: HOSPADM

## 2024-10-15 RX ORDER — SODIUM CHLORIDE 0.9 % (FLUSH) 0.9 %
5-40 SYRINGE (ML) INJECTION PRN
Status: DISCONTINUED | OUTPATIENT
Start: 2024-10-15 | End: 2024-10-15 | Stop reason: HOSPADM

## 2024-10-15 RX ORDER — ALBUTEROL SULFATE 0.83 MG/ML
2.5 SOLUTION RESPIRATORY (INHALATION) EVERY 6 HOURS PRN
Status: DISCONTINUED | OUTPATIENT
Start: 2024-10-15 | End: 2024-10-16 | Stop reason: HOSPADM

## 2024-10-15 RX ORDER — ONDANSETRON 2 MG/ML
INJECTION INTRAMUSCULAR; INTRAVENOUS
Status: DISCONTINUED | OUTPATIENT
Start: 2024-10-15 | End: 2024-10-15 | Stop reason: SDUPTHER

## 2024-10-15 RX ORDER — KETOROLAC TROMETHAMINE 15 MG/ML
15 INJECTION, SOLUTION INTRAMUSCULAR; INTRAVENOUS EVERY 6 HOURS
Status: DISCONTINUED | OUTPATIENT
Start: 2024-10-15 | End: 2024-10-16 | Stop reason: HOSPADM

## 2024-10-15 RX ORDER — ACETAMINOPHEN 500 MG
1000 TABLET ORAL ONCE
Status: COMPLETED | OUTPATIENT
Start: 2024-10-15 | End: 2024-10-15

## 2024-10-15 RX ORDER — SENNA AND DOCUSATE SODIUM 50; 8.6 MG/1; MG/1
1 TABLET, FILM COATED ORAL 2 TIMES DAILY
Status: DISCONTINUED | OUTPATIENT
Start: 2024-10-15 | End: 2024-10-16 | Stop reason: HOSPADM

## 2024-10-15 RX ORDER — EPHEDRINE SULFATE/0.9% NACL/PF 50 MG/5 ML
SYRINGE (ML) INTRAVENOUS
Status: DISCONTINUED | OUTPATIENT
Start: 2024-10-15 | End: 2024-10-15 | Stop reason: SDUPTHER

## 2024-10-15 RX ORDER — SODIUM CHLORIDE 0.9 % (FLUSH) 0.9 %
5-40 SYRINGE (ML) INJECTION PRN
Status: DISCONTINUED | OUTPATIENT
Start: 2024-10-15 | End: 2024-10-15

## 2024-10-15 RX ORDER — SODIUM CHLORIDE 9 MG/ML
INJECTION, SOLUTION INTRAVENOUS PRN
Status: DISCONTINUED | OUTPATIENT
Start: 2024-10-15 | End: 2024-10-15

## 2024-10-15 RX ORDER — HYDRALAZINE HYDROCHLORIDE 20 MG/ML
10 INJECTION INTRAMUSCULAR; INTRAVENOUS
Status: DISCONTINUED | OUTPATIENT
Start: 2024-10-15 | End: 2024-10-15 | Stop reason: HOSPADM

## 2024-10-15 RX ORDER — ONDANSETRON 2 MG/ML
4 INJECTION INTRAMUSCULAR; INTRAVENOUS
Status: DISCONTINUED | OUTPATIENT
Start: 2024-10-15 | End: 2024-10-15 | Stop reason: HOSPADM

## 2024-10-15 RX ORDER — SODIUM CHLORIDE 9 MG/ML
INJECTION, SOLUTION INTRAVENOUS PRN
Status: DISCONTINUED | OUTPATIENT
Start: 2024-10-15 | End: 2024-10-16 | Stop reason: HOSPADM

## 2024-10-15 RX ADMIN — PHENYLEPHRINE HYDROCHLORIDE 100 MCG: 0.1 INJECTION, SOLUTION INTRAVENOUS at 14:09

## 2024-10-15 RX ADMIN — BUPIVACAINE HYDROCHLORIDE IN DEXTROSE 13.5 MG: 7.5 INJECTION, SOLUTION SUBARACHNOID at 12:20

## 2024-10-15 RX ADMIN — ASPIRIN 81 MG: 81 TABLET, COATED ORAL at 21:11

## 2024-10-15 RX ADMIN — TRANEXAMIC ACID 1000 MG: 100 INJECTION, SOLUTION INTRAVENOUS at 12:32

## 2024-10-15 RX ADMIN — PROPOFOL 100 MCG/KG/MIN: 10 INJECTION, EMULSION INTRAVENOUS at 12:26

## 2024-10-15 RX ADMIN — Medication 10 MG: at 12:42

## 2024-10-15 RX ADMIN — SENNOSIDES AND DOCUSATE SODIUM 1 TABLET: 50; 8.6 TABLET ORAL at 21:11

## 2024-10-15 RX ADMIN — ONDANSETRON 4 MG: 2 INJECTION INTRAMUSCULAR; INTRAVENOUS at 12:32

## 2024-10-15 RX ADMIN — OXYCODONE 10 MG: 5 TABLET ORAL at 17:45

## 2024-10-15 RX ADMIN — Medication 2000 MG: at 12:32

## 2024-10-15 RX ADMIN — OXYCODONE 10 MG: 5 TABLET ORAL at 21:11

## 2024-10-15 RX ADMIN — FENTANYL CITRATE 100 MCG: 50 INJECTION INTRAMUSCULAR; INTRAVENOUS at 12:05

## 2024-10-15 RX ADMIN — Medication 5 MG: at 12:53

## 2024-10-15 RX ADMIN — PHENYLEPHRINE HYDROCHLORIDE 100 MCG: 0.1 INJECTION, SOLUTION INTRAVENOUS at 13:31

## 2024-10-15 RX ADMIN — TRANEXAMIC ACID 1000 MG: 100 INJECTION, SOLUTION INTRAVENOUS at 14:01

## 2024-10-15 RX ADMIN — Medication 5 MG: at 14:09

## 2024-10-15 RX ADMIN — PHENYLEPHRINE HYDROCHLORIDE 50 MCG: 0.1 INJECTION, SOLUTION INTRAVENOUS at 13:18

## 2024-10-15 RX ADMIN — DEXAMETHASONE SODIUM PHOSPHATE 4 MG: 4 INJECTION INTRA-ARTICULAR; INTRALESIONAL; INTRAMUSCULAR; INTRAVENOUS; SOFT TISSUE at 12:05

## 2024-10-15 RX ADMIN — DEXAMETHASONE SODIUM PHOSPHATE 10 MG: 10 INJECTION INTRAMUSCULAR; INTRAVENOUS at 12:32

## 2024-10-15 RX ADMIN — ACETAMINOPHEN 1000 MG: 500 TABLET, FILM COATED ORAL at 11:26

## 2024-10-15 RX ADMIN — KETOROLAC TROMETHAMINE 15 MG: 15 INJECTION, SOLUTION INTRAMUSCULAR; INTRAVENOUS at 21:12

## 2024-10-15 RX ADMIN — WATER 2000 MG: 1 INJECTION INTRAMUSCULAR; INTRAVENOUS; SUBCUTANEOUS at 21:12

## 2024-10-15 RX ADMIN — PHENYLEPHRINE HYDROCHLORIDE 100 MCG: 0.1 INJECTION, SOLUTION INTRAVENOUS at 13:52

## 2024-10-15 RX ADMIN — ACETAMINOPHEN 1000 MG: 500 TABLET, FILM COATED ORAL at 17:45

## 2024-10-15 RX ADMIN — SODIUM CHLORIDE, SODIUM LACTATE, POTASSIUM CHLORIDE, AND CALCIUM CHLORIDE: 600; 310; 30; 20 INJECTION, SOLUTION INTRAVENOUS at 12:13

## 2024-10-15 RX ADMIN — SODIUM CHLORIDE, PRESERVATIVE FREE 10 ML: 5 INJECTION INTRAVENOUS at 21:12

## 2024-10-15 RX ADMIN — ROPIVACAINE HYDROCHLORIDE 20 ML: 2 INJECTION, SOLUTION EPIDURAL; INFILTRATION at 12:05

## 2024-10-15 RX ADMIN — Medication 5 MG: at 12:57

## 2024-10-15 RX ADMIN — MIDAZOLAM 2 MG: 1 INJECTION INTRAMUSCULAR; INTRAVENOUS at 12:05

## 2024-10-15 ASSESSMENT — PAIN DESCRIPTION - DESCRIPTORS
DESCRIPTORS: ACHING
DESCRIPTORS: ACHING
DESCRIPTORS: ACHING;SORE

## 2024-10-15 ASSESSMENT — PAIN SCALES - GENERAL
PAINLEVEL_OUTOF10: 2
PAINLEVEL_OUTOF10: 8
PAINLEVEL_OUTOF10: 7
PAINLEVEL_OUTOF10: 5

## 2024-10-15 ASSESSMENT — PAIN - FUNCTIONAL ASSESSMENT
PAIN_FUNCTIONAL_ASSESSMENT: PREVENTS OR INTERFERES SOME ACTIVE ACTIVITIES AND ADLS
PAIN_FUNCTIONAL_ASSESSMENT: 0-10

## 2024-10-15 ASSESSMENT — ENCOUNTER SYMPTOMS: SHORTNESS OF BREATH: 1

## 2024-10-15 ASSESSMENT — PAIN DESCRIPTION - LOCATION
LOCATION: KNEE
LOCATION: KNEE

## 2024-10-15 ASSESSMENT — PAIN DESCRIPTION - ORIENTATION
ORIENTATION: RIGHT
ORIENTATION: RIGHT

## 2024-10-15 NOTE — CARE COORDINATION
Patient is a 69 y.o. year old female admitted for Right TKA . Patient plans to return home on discharge. Order received to arrange home health. Patient without preference towards agency. Referral sent to [facility]. Patient requesting we arrange a JR walker. Pt without preference towards provider. Referral sent to [facility]. Equipment delivered to the hospital room prior to discharge. Will follow until discharge.      10/15/24 6657   Service Assessment   Patient Orientation Alert and Oriented   Cognition Alert   History Provided By Patient   Services At/After Discharge   Transition of Care Consult (CM Consult) Home Health   Internal Home Health Yes   Services At/After Discharge Home Health;PT   Mode of Transport at Discharge Self   Confirm Follow Up Transport Self   Condition of Participation: Discharge Planning   The Plan for Transition of Care is related to the following treatment goals: improve mobility   The Patient and/or Patient Representative was provided with a Choice of Provider? Patient   The Patient and/Or Patient Representative agree with the Discharge Plan? Yes   Freedom of Choice list was provided with basic dialogue that supports the patient's individualized plan of care/goals, treatment preferences, and shares the quality data associated with the providers?  Yes

## 2024-10-15 NOTE — ANESTHESIA PROCEDURE NOTES
Peripheral Block    Patient location during procedure: pre-op  Reason for block: post-op pain management and at surgeon's request  Start time: 10/15/2024 12:05 PM  End time: 10/15/2024 12:08 PM  Staffing  Performed: anesthesiologist   Anesthesiologist: Jad Alonso MD  Performed by: Jad Alonso MD  Authorized by: Jad Alonso MD    Preanesthetic Checklist  Completed: patient identified, IV checked, site marked, risks and benefits discussed, surgical/procedural consents, equipment checked, pre-op evaluation, timeout performed, anesthesia consent given, oxygen available and monitors applied/VS acknowledged  Peripheral Block   Patient position: supine  Prep: ChloraPrep  Provider prep: sterile gloves and mask  Patient monitoring: cardiac monitor, continuous pulse ox, frequent blood pressure checks, IV access, oxygen and responsive to questions  Block type: Femoral  Adductor canal  Laterality: right  Injection technique: single-shot  Guidance: ultrasound guided    Needle   Needle type: insulated echogenic nerve stimulator needle   Needle localization: ultrasound guidance  Assessment   Injection assessment: negative aspiration for heme, no paresthesia on injection, local visualized surrounding nerve on ultrasound and no intravascular symptoms  Paresthesia pain: none  Slow fractionated injection: yes  Hemodynamics: stable  Outcomes: uncomplicated and patient tolerated procedure well    Additional Notes  Ultrasound image taken and stored in chart   Medications Administered  ropivacaine (NAROPIN) injection 0.2% - Perineural   20 mL - 10/15/2024 12:05:00 PM  dexAMETHasone (DECADRON) injection 4 mg/mL - Perineural   4 mg - 10/15/2024 12:05:00 PM

## 2024-10-15 NOTE — RT PROTOCOL NOTE
the most effective method of delivery to the patient.    III. Patient Type: All patients who are determined to meet aerosolized medication criteria as          outlined in this protocol.    IV. Responsibility: Director, Respiratory Care Services, registered Respiratory Care Practitioners (RCP's) with documented competency in the performance of                                     respiratory therapeutic techniques.    V. Equipment needed:  Stethoscope  Pulse oximeter  AeroEclipse nebulizer  Meter Dose Inhaler (MDI)    VI. Protocol:   The following conditions are accepted indications for aerosolized medication therapy.   Bronchospasm/wheezing  Impaired mucociliary clearance  Tracheobronchial mucosal congestion/and laryngeal stridor  Diseases which commonly require aerosolized medication therapy include, but are not limited to:  Asthma/reactive airway disease  Bronchitis/emphysema (COPD)  Cystic fibrosis  Severe laryngitis/tracheitis  Bronchiectasis  Smoke inhalation or chemical trauma to the lung or upper airway  Physical trauma to the upper airway  Laryngotracheobronchitis  Bronchiolitis  Non-specific wheezing              B. Indications for bronchodilator medications will include:  Bronchospasm/ wheezing  Asthma/reactive airway disease  Chronic obstructive pulmonary disease  Obstructive defect on pulmonary function testing  Administration of medications  If a bronchodilator or any other type of respiratory medication is needed, a physician order must be indicated in the medication section in the patient's EMR.   When the physician specifies the medication and dosage at the time of request, the ordered medication will be used as part of the care plan.  The following guidelines will be utilized in the evaluation and selection of the appropriate delivery device for indicated medication(s):  MDI is the preferred method of medication delivery via common canister.  Patient should be alert/cooperative  Able to perform 3

## 2024-10-15 NOTE — ANESTHESIA POSTPROCEDURE EVALUATION
Department of Anesthesiology  Postprocedure Note    Patient: Odalis Crystal  MRN: [id number]  YOB: 1955  Date of evaluation: 10/15/2024    Procedure Summary       Date: 10/15/24 Room / Location: [facility] MAIN OR  / [facility] MAIN OR    Anesthesia Start: 1208 Anesthesia Stop: 1415    Procedure: KNEE TOTAL ARTHROPLASTY-SDD (Right: Knee) Diagnosis:       Primary osteoarthritis of right knee      (Primary osteoarthritis of right knee [M17.11])    Surgeons: Bao Friedman MD Responsible Provider: Jad Alonso MD    Anesthesia Type: spinal ASA Status: 3            Anesthesia Type: No value filed.    Serena Phase I: Serena Score: 9    Serena Phase II:      Anesthesia Post Evaluation    Patient location during evaluation: PACU  Patient participation: complete - patient participated  Level of consciousness: awake and alert  Airway patency: patent  Nausea: well controlled.  Cardiovascular status: acceptable.  Respiratory status: acceptable  Hydration status: stable  Pain management: adequate    No notable events documented.

## 2024-10-15 NOTE — ANESTHESIA PRE PROCEDURE
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   • Syncope and collapse R55   • Abnormal thyroid function test R94.6   • Agatston coronary artery calcium score between 100 and 199 R93.1   • Microcytic anemia D50.9   • Abnormal glucose level R73.09   • Postconcussion syndrome F07.81   • Abnormal cervical Papanicolaou smear R87.619   • Osteoarthrosis M19.90   • Chest pain R07.9   • Lumbar stenosis with neurogenic claudication M48.062   • Vitamin D deficiency E55.9   • Primary osteoarthritis of right knee M17.11   • Arthritis of right knee M17.11       Past Medical History:        Diagnosis Date   • Anxiety and depression    • Arthritis    • Asthma     \"little bit\"   • Cancer (HCC)     uterine cancer   • DVT (deep venous thrombosis) (HCC)     2009; right lower leg; takes daily ASA   • GERD (gastroesophageal reflux disease)     managed with med   • History of stress test 10/19/2020    1. Stress EKG: Normal.  2. SPECT Perfusion Imaging: Normal Perfusion.  3. LV Systolic Function is normal.  4. Risk Assessment: Low Risk Scan.   • Hyperlipidemia    • Hypertension     managed with meds   • Iron deficiency anemia    • Lumbar disc herniation with radiculopathy    • Lumbar foraminal stenosis    • Lumbar radiculopathy    • Seasonal allergies     takes allergy shots weekly and xolair once a month   • Syncope and collapse    • Vitamin D deficiency        Past Surgical History:        Procedure Laterality Date   • CHOLECYSTECTOMY, OPEN  1995    In [place]   • COLONOSCOPY     • HYSTERECTOMY (CERVIX STATUS UNKNOWN)  1991    [facility] in

## 2024-10-15 NOTE — OP NOTE
Operative Note      Patient: Odalis Crystal  YOB: 1955  MRN: [id number]    Date of Procedure: 10/15/2024    Pre-Op Diagnosis Codes:      * Primary osteoarthritis of right knee [M17.11]    Post-Op Diagnosis: Same       Procedure(s):  KNEE TOTAL ARTHROPLASTY-SDD    Surgeon(s):  Bao Friedman MD    Assistant:   * No surgical staff found *    Anesthesia: Spinal    Estimated Blood Loss (mL): less than 100     Complications: None    Specimens:   * No specimens in log *    Implants:  Implant Name Type Inv. Item Serial No.  Lot No. LRB No. Used Action   CEMENT BONE 40GM HI VISC PALACOS R - [id number]  CEMENT BONE 40GM HI VISC PALACOS R  [employer] Pharm[employer] [id number] Right 2 Implanted   COMPONENT PAT [id number] KNEE POLY DOME SONI MEDIALIZED ATTUNE - [id number]  COMPONENT PAT [id number] KNEE POLY DOME SONI MEDIALIZED ATTUNE  [facility] [employer] [facility] [id number] Right 1 Implanted   INSERT TIB FIX BEAR 4 5 MM RT MEDL KNEE STBL AOX ATTUNE - [id number]  INSERT TIB FIX BEAR 4 5 MM RT MEDL KNEE STBL AOX ATTUNE  [facility] [employer]- [id number] Right 1 Implanted   COMPONENT FEM SZ 4 R KNEE CRUCE RET SONI ATTUNE - [id number]  COMPONENT FEM SZ 4 R KNEE CRUCE RET SONI ATTUNE  [facility] [employer]- [id number] Right 1 Implanted   BASEPLATE TIB SZ 4 FIX BEAR SONI S+ TECHNOLOGY ATTUNE - [id number]  BASEPLATE TIB SZ 4 FIX BEAR SONI S+ TECHNOLOGY ATTUNE  [facility] [employer]- [id number] Right 1 Implanted         Drains: * No LDAs found *    Findings:  Infection Present At Time Of Surgery (PATOS) (choose all levels that have infection present):  No infection present  Other Findings: OA    Detailed Description of Procedure:        Indications for procedure:  The patient is a 69 y.o. female  with radiographic evidence of known end stage osteoarthritis of their knee and failure of conservative management, including but not limited to pain relievers, corticosteroids, home exercise

## 2024-10-15 NOTE — ANESTHESIA PROCEDURE NOTES
Spinal Block    Patient location during procedure: OR  End time: 10/15/2024 12:25 PM  Reason for block: primary anesthetic  Staffing  Performed: anesthesiologist   Anesthesiologist: Jad Alonso MD  Performed by: Jad Alonso MD  Authorized by: Jad Alonso MD    Spinal Block  Patient position: sitting  Prep: ChloraPrep  Patient monitoring: cardiac monitor, continuous pulse ox, frequent blood pressure checks and oxygen  Approach: midline  Location: L3/L4  Provider prep: sterile gloves and mask  Local infiltration: lidocaine  Needle  Needle type: Remy   Needle gauge: 25 G  Assessment  Swirl obtained: Yes  CSF: clear  Attempts: 1  Hemodynamics: stable  Preanesthetic Checklist  Completed: patient identified, IV checked, site marked, risks and benefits discussed, surgical/procedural consents, equipment checked, pre-op evaluation, timeout performed, anesthesia consent given, oxygen available and monitors applied/VS acknowledged

## 2024-10-16 VITALS
TEMPERATURE: 97.2 F | OXYGEN SATURATION: 97 % | BODY MASS INDEX: 30.05 KG/M2 | SYSTOLIC BLOOD PRESSURE: 120 MMHG | HEIGHT: 62 IN | HEART RATE: 95 BPM | DIASTOLIC BLOOD PRESSURE: 83 MMHG | RESPIRATION RATE: 16 BRPM | WEIGHT: 163.3 LBS

## 2024-10-16 PROCEDURE — 97535 SELF CARE MNGMENT TRAINING: CPT

## 2024-10-16 PROCEDURE — 97530 THERAPEUTIC ACTIVITIES: CPT

## 2024-10-16 PROCEDURE — 2580000003 HC RX 258: Performed by: ORTHOPAEDIC SURGERY

## 2024-10-16 PROCEDURE — 6360000002 HC RX W HCPCS: Performed by: ORTHOPAEDIC SURGERY

## 2024-10-16 PROCEDURE — 6370000000 HC RX 637 (ALT 250 FOR IP): Performed by: ORTHOPAEDIC SURGERY

## 2024-10-16 RX ORDER — TRAMADOL HYDROCHLORIDE 50 MG/1
50 TABLET ORAL EVERY 6 HOURS
Status: DISCONTINUED | OUTPATIENT
Start: 2024-10-16 | End: 2024-10-16 | Stop reason: HOSPADM

## 2024-10-16 RX ADMIN — ACETAMINOPHEN 1000 MG: 500 TABLET, FILM COATED ORAL at 03:13

## 2024-10-16 RX ADMIN — WATER 2000 MG: 1 INJECTION INTRAMUSCULAR; INTRAVENOUS; SUBCUTANEOUS at 03:14

## 2024-10-16 RX ADMIN — SENNOSIDES AND DOCUSATE SODIUM 1 TABLET: 50; 8.6 TABLET ORAL at 09:39

## 2024-10-16 RX ADMIN — KETOROLAC TROMETHAMINE 15 MG: 15 INJECTION, SOLUTION INTRAMUSCULAR; INTRAVENOUS at 09:39

## 2024-10-16 RX ADMIN — ASPIRIN 81 MG: 81 TABLET, COATED ORAL at 09:39

## 2024-10-16 RX ADMIN — SODIUM CHLORIDE, PRESERVATIVE FREE 10 ML: 5 INJECTION INTRAVENOUS at 08:54

## 2024-10-16 RX ADMIN — ACETAMINOPHEN 1000 MG: 500 TABLET, FILM COATED ORAL at 09:39

## 2024-10-16 RX ADMIN — KETOROLAC TROMETHAMINE 15 MG: 15 INJECTION, SOLUTION INTRAMUSCULAR; INTRAVENOUS at 03:13

## 2024-10-16 RX ADMIN — ONDANSETRON 4 MG: 2 INJECTION, SOLUTION INTRAMUSCULAR; INTRAVENOUS at 08:54

## 2024-10-16 RX ADMIN — OXYCODONE 10 MG: 5 TABLET ORAL at 03:14

## 2024-10-16 RX ADMIN — TRAMADOL HYDROCHLORIDE 50 MG: 50 TABLET ORAL at 07:10

## 2024-10-16 RX ADMIN — ONDANSETRON 4 MG: 2 INJECTION, SOLUTION INTRAMUSCULAR; INTRAVENOUS at 00:44

## 2024-10-16 ASSESSMENT — PAIN SCALES - GENERAL
PAINLEVEL_OUTOF10: 9
PAINLEVEL_OUTOF10: 4
PAINLEVEL_OUTOF10: 6
PAINLEVEL_OUTOF10: 8

## 2024-10-16 ASSESSMENT — PAIN DESCRIPTION - DESCRIPTORS
DESCRIPTORS: ACHING
DESCRIPTORS: ACHING;SORE;THROBBING
DESCRIPTORS: ACHING

## 2024-10-16 ASSESSMENT — PAIN DESCRIPTION - ORIENTATION
ORIENTATION: RIGHT

## 2024-10-16 ASSESSMENT — PAIN DESCRIPTION - LOCATION
LOCATION: KNEE

## 2024-10-16 ASSESSMENT — PAIN - FUNCTIONAL ASSESSMENT: PAIN_FUNCTIONAL_ASSESSMENT: PREVENTS OR INTERFERES SOME ACTIVE ACTIVITIES AND ADLS

## 2024-10-16 NOTE — PLAN OF CARE
Problem: Pain  Goal: Verbalizes/displays adequate comfort level or baseline comfort level  10/16/2024 1118 by Andreas Olea RN  Outcome: Progressing  10/15/2024 2302 by Elaine Bourgeois RN  Outcome: Progressing     Problem: Discharge Planning  Goal: Discharge to home or other facility with appropriate resources  10/16/2024 1118 by Andreas Olea RN  Outcome: Adequate for Discharge  10/15/2024 2302 by Elaine Bourgeois RN  Outcome: Progressing     Problem: Safety - Adult  Goal: Free from fall injury  10/16/2024 1118 by Andreas Olea RN  Outcome: Adequate for Discharge  10/15/2024 2302 by Elaine Bourgeois RN  Outcome: Progressing

## 2024-10-16 NOTE — PROGRESS NOTES
10/15/24 3174   Oxygen Therapy/Pulse Ox   O2 Therapy Room air   Pulse (!) 101   Respirations 20   SpO2 98 %   Pulse Oximeter Device Mode Continuous   $Pulse Oximeter $Spot check (multiple/continuous)     Patient placed on continuous sat monitor. Data cleared and alarms set. No distress noted at this time.   
4 Eyes Skin Assessment     NAME:  Odalis Crystal  YOB: 1955  MEDICAL RECORD NUMBER:  [id number]    The patient is being assessed for  Admission    I agree that at least one RN has performed a thorough Head to Toe Skin Assessment on the patient. ALL assessment sites listed below have been assessed.      Areas assessed by both nurses:    Head, Face, Ears, Shoulders, Back, Chest, Arms, Elbows, Hands, Sacrum. Buttock, Coccyx, Ischium, and Legs. Feet and Heels        Does the Patient have a Wound? No noted wound(s)       Thiago Prevention initiated by RN: No  Wound Care Orders initiated by RN: No    Pressure Injury (Stage 3,4, Unstageable, DTI, NWPT, and Complex wounds) if present, place Wound referral order by RN under : No    New Ostomies, if present place, Ostomy referral order under : No     Nurse 1 eSignature: Electronically signed by Shaunna Marsh RN on 10/15/24 at 4:26 PM EDT    **SHARE this note so that the co-signing nurse can place an eSignature**    Nurse 2 eSignature: Electronically signed by Fannie Thibodeaux RN on 10/15/24 at 4:27 PM EDT   
ACUTE PHYSICAL THERAPY GOALS:   (Developed with and agreed upon by patient and/or caregiver.)  GOALS (1-4 days):  (1.)Ms. Crystal will move from supine to sit and sit to supine  in bed with SUPERVISION.  (2.)Ms. Crystal will transfer from bed to chair and chair to bed with SUPERVISION using the least restrictive device.  (3.)Ms. Crystal will ambulate with SUPERVISION for 150 feet with the least restrictive device.  (4.)Ms. Crystal will ambulate up/down 2 steps with left railing with MINIMAL ASSIST.  (5.)Ms. Crystal will increase right knee ROM to 3-90°.  ________________________________________________________________________________________________            PHYSICAL THERAPY: TOTAL KNEE ARTHROPLASTY Daily Note and AM  (Link to Caseload Tracking: PT Visit Days : 2  Acknowledge Orders  Time In/Out  PT Charge Capture  Rehab Caseload Tracker  Episode   Odalis Crystal is a 69 y.o. female   PRIMARY DIAGNOSIS: Primary osteoarthritis of right knee  Primary osteoarthritis of right knee [M17.11]  Arthritis of right knee [M17.11]  Procedure(s) (LRB):  KNEE TOTAL ARTHROPLASTY-SDD (Right)  1 Day Post-Op  Reason for Referral: Pain in Right Knee (M25.561)  Stiffness of Right Knee, Not elsewhere classified (M25.661)  Difficulty in walking, Not elsewhere classified (R26.2)  Outpatient in a bed: Payor: [employer] MEDICARE / Plan: A[employer] MEDICARE-ADVANTAGE PPO / Product Type: Medicare /     REHAB RECOMMENDATIONS:   Recommendation to date pending progress:  Setting:  Home Health Therapy    Equipment:    Rolling Walker     RANGE OF MOTION:   Right Knee Flexion: R Knee Flexion (0-145): 79  Right Knee Extension: R Knee Extension (0): 9     GAIT: I Mod I S SBA CGA Min Mod Max Total  NT x2 Comments:   Level of Assistance [] [] [] [x] [] [] [] [] [] [] []            Weightbearing Status  Right Lower Extremity Weight Bearing: Weight Bearing As Tolerated    Distance  100 feet    Gait Quality Decreased moraima , Decreased step length, and Decreased 
ACUTE PHYSICAL THERAPY GOALS:   (Developed with and agreed upon by patient and/or caregiver.)  GOALS (1-4 days):  (1.)Ms. Crystal will move from supine to sit and sit to supine  in bed with SUPERVISION.  (2.)Ms. Crystal will transfer from bed to chair and chair to bed with SUPERVISION using the least restrictive device.  (3.)Ms. Crystal will ambulate with SUPERVISION for 150 feet with the least restrictive device.  (4.)Ms. Crystal will ambulate up/down 2 steps with left railing with MINIMAL ASSIST.  (5.)Ms. Crystal will increase right knee ROM to 3-90°.  ________________________________________________________________________________________________            PHYSICAL THERAPY: TOTAL KNEE ARTHROPLASTY Initial Assessment and PM  (Link to Caseload Tracking: PT Visit Days : 1  Acknowledge Orders  Time In/Out  PT Charge Capture  Rehab Caseload Tracker  Episode   Odalis Crystal is a 69 y.o. female   PRIMARY DIAGNOSIS: Primary osteoarthritis of right knee  Primary osteoarthritis of right knee [M17.11]  Arthritis of right knee [M17.11]  Procedure(s) (LRB):  KNEE TOTAL ARTHROPLASTY-SDD (Right)  Day of Surgery  Reason for Referral: Pain in Right Knee (M25.561)  Stiffness of Right Knee, Not elsewhere classified (M25.661)  Difficulty in walking, Not elsewhere classified (R26.2)  Outpatient in a bed: Payor: T MEDICARE / Plan: AET MEDICARE-ADVANTAGE PPO / Product Type: Medicare /     REHAB RECOMMENDATIONS:   Recommendation to date pending progress:  Setting:  Home Health Therapy    Equipment:    Rolling Walker     RANGE OF MOTION:   Right Knee Flexion: R Knee Flexion (0-145): 75  Right Knee Extension: R Knee Extension (0): 5     GAIT: I Mod I S SBA CGA Min Mod Max Total  NT x2 Comments:   Level of Assistance [] [] [] [] [x] [x] [] [] [] [] []            Weightbearing Status  Right Lower Extremity Weight Bearing: Weight Bearing As Tolerated    Distance  50 feet    Gait Quality Decreased moraima , Decreased step clearance, 
Discharge paperwork provided to and reviewed with patient and spouse. All questions answered. Pain and nausea controlled.  will provide transport. Ready for discharge.  
Dr. Beth reviewed chart. No order received. Ok to proceed.    
Labs within anesthesia guidelines, no follow-up required. Labs automatically routed to ordering provider via Epic documentation.      
[facility] Progress Note    NAME: Odalis Crystal  : 1955  MRN: [id number]  DATE: 10/16/2024  POD: 1 Day Post-Op  S/P: right total knee arthroplasty    SUBJECTIVE:  No acute events overnight. Pain overnight with emesis.  No CP SOB.     No results for input(s): \"HGB\", \"HCT\", \"INR\", \"NA\", \"K\", \"CL\", \"CO2\", \"BUN\", \"GLU\" in the last 72 hours.    Invalid input(s): \"CREA\"        PHYSICAL EXAM:  Pt is AAOx3. No acute distress  right Lower Extremity  Aquacel dressing clean, dry, and intact  Incision not visualized  Motor function intact EHL/TA, GS/PT/FHL  SILT s/s/sp/dp/t distributions  Palpable DP/PT pulses. Foot WWP    ASSESSMENT:  69 y.o.  female   s/p right TKA on 10/15/24 doing well postoperatively    PLAN:  - Regular diet  - Hemodynamically stable  - Perioperative IV antibiotics x 24 hours  - pain control: toradol, tylenol, oxycodone 5mg 1-2 tabs q3h, tramadol standing  - Postoperative xrays reviewed and without complication  - WBAT RLE, mobilize with PT/OT  - DVT prophylaxis with ASA BID x 30 days and SCDs  - Dispo planning: Anticipate discharge home pending PT/OT clearance  - Follow up in clinic in 2 weeks for a wound check      Bao Friedman MD,10/16/2024, 6:46 AM    
OCCUPATIONAL THERAPY Initial Assessment, Daily Note, and PM      (Link to Caseload Tracking: OT Visit Days: 1  OT Orders   Time  OT Charge Capture  Rehab Caseload Tracker  Episode     Odalis Crystal is a 69 y.o. female   PRIMARY DIAGNOSIS: Primary osteoarthritis of right knee  Primary osteoarthritis of right knee [M17.11]  Arthritis of right knee [M17.11]  Procedure(s) (LRB):  KNEE TOTAL ARTHROPLASTY-SDD (Right)  Day of Surgery  Reason for Referral: Pain in Right Knee (M25.561)  Stiffness of Right Knee, Not elsewhere classified (M25.661)  Outpatient in a bed: Payor: [facility] MEDICARE / Plan: AET MEDICARE-ADVANTAGE PPO / Product Type: Medicare /     ASSESSMENT:     REHAB RECOMMENDATIONS:   Recommendation to date pending progress:  Setting:  No further skilled occupational therapy after discharge from hospital    Equipment:    Rolling Walker     ASSESSMENT:  Ms. Crsytal is s/p right TKA and presents with decreased independence with functional mobility and activities of daily living as compared to baseline level of function and safety. Patient would benefit from skilled Occupational Therapy to maximize independence and safety with self-care task and functional mobility.   Patient seen in room with supportive  present. Pt worked on bed mobility, edge of bed sitting tolerance (with some light headedness noted), donning clothes and up in room and schuler with RW; all with verbal cues for safety and technique. Pt noted with LOB x 5 and continued light headedness thought session. Pt was planning to discharge home today but might be best served to spend the night to better prepare for safe discharge home. OT will follow for full self care and mobility training.        [facility] [employer] “6 Clicks” Daily Activity Inpatient Short Form:           SUBJECTIVE:     Ms. Crystal states, \"I almost fell\"      Social/Functional   Lives With: Spouse  Type of Home: House  Home Layout: One level  Home Access: Stairs to 
Safety  [x] Walker Management/Safety  [x] Adaptive Equipment as Needed  [x] Therapeutic Resting Position of Joint     TOTAL TREATMENT DURATION AND TIME:  Time In: 1030  Time Out: 1130  Minutes: 60    Nathan Valentino, OT

## 2024-10-16 NOTE — PLAN OF CARE
Problem: Pain  Goal: Verbalizes/displays adequate comfort level or baseline comfort level  10/15/2024 2302 by Elaine Bourgeois, RN  Outcome: Progressing  10/15/2024 1545 by Shaunna Marsh RN  Outcome: Progressing     Problem: Discharge Planning  Goal: Discharge to home or other facility with appropriate resources  10/15/2024 2302 by Elaine Bourgeois RN  Outcome: Progressing  10/15/2024 1545 by Shaunna Marsh RN  Outcome: Progressing     Problem: Safety - Adult  Goal: Free from fall injury  Outcome: Progressing

## 2024-10-16 NOTE — DISCHARGE INSTRUCTIONS
[facility]      Patient Discharge Instructions    Odalis Saleem Marah/[id number] : 1955    Admitted 10/15/2024 Discharged: 10/16/2024       IF YOU HAVE ANY PROBLEMS ONCE YOU ARE AT HOME CALL THE FOLLOWING NUMBERS:   Main office number: [phone]      Medications    The medications you are to continue on are listed on the medication reconciliation sheet.   Narcotic pain medications as well as supplemental iron can cause constipation. If this occurs try stopping the narcotic pain medication and/or the iron.   It is important that you take the medication exactly as they are prescribed.  Medications which increase your risk of blood clots are listed to stop for 5 weeks after surgery as well as medications or supplements which increase your risk of bleeding complications.   Keep your medication in the bottles provided by the pharmacist and keep a list of the medication names, dosages, and times to be taken in your wallet.   Do not take other medications without consulting your doctor.       Important Information    Do NOT smoke as this will greatly increase your risk of infection!    Resume your prehospital diet. If you have excessive nausea or vomitting call your doctor's office     Leg swelling and warmth is normal for 6 months after surgery. If you experience swelling in your leg elevate you leg while laying down with your toes above your heart. If you have sudden onset severe swelling with leg pain call our office. Use Dimas Hose stockings until we see you in the office for your follow up appointment.    The stitches deep inside take approximately 6 months to dissolve. There will be sharp shooting, stinging and burning pain. This is normal and will resolve between 3-6 months after surgery.     Difficulty sleeping is normal following total Knee and Hip replacement. You may try melatonin, an over-the-counter sleep aid or benadryl to help with sleep. Most patients will resume sleeping through

## 2024-10-18 ENCOUNTER — TELEPHONE (OUTPATIENT)
Dept: ORTHOPEDIC SURGERY | Age: 69
End: 2024-10-18

## 2024-10-18 DIAGNOSIS — Z96.651 S/P TOTAL KNEE ARTHROPLASTY, RIGHT: Primary | ICD-10-CM

## 2024-10-28 ENCOUNTER — OFFICE VISIT (OUTPATIENT)
Dept: ORTHOPEDIC SURGERY | Age: 69
End: 2024-10-28

## 2024-10-28 VITALS — HEIGHT: 62 IN | BODY MASS INDEX: 30 KG/M2 | WEIGHT: 163 LBS

## 2024-10-28 DIAGNOSIS — Z96.651 HX OF TOTAL KNEE ARTHROPLASTY, RIGHT: Primary | ICD-10-CM

## 2024-10-28 PROCEDURE — 99024 POSTOP FOLLOW-UP VISIT: CPT | Performed by: ORTHOPAEDIC SURGERY

## 2024-10-28 RX ORDER — GABAPENTIN 300 MG/1
300 CAPSULE ORAL 3 TIMES DAILY
Qty: 90 CAPSULE | Refills: 0 | Status: SHIPPED | OUTPATIENT
Start: 2024-10-28 | End: 2024-11-27

## 2024-10-28 RX ORDER — HYDROMORPHONE HYDROCHLORIDE 2 MG/1
2 TABLET ORAL
Qty: 30 TABLET | Refills: 0 | Status: SHIPPED | OUTPATIENT
Start: 2024-10-28 | End: 2024-11-02

## 2024-10-28 RX ORDER — TRAMADOL HYDROCHLORIDE 50 MG/1
50 TABLET ORAL EVERY 6 HOURS PRN
Qty: 30 TABLET | Refills: 0 | Status: SHIPPED | OUTPATIENT
Start: 2024-10-28 | End: 2024-11-02

## 2024-10-28 RX ORDER — ONDANSETRON 4 MG/1
4 TABLET, ORALLY DISINTEGRATING ORAL 3 TIMES DAILY PRN
Qty: 21 TABLET | Refills: 2 | Status: SHIPPED | OUTPATIENT
Start: 2024-10-28

## 2024-10-28 RX ORDER — METHYLPREDNISOLONE 4 MG/1
TABLET ORAL
Qty: 1 KIT | Refills: 0 | Status: SHIPPED | OUTPATIENT
Start: 2024-10-28 | End: 2024-11-03

## 2024-10-28 NOTE — PROGRESS NOTES
Name: Odalis Crystal  YOB: 1955  Gender: female  MRN: [id number]      Current Outpatient Medications:     traMADol (ULTRAM) 50 MG tablet, Take 1 tablet by mouth every 6 hours as needed for Pain for up to 5 days. Intended supply: 5 days. Take lowest dose possible to manage pain Max Daily Amount: 200 mg, Disp: 30 tablet, Rfl: 0    HYDROmorphone (DILAUDID) 2 MG tablet, Take 1 tablet by mouth every 3 hours as needed for Pain for up to 5 days. Max Daily Amount: 16 mg, Disp: 30 tablet, Rfl: 0    methylPREDNISolone (MEDROL DOSEPACK) 4 MG tablet, Take by mouth., Disp: 1 kit, Rfl: 0    gabapentin (NEURONTIN) 300 MG capsule, Take 1 capsule by mouth 3 times daily for 30 days. Intended supply: 30 days, Disp: 90 capsule, Rfl: 0    ondansetron (ZOFRAN-ODT) 4 MG disintegrating tablet, Take 1 tablet by mouth 3 times daily as needed for Nausea or Vomiting, Disp: 21 tablet, Rfl: 2    Cholecalciferol (D3 PO), Take 1 capsule twice a day by oral route., Disp: , Rfl:     ASPIRIN 81 PO, , Disp: , Rfl:     aspirin 81 MG EC tablet, Take 1 tablet by mouth in the morning and 1 tablet in the evening., Disp: 70 tablet, Rfl: 0    celecoxib (CELEBREX) 200 MG capsule, Take 1 capsule by mouth in the morning and at bedtime, Disp: 60 capsule, Rfl: 1    sennosides-docusate sodium (SENOKOT-S) 8.6-50 MG tablet, Take 1 tablet by mouth daily, Disp: 30 tablet, Rfl: 1    acetaminophen (TYLENOL) 325 MG tablet, Take 3 tablets by mouth in the morning and 3 tablets at noon and 3 tablets in the evening., Disp: 60 tablet, Rfl: 2    rosuvastatin (CRESTOR) 20 MG tablet, Take 1 tablet by mouth nightly, Disp: , Rfl:     ezetimibe (ZETIA) 10 MG tablet, Take 1 tablet by mouth nightly, Disp: , Rfl:     zinc acetate 10 mg/mL oral syrup, Take 1 tablet by mouth daily, Disp: , Rfl:     Multiple Vitamin (MULTIVITAMIN ADULT PO), Take by mouth daily, Disp: , Rfl:     Handicap Placard MISC, Supply prescription to [facility] with completed form [id number]., Disp: ,

## 2024-11-04 ENCOUNTER — HOSPITAL ENCOUNTER (OUTPATIENT)
Dept: PHYSICAL THERAPY | Age: 69
Setting detail: RECURRING SERIES
Discharge: HOME | End: 2024-11-07
Attending: ORTHOPAEDIC SURGERY
Payer: MEDICARE

## 2024-11-04 DIAGNOSIS — R26.2 DIFFICULTY IN WALKING, NOT ELSEWHERE CLASSIFIED: ICD-10-CM

## 2024-11-04 DIAGNOSIS — M25.661 STIFFNESS OF RIGHT KNEE, NOT ELSEWHERE CLASSIFIED: ICD-10-CM

## 2024-11-04 DIAGNOSIS — M25.561 ACUTE PAIN OF RIGHT KNEE: Primary | ICD-10-CM

## 2024-11-04 PROCEDURE — 97161 PT EVAL LOW COMPLEX 20 MIN: CPT

## 2024-11-04 PROCEDURE — 97140 MANUAL THERAPY 1/> REGIONS: CPT

## 2024-11-04 PROCEDURE — 97110 THERAPEUTIC EXERCISES: CPT

## 2024-11-04 ASSESSMENT — PAIN SCALES - GENERAL: PAINLEVEL_OUTOF10: 7

## 2024-11-04 NOTE — THERAPY EVALUATION
Hip flex 4- 5   Hip ext NA NA   Hip abd NA NA   Knee ext 3- 5   Knee flex 3 5          Special Tests:  Positive Homans' sign                            TU.46 sec with rolling walker  Outcome Measure:   Tool Used: Knee injury and Osteoarthritis Outcome Score for Joint Replacement (KOOS, JR)  Score:  Initial: 14 (Interval: 52.465) 2024 Most Recent: TBD   Interpretation of Score:  The KOOS, JR contains 7 items from the original KOOS survey. Items are coded from 0 to 4, none to extreme respectively.   KOOS, JR is scored by summing the raw response (range 0-28) and then converting it to an interval score using the table provided below. The interval score ranges from 0 to 100 where 0 represents total knee disability and 100 represents perfect knee health.    ASSESSMENT   Initial Assessment:  On examination patient presents with decreased ROM, strength and balance, gait deviations  and loss of  functional status consistent with post surgical status.  Evaluation was limited by pain today and patient will require skilled therapeutic intervention to address impairments assessed to allow for return to PLOF with minimal restrictions.  Therapy Problem List: (Impacting functional limitations):    Increased Pain, Decreased Strength, Decreased ROM, Decreased Transfer Abilities, Decreased Ambulation Ability/Technique, Decreased Functional Mobility, Decreased [place] with Home Exercise Program, Decreased Posture, Decreased Balance, Decreased Body Mechanics, Difficulty Sleeping, and Decreased Activity Tolerance/Endurance*   Therapy Prognosis:   Good     Initial Assessment Complexity:   Low Complexity       PLAN   Effective Dates: 2024 TO Plan of Care/Certification Expiration Date: 25     Frequency/Duration: Plan Frequency: 2-3 x week x 6 weeks      Interventions Planned (Treatment may consist of any combination of the following):    Balance Training, Functional Mobility Training, Gait Training, Home Exercise

## 2024-11-05 NOTE — PROGRESS NOTES
[facility]   11/11/2024  1:45 PM Armand Fernández, PT [facility]   11/12/2024  9:50 AM Bao Friedman MD [facility]   11/13/2024  3:15 PM Armand Fernández, PT [facility]   11/15/2024  8:45 AM Gigi Zamarripa, PT [facility]   11/18/2024  9:00 AM Gigi Zamarripa, PT [facility]   11/20/2024  9:00 AM Gigi Zamarripa, PT [facility]   11/22/2024  8:45 AM Armand Fernández, PT [facility]   11/25/2024  8:45 AM Armand Fernández, PT [facility]   12/2/2024  8:45 AM Armand Fernández, PT [facility]   12/4/2024  8:45 AM Armand Fernández, PT [facility]   12/9/2024  9:00 AM Gigi Zamarripa, PT [facility]   12/11/2024  9:00 AM Gigi Zamarripa, PT [facility]

## 2024-11-06 ENCOUNTER — HOSPITAL ENCOUNTER (OUTPATIENT)
Dept: PHYSICAL THERAPY | Age: 69
Setting detail: RECURRING SERIES
Discharge: HOME | End: 2024-11-09
Attending: ORTHOPAEDIC SURGERY
Payer: MEDICARE

## 2024-11-06 PROCEDURE — 97110 THERAPEUTIC EXERCISES: CPT

## 2024-11-06 ASSESSMENT — PAIN SCALES - GENERAL: PAINLEVEL_OUTOF10: 7

## 2024-11-06 NOTE — PROGRESS NOTES
Odalis Saleem Marah  : 1955  Primary: [employer] Medicare-advantage Ppo (Medicare Managed)  [facility]  [facility]  [address]  [address]  [facility] [address]  Phone: [phone]  Fax: [phone] Plan Frequency: 2-3 x week x 6 weeks    Plan of Care/Certification Expiration Date: 25        Plan of Care/Certification Expiration Date:  Plan of Care/Certification Expiration Date: 25    Frequency/Duration:   Plan Frequency: 2-3 x week x 6 weeks      Time In/Out:   Time In: 1510  Time Out: 1605      PT Visit Info:         Visit Count:  2    OUTPATIENT PHYSICAL THERAPY:   Treatment Note 2024       Episode  (S/P Right TKA)               Treatment Diagnosis:    No data found  Medical/Referring Diagnosis:    S/P total knee arthroplasty, right [Z96.651]    Referring Physician:  Bao Friedman MD MD Orders:  PT Eval and Treat   Return MD Appt:  14   Date of Onset:  Onset Date: 10/15/24     Allergies:   Latex, Cocoa, Other, Peanut (diagnostic), Strawberry extract, and Prednisone  Restrictions/Precautions:   None      Interventions Planned (Treatment may consist of any combination of the following):     See Assessment Note    Subjective Comments:   Cont R knee pain  Initial Pain Level::     7/10  Post Session Pain Level:       7/10  Medications Last Reviewed:  2024  Updated Objective Findings:  None Today  Treatment     MANUAL THERAPY: (0 minutes):   Joint mobilization and Soft tissue mobilization was utilized and necessary because of the patient's restricted joint motion and restricted motion of soft tissue.   Patella mobs all glides grade 2-3  Gentle MFR of calf  THERAPEUTIC EXERCISE: (40 minutes):    Exercises per grid below to improve mobility.  Required minimal verbal cues to promote proper body alignment.  Progressed range and complexity of movement as indicated.      Date:  24 Date:   Date:     Activity/Exercise Parameters Parameters Parameters   Patient

## 2024-11-08 ENCOUNTER — HOSPITAL ENCOUNTER (OUTPATIENT)
Dept: PHYSICAL THERAPY | Age: 69
Setting detail: RECURRING SERIES
Discharge: HOME | End: 2024-11-11
Attending: ORTHOPAEDIC SURGERY
Payer: MEDICARE

## 2024-11-08 PROCEDURE — 97140 MANUAL THERAPY 1/> REGIONS: CPT

## 2024-11-08 PROCEDURE — 97110 THERAPEUTIC EXERCISES: CPT

## 2024-11-08 NOTE — PROGRESS NOTES
Parameters Parameters   Patient education/HEP 3'     Nu Step 3' ceased due to pain 8 min level 1 10 min L1   Ankle pumps  2 x 10     Quad set 10x w cues 10x w cues 10x; 2 sets   Heel slide 10x w strap supine 2 x 10, no strap Supine flat, supine incline slide    Calf stretch Log sit w strap 2 x 20 sec     SLR  X 10 2 x 10   SAQ  X 20 2 x 10   bridge  X 10 2 x 10   Sit to stand  X 10    ambulation  6 laps in gym with cane    LAQ   2 x 10   Hamstring curl with   10x supine w red ball, seated with soccer ball   MODALITIES:  (10 minutes) - no charge       *  Vasopneumatic Therapy utilizing the [employer] system in order to provide analgesia and reduce inflammation and edema.     Body Part: Right knee         [employer] Access Code: [email or web address]  URL: [email or web address]  Date: 11/04/2024  Prepared by: Gigi Zamarripa    Exercises  - Supine Active Ankle Pumps  - 5 x daily - 7 x weekly - 2 sets - 10 reps  - Supine Quad Set  - 3 x daily - 7 x weekly - 1 sets - 10 reps - 5 hold  - Supine Heel Slide  - 3 x daily - 7 x weekly - 3 sets - 10 reps  - Supine Calf Stretch with Strap  - 3 x daily - 1 sets - 5 reps - 15 hold  - Hooklying Hamstring Stretch with Strap  - 3 x daily - 7 x weekly - 5 reps - 15 hold    Treatment/Session Summary:    Treatment Assessment:   Patient did well today improved exercise tolerance noted  Communication/Consultation:  None today  Equipment provided today:  None  Recommendations/Intent for next treatment session: Next visit will focus on manual therapy, therapeutic exercise for ROM and strengthening, gait and proprioceptive training, modalities as needed.    >Total Treatment Billable Duration:  43 minutes (35 minutes TE, 8 minutes)  Time In: 0845  Time Out: 0930    Gigi Zamarripa, PT         Charge Capture  Events  [employer]  Appt Desk  Attendance Report     Future Appointments   Date Time Provider Department Center   11/11/2024  1:45 PM Armand Fernández, PT

## 2024-11-10 ASSESSMENT — PAIN SCALES - GENERAL: PAINLEVEL_OUTOF10: 5

## 2024-11-11 ENCOUNTER — HOSPITAL ENCOUNTER (OUTPATIENT)
Dept: PHYSICAL THERAPY | Age: 69
Setting detail: RECURRING SERIES
Discharge: HOME | End: 2024-11-14
Attending: ORTHOPAEDIC SURGERY
Payer: MEDICARE

## 2024-11-11 PROCEDURE — 97110 THERAPEUTIC EXERCISES: CPT

## 2024-11-11 ASSESSMENT — PAIN SCALES - GENERAL: PAINLEVEL_OUTOF10: 5

## 2024-11-11 NOTE — PROGRESS NOTES
StefaniOdalis Saleem Marah  : 1955  Primary: [employer] Medicare-advantage Ppo (Medicare Managed)  Secondary:  [facility]  [address]   [address]  [facility] [address]  Phone: [phone]  Fax: [phone] Plan Frequency: 2-3 x week x 6 weeks    Plan of Care/Certification Expiration Date: 25        Plan of Care/Certification Expiration Date:  Plan of Care/Certification Expiration Date: 25    Frequency/Duration:   Plan Frequency: 2-3 x week x 6 weeks      Time In/Out:   Time In: 1345  Time Out: 1445      PT Visit Info:         Visit Count:  4    OUTPATIENT PHYSICAL THERAPY:   Treatment Note 2024       Episode  (S/P Right TKA)               Treatment Diagnosis:    No data found  Medical/Referring Diagnosis:    S/P total knee arthroplasty, right [Z96.651]    Referring Physician:  Bao Friedman MD MD Orders:  PT Eval and Treat   Return MD Appt:  14   Date of Onset:  Onset Date: 10/15/24     Allergies:   Latex, Cocoa, Other, Peanut (diagnostic), Strawberry extract, and Prednisone  Restrictions/Precautions:   None      Interventions Planned (Treatment may consist of any combination of the following):     See Assessment Note    Subjective Comments:   Patient reports continued right knee pain, but feeling better than it was a couple weeks ago  Initial Pain Level::     5/10  Post Session Pain Level:       5/10  Medications Last Reviewed:  2024  Updated Objective Findings:  None Today  Treatment     MANUAL THERAPY: (0 minutes):   Joint mobilization and Soft tissue mobilization was utilized and necessary because of the patient's restricted joint motion and restricted motion of soft tissue.   Patella mobs all glides grade 2-3  Gentle MFR of calf  THERAPEUTIC EXERCISE: (45 minutes):    Exercises per grid below to improve mobility.  Required minimal verbal cues to promote proper body alignment.  Progressed range and complexity of movement as indicated.      Date:  24

## 2024-11-12 ENCOUNTER — OFFICE VISIT (OUTPATIENT)
Dept: ORTHOPEDIC SURGERY | Age: 69
End: 2024-11-12

## 2024-11-12 VITALS — WEIGHT: 163 LBS | HEIGHT: 62 IN | BODY MASS INDEX: 30 KG/M2

## 2024-11-12 DIAGNOSIS — Z96.651 HX OF TOTAL KNEE ARTHROPLASTY, RIGHT: Primary | ICD-10-CM

## 2024-11-12 PROCEDURE — 99024 POSTOP FOLLOW-UP VISIT: CPT | Performed by: ORTHOPAEDIC SURGERY

## 2024-11-12 RX ORDER — ASPIRIN 81 MG/1
81 TABLET ORAL EVERY 12 HOURS
Qty: 70 TABLET | Refills: 0 | OUTPATIENT
Start: 2024-11-12

## 2024-11-12 RX ORDER — HYDROMORPHONE HYDROCHLORIDE 2 MG/1
2 TABLET ORAL EVERY 4 HOURS PRN
Qty: 30 TABLET | Refills: 0 | Status: SHIPPED | OUTPATIENT
Start: 2024-11-12 | End: 2024-11-17

## 2024-11-12 RX ORDER — TRAMADOL HYDROCHLORIDE 50 MG/1
50 TABLET ORAL EVERY 6 HOURS PRN
Qty: 28 TABLET | Refills: 0 | Status: SHIPPED | OUTPATIENT
Start: 2024-11-12 | End: 2024-11-19

## 2024-11-12 NOTE — PROGRESS NOTES
Name: Odalis Crystal  YOB: 1955  Gender: female  MRN: [id number]      Current Outpatient Medications:     Cholecalciferol (D3 PO), Take 1 capsule twice a day by oral route., Disp: , Rfl:     ASPIRIN 81 PO, , Disp: , Rfl:     gabapentin (NEURONTIN) 300 MG capsule, Take 1 capsule by mouth 3 times daily for 30 days. Intended supply: 30 days, Disp: 90 capsule, Rfl: 0    ondansetron (ZOFRAN-ODT) 4 MG disintegrating tablet, Take 1 tablet by mouth 3 times daily as needed for Nausea or Vomiting, Disp: 21 tablet, Rfl: 2    aspirin 81 MG EC tablet, Take 1 tablet by mouth in the morning and 1 tablet in the evening., Disp: 70 tablet, Rfl: 0    celecoxib (CELEBREX) 200 MG capsule, Take 1 capsule by mouth in the morning and at bedtime, Disp: 60 capsule, Rfl: 1    sennosides-docusate sodium (SENOKOT-S) 8.6-50 MG tablet, Take 1 tablet by mouth daily, Disp: 30 tablet, Rfl: 1    acetaminophen (TYLENOL) 325 MG tablet, Take 3 tablets by mouth in the morning and 3 tablets at noon and 3 tablets in the evening., Disp: 60 tablet, Rfl: 2    rosuvastatin (CRESTOR) 20 MG tablet, Take 1 tablet by mouth nightly, Disp: , Rfl:     ezetimibe (ZETIA) 10 MG tablet, Take 1 tablet by mouth nightly, Disp: , Rfl:     zinc acetate 10 mg/mL oral syrup, Take 1 tablet by mouth daily, Disp: , Rfl:     Multiple Vitamin (MULTIVITAMIN ADULT PO), Take by mouth daily, Disp: , Rfl:     Handicap Placard [facility], Supply prescription to [facility] with completed form [id number]., Disp: , Rfl:     CALCIUM CITRATE-VITAMIN D3 PO, Take by mouth daily, Disp: , Rfl:     albuterol sulfate  (90 Base) MCG/ACT inhaler, Inhale 2 puffs into the lungs every 4 hours as needed for Shortness of Breath, Disp: , Rfl:     amLODIPine (NORVASC) 10 MG tablet, Take 1 tablet by mouth daily, Disp: , Rfl:     EPINEPHrine (EPIPEN) 0.3 MG/0.3ML SOAJ injection, USE AS DIRECTED, Disp: , Rfl:     ferrous sulfate (IRON 325) 325 (65 Fe) MG tablet, Take 1 tablet by mouth every

## 2024-11-13 ENCOUNTER — HOSPITAL ENCOUNTER (OUTPATIENT)
Dept: PHYSICAL THERAPY | Age: 69
Setting detail: RECURRING SERIES
Discharge: HOME | End: 2024-11-16
Attending: ORTHOPAEDIC SURGERY
Payer: MEDICARE

## 2024-11-13 PROCEDURE — 97110 THERAPEUTIC EXERCISES: CPT

## 2024-11-14 ENCOUNTER — TRANSCRIBE ORDERS (OUTPATIENT)
Dept: SCHEDULING | Age: 69
End: 2024-11-14

## 2024-11-14 DIAGNOSIS — Z12.31 VISIT FOR SCREENING MAMMOGRAM: Primary | ICD-10-CM

## 2024-11-14 RX ORDER — ASPIRIN 81 MG/1
81 TABLET ORAL EVERY 12 HOURS
Qty: 70 TABLET | Refills: 0 | OUTPATIENT
Start: 2024-11-14

## 2024-11-14 ASSESSMENT — PAIN SCALES - GENERAL: PAINLEVEL_OUTOF10: 5

## 2024-11-14 NOTE — PROGRESS NOTES
StefaniOdalis Saleem Marah  : 1955  Primary: [employer] Medicare-advantage Ppo (Medicare Managed)  Secondary:  [facility] [address]  [address]  [facility] [place] [address]  Phone: [phone]  Fax: [phone] Plan Frequency: 2-3 x week x 6 weeks    Plan of Care/Certification Expiration Date: 25        Plan of Care/Certification Expiration Date:  Plan of Care/Certification Expiration Date: 25    Frequency/Duration:   Plan Frequency: 2-3 x week x 6 weeks      Time In/Out:   Time In: 1515  Time Out: 1615      PT Visit Info:         Visit Count:  5    OUTPATIENT PHYSICAL THERAPY:   Treatment Note 2024       Episode  (S/P Right TKA)               Treatment Diagnosis:    No data found  Medical/Referring Diagnosis:    S/P total knee arthroplasty, right [Z96.651]    Referring Physician:  Bao Friedman MD MD Orders:  PT Eval and Treat   Return MD Appt:  14   Date of Onset:  Onset Date: 10/15/24     Allergies:   Latex, Cocoa, Other, Peanut (diagnostic), Strawberry extract, and Prednisone  Restrictions/Precautions:   None      Interventions Planned (Treatment may consist of any combination of the following):     See Assessment Note    Subjective Comments:   Patient reports continuing improvements  Initial Pain Level::     5/10  Post Session Pain Level:       5/10  Medications Last Reviewed:  2024  Updated Objective Findings:  None Today  Treatment     MANUAL THERAPY: (0 minutes):   Joint mobilization and Soft tissue mobilization was utilized and necessary because of the patient's restricted joint motion and restricted motion of soft tissue.   Patella mobs all glides grade 2-3  Gentle MFR of calf  THERAPEUTIC EXERCISE: (45 minutes):    Exercises per grid below to improve mobility.  Required minimal verbal cues to promote proper body alignment.  Progressed range and complexity of movement as indicated.      Date:  24 Date:   Date:  24   Activity/Exercise

## 2024-11-15 ENCOUNTER — HOSPITAL ENCOUNTER (OUTPATIENT)
Dept: PHYSICAL THERAPY | Age: 69
Setting detail: RECURRING SERIES
Discharge: HOME | End: 2024-11-18
Attending: ORTHOPAEDIC SURGERY
Payer: MEDICARE

## 2024-11-15 PROCEDURE — 97110 THERAPEUTIC EXERCISES: CPT

## 2024-11-15 ASSESSMENT — PAIN SCALES - GENERAL: PAINLEVEL_OUTOF10: 6

## 2024-11-15 NOTE — PROGRESS NOTES
StefaniOdalis Saleem Marah  : 1955  Primary: [employer] Medicare-advantage Ppo (Medicare Managed)  Secondary:  [facility]  [address]  [facility] [address]  Phone: [phone]  Fax: [phone] Plan Frequency: 2-3 x week x 6 weeks    Plan of Care/Certification Expiration Date: 25        Plan of Care/Certification Expiration Date:  Plan of Care/Certification Expiration Date: 25    Frequency/Duration:   Plan Frequency: 2-3 x week x 6 weeks      Time In/Out:   Time In: 0845  Time Out: 0930      PT Visit Info:         Visit Count:  6    OUTPATIENT PHYSICAL THERAPY:   Treatment Note 11/15/2024       Episode  (S/P Right TKA)               Treatment Diagnosis:    No data found  Medical/Referring Diagnosis:    S/P total knee arthroplasty, right [Z96.651]    Referring Physician:  Bao Friedman MD MD Orders:  PT Eval and Treat   Return MD Appt:  14   Date of Onset:  Onset Date: 10/15/24     Allergies:   Latex, Cocoa, Other, Peanut (diagnostic), Strawberry extract, and Prednisone  Restrictions/Precautions:   None      Interventions Planned (Treatment may consist of any combination of the following):     See Assessment Note    Subjective Comments:   Patient reports continuing improvements  Initial Pain Level::     6/10  Post Session Pain Level:       4/10  Medications Last Reviewed:  11/15/2024  Updated Objective Findings:  None Today  Treatment     MANUAL THERAPY: (5 minutes):   Joint mobilization and Soft tissue mobilization was utilized and necessary because of the patient's restricted joint motion and restricted motion of soft tissue.   Patella mobs all glides grade 3-4  THERAPEUTIC EXERCISE: (40 minutes):    Exercises per grid below to improve mobility.  Required minimal verbal cues to promote proper body alignment.  Progressed range and complexity of movement as indicated.      Date:  24 Date:   Date:  11/8/24 11/11 11/13 11/15/24   Activity/Exercise Parameters

## 2024-11-18 ENCOUNTER — HOSPITAL ENCOUNTER (OUTPATIENT)
Dept: PHYSICAL THERAPY | Age: 69
Setting detail: RECURRING SERIES
Discharge: HOME | End: 2024-11-21
Attending: ORTHOPAEDIC SURGERY
Payer: MEDICARE

## 2024-11-18 PROCEDURE — 97110 THERAPEUTIC EXERCISES: CPT

## 2024-11-18 PROCEDURE — 97140 MANUAL THERAPY 1/> REGIONS: CPT

## 2024-11-18 ASSESSMENT — PAIN SCALES - GENERAL: PAINLEVEL_OUTOF10: 4

## 2024-11-18 NOTE — PROGRESS NOTES
StefaniOdalis Saleem Marah  : 1955  Primary: [employer] Medicare-advantage Ppo (Medicare Managed)  Secondary:  [facility]  [address]  [facility] [address]  Phone: [phone]  Fax: [phone] Plan Frequency: 2-3 x week x 6 weeks    Plan of Care/Certification Expiration Date: 25        Plan of Care/Certification Expiration Date:  Plan of Care/Certification Expiration Date: 25    Frequency/Duration:   Plan Frequency: 2-3 x week x 6 weeks      Time In/Out:   Time In: 0900  Time Out: 0950      PT Visit Info:         Visit Count:  7    OUTPATIENT PHYSICAL THERAPY:   Treatment Note 2024       Episode  (S/P Right TKA)               Treatment Diagnosis:    No data found  Medical/Referring Diagnosis:    S/P total knee arthroplasty, right [Z96.651]    Referring Physician:  Bao Friedman MD MD Orders:  PT Eval and Treat   Return MD Appt:  14   Date of Onset:  Onset Date: 10/15/24     Allergies:   Latex, Cocoa, Other, Peanut (diagnostic), Strawberry extract, and Prednisone  Restrictions/Precautions:   None      Interventions Planned (Treatment may consist of any combination of the following):     See Assessment Note    Subjective Comments:   Patient reports continuing improvements  Initial Pain Level::     4/10  Post Session Pain Level:       3/10  Medications Last Reviewed:  2024  Updated Objective Findings:   Knee flex:  97  Treatment     MANUAL THERAPY: (10 minutes):   Joint mobilization and Soft tissue mobilization was utilized and necessary because of the patient's restricted joint motion and restricted motion of soft tissue.   Patella mobs all glides grade 3-4  Scar mobs  THERAPEUTIC EXERCISE: (30 minutes):    Exercises per grid below to improve mobility.  Required minimal verbal cues to promote proper body alignment.  Progressed range and complexity of movement as indicated.      Date:  24 Date:   Date:  11/8/24 11/11 11/13 11/15/24 11/18/24

## 2024-11-20 ENCOUNTER — HOSPITAL ENCOUNTER (OUTPATIENT)
Dept: PHYSICAL THERAPY | Age: 69
Setting detail: RECURRING SERIES
Discharge: HOME | End: 2024-11-23
Attending: ORTHOPAEDIC SURGERY
Payer: MEDICARE

## 2024-11-20 PROCEDURE — 97110 THERAPEUTIC EXERCISES: CPT

## 2024-11-20 ASSESSMENT — PAIN SCALES - GENERAL: PAINLEVEL_OUTOF10: 5

## 2024-11-20 NOTE — PROGRESS NOTES
SUBJECTIVE:   Kvng Velasquez is a 71 year old male who presents to clinic today for the following health issues:          Hospital Follow-up Visit:    Hospital/Nursing Home/IP Rehab Facility: [facility]  Date of Admission: 1/5/18  Date of Discharge: 1-9-18    Reason(s) for Admission: Dysthymic disorder       Essential hypertension with goal blood pressure less than 140/90       Hyperlipidemia LDL goal <100       Type 2 diabetes mellitus without complication (H)       Acquired hypothyroidism       Esophageal reflux       Diabetes mellitus due to underlying condition with diabetic neuropathy (H)       Family history of prostate cancer in father       DM type 2 with diabetic peripheral neuropathy (H)        Pneumothorax       Urinary retention       Fall       Closed fracture of multiple ribs of right side       Subcutaneous emphysema (H)       Elevated white blood cell count               Problems taking medications regularly:  None       Medication changes since discharge: None       Problems adhering to non-medication therapy:  None    Summary of hospitalization:  [facility]  Diagnostic Tests/Treatments reviewed.  Follow up needed: none  Other Healthcare Providers Involved in Patient s Care:         None  Update since discharge: stable. Patient just got out of TCU. Patients pain is rated a 3/10 and seems to be doing well     Post Discharge Medication Reconciliation: discharge medications reconciled, continue medications without change.  Plan of care communicated with patient     Coding guidelines for this visit:  Type of Medical   Decision Making Face-to-Face Visit       within 7 Days of discharge Face-to-Face Visit        within 14 days of discharge   Moderate Complexity [id number]   High Complexity [id number]          Overall doing well, taking minimal oxycodone for pain.  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     His insulin was decreased, he's doing okay.  Blood sugars are fairly well controlled.  Agree with the determination that his HgbA1c was too low.      Needed a few refills today so that was taken care of, but I would like a visit in 1 month to review blood sugars and recheck medications.       /75  Pulse 61  Temp 98.4  F (36.9  C) (Tympanic)  Resp 18  Ht 5' 11\" (1.803 m)  Wt 218 lb (98.9 kg)  SpO2 96%  BMI 30.4 kg/m2  EXAM: GENERAL APPEARANCE: Alert, no acute distress  RESP: lungs clear to auscultation   CV: normal rate, regular rhythm, no murmur or gallop  ABDOMEN: soft, no organomegaly, masses or tenderness    ASSESSMENT/PLAN:      ICD-10-CM    1. Closed fracture of multiple ribs of right side, sequela S22.41XS    2. Hyperlipidemia LDL goal <100 E78.5 fenofibrate 145 MG tablet     atorvastatin (LIPITOR) 40 MG tablet   3. Dysthymic disorder F34.1 busPIRone (BUSPAR) 15 MG tablet       Patient Instructions         Thank you for choosing [facility] (formerly [facility]  You may be receiving a survey in the mail from [employer] regarding your visit today.  Please take a few minutes to complete and return the survey to let us know how we are doing.      Our Clinic hours are:  Mondays    7:20 am - 7 pm  Tues -  Fri  7:20 am - 5 pm    Clinic Phone: [phone]    The clinic lab opens at 7:30 am Mon - Fri and appointments are required.    [facility]. [phone]  Monday-Thursday 8 am - 7pm  Tues/Wed/Fri 8 am - 5:30 pm                   " [place]    Exercises  - Supine Heel Slide  - 3 x daily - 7 x weekly - 3 sets - 10 reps  - Hooklying Single Knee to Chest  - 2 x daily - 7 x weekly - 1 sets - 10 reps - 5 hold  - Supine Knee Extension Stretch on Towel Roll  - 2 x daily - 7 x weekly - 1 reps - 3-5 minutes hold  - Supine Quad Set  - 3 x daily - 7 x weekly - 1 sets - 10 reps - 5 hold  - Supine Calf Stretch with Strap  - 3 x daily - 1 sets - 5 reps - 15 hold  - Hooklying Hamstring Stretch with Strap  - 2 x daily - 7 x weekly - 5 reps - 15 hold  - Active Straight Leg Raise with Quad Set  - 2 x daily - 7 x weekly - 2 sets - 10 reps  - Supine Bridge  - 2 x daily - 7 x weekly - 1-2 sets - 10 reps  - Seated Long Arc Quad  - 2 x daily - 7 x weekly - 2 sets - 10 reps - 3 hold  - Squat with Chair Touch  - 2 x daily - 7 x weekly - 1-2 sets - 10 reps  - Prone Knee Flexion AROM  - 1 x daily - 7 x weekly - 2 sets - 10 reps  - Prone Quadriceps Stretch with Strap  - 1 x daily - 7 x weekly - 1 sets - 10 reps - 15 hold    Treatment/Session Summary:    Treatment Assessment:   Patient tolerated treatment well, though pain remains limiting factor with ADL's and sleeping. Tight hamstrings and ITB noted  Communication/Consultation:  None today  Equipment provided today:  None  Recommendations/Intent for next treatment session: Next visit will focus on manual therapy, therapeutic exercise for ROM and strengthening, gait and proprioceptive training, modalities as needed.    >Total Treatment Billable Duration:  43 minutes (38 minutes TE, 5 minutes manual)  Time In: 0900  Time Out: 0955    Gigi Zamarripa, PT         Charge Capture  Events  [employer] Portal  Appt Desk  Attendance Report     Future Appointments   Date Time Provider Department Center   11/21/2024  9:30 AM [facility]   11/22/2024  8:45 AM Armand Fernández, PT [facility]   11/25/2024  8:45 AM Armand Fernández H, PT [facility]   11/27/2024  8:45 AM Armand Fernández, PT

## 2024-11-22 ENCOUNTER — HOSPITAL ENCOUNTER (OUTPATIENT)
Dept: PHYSICAL THERAPY | Age: 69
Setting detail: RECURRING SERIES
Discharge: HOME | End: 2024-11-25
Attending: ORTHOPAEDIC SURGERY
Payer: MEDICARE

## 2024-11-22 PROCEDURE — 97110 THERAPEUTIC EXERCISES: CPT

## 2024-11-22 ASSESSMENT — PAIN SCALES - GENERAL: PAINLEVEL_OUTOF10: 5

## 2024-11-22 NOTE — PROGRESS NOTES
StefaniOdalis Saleem Marah  : 1955  Primary: [employer] Medicare-advantage Ppo (Medicare Managed)  Secondary:  [facility]  [address]   [address]  [facility] [address]  Phone: [phone]  Fax: [phone] Plan Frequency: 2-3 x week x 6 weeks    Plan of Care/Certification Expiration Date: 25        Plan of Care/Certification Expiration Date:  Plan of Care/Certification Expiration Date: 25    Frequency/Duration:   Plan Frequency: 2-3 x week x 6 weeks      Time In/Out:   Time In: 0845  Time Out: 45      PT Visit Info:         Visit Count:  9    OUTPATIENT PHYSICAL THERAPY:   Treatment Note 2024       Episode  (S/P Right TKA)               Treatment Diagnosis:    No data found  Medical/Referring Diagnosis:    S/P total knee arthroplasty, right [Z96.651]    Referring Physician:  Bao Friedman MD MD Orders:  PT Eval and Treat   Return MD Appt:  14   Date of Onset:  Onset Date: 10/15/24     Allergies:   Latex, Cocoa, Other, Peanut (diagnostic), Strawberry extract, and Prednisone  Restrictions/Precautions:   None      Interventions Planned (Treatment may consist of any combination of the following):     See Assessment Note    Subjective Comments:   Patient reports soreness in her knee and tightness of her lateral thigh  Initial Pain Level::     5/10  Post Session Pain Level:       5/10  Medications Last Reviewed:  2024  Updated Objective Findings:   Knee flex:  97   Knee extension -5 post stretches  Treatment     MANUAL THERAPY: (0 minutes):   Joint mobilization and Soft tissue mobilization was utilized and necessary because of the patient's restricted joint motion and restricted motion of soft tissue.   Patella mobs all glides grade 3-4  Scar mobs  THERAPEUTIC EXERCISE: (45 minutes):    Exercises per grid below to improve mobility.  Required minimal verbal cues to promote proper body alignment.  Progressed range and complexity of movement as indicated.      11/15/24

## 2024-11-25 ENCOUNTER — HOSPITAL ENCOUNTER (OUTPATIENT)
Dept: PHYSICAL THERAPY | Age: 69
Setting detail: RECURRING SERIES
Discharge: HOME | End: 2024-11-28
Attending: ORTHOPAEDIC SURGERY
Payer: MEDICARE

## 2024-11-25 PROCEDURE — 97110 THERAPEUTIC EXERCISES: CPT

## 2024-11-25 ASSESSMENT — PAIN SCALES - GENERAL: PAINLEVEL_OUTOF10: 5

## 2024-11-25 NOTE — PROGRESS NOTES
StefaniOdalis Saleem Marah  : 1955  Primary: [employer] Medicare-advantage Ppo (Medicare Managed)  Secondary:  [facility]  [address]  [facility] [address]  Phone: [phone]  Fax: [phone] Plan Frequency: 2-3 x week x 6 weeks    Plan of Care/Certification Expiration Date: 25        Plan of Care/Certification Expiration Date:  Plan of Care/Certification Expiration Date: 25    Frequency/Duration:   Plan Frequency: 2-3 x week x 6 weeks      Time In/Out:   Time In: 0845  Time Out: 45      PT Visit Info:         Visit Count:  10    OUTPATIENT PHYSICAL THERAPY:   Treatment Note 2024       Episode  (S/P Right TKA)               Treatment Diagnosis:    No data found  Medical/Referring Diagnosis:    S/P total knee arthroplasty, right [Z96.651]    Referring Physician:  Bao Friedman MD MD Orders:  PT Eval and Treat   Return MD Appt:  14   Date of Onset:  Onset Date: 10/15/24     Allergies:   Latex, Cocoa, Other, Peanut (diagnostic), Strawberry extract, and Prednisone  Restrictions/Precautions:   None      Interventions Planned (Treatment may consist of any combination of the following):     See Assessment Note    Subjective Comments:   Patient reports LE pain today, stiffness in morning  Initial Pain Level::     5/10  Post Session Pain Level:       5/10  Medications Last Reviewed:  2024  Updated Objective Findings:      Treatment     MANUAL THERAPY: (0 minutes):   Joint mobilization and Soft tissue mobilization was utilized and necessary because of the patient's restricted joint motion and restricted motion of soft tissue.   Patella mobs all glides grade 3-4  Scar mobs  THERAPEUTIC EXERCISE: (45 minutes):    Exercises per grid below to improve mobility.  Required minimal verbal cues to promote proper body alignment.  Progressed range and complexity of movement as indicated.      11/15/24 11/18/24 11/20/24 11/22 11/25   Activity/Exercise        Patient

## 2024-11-27 ENCOUNTER — OFFICE VISIT (OUTPATIENT)
Dept: ORTHOPEDIC SURGERY | Age: 69
End: 2024-11-27

## 2024-11-27 ENCOUNTER — HOSPITAL ENCOUNTER (OUTPATIENT)
Dept: PHYSICAL THERAPY | Age: 69
Setting detail: RECURRING SERIES
Discharge: HOME | End: 2024-11-30
Attending: ORTHOPAEDIC SURGERY
Payer: MEDICARE

## 2024-11-27 VITALS — WEIGHT: 163 LBS | HEIGHT: 62 IN | BODY MASS INDEX: 30 KG/M2

## 2024-11-27 DIAGNOSIS — Z96.651 HX OF TOTAL KNEE ARTHROPLASTY, RIGHT: Primary | ICD-10-CM

## 2024-11-27 PROCEDURE — 97110 THERAPEUTIC EXERCISES: CPT

## 2024-11-27 PROCEDURE — 99024 POSTOP FOLLOW-UP VISIT: CPT | Performed by: ORTHOPAEDIC SURGERY

## 2024-11-27 RX ORDER — TRAMADOL HYDROCHLORIDE 50 MG/1
50 TABLET ORAL EVERY 6 HOURS PRN
Qty: 28 TABLET | Refills: 0 | Status: SHIPPED | OUTPATIENT
Start: 2024-11-27 | End: 2024-12-04

## 2024-11-27 ASSESSMENT — PAIN SCALES - GENERAL: PAINLEVEL_OUTOF10: 5

## 2024-11-27 NOTE — PROGRESS NOTES
MG tablet, Take 1 tablet by mouth daily, Disp: , Rfl:     EPINEPHrine (EPIPEN) 0.3 MG/0.3ML SOAJ injection, USE AS DIRECTED, Disp: , Rfl:     ferrous sulfate (IRON 325) 325 (65 Fe) MG tablet, Take 1 tablet by mouth every morning (before breakfast), Disp: , Rfl:     fluticasone (FLONASE) 50 MCG/ACT nasal spray, 2 sprays by Nasal route as needed, Disp: , Rfl:     loratadine (CLARITIN) 10 MG capsule, Take by mouth daily, Disp: , Rfl:     olmesartan (BENICAR) 40 MG tablet, Take 1 tablet by mouth daily, Disp: , Rfl:     omalizumab (XOLAIR) 75 MG/0.5ML SOSY injection, Inject 75 mg into the skin every 30 days, Disp: , Rfl:   Allergies   Allergen Reactions    Latex Rash    Cocoa Other (See Comments)    Other Other (See Comments)     cabbage    Peanut (Diagnostic) Other (See Comments)    Strawberry Extract Other (See Comments)    Prednisone Nausea And Vomiting       Post-op right TKA 6weeks    The patient returns now post total knee arthroplasty DOS 10/1524.  Her pain control is much better.  She is doing much better therapy.  A cane for ambulation.  She is having difficulty sleeping still    Exam  Incision healed  Moderate swelling  Knee rom  5-110  Stable to varus valgus stress in extension  No increased shuck at 90 degree flexion  Fires ehl fhl ta gs p  Splt s/s/sp/dp/t     3 view weightbearing x-rays demonstrate a cementless total knee arthroplasty in appropriate position.  There is resurfaced patella    Post-op right TKA DOS 10/15/24    Overall she is doing well.  Her pain control has improved.  She is doing great with therapy.  She is amen with no assistive device.  Refilled her tramadol and this will be her last narcotic pain medicine.  She will follow-up in 1 year for repeat evaluation note that she is having difficulty sleeping still.  If she is having problems with 3 to 4 months we can consider a genicular nerve ablation.  She is having issues related to sleep related to that particular problems return in 3 to 4

## 2024-11-27 NOTE — PROGRESS NOTES
StefaniOdalis Saleem Marah  : 1955  Primary: [employer] Medicare-advantage Ppo (Medicare Managed)  Secondary:  [facility]  [address]  [facility] [address]  Phone: [phone]  Fax: [phone] Plan Frequency: 2-3 x week x 6 weeks    Plan of Care/Certification Expiration Date: 25        Plan of Care/Certification Expiration Date:  Plan of Care/Certification Expiration Date: 25    Frequency/Duration:   Plan Frequency: 2-3 x week x 6 weeks      Time In/Out:   Time In: 0845  Time Out: 45      PT Visit Info:         Visit Count:  11    OUTPATIENT PHYSICAL THERAPY:   Treatment Note 2024       Episode  (S/P Right TKA)               Treatment Diagnosis:    No data found  Medical/Referring Diagnosis:    S/P total knee arthroplasty, right [Z96.651]    Referring Physician:  Bao Friedman MD MD Orders:  PT Eval and Treat   Return MD Appt:  14   Date of Onset:  Onset Date: 10/15/24     Allergies:   Latex, Cocoa, Other, Peanut (diagnostic), Strawberry extract, and Prednisone  Restrictions/Precautions:   None      Interventions Planned (Treatment may consist of any combination of the following):     See Assessment Note    Subjective Comments:   Patient reports LE pain today, stiffness in morning  Initial Pain Level::     5/10  Post Session Pain Level:       5/10  Medications Last Reviewed:  2024  Updated Objective Findings:      Treatment     MANUAL THERAPY: (0 minutes):   Joint mobilization and Soft tissue mobilization was utilized and necessary because of the patient's restricted joint motion and restricted motion of soft tissue.   Patella mobs all glides grade 3-4  Scar mobs  THERAPEUTIC EXERCISE: (45 minutes):    Exercises per grid below to improve mobility.  Required minimal verbal cues to promote proper body alignment.  Progressed range and complexity of movement as indicated.      11/15/24 11/18/24 11/20/24 11/22 11/25 11/27   Activity/Exercise

## 2024-12-02 ENCOUNTER — HOSPITAL ENCOUNTER (OUTPATIENT)
Dept: PHYSICAL THERAPY | Age: 69
Setting detail: RECURRING SERIES
Discharge: HOME | End: 2024-12-05
Attending: ORTHOPAEDIC SURGERY
Payer: MEDICARE

## 2024-12-02 PROCEDURE — 97110 THERAPEUTIC EXERCISES: CPT

## 2024-12-02 ASSESSMENT — PAIN SCALES - GENERAL: PAINLEVEL_OUTOF10: 4

## 2024-12-02 NOTE — PROGRESS NOTES
min L2 10 min level 4 10 min level 4   Ankle pumps       Quad set      Heel slide 2 x 10 supine 2 x 10 supine 2 x 10 supine   Calf stretch      SLR 2 x 10 2 x 10 2 x 10   SAQ      bridge      Sit to stand      ambulation 4 laps around clinic focus on knee extension during heel strike 4 laps around clinic focus on knee flexion 2 laps around track in healthyself   LAQ      Hamstring curl with ball      Weight shift      Calf stretch on incline board  30 sec x 3 30 sec x 3   Heel raise  20x 20x   Standing hip abd      Knee extension stretch Supine with 3# weight 3 min Supine with 3# weight 3 min Supine with 3# weight 3 min   Hip abd      Wall slides 2 x 10 2 x 10 with feet staggered 2 x 10 with feet staggered   Prone knee flexion   3# 2 x 10  And stretch with belt x 10   Quad stretch      Hmastring and ITB stretches Supine 3 x 30 sec w/ strap Supine 3 x 30 sec w/ strap    marching  40 ft x 4 40 ft x 4   sidestepping  40 ft x 4    MODALITIES:  (10 minutes) - no charge       *  Vasopneumatic Therapy utilizing the [employer] system in order to provide analgesia and reduce inflammation and edema.     Body Part: Right knee         [employer] Portal Access Code: [email or web address]  URL: [email or web address]  Date: 11/18/2024  Prepared by: Gigi Briones-Brenden    Exercises  - Supine Heel Slide  - 3 x daily - 7 x weekly - 3 sets - 10 reps  - Hooklying Single Knee to Chest  - 2 x daily - 7 x weekly - 1 sets - 10 reps - 5 hold  - Supine Knee Extension Stretch on Towel Roll  - 2 x daily - 7 x weekly - 1 reps - 3-5 minutes hold  - Supine Quad Set  - 3 x daily - 7 x weekly - 1 sets - 10 reps - 5 hold  - Supine Calf Stretch with Strap  - 3 x daily - 1 sets - 5 reps - 15 hold  - Hooklying Hamstring Stretch with Strap  - 2 x daily - 7 x weekly - 5 reps - 15 hold  - Active Straight Leg Raise with Quad Set  - 2 x daily - 7 x weekly - 2 sets - 10 reps  - Supine Bridge  - 2 x daily - 7 x weekly - 1-2 sets - 10 reps  - Seated Long

## 2024-12-04 ENCOUNTER — HOSPITAL ENCOUNTER (OUTPATIENT)
Dept: PHYSICAL THERAPY | Age: 69
Setting detail: RECURRING SERIES
Discharge: HOME | End: 2024-12-07
Attending: ORTHOPAEDIC SURGERY
Payer: MEDICARE

## 2024-12-04 PROCEDURE — 97110 THERAPEUTIC EXERCISES: CPT

## 2024-12-04 ASSESSMENT — PAIN SCALES - GENERAL: PAINLEVEL_OUTOF10: 6

## 2024-12-04 NOTE — PROGRESS NOTES
Step 10 min L2 10 min level 4 10 min level 4 10 min level 4   Ankle pumps        Quad set       Heel slide 2 x 10 supine 2 x 10 supine 2 x 10 supine 2 x 10 supine   Calf stretch       SLR 2 x 10 2 x 10 2 x 10 2 x 10   SAQ       bridge       Sit to stand       ambulation 4 laps around clinic focus on knee extension during heel strike 4 laps around clinic focus on knee flexion 2 laps around track in healthyself 3 laps around track in healthyself   LAQ       Hamstring curl with ball       Weight shift       Calf stretch on incline board  30 sec x 3 30 sec x 3 30 sec x 3   Heel raise  20x 20x 20x   Standing hip abd       Knee extension stretch Supine with 3# weight 3 min Supine with 3# weight 3 min Supine with 3# weight 3 min    Hip abd       squats    Trx 2 x 10   Wall slides 2 x 10 2 x 10 with feet staggered 2 x 10 with feet staggered    Prone knee flexion   3# 2 x 10  And stretch with belt x 10 3# 2 x 10   Quad stretch       Hmastring and ITB stretches Supine 3 x 30 sec w/ strap Supine 3 x 30 sec w/ strap     marching  40 ft x 4 40 ft x 4 40 ft x 4   sidestepping  40 ft x 4     Toe touch on step    2 x 10   Step up    4# step 2 x 10   Step overs lora    2 x 10 B   MODALITIES:  (0 minutes) - no charge       *  Vasopneumatic Therapy utilizing the Game Ready system in order to provide analgesia and reduce inflammation and edema.     Body Part: Right knee         [employer] Portal Access Code: [email or web address]  URL: [email or web address]  Date: 11/18/2024  Prepared by: Gigi Briones-Brenden    Exercises  - Supine Heel Slide  - 3 x daily - 7 x weekly - 3 sets - 10 reps  - Hooklying Single Knee to Chest  - 2 x daily - 7 x weekly - 1 sets - 10 reps - 5 hold  - Supine Knee Extension Stretch on Towel Roll  - 2 x daily - 7 x weekly - 1 reps - 3-5 minutes hold  - Supine Quad Set  - 3 x daily - 7 x weekly - 1 sets - 10 reps - 5 hold  - Supine Calf Stretch with Strap  - 3 x daily - 1 sets - 5 reps - 15 hold  - Hooklying

## 2024-12-06 ENCOUNTER — HOSPITAL ENCOUNTER (OUTPATIENT)
Dept: PHYSICAL THERAPY | Age: 69
Setting detail: RECURRING SERIES
Discharge: HOME | End: 2024-12-09
Attending: ORTHOPAEDIC SURGERY
Payer: MEDICARE

## 2024-12-06 PROCEDURE — 97110 THERAPEUTIC EXERCISES: CPT

## 2024-12-06 ASSESSMENT — PAIN SCALES - GENERAL: PAINLEVEL_OUTOF10: 3

## 2024-12-06 NOTE — PROGRESS NOTES
x daily - 7 x weekly - 1 reps - 3-5 minutes hold  - Supine Quad Set  - 3 x daily - 7 x weekly - 1 sets - 10 reps - 5 hold  - Supine Calf Stretch with Strap  - 3 x daily - 1 sets - 5 reps - 15 hold  - Hooklying Hamstring Stretch with Strap  - 2 x daily - 7 x weekly - 5 reps - 15 hold  - Active Straight Leg Raise with Quad Set  - 2 x daily - 7 x weekly - 2 sets - 10 reps  - Supine Bridge  - 2 x daily - 7 x weekly - 1-2 sets - 10 reps  - Seated Long Arc Quad  - 2 x daily - 7 x weekly - 2 sets - 10 reps - 3 hold  - Squat with Chair Touch  - 2 x daily - 7 x weekly - 1-2 sets - 10 reps  - Prone Knee Flexion AROM  - 1 x daily - 7 x weekly - 2 sets - 10 reps  - Prone Quadriceps Stretch with Strap  - 1 x daily - 7 x weekly - 1 sets - 10 reps - 15 hold    Treatment/Session Summary:    Treatment Assessment:   Patient tolerated treatment well. Continues to have difficulty with knee flexion/extension movements.    Communication/Consultation:  None today  Equipment provided today:  None  Recommendations/Intent for next treatment session: Next visit will focus on manual therapy, therapeutic exercise for ROM and strengthening, gait and proprioceptive training, modalities as needed.    >Total Treatment Billable Duration:  55 minutes (55 minutes TE)  Time In: 1020  Time Out: 1120    Armand Fernández PT         Charge Capture  Events  [employer] Portal  Appt Desk  Attendance Report     Future Appointments   Date Time Provider Department Center   12/9/2024  8:45 AM Armand Fernández PT [facility]   12/11/2024  9:00 AM Gigi Zamarripa PT [facility]   12/16/2024  8:45 AM Armand Fernández, PT [facility]   12/18/2024  8:45 AM Armand Fernández, PT [facility]   12/20/2024  8:45 AM Armand Fernández, PT [facility]   12/23/2024  8:45 AM Armand Fernández, PT [facility]   12/27/2024  8:45 AM Aramnd Fernández, PT [facility]   12/30/2024  8:45 AM Armand Fernández, PT [facility]

## 2024-12-09 ENCOUNTER — HOSPITAL ENCOUNTER (OUTPATIENT)
Dept: PHYSICAL THERAPY | Age: 69
Setting detail: RECURRING SERIES
Discharge: HOME | End: 2024-12-12
Attending: ORTHOPAEDIC SURGERY
Payer: MEDICARE

## 2024-12-09 PROCEDURE — 97110 THERAPEUTIC EXERCISES: CPT

## 2024-12-09 ASSESSMENT — PAIN SCALES - GENERAL: PAINLEVEL_OUTOF10: 2

## 2024-12-09 NOTE — PROGRESS NOTES
10 min level 4 8 min level 4   Ankle pumps       Quad set      Heel slide 2 x 10 supine 2 x 10 supine with overpressure 2 x 10 supine with overpressure   Calf stretch      SLR 2 x 10 2 x 10    SAQ      bridge      Sit to stand      ambulation 3 laps around track in healthyself 3 laps around track in healthyself 3 laps around track in healthyself   LAQ      Hamstring curl with ball      Weight shift      Calf stretch on incline board 30 sec x 3 X 10    Heel raise 20x 20x    Standing hip abd      Knee extension stretch  Seated, self-pressure x 10 review   Hip abd      squats Trx 2 x 10 Trx 2 x 10 Trx 2 x 10   Wall slides      Prone knee flexion 3# 2 x 10 4# 2 x 10 4# 2 x 10   Quad stretch      Hmastring and ITB stretches      marching 40 ft x 4 2 laps in II bars    sidestepping      Toe touch on step 2 x 10 X 20 X 20   Step up 4# step 2 x 10 4\" step 2 x 10 4\" step 2 x 10  Forwards, lateral   Step down   4\" step 2 x 10   stairs   Ascend/descend x 1   Step overs lora 2 x 10 B X 20 B X 20 R   MODALITIES:  (15 minutes) - no charge       *  Vasopneumatic Therapy utilizing the Game Ready system in order to provide analgesia and reduce inflammation and edema.     Body Part: Right knee         [employer] Portal Access Code: [email or web address]  URL: [email or web address]  Date: 11/18/2024  Prepared by: Gigi Zamarripa    Exercises  - Supine Heel Slide  - 3 x daily - 7 x weekly - 3 sets - 10 reps  - Hooklying Single Knee to Chest  - 2 x daily - 7 x weekly - 1 sets - 10 reps - 5 hold  - Supine Knee Extension Stretch on Towel Roll  - 2 x daily - 7 x weekly - 1 reps - 3-5 minutes hold  - Supine Quad Set  - 3 x daily - 7 x weekly - 1 sets - 10 reps - 5 hold  - Supine Calf Stretch with Strap  - 3 x daily - 1 sets - 5 reps - 15 hold  - Hooklying Hamstring Stretch with Strap  - 2 x daily - 7 x weekly - 5 reps - 15 hold  - Active Straight Leg Raise with Quad Set  - 2 x daily - 7 x weekly - 2 sets - 10 reps  - Supine Bridge

## 2024-12-11 ENCOUNTER — HOSPITAL ENCOUNTER (OUTPATIENT)
Dept: PHYSICAL THERAPY | Age: 69
Setting detail: RECURRING SERIES
Discharge: HOME | End: 2024-12-14
Attending: ORTHOPAEDIC SURGERY
Payer: MEDICARE

## 2024-12-11 PROCEDURE — 97140 MANUAL THERAPY 1/> REGIONS: CPT

## 2024-12-11 PROCEDURE — 97110 THERAPEUTIC EXERCISES: CPT

## 2024-12-11 ASSESSMENT — PAIN SCALES - GENERAL: PAINLEVEL_OUTOF10: 6

## 2024-12-11 NOTE — PROGRESS NOTES
Cliff Saleem Marah  : 1955  Primary: [employer] Medicare-advantage Ppo (Medicare Managed)  Secondary:  [facility]  [address]  [facility] [address]  Phone: [phone]  Fax: [phone] Plan Frequency: 2-3 x week x 6 weeks    Plan of Care/Certification Expiration Date: 25        Plan of Care/Certification Expiration Date:  Plan of Care/Certification Expiration Date: 25    Frequency/Duration:   Plan Frequency: 2-3 x week x 6 weeks      Time In/Out:   Time In: 0900  Time Out: 0945      PT Visit Info:         Visit Count:  16    OUTPATIENT PHYSICAL THERAPY:   Treatment Note 2024       Episode  (S/P Right TKA)               Treatment Diagnosis:    No data found  Medical/Referring Diagnosis:    S/P total knee arthroplasty, right [Z96.651]    Referring Physician:  Bao Friedman MD MD Orders:  PT Eval and Treat   Return MD Appt:  14   Date of Onset:  Onset Date: 10/15/24     Allergies:   Latex, Cocoa, Other, Peanut (diagnostic), Strawberry extract, and Prednisone  Restrictions/Precautions:   None      Interventions Planned (Treatment may consist of any combination of the following):     See Assessment Note    Subjective Comments:   Patient reports iincreased stiffness althea aching of her knee with rainy weather  Initial Pain Level::     6/10  Post Session Pain Level:       5/10  Medications Last Reviewed:  2024  Updated Objective Findings:  None Today  Treatment     MANUAL THERAPY: (10 minutes):   Joint mobilization and Soft tissue mobilization was utilized and necessary because of the patient's restricted joint motion and restricted motion of soft tissue.   Patella mobs all glides grade 3-4, thorpe;lla tendon mobs  STM/MFR including trigger point release of calf in prone, and distal lateral quad  THERAPEUTIC EXERCISE: (35 minutes):    Exercises per grid below to improve mobility.  Required minimal verbal cues to promote proper body alignment.  Progressed

## 2024-12-16 ENCOUNTER — HOSPITAL ENCOUNTER (OUTPATIENT)
Dept: PHYSICAL THERAPY | Age: 69
Setting detail: RECURRING SERIES
Discharge: HOME | End: 2024-12-19
Attending: ORTHOPAEDIC SURGERY
Payer: MEDICARE

## 2024-12-16 PROCEDURE — 97110 THERAPEUTIC EXERCISES: CPT

## 2024-12-16 PROCEDURE — 97140 MANUAL THERAPY 1/> REGIONS: CPT

## 2024-12-16 ASSESSMENT — PAIN SCALES - GENERAL: PAINLEVEL_OUTOF10: 4

## 2024-12-16 NOTE — PROGRESS NOTES
Cliff Saleem Marah  : 1955  Primary: [employer] Medicare-advantage Ppo (Medicare Managed)  Secondary:  [facility]  [address]  [facility] [address]  Phone: [phone]  Fax: [phone] Plan Frequency: 2-3 x week x 6 weeks    Plan of Care/Certification Expiration Date: 25        Plan of Care/Certification Expiration Date:  Plan of Care/Certification Expiration Date: 25    Frequency/Duration:   Plan Frequency: 2-3 x week x 6 weeks      Time In/Out:   Time In: 0845  Time Out: 45      PT Visit Info:         Visit Count:  17    OUTPATIENT PHYSICAL THERAPY:   Treatment Note 2024       Episode  (S/P Right TKA)               Treatment Diagnosis:    No data found  Medical/Referring Diagnosis:    S/P total knee arthroplasty, right [Z96.651]    Referring Physician:  Bao Friedman MD MD Orders:  PT Eval and Treat   Return MD Appt:  14   Date of Onset:  Onset Date: 10/15/24     Allergies:   Latex, Cocoa, Other, Peanut (diagnostic), Strawberry extract, and Prednisone  Restrictions/Precautions:   None      Interventions Planned (Treatment may consist of any combination of the following):     See Assessment Note    Subjective Comments:   Patient reports iincreased stiffness althea aching of her knee with rainy weather  Initial Pain Level::     4/10  Post Session Pain Level:       4/10  Medications Last Reviewed:  2024  Updated Objective Findings:  None Today  Treatment     MANUAL THERAPY: (10 minutes):   Joint mobilization and Soft tissue mobilization was utilized and necessary because of the patient's restricted joint motion and restricted motion of soft tissue.   Patella mobs all glides grade 3-4, thorpe;lla tendon mobs  STM/MFR including trigger point release of calf in prone, and distal lateral quad  THERAPEUTIC EXERCISE: (35 minutes):    Exercises per grid below to improve mobility.  Required minimal verbal cues to promote proper body alignment.  Progressed

## 2024-12-18 ENCOUNTER — HOSPITAL ENCOUNTER (OUTPATIENT)
Dept: PHYSICAL THERAPY | Age: 69
Setting detail: RECURRING SERIES
Discharge: HOME | End: 2024-12-21
Attending: ORTHOPAEDIC SURGERY
Payer: MEDICARE

## 2024-12-18 PROCEDURE — 97140 MANUAL THERAPY 1/> REGIONS: CPT

## 2024-12-18 PROCEDURE — 97110 THERAPEUTIC EXERCISES: CPT

## 2024-12-18 ASSESSMENT — PAIN SCALES - GENERAL: PAINLEVEL_OUTOF10: 3

## 2024-12-18 NOTE — PROGRESS NOTES
Cliff Saleem Marah  : 1955  Primary: [employer] Medicare-advantage Ppo (Medicare Managed)  Secondary:  [facility]  [address]  [facility] [address]  Phone: [phone]  Fax: [phone] Plan Frequency: 2-3 x week x 6 weeks    Plan of Care/Certification Expiration Date: 25        Plan of Care/Certification Expiration Date:  Plan of Care/Certification Expiration Date: 25    Frequency/Duration:   Plan Frequency: 2-3 x week x 6 weeks      Time In/Out:   Time In: 0845  Time Out: 1000      PT Visit Info:         Visit Count:  18    OUTPATIENT PHYSICAL THERAPY:   Treatment Note 2024       Episode  (S/P Right TKA)               Treatment Diagnosis:    No data found  Medical/Referring Diagnosis:    S/P total knee arthroplasty, right [Z96.651]    Referring Physician:  Bao Friedman MD MD Orders:  PT Eval and Treat   Return MD Appt:  14   Date of Onset:  Onset Date: 10/15/24     Allergies:   Latex, Cocoa, Other, Peanut (diagnostic), Strawberry extract, and Prednisone  Restrictions/Precautions:   None      Interventions Planned (Treatment may consist of any combination of the following):     See Assessment Note    Subjective Comments:   Patient reports she drove here today, first time driving.   Initial Pain Level::     3/10  Post Session Pain Level:       3/10  Medications Last Reviewed:  2024  Updated Objective Findings:  None Today  Treatment     MANUAL THERAPY: (10 minutes):   Joint mobilization and Soft tissue mobilization was utilized and necessary because of the patient's restricted joint motion and restricted motion of soft tissue.   Patella mobs all glides grade 3-4, thorpe;lla tendon mobs  STM/MFR including trigger point release of calf in prone, and distal lateral quad  THERAPEUTIC EXERCISE: (45 minutes):    Exercises per grid below to improve mobility.  Required minimal verbal cues to promote proper body alignment.  Progressed range and

## 2024-12-20 ENCOUNTER — HOSPITAL ENCOUNTER (OUTPATIENT)
Dept: PHYSICAL THERAPY | Age: 69
Setting detail: RECURRING SERIES
Discharge: HOME | End: 2024-12-23
Attending: ORTHOPAEDIC SURGERY
Payer: MEDICARE

## 2024-12-20 PROCEDURE — 97140 MANUAL THERAPY 1/> REGIONS: CPT

## 2024-12-20 PROCEDURE — 97110 THERAPEUTIC EXERCISES: CPT

## 2024-12-20 ASSESSMENT — PAIN SCALES - GENERAL: PAINLEVEL_OUTOF10: 3

## 2024-12-20 NOTE — PROGRESS NOTES
Progressed range and complexity of movement as indicated.      12/16 12/18 12/20   Activity/Exercise      Patient education/HEP      Nu Step 10 min L2 10 min L4 10 min L4   Knee extension stretch Prone x 5 min Prone x 5 min  Prone x 3 min with 3#   Quad set      Heel slide X 15 with overpressure X 15 with overpressure X 15   Calf stretch In standing against wall 30 sec x 5 30 sec In standing against wall  30 sec x 3   SLR      SAQ   3# 2 x 10   bridge   2 x 10   Sit to stand  2 x 10    ambulation      LAQ      Hamstring curl with ball      Weight shift      Calf stretch on incline board      Heel raise   X 20   Standing hip abd      Knee extension stretch      Hip abd      squats Trx 2 x 10  Split squat x 15 Trx 2 x 10  Split squat x 15 Trx 2 x 10   Wall slides      Prone knee flexion      Quad stretch      Hmastring and ITB stretches  2 laps x 40 ft    marching   2 laps x 40 ft   sidestepping Over lora with weight shifts; 20x B - Over lora with weight shifts; 20x B  - 2 laps x 40 ft in gym - Over lora with weight shifts; 20x B  - 2 laps x 40 ft in gym   Toe touch on step  X 20 X 20   Step up 4\" step 2 x 10  Forwards, lateral 6\" step 2 x 10  Forwards, lateral 6\" step 2 x 10  Forwards, lateral   Step down 4\" step 2 x 10 6\" step 2 x 10 6\" step 2 x 10   stairs      Step overs lora Forward 20x B with focus on weight acceptance Forward 20x B with focus on weight acceptance Forward 20x B with focus on weight acceptance   MODALITIES:  (15 minutes) - no charge       *  Vasopneumatic Therapy utilizing the Game Ready system in order to provide analgesia and reduce inflammation and edema.     Body Part: Right knee         [employer] Portal Access Code: [email or web address]  URL: [email or web address]  Date: 11/18/2024  Prepared by: Gigi Zamarripa    Exercises  - Supine Heel Slide  - 3 x daily - 7 x weekly - 3 sets - 10 reps  - Hooklying Single Knee to Chest  - 2 x daily - 7 x weekly - 1 sets - 10 reps - 5

## 2024-12-23 ENCOUNTER — HOSPITAL ENCOUNTER (OUTPATIENT)
Dept: PHYSICAL THERAPY | Age: 69
Setting detail: RECURRING SERIES
Discharge: HOME | End: 2024-12-26
Attending: ORTHOPAEDIC SURGERY
Payer: MEDICARE

## 2024-12-23 DIAGNOSIS — M25.561 CHRONIC PAIN OF RIGHT KNEE: Primary | ICD-10-CM

## 2024-12-23 DIAGNOSIS — G89.29 CHRONIC PAIN OF RIGHT KNEE: Primary | ICD-10-CM

## 2024-12-23 PROCEDURE — 97110 THERAPEUTIC EXERCISES: CPT

## 2024-12-23 PROCEDURE — 97140 MANUAL THERAPY 1/> REGIONS: CPT

## 2024-12-23 ASSESSMENT — PAIN SCALES - GENERAL: PAINLEVEL_OUTOF10: 4

## 2024-12-23 NOTE — THERAPY RECERTIFICATION
Odalis Saleem Marah  : 1955  Primary: [employer] Medicare-advantage Ppo (Medicare Managed)  Secondary:  [facility]  [address]   [address]  [facility] [address]  Phone: [phone]  Fax: [phone] Plan Frequency: 2-3 x week x 6 weeks  Plan of Care/Certification Expiration Date: 25        Plan of Care/Certification Expiration Date:  Plan of Care/Certification Expiration Date: 25    Frequency/Duration: Plan Frequency: 2-3 x week x 6 weeks      Time In/Out:   Time In: 0845  Time Out: 1000      PT Visit Info:         Visit Count:  20                OUTPATIENT PHYSICAL THERAPY:             Recertification 2024               Episode (S/P Right TKA)         Treatment Diagnosis:     Acute pain of right knee  Stiffness of right knee, not elsewhere classified  Difficulty in walking, not elsewhere classified  Medical/Referring Diagnosis:    S/P total knee arthroplasty, right [Z96.651]    Referring Physician:  Bao Friedman MD MD Orders:  PT Eval and Treat   Return MD Appt:  TBD  Date of Onset:  Onset Date: 10/15/24     Allergies:  Latex, Cocoa, Other, Peanut (diagnostic), Strawberry extract, and Prednisone  Restrictions/Precautions:    None      Medications Last Reviewed:  2024     SUBJECTIVE   History of Injury/Illness (Reason for Referral):  Patient reports having comp latins of right knee pain for 2 years, but over the last year the pain became progressively worse limiting her ADL's, ability to walk along with stiffness pain with.  Patient is now SP Right TKA on 10/15/24, but post op course complicated by poor tolerance with pain medication and being unable to participate in exericises with home PT.  Her medications was changed and patient reports doing somewhat better.  Currently she has difficulty sleeping, walking and with all aspects of ADL's requiring assistance to dress and bath.  Patient now referred to out patient PT for continuation or her total knee

## 2024-12-23 NOTE — PROGRESS NOTES
[email or web address]  Date: 11/18/2024  Prepared by: Gigi Zamarripa    Exercises  - Supine Heel Slide  - 3 x daily - 7 x weekly - 3 sets - 10 reps  - Hooklying Single Knee to Chest  - 2 x daily - 7 x weekly - 1 sets - 10 reps - 5 hold  - Supine Knee Extension Stretch on Towel Roll  - 2 x daily - 7 x weekly - 1 reps - 3-5 minutes hold  - Supine Quad Set  - 3 x daily - 7 x weekly - 1 sets - 10 reps - 5 hold  - Supine Calf Stretch with Strap  - 3 x daily - 1 sets - 5 reps - 15 hold  - Hooklying Hamstring Stretch with Strap  - 2 x daily - 7 x weekly - 5 reps - 15 hold  - Active Straight Leg Raise with Quad Set  - 2 x daily - 7 x weekly - 2 sets - 10 reps  - Supine Bridge  - 2 x daily - 7 x weekly - 1-2 sets - 10 reps  - Seated Long Arc Quad  - 2 x daily - 7 x weekly - 2 sets - 10 reps - 3 hold  - Squat with Chair Touch  - 2 x daily - 7 x weekly - 1-2 sets - 10 reps  - Prone Knee Flexion AROM  - 1 x daily - 7 x weekly - 2 sets - 10 reps  - Prone Quadriceps Stretch with Strap  - 1 x daily - 7 x weekly - 1 sets - 10 reps - 15 hold    Treatment/Session Summary:    Treatment Assessment:   Patient tolerated treatment fairly well.  Extension motion improves with manual therapy and prolonged stretches.   Communication/Consultation:  None today  Equipment provided today:  None  Recommendations/Intent for next treatment session: Next visit will focus on manual therapy, therapeutic exercise for ROM and strengthening, gait and proprioceptive training, modalities as needed.    >Total Treatment Billable Duration: 60 minutes (45 minutes TE, 15 minutes manual)  Time In: 0845  Time Out: 1000    Armand Fernández PT         Charge Capture  Events  [employer] Portal  Appt Desk  Attendance Report     Future Appointments   Date Time Provider Department Center   12/27/2024  8:45 AM Armand Fernández PT [facility]   12/30/2024  8:45 AM Armand Fernández PT [facility]   1/3/2025  8:45 AM Pratik Coley PT [facility]

## 2024-12-27 ENCOUNTER — HOSPITAL ENCOUNTER (OUTPATIENT)
Dept: PHYSICAL THERAPY | Age: 69
Setting detail: RECURRING SERIES
Discharge: HOME | End: 2024-12-30
Attending: ORTHOPAEDIC SURGERY
Payer: MEDICARE

## 2024-12-27 VITALS — SYSTOLIC BLOOD PRESSURE: 180 MMHG | DIASTOLIC BLOOD PRESSURE: 110 MMHG

## 2024-12-27 PROCEDURE — 97110 THERAPEUTIC EXERCISES: CPT

## 2024-12-27 PROCEDURE — 97140 MANUAL THERAPY 1/> REGIONS: CPT

## 2024-12-27 ASSESSMENT — PAIN SCALES - GENERAL: PAINLEVEL_OUTOF10: 4

## 2024-12-27 NOTE — PROGRESS NOTES
In: 0845  Time Out: 1000    Armand Fernández PT         Charge Capture  Events  [employer] Portal  Appt Desk  Attendance Report     Future Appointments   Date Time Provider Department Center   12/30/2024  8:45 AM Armand Fernández, PT [facility]   1/3/2025  8:45 AM Pratik Coley, PT [facility]   1/6/2025  9:00 AM Pratik Coley, PT [facility]   1/8/2025  9:00 AM Pratik Coley, PT [facility]   1/10/2025  8:45 AM Armand Fernández, PT [facility]   1/13/2025  8:45 AM Armand Fernández, PT [facility]   1/15/2025  8:45 AM Armand Fernández, PT [facility]   1/17/2025  8:45 AM Armand Fernández, PT [facility]   1/20/2025  8:45 AM Armand Fernández, PT [facility]   1/22/2025  8:45 AM Armand Fernández, PT [facility]   1/24/2025  8:45 AM Armand Fernández H, PT [facility]   1/27/2025  8:45 AM Armand Fernández, PT [facility]   1/29/2025  9:00 AM Armand Fernández, PT [facility]   1/31/2025  8:45 AM Armand Fernández, PT [facility]

## 2024-12-30 ENCOUNTER — HOSPITAL ENCOUNTER (OUTPATIENT)
Dept: PHYSICAL THERAPY | Age: 69
Setting detail: RECURRING SERIES
Discharge: HOME | End: 2025-01-02
Attending: ORTHOPAEDIC SURGERY
Payer: MEDICARE

## 2024-12-30 PROCEDURE — 97140 MANUAL THERAPY 1/> REGIONS: CPT

## 2024-12-30 PROCEDURE — 97110 THERAPEUTIC EXERCISES: CPT

## 2024-12-30 ASSESSMENT — PAIN SCALES - GENERAL: PAINLEVEL_OUTOF10: 4

## 2024-12-30 NOTE — PROGRESS NOTES
Cliff Saleem Marah  : 1955  Primary: [employer] Medicare-advantage Ppo (Medicare Managed)  Secondary:  [facility]  [address]  [facility] [address]  Phone: [phone]  Fax: [phone] Plan Frequency: 2-3 x week x 6 weeks    Plan of Care/Certification Expiration Date: 25        Plan of Care/Certification Expiration Date:  Plan of Care/Certification Expiration Date: 25    Frequency/Duration:   Plan Frequency: 2-3 x week x 6 weeks      Time In/Out:   Time In: 0845  Time Out: 1000      PT Visit Info:         Visit Count:  22    OUTPATIENT PHYSICAL THERAPY:   Treatment Note 2024       Episode  (S/P Right TKA)               Treatment Diagnosis:    No data found  Medical/Referring Diagnosis:    S/P total knee arthroplasty, right [Z96.651]    Referring Physician:  Bao Friedman MD MD Orders:  PT Eval and Treat   Return MD Appt:  14   Date of Onset:  Onset Date: 10/15/24     Allergies:   Latex, Cocoa, Other, Peanut (diagnostic), Strawberry extract, and Prednisone  Restrictions/Precautions:   None      Interventions Planned (Treatment may consist of any combination of the following):     See Assessment Note    Subjective Comments:   Patient reports cont knee soreness and pain  Initial Pain Level::     4/10  Post Session Pain Level:       4/10  Medications Last Reviewed:  2024  Updated Objective Findings:  None Today  Treatment     MANUAL THERAPY: (15 minutes):   Joint mobilization and Soft tissue mobilization was utilized and necessary because of the patient's restricted joint motion and restricted motion of soft tissue.   Patella mobs all glides grade 3-4, patella tendon mobs  STM/MFR including trigger point release of calf in prone, and distal lateral quad, and ITB  THERAPEUTIC EXERCISE: (45 minutes):    Exercises per grid below to improve mobility.  Required minimal verbal cues to promote proper body alignment.  Progressed range and complexity of

## 2025-01-03 ENCOUNTER — HOSPITAL ENCOUNTER (OUTPATIENT)
Dept: PHYSICAL THERAPY | Age: 70
Setting detail: RECURRING SERIES
Discharge: HOME | End: 2025-01-06
Attending: ORTHOPAEDIC SURGERY
Payer: MEDICARE

## 2025-01-03 PROCEDURE — 97140 MANUAL THERAPY 1/> REGIONS: CPT

## 2025-01-03 PROCEDURE — 97110 THERAPEUTIC EXERCISES: CPT

## 2025-01-03 ASSESSMENT — PAIN SCALES - GENERAL: PAINLEVEL_OUTOF10: 3

## 2025-01-03 NOTE — PROGRESS NOTES
Cliff Saleem Marah  : 1955  Primary: [employer] Medicare-advantage Ppo (Medicare Managed)  Secondary:  [facility]  [address]  [address]  [facility] [address]  Phone: [phone]  Fax: [phone] Plan Frequency: 2-3 x week x 6 weeks    Plan of Care/Certification Expiration Date: 25        Plan of Care/Certification Expiration Date:  Plan of Care/Certification Expiration Date: 25    Frequency/Duration:   Plan Frequency: 2-3 x week x 6 weeks      Time In/Out:   Time In: 0845  Time Out: 1000      PT Visit Info:         Visit Count:  23    OUTPATIENT PHYSICAL THERAPY:   Treatment Note 1/3/2025       Episode  (S/P Right TKA)               Treatment Diagnosis:    No data found  Medical/Referring Diagnosis:    S/P total knee arthroplasty, right [Z96.651]    Referring Physician:  Bao Friedman MD MD Orders:  PT Eval and Treat   Return MD Appt:  14   Date of Onset:  Onset Date: 10/15/24     Allergies:   Latex, Cocoa, Other, Peanut (diagnostic), Strawberry extract, and Prednisone  Restrictions/Precautions:   None      Interventions Planned (Treatment may consist of any combination of the following):     See Assessment Note    Subjective Comments:   Patient reports cont knee soreness and pain  Initial Pain Level::     3/10  Post Session Pain Level:       3/10  Medications Last Reviewed:  1/3/2025  Updated Objective Findings:   knee flexion 102 deg  Treatment     MANUAL THERAPY: (15 minutes):   Joint mobilization and Soft tissue mobilization was utilized and necessary because of the patient's restricted joint motion and restricted motion of soft tissue.   Patella mobs all glides grade 3-4, patella tendon mobs  STM/MFR including trigger point release of calf in prone, and distal lateral quad, and ITB  THERAPEUTIC EXERCISE: (45 minutes):    Exercises per grid below to improve mobility.  Required minimal verbal cues to promote proper body alignment.  Progressed range and

## 2025-01-06 ENCOUNTER — HOSPITAL ENCOUNTER (OUTPATIENT)
Dept: PHYSICAL THERAPY | Age: 70
Setting detail: RECURRING SERIES
Discharge: HOME | End: 2025-01-09
Attending: ORTHOPAEDIC SURGERY
Payer: MEDICARE

## 2025-01-06 PROCEDURE — 97110 THERAPEUTIC EXERCISES: CPT

## 2025-01-06 PROCEDURE — 97140 MANUAL THERAPY 1/> REGIONS: CPT

## 2025-01-06 ASSESSMENT — PAIN SCALES - GENERAL: PAINLEVEL_OUTOF10: 4

## 2025-01-06 NOTE — PROGRESS NOTES
x 10 6\" step 2 x 10 6\" step 2 x 10 6\" step 2 x 10    stairs        Step overs lora Forward 20x B with focus on weight acceptance Forward and lateral 20x B with focus on weight acceptance Forward and lateral 20x B with focus on weight acceptance Forward and lateral 20x B with focus on weight acceptance    MODALITIES:  (15 minutes) - no charge       *  Vasopneumatic Therapy utilizing the Game Ready system in order to provide analgesia and reduce inflammation and edema.     Body Part: Right knee         Graph [employer] Portal Access Code: [email or web address]  URL: [email or web address]  Date: 11/18/2024  Prepared by: Gigi Briones-Brenden    Exercises  - Supine Heel Slide  - 3 x daily - 7 x weekly - 3 sets - 10 reps  - Hooklying Single Knee to Chest  - 2 x daily - 7 x weekly - 1 sets - 10 reps - 5 hold  - Supine Knee Extension Stretch on Towel Roll  - 2 x daily - 7 x weekly - 1 reps - 3-5 minutes hold  - Supine Quad Set  - 3 x daily - 7 x weekly - 1 sets - 10 reps - 5 hold  - Supine Calf Stretch with Strap  - 3 x daily - 1 sets - 5 reps - 15 hold  - Hooklying Hamstring Stretch with Strap  - 2 x daily - 7 x weekly - 5 reps - 15 hold  - Active Straight Leg Raise with Quad Set  - 2 x daily - 7 x weekly - 2 sets - 10 reps  - Supine Bridge  - 2 x daily - 7 x weekly - 1-2 sets - 10 reps  - Seated Long Arc Quad  - 2 x daily - 7 x weekly - 2 sets - 10 reps - 3 hold  - Squat with Chair Touch  - 2 x daily - 7 x weekly - 1-2 sets - 10 reps  - Prone Knee Flexion AROM  - 1 x daily - 7 x weekly - 2 sets - 10 reps  - Prone Quadriceps Stretch with Strap  - 1 x daily - 7 x weekly - 1 sets - 10 reps - 15 hold    Treatment/Session Summary:    Treatment Assessment:   Focused on R knee flexion and extension rom, as well as quad, gastroc and hamstring motor control with single plane gentle strengthening today. Pt fatigues quickly with LE strengthening, but rom is gradually improving.   Communication/Consultation:  None today  Equipment

## 2025-01-08 ENCOUNTER — HOSPITAL ENCOUNTER (OUTPATIENT)
Dept: PHYSICAL THERAPY | Age: 70
Setting detail: RECURRING SERIES
Discharge: HOME | End: 2025-01-11
Attending: ORTHOPAEDIC SURGERY
Payer: MEDICARE

## 2025-01-08 PROCEDURE — 97110 THERAPEUTIC EXERCISES: CPT

## 2025-01-08 PROCEDURE — 97140 MANUAL THERAPY 1/> REGIONS: CPT

## 2025-01-08 ASSESSMENT — PAIN SCALES - GENERAL: PAINLEVEL_OUTOF10: 3

## 2025-01-08 NOTE — PROGRESS NOTES
StefaniOdalis Saleem Marah  : 1955  Primary: [employer] Medicare-advantage Ppo (Medicare Managed)  Secondary:  [address]  [facility] [address]  Phone: [phone]  Fax: [phone] Plan Frequency: 2-3 x week x 6 weeks    Plan of Care/Certification Expiration Date: 25        Plan of Care/Certification Expiration Date:  Plan of Care/Certification Expiration Date: 25    Frequency/Duration:   Plan Frequency: 2-3 x week x 6 weeks      Time In/Out:   Time In: 0850  Time Out: 1005      PT Visit Info:         Visit Count:  25    OUTPATIENT PHYSICAL THERAPY:   Treatment Note 2025       Episode  (S/P Right TKA)               Treatment Diagnosis:    Acute pain of right knee  Stiffness of right knee, not elsewhere classified  Difficulty in walking, not elsewhere classified  Medical/Referring Diagnosis:    S/P total knee arthroplasty, right [Z96.651]    Referring Physician:  Bao Friedman MD MD Orders:  PT Eval and Treat   Return MD Appt:  14   Date of Onset:  Onset Date: 10/15/24     Allergies:   Latex, Cocoa, Other, Peanut (diagnostic), Strawberry extract, and Prednisone  Restrictions/Precautions:   None      Interventions Planned (Treatment may consist of any combination of the following):     See Assessment Note    Subjective Comments:   Patient reports increased tightness with cold weather    Initial Pain Level::     3/10  Post Session Pain Level:       3/10  Medications Last Reviewed:  2025  Updated Objective Findings:      Treatment     MANUAL THERAPY: (15 minutes):   Joint mobilization and Soft tissue mobilization was utilized and necessary because of the patient's restricted joint motion and restricted motion of soft tissue.   Patella mobs all glides grade 3-4, patella tendon mobs  STM/MFR including trigger point release of adductor mass, distal lateral quad, and ITB  Knee prom with overpressure and end range joint mobilizations to improve flexion

## 2025-01-10 ENCOUNTER — HOSPITAL ENCOUNTER (OUTPATIENT)
Dept: PHYSICAL THERAPY | Age: 70
Setting detail: RECURRING SERIES
Discharge: HOME | End: 2025-01-13
Attending: ORTHOPAEDIC SURGERY
Payer: MEDICARE

## 2025-01-10 PROCEDURE — 97140 MANUAL THERAPY 1/> REGIONS: CPT

## 2025-01-10 PROCEDURE — 97110 THERAPEUTIC EXERCISES: CPT

## 2025-01-10 ASSESSMENT — PAIN SCALES - GENERAL: PAINLEVEL_OUTOF10: 3

## 2025-01-10 NOTE — PROGRESS NOTES
Cliff Saleem Marah  : 1955  Primary: [employer] Medicare-advantage Ppo (Medicare Managed)  Secondary:  [address]  [address]  [facility] [address]  Phone: [phone]  Fax: [phone] Plan Frequency: 2-3 x week x 6 weeks    Plan of Care/Certification Expiration Date: 25        Plan of Care/Certification Expiration Date:  Plan of Care/Certification Expiration Date: 25    Frequency/Duration:   Plan Frequency: 2-3 x week x 6 weeks      Time In/Out:   Time In: 830  Time Out: 945      PT Visit Info:         Visit Count:  26    OUTPATIENT PHYSICAL THERAPY:   Treatment Note 1/10/2025       Episode  (S/P Right TKA)               Treatment Diagnosis:    Acute pain of right knee  Stiffness of right knee, not elsewhere classified  Difficulty in walking, not elsewhere classified  Medical/Referring Diagnosis:    S/P total knee arthroplasty, right [Z96.651]    Referring Physician:  Bao Friedman MD MD Orders:  PT Eval and Treat   Return MD Appt:  14   Date of Onset:  Onset Date: 10/15/24     Allergies:   Latex, Cocoa, Other, Peanut (diagnostic), Strawberry extract, and Prednisone  Restrictions/Precautions:   None      Interventions Planned (Treatment may consist of any combination of the following):     See Assessment Note    Subjective Comments:   Patient reports cont knee pain and tightness  Initial Pain Level::     3/10  Post Session Pain Level:       3/10  Medications Last Reviewed:  1/10/2025  Updated Objective Findings:   knee flexion 105 deg  Treatment     MANUAL THERAPY: (15 minutes):   Joint mobilization and Soft tissue mobilization was utilized and necessary because of the patient's restricted joint motion and restricted motion of soft tissue.   Patella mobs all glides grade 3-4, patella tendon mobs  STM/MFR including trigger point release of adductor mass, distal lateral quad, and ITB  Knee prom with overpressure and end range joint mobilizations to

## 2025-01-13 ENCOUNTER — HOSPITAL ENCOUNTER (OUTPATIENT)
Dept: PHYSICAL THERAPY | Age: 70
Setting detail: RECURRING SERIES
Discharge: HOME | End: 2025-01-16
Attending: ORTHOPAEDIC SURGERY
Payer: MEDICARE

## 2025-01-13 PROCEDURE — 97140 MANUAL THERAPY 1/> REGIONS: CPT

## 2025-01-13 PROCEDURE — 97110 THERAPEUTIC EXERCISES: CPT

## 2025-01-13 ASSESSMENT — PAIN SCALES - GENERAL: PAINLEVEL_OUTOF10: 3

## 2025-01-13 NOTE — PROGRESS NOTES
Cliff Saleem Marah  : 1955  Primary: [employer] Medicare-advantage Ppo (Medicare Managed)  Secondary:  [address]  [address]  [facility] [address]  Phone: [phone]  Fax: [phone] Plan Frequency: 2-3 x week x 6 weeks    Plan of Care/Certification Expiration Date: 25        Plan of Care/Certification Expiration Date:  Plan of Care/Certification Expiration Date: 25    Frequency/Duration:   Plan Frequency: 2-3 x week x 6 weeks      Time In/Out:   Time In: 0845  Time Out: 1000      PT Visit Info:         Visit Count:  27    OUTPATIENT PHYSICAL THERAPY:   Treatment Note 2025       Episode  (S/P Right TKA)               Treatment Diagnosis:    Acute pain of right knee  Stiffness of right knee, not elsewhere classified  Difficulty in walking, not elsewhere classified  Medical/Referring Diagnosis:    S/P total knee arthroplasty, right [Z96.651]    Referring Physician:  Bao Friedman MD MD Orders:  PT Eval and Treat   Return MD Appt:  14   Date of Onset:  Onset Date: 10/15/24     Allergies:   Latex, Cocoa, Other, Peanut (diagnostic), Strawberry extract, and Prednisone  Restrictions/Precautions:   None      Interventions Planned (Treatment may consist of any combination of the following):     See Assessment Note    Subjective Comments:   Patient reports cont knee pain and tightness  Initial Pain Level::     3/10  Post Session Pain Level:       3/10  Medications Last Reviewed:  2025  Updated Objective Findings:   knee flexion 105 deg  Treatment     MANUAL THERAPY: (15 minutes):   Joint mobilization and Soft tissue mobilization was utilized and necessary because of the patient's restricted joint motion and restricted motion of soft tissue.   Patella mobs all glides grade 3-4, patella tendon mobs  STM/MFR including trigger point release of adductor mass, distal lateral quad, and ITB  Knee prom with overpressure and end range joint mobilizations to

## 2025-01-15 ENCOUNTER — HOSPITAL ENCOUNTER (OUTPATIENT)
Dept: PHYSICAL THERAPY | Age: 70
Setting detail: RECURRING SERIES
Discharge: HOME | End: 2025-01-18
Attending: ORTHOPAEDIC SURGERY
Payer: MEDICARE

## 2025-01-15 PROCEDURE — 97110 THERAPEUTIC EXERCISES: CPT

## 2025-01-15 ASSESSMENT — PAIN SCALES - GENERAL: PAINLEVEL_OUTOF10: 5

## 2025-01-15 NOTE — PROGRESS NOTES
StefaniOdalis Saleem Marah  : 1955  Primary: [employer] Medicare-advantage Ppo (Medicare Managed)  Secondary:  [address]   [address]  [facility] [address]  Phone: [phone]  Fax: [phone] Plan Frequency: 2-3 x week x 6 weeks    Plan of Care/Certification Expiration Date: 25        Plan of Care/Certification Expiration Date:  Plan of Care/Certification Expiration Date: 25    Frequency/Duration:   Plan Frequency: 2-3 x week x 6 weeks      Time In/Out:   Time In: 45  Time Out: 945      PT Visit Info:         Visit Count:  28    OUTPATIENT PHYSICAL THERAPY:   Treatment Note 1/15/2025       Episode  (S/P Right TKA)               Treatment Diagnosis:    Acute pain of right knee  Stiffness of right knee, not elsewhere classified  Difficulty in walking, not elsewhere classified  Medical/Referring Diagnosis:    S/P total knee arthroplasty, right [Z96.651]    Referring Physician:  Bao Friedman MD MD Orders:  PT Eval and Treat   Return MD Appt:  14   Date of Onset:  Onset Date: 10/15/24     Allergies:   Latex, Cocoa, Other, Peanut (diagnostic), Strawberry extract, and Prednisone  Restrictions/Precautions:   None      Interventions Planned (Treatment may consist of any combination of the following):     See Assessment Note    Subjective Comments:   Patient reports increased pain in knee since monday  Initial Pain Level::     5/10  Post Session Pain Level:       5/10  Medications Last Reviewed:  1/15/2025  Updated Objective Findings:      Treatment     MANUAL THERAPY: ( minutes):   Joint mobilization and Soft tissue mobilization was utilized and necessary because of the patient's restricted joint motion and restricted motion of soft tissue.   Patella mobs all glides grade 3-4, patella tendon mobs  STM/MFR including trigger point release of adductor mass, distal lateral quad, and ITB  Knee prom with overpressure and end range joint mobilizations to improve flexion and

## 2025-01-17 ENCOUNTER — HOSPITAL ENCOUNTER (OUTPATIENT)
Dept: PHYSICAL THERAPY | Age: 70
Setting detail: RECURRING SERIES
Discharge: HOME | End: 2025-01-20
Attending: ORTHOPAEDIC SURGERY
Payer: MEDICARE

## 2025-01-17 PROCEDURE — 97110 THERAPEUTIC EXERCISES: CPT

## 2025-01-17 ASSESSMENT — PAIN SCALES - GENERAL: PAINLEVEL_OUTOF10: 3

## 2025-01-17 NOTE — PROGRESS NOTES
Cliff Saleem Marah  : 1955  Primary: [employer] Medicare-advantage Ppo (Medicare Managed)  Secondary:  [address]   [address]  [facility] [address]  Phone: [phone]  Fax: [phone] Plan Frequency: 2-3 x week x 6 weeks    Plan of Care/Certification Expiration Date: 25        Plan of Care/Certification Expiration Date:  Plan of Care/Certification Expiration Date: 25    Frequency/Duration:   Plan Frequency: 2-3 x week x 6 weeks      Time In/Out:   Time In: 45  Time Out: 945      PT Visit Info:         Visit Count:  29    OUTPATIENT PHYSICAL THERAPY:   Treatment Note 2025       Episode  (S/P Right TKA)               Treatment Diagnosis:    Acute pain of right knee  Stiffness of right knee, not elsewhere classified  Difficulty in walking, not elsewhere classified  Medical/Referring Diagnosis:    S/P total knee arthroplasty, right [Z96.651]    Referring Physician:  Bao Friedman MD MD Orders:  PT Eval and Treat   Return MD Appt:  14   Date of Onset:  Onset Date: 10/15/24     Allergies:   Latex, Cocoa, Other, Peanut (diagnostic), Strawberry extract, and Prednisone  Restrictions/Precautions:   None      Interventions Planned (Treatment may consist of any combination of the following):     See Assessment Note    Subjective Comments:   Patient reports cont stiffness  Initial Pain Level::     3/10  Post Session Pain Level:       3/10  Medications Last Reviewed:  2025  Updated Objective Findings:      Treatment     MANUAL THERAPY: ( minutes):   Joint mobilization and Soft tissue mobilization was utilized and necessary because of the patient's restricted joint motion and restricted motion of soft tissue.   Patella mobs all glides grade 3-4, patella tendon mobs  STM/MFR including trigger point release of adductor mass, distal lateral quad, and ITB  Knee prom with overpressure and end range joint mobilizations to improve flexion and extension.

## 2025-01-20 ENCOUNTER — HOSPITAL ENCOUNTER (OUTPATIENT)
Dept: PHYSICAL THERAPY | Age: 70
Setting detail: RECURRING SERIES
Discharge: HOME | End: 2025-01-23
Attending: ORTHOPAEDIC SURGERY
Payer: MEDICARE

## 2025-01-20 PROCEDURE — 97110 THERAPEUTIC EXERCISES: CPT

## 2025-01-20 ASSESSMENT — PAIN SCALES - GENERAL: PAINLEVEL_OUTOF10: 4

## 2025-01-20 NOTE — PROGRESS NOTES
Odalis Saleem Marah  : 1955  Primary: [employer] Medicare-advantage Ppo (Medicare Managed)  Secondary:  [facility]  [address]   [address]  [facility] [address]  Phone: [phone]  Fax: [phone] Plan Frequency: 2-3 x week x 6 weeks  Plan of Care/Certification Expiration Date: 25        Plan of Care/Certification Expiration Date:  Plan of Care/Certification Expiration Date: 25    Frequency/Duration: Plan Frequency: 2-3 x week x 6 weeks      Time In/Out:   Time In: 45  Time Out: 945      PT Visit Info:         Visit Count:  30                OUTPATIENT PHYSICAL THERAPY:             Progress Report 2025               Episode (S/P Right TKA)         Treatment Diagnosis:     Acute pain of right knee  Stiffness of right knee, not elsewhere classified  Difficulty in walking, not elsewhere classified  Medical/Referring Diagnosis:    S/P total knee arthroplasty, right [Z96.651]    Referring Physician:  Bao Friedman MD MD Orders:  PT Eval and Treat   Return MD Appt:  TBD  Date of Onset:  Onset Date: 10/15/24     Allergies:  Latex, Cocoa, Other, Peanut (diagnostic), Strawberry extract, and Prednisone  Restrictions/Precautions:    None      Medications Last Reviewed:  2025     SUBJECTIVE   History of Injury/Illness (Reason for Referral):  Patient reports having comp latins of right knee pain for 2 years, but over the last year the pain became progressively worse limiting her ADL's, ability to walk along with stiffness pain with.  Patient is now SP Right TKA on 10/15/24, but post op course complicated by poor tolerance with pain medication and being unable to participate in exericises with home PT.  Her medications was changed and patient reports doing somewhat better.  Currently she has difficulty sleeping, walking and with all aspects of ADL's requiring assistance to dress and bath.  Patient now referred to out patient PT for continuation or her total knee

## 2025-01-20 NOTE — PROGRESS NOTES
StefaniOdalis Saleem Marah  : 1955  Primary: [employer] Medicare-advantage Ppo (Medicare Managed)  Secondary:  [address]  [address]  [address]  [facility] [address]  Phone: [phone]  Fax: [phone] Plan Frequency: 2-3 x week x 6 weeks    Plan of Care/Certification Expiration Date: 25        Plan of Care/Certification Expiration Date:  Plan of Care/Certification Expiration Date: 25    Frequency/Duration:   Plan Frequency: 2-3 x week x 6 weeks      Time In/Out:   Time In: 45  Time Out: 945      PT Visit Info:         Visit Count:  30    OUTPATIENT PHYSICAL THERAPY:   Treatment Note 2025       Episode  (S/P Right TKA)               Treatment Diagnosis:    Acute pain of right knee  Stiffness of right knee, not elsewhere classified  Difficulty in walking, not elsewhere classified  Medical/Referring Diagnosis:    S/P total knee arthroplasty, right [Z96.651]    Referring Physician:  Bao Friedman MD MD Orders:  PT Eval and Treat   Return MD Appt:  14   Date of Onset:  Onset Date: 10/15/24     Allergies:   Latex, Cocoa, Other, Peanut (diagnostic), Strawberry extract, and Prednisone  Restrictions/Precautions:   None      Interventions Planned (Treatment may consist of any combination of the following):     See Assessment Note    Subjective Comments:   Patient reports cont stiffness, more pain with cold temperatures  Initial Pain Level::     4/10  Post Session Pain Level:       4/10  Medications Last Reviewed:  2025  Updated Objective Findings:   flexion 105 deg  Treatment     MANUAL THERAPY: ( minutes):   Joint mobilization and Soft tissue mobilization was utilized and necessary because of the patient's restricted joint motion and restricted motion of soft tissue.   Patella mobs all glides grade 3-4, patella tendon mobs  STM/MFR including trigger point release of adductor mass, distal lateral quad, and ITB  Knee prom with overpressure and end range joint

## 2025-01-22 ENCOUNTER — HOSPITAL ENCOUNTER (OUTPATIENT)
Dept: PHYSICAL THERAPY | Age: 70
Setting detail: RECURRING SERIES
End: 2025-01-22
Attending: ORTHOPAEDIC SURGERY
Payer: MEDICARE

## 2025-01-24 ENCOUNTER — HOSPITAL ENCOUNTER (OUTPATIENT)
Dept: PHYSICAL THERAPY | Age: 70
Setting detail: RECURRING SERIES
Discharge: HOME | End: 2025-01-27
Attending: ORTHOPAEDIC SURGERY
Payer: MEDICARE

## 2025-01-24 PROCEDURE — 97110 THERAPEUTIC EXERCISES: CPT

## 2025-01-24 NOTE — PROGRESS NOTES
StefaniOdalis Saleem Marah  : 1955  Primary: [employer] Medicare-advantage Ppo (Medicare Managed)  Secondary:  [address]  [address]  [address]  [facility] [address]  Phone: [phone]  Fax: [phone] Plan Frequency: 2-3 x week x 6 weeks    Plan of Care/Certification Expiration Date: 25        Plan of Care/Certification Expiration Date:  Plan of Care/Certification Expiration Date: 25    Frequency/Duration:   Plan Frequency: 2-3 x week x 6 weeks      Time In/Out:   Time In: 0845  Time Out: 934      PT Visit Info:         Visit Count:  31    OUTPATIENT PHYSICAL THERAPY:   Treatment Note 2025       Episode  (S/P Right TKA)               Treatment Diagnosis:    Acute pain of right knee  Stiffness of right knee, not elsewhere classified  Difficulty in walking, not elsewhere classified  Medical/Referring Diagnosis:    S/P total knee arthroplasty, right [Z96.651]    Referring Physician:  Bao Friedman MD MD Orders:  PT Eval and Treat   Return MD Appt:  14   Date of Onset:  Onset Date: 10/15/24     Allergies:   Latex, Cocoa, Other, Peanut (diagnostic), Strawberry extract, and Prednisone  Restrictions/Precautions:   None      Interventions Planned (Treatment may consist of any combination of the following):     See Assessment Note    Subjective Comments:   Patient reports cont stiffness, more pain with cold temperatures  Initial Pain Level::      /10  Post Session Pain Level:        /10  Medications Last Reviewed:  2025  Updated Objective Findings:   flexion 108 deg  Treatment     MANUAL THERAPY: ( minutes):   Joint mobilization and Soft tissue mobilization was utilized and necessary because of the patient's restricted joint motion and restricted motion of soft tissue.   Patella mobs all glides grade 3-4, patella tendon mobs  STM/MFR including trigger point release of adductor mass, distal lateral quad, and ITB  Knee prom with overpressure and end range joint

## 2025-01-27 ENCOUNTER — HOSPITAL ENCOUNTER (OUTPATIENT)
Dept: PHYSICAL THERAPY | Age: 70
Setting detail: RECURRING SERIES
Discharge: HOME | End: 2025-01-30
Attending: ORTHOPAEDIC SURGERY
Payer: MEDICARE

## 2025-01-27 PROCEDURE — 97110 THERAPEUTIC EXERCISES: CPT

## 2025-01-27 ASSESSMENT — PAIN SCALES - GENERAL: PAINLEVEL_OUTOF10: 3

## 2025-01-27 NOTE — PROGRESS NOTES
StefaniOdalis Saleem Marah  : 1955  Primary: [employer] Medicare-advantage Ppo (Medicare Managed)  Secondary:  [address]  [address]  [address]  [facility] [address]  Phone: [phone]  Fax: [phone] Plan Frequency: 2-3 x week x 6 weeks    Plan of Care/Certification Expiration Date: 25        Plan of Care/Certification Expiration Date:  Plan of Care/Certification Expiration Date: 25    Frequency/Duration:   Plan Frequency: 2-3 x week x 6 weeks      Time In/Out:   Time In: 0845  Time Out: 930      PT Visit Info:         Visit Count:  32    OUTPATIENT PHYSICAL THERAPY:   Treatment Note 2025       Episode  (S/P Right TKA)               Treatment Diagnosis:    Acute pain of right knee  Stiffness of right knee, not elsewhere classified  Difficulty in walking, not elsewhere classified  Medical/Referring Diagnosis:    S/P total knee arthroplasty, right [Z96.651]    Referring Physician:  Bao Friedman MD MD Orders:  PT Eval and Treat   Return MD Appt:  14   Date of Onset:  Onset Date: 10/15/24     Allergies:   Latex, Cocoa, Other, Peanut (diagnostic), Strawberry extract, and Prednisone  Restrictions/Precautions:   None      Interventions Planned (Treatment may consist of any combination of the following):     See Assessment Note    Subjective Comments:   Patient reports cont stiffness, more pain with cold temperatures  Initial Pain Level::     3/10  Post Session Pain Level:        /10  Medications Last Reviewed:  2025  Updated Objective Findings:   flexion 108 deg  Treatment     MANUAL THERAPY: ( minutes):   Joint mobilization and Soft tissue mobilization was utilized and necessary because of the patient's restricted joint motion and restricted motion of soft tissue.   Patella mobs all glides grade 3-4, patella tendon mobs  STM/MFR including trigger point release of adductor mass, distal lateral quad, and ITB  Knee prom with overpressure and end range joint

## 2025-01-29 ENCOUNTER — HOSPITAL ENCOUNTER (OUTPATIENT)
Dept: PHYSICAL THERAPY | Age: 70
Setting detail: RECURRING SERIES
Discharge: HOME | End: 2025-02-01
Attending: ORTHOPAEDIC SURGERY
Payer: MEDICARE

## 2025-01-29 PROCEDURE — 97110 THERAPEUTIC EXERCISES: CPT

## 2025-01-29 ASSESSMENT — PAIN SCALES - GENERAL: PAINLEVEL_OUTOF10: 3

## 2025-01-29 NOTE — PROGRESS NOTES
mobilizations to improve flexion and extension.   THERAPEUTIC EXERCISE: (45 minutes):    Exercises per grid below to improve mobility.  Required minimal verbal cues to promote proper body alignment.  Progressed range and complexity of movement as indicated.      1/20 1/24 1/27 1/29   Activity/Exercise       Patient education/HEP       Nu Step 10 min L4 10 min L4 10 min L4 10 min L4   Knee extension stretch       Quad set       Heel slide 2 x 10 with belt 2 x 10 with belt 2 x 10 with belt 2 x 10 with belt   Calf stretch       SLR       SAQ 5# 2 x 10 5# 2 x 10 5# 2 x 10 5# 2 x 10   bridge SL 2 x 10 SL 2 x 10 SL 2 x 10 SL 2 x 10   Sit to stand 2 x 10, 5# 2 x 10, 5# 2 x 10, 5# 2 x 10, 5#   ambulation   3 laps in healthyself 3 laps in healthyself   LAQ       Hamstring curl with ball Prone with 5# 2 x 10 Prone with 5# 2 x 10 Prone with 5# 2 x 10 Prone with 5# 2 x 10   Weight shift       Calf stretch on incline board       Heel raise       Standing hip abd       Knee extension stretch       Hip abd       squats Trx 2 x 10  Split squat walk in II bars 2 laps  Trx 2 x 10  Split squat walk in II bars 2 laps  Trx 2 x 10 Trx 2 x 10  Split squat walk in II bars 2 laps    Wall slides       Prone knee flexion       Quad stretch       Hmastring and ITB stretches       marching In II bars over hurdles 6 laps In II bars over hurdles 6 laps In II bars over hurdles 6 laps In II bars over hurdles 6 laps   sidestepping - ytb 4 x 40 ft - ytb 4 x 40 ft ytb 4 x 40 ft ytb 4 x 40 ft   Toe touch on step       Step up       Step down       stairs Ascend and descend x 3 Ascend and descend x 3 Ascend and descend x 3 Ascend and descend x 3   Step overs lora       Upright bike 4 min level 1 4 min level 1 10 min level 1 8 min level 1   shuttle    75# 2 x 15   MODALITIES:  (0 minutes) - no charge       *  Vasopneumatic Therapy utilizing the Game Ready system in order to provide analgesia and reduce inflammation and edema.     Body Part: Right knee

## 2025-01-31 ENCOUNTER — HOSPITAL ENCOUNTER (OUTPATIENT)
Dept: PHYSICAL THERAPY | Age: 70
Setting detail: RECURRING SERIES
End: 2025-01-31
Attending: ORTHOPAEDIC SURGERY
Payer: MEDICARE

## 2025-01-31 PROCEDURE — 97110 THERAPEUTIC EXERCISES: CPT

## 2025-01-31 ASSESSMENT — PAIN SCALES - GENERAL: PAINLEVEL_OUTOF10: 3

## 2025-01-31 NOTE — PROGRESS NOTES
Cliff Saleem Marah  : 1955  Primary: [employer] Medicare-advantage Ppo (Medicare Managed)  Secondary:  [facility]  [address]  [address]  [facility] [address]  Phone: [phone]  Fax: [phone] Plan Frequency: 2-3 x week x 6 weeks    Plan of Care/Certification Expiration Date: 25        Plan of Care/Certification Expiration Date:  Plan of Care/Certification Expiration Date: 25    Frequency/Duration:   Plan Frequency: 2-3 x week x 6 weeks      Time In/Out:   Time In: 0845  Time Out: 0940      PT Visit Info:         Visit Count:  34    OUTPATIENT PHYSICAL THERAPY:   Treatment Note 2025       Episode  (S/P Right TKA)               Treatment Diagnosis:    Acute pain of right knee  Stiffness of right knee, not elsewhere classified  Difficulty in walking, not elsewhere classified  Medical/Referring Diagnosis:    S/P total knee arthroplasty, right [Z96.651]    Referring Physician:  Bao Friedman MD MD Orders:  PT Eval and Treat   Return MD Appt:  14   Date of Onset:  Onset Date: 10/15/24     Allergies:   Latex, Cocoa, Other, Peanut (diagnostic), Strawberry extract, and Prednisone  Restrictions/Precautions:   None      Interventions Planned (Treatment may consist of any combination of the following):     See Assessment Note    Subjective Comments:   Patient reports cont stiffness  Initial Pain Level::     3/10  Post Session Pain Level:       3/10  Medications Last Reviewed:  2025  Updated Objective Findings:   flexion 108 deg  Treatment     MANUAL THERAPY: ( minutes):   Joint mobilization and Soft tissue mobilization was utilized and necessary because of the patient's restricted joint motion and restricted motion of soft tissue.   Patella mobs all glides grade 3-4, patella tendon mobs  STM/MFR including trigger point release of adductor mass, distal lateral quad, and ITB  Knee prom with overpressure and end range joint mobilizations to improve flexion and

## 2025-02-05 ENCOUNTER — HOSPITAL ENCOUNTER (OUTPATIENT)
Dept: PHYSICAL THERAPY | Age: 70
Setting detail: RECURRING SERIES
Discharge: HOME | End: 2025-02-08
Attending: ORTHOPAEDIC SURGERY
Payer: MEDICARE

## 2025-02-05 PROCEDURE — 97110 THERAPEUTIC EXERCISES: CPT

## 2025-02-05 NOTE — PROGRESS NOTES
StefaniOdalis Saleem Marah  : 1955  Primary: [employer]-advantage [employer]po (Medicare Managed)  Secondary:  [facility]  [address]  [address]  [facility] [address]  Phone: [phone]  Fax: [phone] Plan Frequency: 2-3 x week x 6 weeks    Plan of Care/Certification Expiration Date: 25        Plan of Care/Certification Expiration Date:  Plan of Care/Certification Expiration Date: 25    Frequency/Duration:   Plan Frequency: 2-3 x week x 6 weeks      Time In/Out:   Time In: 920  Time Out: 1020      PT Visit Info:         Visit Count:  35    OUTPATIENT PHYSICAL THERAPY:   Treatment Note 2025       Episode  (S/P Right TKA)               Treatment Diagnosis:    Acute pain of right knee  Stiffness of right knee, not elsewhere classified  Difficulty in walking, not elsewhere classified  Medical/Referring Diagnosis:    S/P total knee arthroplasty, right [Z96.651]    Referring Physician:  Bao Friedman MD MD Orders:  PT Eval and Treat   Return MD Appt:  14   Date of Onset:  Onset Date: 10/15/24     Allergies:   Latex, Cocoa, Other, Peanut (diagnostic), Strawberry extract, and Prednisone  Restrictions/Precautions:   None      Interventions Planned (Treatment may consist of any combination of the following):     See Assessment Note    Subjective Comments:   Patient reports walking continues to get easier  Initial Pain Level::      /10  Post Session Pain Level:        /10  Medications Last Reviewed:  2025  Updated Objective Findings:   flexion 108 deg  Treatment     MANUAL THERAPY: ( minutes):   Joint mobilization and Soft tissue mobilization was utilized and necessary because of the patient's restricted joint motion and restricted motion of soft tissue.   Patella mobs all glides grade 3-4, patella tendon mobs  STM/MFR including trigger point release of adductor mass, distal lateral quad, and ITB  Knee prom with overpressure and end range joint mobilizations to improve

## 2025-02-07 ENCOUNTER — HOSPITAL ENCOUNTER (OUTPATIENT)
Dept: PHYSICAL THERAPY | Age: 70
Setting detail: RECURRING SERIES
Discharge: HOME | End: 2025-02-10
Attending: ORTHOPAEDIC SURGERY
Payer: MEDICARE

## 2025-02-07 PROCEDURE — 97110 THERAPEUTIC EXERCISES: CPT

## 2025-02-07 ASSESSMENT — PAIN SCALES - GENERAL: PAINLEVEL_OUTOF10: 0

## 2025-02-07 NOTE — PROGRESS NOTES
StefaniOdalis Saleem Marah  : 1955  Primary: [employer]-advantage Ppo (Medicare Managed)  Secondary:  [facility]  [address]  [address]  [facility] [address]  Phone: [phone]  Fax: [phone] Plan Frequency: 2-3 x week x 6 weeks    Plan of Care/Certification Expiration Date: 25        Plan of Care/Certification Expiration Date:  Plan of Care/Certification Expiration Date: 25    Frequency/Duration:   Plan Frequency: 2-3 x week x 6 weeks      Time In/Out:   Time In: 0800  Time Out: 0900      PT Visit Info:         Visit Count:  36    OUTPATIENT PHYSICAL THERAPY:   Treatment Note 2025       Episode  (S/P Right TKA)               Treatment Diagnosis:    Acute pain of right knee  Stiffness of right knee, not elsewhere classified  Difficulty in walking, not elsewhere classified  Medical/Referring Diagnosis:    S/P total knee arthroplasty, right [Z96.651]    Referring Physician:  Bao Friedman MD MD Orders:  PT Eval and Treat   Return MD Appt:  14   Date of Onset:  Onset Date: 10/15/24     Allergies:   Latex, Cocoa, Other, Peanut (diagnostic), Strawberry extract, and Prednisone  Restrictions/Precautions:   None      Interventions Planned (Treatment may consist of any combination of the following):     See Assessment Note    Subjective Comments:   Patient reports stiffness in knee but improving   Initial Pain Level::     0/10  Post Session Pain Level:       0/10  Medications Last Reviewed:  2025  Updated Objective Findings:   flexion 108 deg  Treatment     MANUAL THERAPY: ( minutes):   Joint mobilization and Soft tissue mobilization was utilized and necessary because of the patient's restricted joint motion and restricted motion of soft tissue.   Patella mobs all glides grade 3-4, patella tendon mobs  STM/MFR including trigger point release of adductor mass, distal lateral quad, and ITB  Knee prom with overpressure and end range joint mobilizations to improve

## 2025-02-10 ENCOUNTER — HOSPITAL ENCOUNTER (OUTPATIENT)
Dept: PHYSICAL THERAPY | Age: 70
Setting detail: RECURRING SERIES
Discharge: HOME | End: 2025-02-13
Attending: ORTHOPAEDIC SURGERY
Payer: MEDICARE

## 2025-02-10 PROCEDURE — 97110 THERAPEUTIC EXERCISES: CPT

## 2025-02-10 ASSESSMENT — PAIN SCALES - GENERAL: PAINLEVEL_OUTOF10: 0

## 2025-02-10 NOTE — PROGRESS NOTES
extension.   THERAPEUTIC EXERCISE: (45 minutes):    Exercises per grid below to improve mobility.  Required minimal verbal cues to promote proper body alignment.  Progressed range and complexity of movement as indicated.      1/29 1/31 2/5 2/7 2/10   Activity/Exercise        Patient education/HEP        Nu Step 10 min L4 10 min L4 10 min L5 10 min L5 10 min L5   Knee extension stretch        Quad set        Heel slide 2 x 10 with belt 2 x 10 with belt      Calf stretch        SLR        SAQ 5# 2 x 10 5# 2 x 10      bridge SL 2 x 10 SL 2 x 10      Sit to stand 2 x 10, 5#       ambulation 3 laps in healthyself 3 laps in healthyself 3 laps in healthyself 3 laps in healthyself 3 laps in healthyself   LAQ        Hamstring curl with ball Prone with 5# 2 x 10 Prone with 5# 2 x 10      Weight shift        Calf stretch on incline board        Heel raise        Standing hip abd        Knee extension stretch        Hip abd        squats Trx 2 x 10  Split squat walk in II bars 2 laps  Trx 2 x 10  Split squat walk in II bars 2 laps  Trx 2 x 10  Split squat 2 x 10, 3# Trx 2 x 10  Split squat 2 x 10, 3# Trx 2 x 10  Split squat 2 x 10, 3#   Wall slides        Prone knee flexion        Quad stretch        Hmastring and ITB stretches        marching In II bars over hurdles 6 laps In II bars over hurdles 6 laps In gym 4 laps In gym 4 laps In gym 4 laps   sidestepping ytb 4 x 40 ft ytb 4 x 40 ft Ytb 4 x 40 ft Ytb 4 x 40 ft Ytb 4 x 40 ft   Monster walk    Ytb 4 x 40 ft Ytb 4 x 40 ft   Toe touch on step        Step up        Step down        stairs Ascend and descend x 3 Ascend and descend x 3 Ascend and descend x 3 Ascend and descend x 4 Ascend and descend x 4   Step overs lora        Upright bike 8 min level 1 10 min level 1 10 min level 1 8.5 min level 1 10 min level 1   shuttle 75# 2 x 15 75# 2 x 15 75# 2 x 15 75# 2 x 15 75# 2 x 15   MODALITIES:  (0 minutes) - no charge       *  Vasopneumatic Therapy utilizing the [employer] system

## 2025-02-13 ENCOUNTER — HOSPITAL ENCOUNTER (OUTPATIENT)
Dept: PHYSICAL THERAPY | Age: 70
Setting detail: RECURRING SERIES
Discharge: HOME | End: 2025-02-16
Attending: ORTHOPAEDIC SURGERY
Payer: MEDICARE

## 2025-02-13 PROCEDURE — 97110 THERAPEUTIC EXERCISES: CPT

## 2025-02-13 ASSESSMENT — PAIN SCALES - GENERAL: PAINLEVEL_OUTOF10: 0

## 2025-02-13 NOTE — PROGRESS NOTES
Cliff Saleem Marah  : 1955  Primary: [employer]-advantage Ppo (Medicare Managed)  Secondary:  [facility]  [address]  [address]  [facility] [address]  Phone: [phone]  Fax: [phone] Plan Frequency: 2-3 x week x 6 weeks    Plan of Care/Certification Expiration Date: 25        Plan of Care/Certification Expiration Date:  Plan of Care/Certification Expiration Date: 25    Frequency/Duration:   Plan Frequency: 2-3 x week x 6 weeks      Time In/Out:          PT Visit Info:         Visit Count:  38    OUTPATIENT PHYSICAL THERAPY:   Treatment Note 2025       Episode  (S/P Right TKA)               Treatment Diagnosis:    Acute pain of right knee  Stiffness of right knee, not elsewhere classified  Difficulty in walking, not elsewhere classified  Medical/Referring Diagnosis:    S/P total knee arthroplasty, right [Z96.651]    Referring Physician:  Bao Friedman MD MD Orders:  PT Eval and Treat   Return MD Appt:  14   Date of Onset:  Onset Date: 10/15/24     Allergies:   Latex, Cocoa, Other, Peanut (diagnostic), Strawberry extract, and Prednisone  Restrictions/Precautions:   None      Interventions Planned (Treatment may consist of any combination of the following):     See Assessment Note    Subjective Comments:   Patient reports she is feeling good  Initial Pain Level::     0/10  Post Session Pain Level:        /10  Medications Last Reviewed:  2025  Updated Objective Findings:      Treatment     MANUAL THERAPY: ( minutes):   Joint mobilization and Soft tissue mobilization was utilized and necessary because of the patient's restricted joint motion and restricted motion of soft tissue.   Patella mobs all glides grade 3-4, patella tendon mobs  STM/MFR including trigger point release of adductor mass, distal lateral quad, and ITB  Knee prom with overpressure and end range joint mobilizations to improve flexion and extension.   THERAPEUTIC EXERCISE: (45

## 2025-02-17 ENCOUNTER — APPOINTMENT (OUTPATIENT)
Dept: PHYSICAL THERAPY | Age: 70
End: 2025-02-17
Attending: ORTHOPAEDIC SURGERY
Payer: MEDICARE

## 2025-02-17 NOTE — THERAPY DISCHARGE
Odalis Crystal  : 1955  Primary: [employer]-advantage Ppo (Medicare Managed)  Secondary:  [facility]  [address]  [address]  [facility] [address]  Phone: [phone]  Fax: [phone] Plan Frequency: 2-3 x week x 6 weeks  Plan of Care/Certification Expiration Date: 25          PT Visit Info:         Visit Count:  38                OUTPATIENT PHYSICAL THERAPY:             Discharge Summary 2025               Episode (S/P Right TKA)         Treatment Diagnosis:     Acute pain of right knee  Stiffness of right knee, not elsewhere classified  Difficulty in walking, not elsewhere classified  Medical/Referring Diagnosis:    S/P total knee arthroplasty, right [Z96.651]    Referring Physician:  Bao Friedman MD MD Orders:  PT Eval and Treat   Return MD Appt:  TBD  Date of Onset:  Onset Date: 10/15/24     Allergies:  Latex, Cocoa, Other, Peanut (diagnostic), Strawberry extract, and Prednisone  Restrictions/Precautions:    None      Medications Last Reviewed:  2025     SUBJECTIVE   History of Injury/Illness (Reason for Referral):  Patient reports having comp latins of right knee pain for 2 years, but over the last year the pain became progressively worse limiting her ADL's, ability to walk along with stiffness pain with.  Patient is now SP Right TKA on 10/15/24, but post op course complicated by poor tolerance with pain medication and being unable to participate in exericises with home PT.  Her medications was changed and patient reports doing somewhat better.  Currently she has difficulty sleeping, walking and with all aspects of ADL's requiring assistance to dress and bath.  Patient now referred to out patient PT for continuation or her total knee rehab.  Patient Stated Goal(s):  \"To restore normal function of her knee\"  Initial Pain Level:      0/10   Post Session Pain Level:     0/10  Past Medical History/Comorbidities:   Ms. Crystal  has a past medical history

## 2025-02-21 ENCOUNTER — APPOINTMENT (OUTPATIENT)
Dept: PHYSICAL THERAPY | Age: 70
End: 2025-02-21
Attending: ORTHOPAEDIC SURGERY
Payer: MEDICARE

## 2025-02-24 ENCOUNTER — APPOINTMENT (OUTPATIENT)
Dept: PHYSICAL THERAPY | Age: 70
End: 2025-02-24
Attending: ORTHOPAEDIC SURGERY
Payer: MEDICARE

## 2025-02-28 ENCOUNTER — APPOINTMENT (OUTPATIENT)
Dept: PHYSICAL THERAPY | Age: 70
End: 2025-02-28
Attending: ORTHOPAEDIC SURGERY
Payer: MEDICARE

## (undated) DEVICE — DRAPE SHT 3 QTR PROXIMA 53X77 --

## (undated) DEVICE — CURETTE SURG FOR BNE CEM REM QUIK USE

## (undated) DEVICE — SUTURE VICRYL + SZ 1 L18IN ABSRB UD L36MM CT-1 1/2 CIR VCP841D

## (undated) DEVICE — CLOSURE SKIN FLX NONINVASIVE PRELOC TECHNOLOGY FOR 24IN

## (undated) DEVICE — SUTURE VICRYL SZ 2-0 L36IN ABSRB UD L36MM CT-1 1/2 CIR J945H

## (undated) DEVICE — PADDING CAST W6INXL4YD ST COT COHESIVE HND TEARABLE SPEC

## (undated) DEVICE — BNDG,ELSTC,MATRIX,STRL,6"X5YD,LF,HOOK&LP: Brand: MEDLINE

## (undated) DEVICE — SOLUTION IRRIG 3000ML 0.9% SOD CHL USP UROMATIC PLAS CONT

## (undated) DEVICE — MASTISOL ADHESIVE LIQ 2/3ML

## (undated) DEVICE — INTENDED FOR TISSUE SEPARATION, AND OTHER PROCEDURES THAT REQUIRE A SHARP SURGICAL BLADE TO PUNCTURE OR CUT.: Brand: BARD-PARKER SAFETY BLADES SIZE 15, STERILE

## (undated) DEVICE — CARDINAL HEALTH FLEXIBLE LIGHT HANDLE COVER: Brand: CARDINAL HEALTH

## (undated) DEVICE — SUTURE VCRL SZ 2-0 L27IN ABSRB UD L36MM CP-1 1/2 CIR REV J266H

## (undated) DEVICE — SOLUTION IV 1000ML 0.9% SOD CHL

## (undated) DEVICE — YANKAUER,FLEXIBLE HANDLE,REGLR CAPACITY: Brand: MEDLINE INDUSTRIES, INC.

## (undated) DEVICE — 2000CC GUARDIAN II: Brand: GUARDIAN

## (undated) DEVICE — HANDPIECE SET WITH COAXIAL HIGH FLOW TIP AND SUCTION TUBE: Brand: INTERPULSE

## (undated) DEVICE — SUIT SURG ISOLATN ZIP TOGA XL T7 +

## (undated) DEVICE — NEEDLE HYPO 21GA L1.5IN INTRAMUSCULAR S STL LATCH BVL UP

## (undated) DEVICE — SUTURE MCRYL SZ 3-0 L27IN ABSRB UD L24MM PS-1 3/8 CIR PRIM Y936H

## (undated) DEVICE — TUBING PMP L16FT MAIN DISP FOR AR-6400 AR-6475

## (undated) DEVICE — SUTURE ABS ANTIBACT 1-0 CTX 24IN STRATAFIX PDS+ SXPP1A445

## (undated) DEVICE — KENDALL SCD EXPRESS SLEEVES, KNEE LENGTH, MEDIUM: Brand: KENDALL SCD

## (undated) DEVICE — REM POLYHESIVE ADULT PATIENT RETURN ELECTRODE: Brand: VALLEYLAB

## (undated) DEVICE — APPLICATOR MEDICATED 26 CC SOLUTION HI LT ORNG CHLORAPREP

## (undated) DEVICE — STERILE PVP: Brand: MEDLINE INDUSTRIES, INC.

## (undated) DEVICE — 3M™ IOBAN™ 2 ANTIMICROBIAL INCISE DRAPE 6651EZ: Brand: IOBAN™ 2

## (undated) DEVICE — PACK PROCEDURE SURG POST LAMINECTOMY CDS

## (undated) DEVICE — SYRINGE, LUER LOCK, 60ML: Brand: MEDLINE

## (undated) DEVICE — 1010 S-DRAPE TOWEL DRAPE 10/BX: Brand: STERI-DRAPE™

## (undated) DEVICE — 3M™ STERI-STRIP™ REINFORCED ADHESIVE SKIN CLOSURES, R1548, 1 IN X 5 IN (25 MM X 125 MM), 4 STRIPS/ENVELOPE: Brand: 3M™ STERI-STRIP™

## (undated) DEVICE — WAX SURG 2.5GM HEMSTAT BNE BEESWAX PARAFFIN ISO PALMITATE

## (undated) DEVICE — SUTURE VCRL + 3-0 L27IN ABSRB UD PS-2 L19MM 3/8 CIR PRIM VCP427H

## (undated) DEVICE — KNEE ARTHROSCOPY DR KOCH: Brand: MEDLINE INDUSTRIES, INC.

## (undated) DEVICE — PAD,ABDOMINAL,5"X9",ST,LF,25/BX: Brand: MEDLINE INDUSTRIES, INC.

## (undated) DEVICE — BANDAGE COMPR W6INXL12FT SMOOTH FOR LIMB EXSANG ESMARCH

## (undated) DEVICE — DRAPE TWL SURG 16X26IN BLU ORB04] ALLCARE INC]

## (undated) DEVICE — SUTURE VCRL SZ 1 L27IN ABSRB UD L36MM CP-1 1/2 CIR REV CUT J268H

## (undated) DEVICE — CLOSURE SKIN FACILITATES COMPATIBILITY W/ CERTAIN IS DSG

## (undated) DEVICE — BLADE SHV L13CM DIA4MM CVD EXCALIBUR AGG COOLCUT

## (undated) DEVICE — DRAPE XR C ARM 41X74IN LF --

## (undated) DEVICE — SYR 10ML CTRL LR LCK NSAF LF --

## (undated) DEVICE — 5.0MM PRECISION ROUND

## (undated) DEVICE — KIT POS W/ FOAM ARM CRADL SHEARGUARD CHST PD CVR FOR SPNL

## (undated) DEVICE — 3M™ IOBAN™ 2 ANTIMICROBIAL INCISE DRAPE 6650EZ: Brand: IOBAN™ 2

## (undated) DEVICE — STANDARD HYPODERMIC NEEDLE,POLYPROPYLENE HUB: Brand: MONOJECT

## (undated) DEVICE — FLOSEAL MATRIX IS INDICATED IN SURGICAL PROCEDURES (OTHER THAN IN OPHTHALMIC) AS AN ADJUNCT TO HEMOSTASIS WHEN CONTROL OF BLEEDING BY LIGATURE OR CONVENTIONALPROCEDURES IS INEFFECTIVE OR IMPRACTICAL.: Brand: FLOSEAL HEMOSTATIC MATRIX

## (undated) DEVICE — NDL SUT TAPR PT 0.5 CIR 4 NS --

## (undated) DEVICE — INTENDED FOR TISSUE SEPARATION, AND OTHER PROCEDURES THAT REQUIRE A SHARP SURGICAL BLADE TO PUNCTURE OR CUT.: Brand: BARD-PARKER SAFETY BLADES SIZE 10, STERILE

## (undated) DEVICE — DRAPE,U/SHT,SPLIT,FILM,60X84,STERILE: Brand: MEDLINE

## (undated) DEVICE — GOWN,PREVENTION PLUS,XL,ST,24/CS: Brand: MEDLINE

## (undated) DEVICE — DRAPE,TOP,102X53,STERILE: Brand: MEDLINE

## (undated) DEVICE — STOCKINETTE,DOUBLE PLY,4X48,STERILE: Brand: MEDLINE

## (undated) DEVICE — STERILE SYNTHETIC POLYISOPRENE POWDER-FREE SURGICAL GLOVES WITH HYDROGEL COATING, SMOOTH FINISH, STRAIGHT FINGER: Brand: PROTEXIS

## (undated) DEVICE — (D)PREP SKN CHLRAPRP APPL 26ML -- CONVERT TO ITEM 371833

## (undated) DEVICE — GLOVE SURG SZ 8 L12IN FNGR THK79MIL GRN LTX FREE

## (undated) DEVICE — 3M™ STERI-DRAPE™ U-DRAPE 1015: Brand: STERI-DRAPE™

## (undated) DEVICE — SOLUTION IRRIG 3000ML 0.9% SOD CHL FLX CONT 0797208] ICU MEDICAL INC]

## (undated) DEVICE — TOTAL KNEE DR KAVOLUS: Brand: MEDLINE INDUSTRIES, INC.

## (undated) DEVICE — PADDING CAST W4INXL4YD ST COT COHESIVE HND TEARABLE SPEC

## (undated) DEVICE — SUPPORT POS LEG COND DISP PD FOR TOT KNEE REPL

## (undated) DEVICE — HOOD: Brand: T7PLUS

## (undated) DEVICE — SUTURE VICRYL SZ 0 L27IN ABSRB UD L36MM CT-1 1/2 CIR J260H

## (undated) DEVICE — BANDAGE COBAN 6 IN WND 6INX5YD FOAM

## (undated) DEVICE — SUTURE NRLN SZ 4-0 L18IN NONABSORBABLE BLK L13MM TF 1/2 CIR C584D